# Patient Record
Sex: FEMALE | Race: WHITE
[De-identification: names, ages, dates, MRNs, and addresses within clinical notes are randomized per-mention and may not be internally consistent; named-entity substitution may affect disease eponyms.]

---

## 2017-09-01 ENCOUNTER — HOSPITAL ENCOUNTER (INPATIENT)
Dept: HOSPITAL 17 - NEPC | Age: 62
LOS: 22 days | Discharge: HOME | DRG: 545 | End: 2017-09-23
Attending: HOSPITALIST | Admitting: HOSPITALIST
Payer: SELF-PAY

## 2017-09-01 VITALS
RESPIRATION RATE: 16 BRPM | DIASTOLIC BLOOD PRESSURE: 80 MMHG | HEART RATE: 58 BPM | OXYGEN SATURATION: 97 % | SYSTOLIC BLOOD PRESSURE: 165 MMHG

## 2017-09-01 VITALS
SYSTOLIC BLOOD PRESSURE: 165 MMHG | OXYGEN SATURATION: 97 % | DIASTOLIC BLOOD PRESSURE: 80 MMHG | HEART RATE: 62 BPM | RESPIRATION RATE: 14 BRPM

## 2017-09-01 VITALS — HEART RATE: 61 BPM | OXYGEN SATURATION: 98 % | RESPIRATION RATE: 14 BRPM

## 2017-09-01 VITALS
RESPIRATION RATE: 16 BRPM | OXYGEN SATURATION: 95 % | SYSTOLIC BLOOD PRESSURE: 142 MMHG | DIASTOLIC BLOOD PRESSURE: 68 MMHG | HEART RATE: 58 BPM

## 2017-09-01 VITALS — BODY MASS INDEX: 42.58 KG/M2 | HEIGHT: 59 IN | WEIGHT: 211.2 LBS

## 2017-09-01 DIAGNOSIS — G47.00: ICD-10-CM

## 2017-09-01 DIAGNOSIS — R79.1: ICD-10-CM

## 2017-09-01 DIAGNOSIS — Z88.6: ICD-10-CM

## 2017-09-01 DIAGNOSIS — I50.9: ICD-10-CM

## 2017-09-01 DIAGNOSIS — J44.9: ICD-10-CM

## 2017-09-01 DIAGNOSIS — Z87.11: ICD-10-CM

## 2017-09-01 DIAGNOSIS — F32.9: ICD-10-CM

## 2017-09-01 DIAGNOSIS — M32.14: Primary | ICD-10-CM

## 2017-09-01 DIAGNOSIS — F43.23: ICD-10-CM

## 2017-09-01 DIAGNOSIS — I25.10: ICD-10-CM

## 2017-09-01 DIAGNOSIS — H91.93: ICD-10-CM

## 2017-09-01 DIAGNOSIS — K74.60: ICD-10-CM

## 2017-09-01 DIAGNOSIS — I13.0: ICD-10-CM

## 2017-09-01 DIAGNOSIS — Z91.011: ICD-10-CM

## 2017-09-01 DIAGNOSIS — K59.00: ICD-10-CM

## 2017-09-01 DIAGNOSIS — K29.50: ICD-10-CM

## 2017-09-01 DIAGNOSIS — N18.9: ICD-10-CM

## 2017-09-01 DIAGNOSIS — E03.9: ICD-10-CM

## 2017-09-01 DIAGNOSIS — M19.90: ICD-10-CM

## 2017-09-01 DIAGNOSIS — K85.90: ICD-10-CM

## 2017-09-01 DIAGNOSIS — K22.2: ICD-10-CM

## 2017-09-01 DIAGNOSIS — E11.22: ICD-10-CM

## 2017-09-01 DIAGNOSIS — I25.2: ICD-10-CM

## 2017-09-01 DIAGNOSIS — K75.4: ICD-10-CM

## 2017-09-01 DIAGNOSIS — K70.9: ICD-10-CM

## 2017-09-01 DIAGNOSIS — N20.0: ICD-10-CM

## 2017-09-01 DIAGNOSIS — Z86.73: ICD-10-CM

## 2017-09-01 DIAGNOSIS — N17.9: ICD-10-CM

## 2017-09-01 LAB
ANION GAP SERPL CALC-SCNC: 6 MEQ/L (ref 5–15)
APTT BLD: 34 SEC (ref 24.3–30.1)
BASOPHILS # BLD AUTO: 0.1 TH/MM3 (ref 0–0.2)
BASOPHILS NFR BLD: 1 % (ref 0–2)
BUN SERPL-MCNC: 31 MG/DL (ref 7–18)
CHLORIDE SERPL-SCNC: 102 MEQ/L (ref 98–107)
CK MB SERPL-MCNC: 3.6 NG/ML (ref 0.5–3.6)
CK SERPL-CCNC: 205 U/L (ref 26–192)
EOSINOPHIL # BLD: 0.2 TH/MM3 (ref 0–0.4)
EOSINOPHIL NFR BLD: 2.9 % (ref 0–4)
ERYTHROCYTE [DISTWIDTH] IN BLOOD BY AUTOMATED COUNT: 15.9 % (ref 11.6–17.2)
GFR SERPLBLD BASED ON 1.73 SQ M-ARVRAT: 14 ML/MIN (ref 89–?)
HCO3 BLD-SCNC: 26.4 MEQ/L (ref 21–32)
HCT VFR BLD CALC: 30.6 % (ref 35–46)
HEMO FLAGS: (no result)
INR PPP: 1 RATIO
LYMPHOCYTES # BLD AUTO: 2.8 TH/MM3 (ref 1–4.8)
LYMPHOCYTES NFR BLD AUTO: 46.9 % (ref 9–44)
MAGNESIUM SERPL-MCNC: 1.9 MG/DL (ref 1.5–2.5)
MCH RBC QN AUTO: 31.5 PG (ref 27–34)
MCHC RBC AUTO-ENTMCNC: 33.7 % (ref 32–36)
MCV RBC AUTO: 93.5 FL (ref 80–100)
MONOCYTES NFR BLD: 8.5 % (ref 0–8)
NEUTROPHILS # BLD AUTO: 2.5 TH/MM3 (ref 1.8–7.7)
NEUTROPHILS NFR BLD AUTO: 40.7 % (ref 16–70)
PLATELET # BLD: 229 TH/MM3 (ref 150–450)
POTASSIUM SERPL-SCNC: 4 MEQ/L (ref 3.5–5.1)
PROTHROMBIN TIME: 11.6 SEC (ref 9.8–11.6)
RBC # BLD AUTO: 3.27 MIL/MM3 (ref 4–5.3)
SODIUM SERPL-SCNC: 134 MEQ/L (ref 136–145)
WBC # BLD AUTO: 6 TH/MM3 (ref 4–11)

## 2017-09-01 PROCEDURE — 80053 COMPREHEN METABOLIC PANEL: CPT

## 2017-09-01 PROCEDURE — 82728 ASSAY OF FERRITIN: CPT

## 2017-09-01 PROCEDURE — 83036 HEMOGLOBIN GLYCOSYLATED A1C: CPT

## 2017-09-01 PROCEDURE — 82784 ASSAY IGA/IGD/IGG/IGM EACH: CPT

## 2017-09-01 PROCEDURE — 83516 IMMUNOASSAY NONANTIBODY: CPT

## 2017-09-01 PROCEDURE — 82787 IGG 1 2 3 OR 4 EACH: CPT

## 2017-09-01 PROCEDURE — 94664 DEMO&/EVAL PT USE INHALER: CPT

## 2017-09-01 PROCEDURE — 86376 MICROSOMAL ANTIBODY EACH: CPT

## 2017-09-01 PROCEDURE — 76937 US GUIDE VASCULAR ACCESS: CPT

## 2017-09-01 PROCEDURE — 96375 TX/PRO/DX INJ NEW DRUG ADDON: CPT

## 2017-09-01 PROCEDURE — 96374 THER/PROPH/DIAG INJ IV PUSH: CPT

## 2017-09-01 PROCEDURE — 83520 IMMUNOASSAY QUANT NOS NONAB: CPT

## 2017-09-01 PROCEDURE — 85027 COMPLETE CBC AUTOMATED: CPT

## 2017-09-01 PROCEDURE — 82948 REAGENT STRIP/BLOOD GLUCOSE: CPT

## 2017-09-01 PROCEDURE — 83690 ASSAY OF LIPASE: CPT

## 2017-09-01 PROCEDURE — 77012 CT SCAN FOR NEEDLE BIOPSY: CPT

## 2017-09-01 PROCEDURE — C1769 GUIDE WIRE: HCPCS

## 2017-09-01 PROCEDURE — 86255 FLUORESCENT ANTIBODY SCREEN: CPT

## 2017-09-01 PROCEDURE — 82390 ASSAY OF CERULOPLASMIN: CPT

## 2017-09-01 PROCEDURE — 86160 COMPLEMENT ANTIGEN: CPT

## 2017-09-01 PROCEDURE — 81001 URINALYSIS AUTO W/SCOPE: CPT

## 2017-09-01 PROCEDURE — 80307 DRUG TEST PRSMV CHEM ANLYZR: CPT

## 2017-09-01 PROCEDURE — 83540 ASSAY OF IRON: CPT

## 2017-09-01 PROCEDURE — A9540 TC99M MAA: HCPCS

## 2017-09-01 PROCEDURE — 85025 COMPLETE CBC W/AUTO DIFF WBC: CPT

## 2017-09-01 PROCEDURE — 86021 WBC ANTIBODY IDENTIFICATION: CPT

## 2017-09-01 PROCEDURE — 84443 ASSAY THYROID STIM HORMONE: CPT

## 2017-09-01 PROCEDURE — 85610 PROTHROMBIN TIME: CPT

## 2017-09-01 PROCEDURE — 82105 ALPHA-FETOPROTEIN SERUM: CPT

## 2017-09-01 PROCEDURE — 93005 ELECTROCARDIOGRAM TRACING: CPT

## 2017-09-01 PROCEDURE — 85730 THROMBOPLASTIN TIME PARTIAL: CPT

## 2017-09-01 PROCEDURE — 85379 FIBRIN DEGRADATION QUANT: CPT

## 2017-09-01 PROCEDURE — 85652 RBC SED RATE AUTOMATED: CPT

## 2017-09-01 PROCEDURE — 84439 ASSAY OF FREE THYROXINE: CPT

## 2017-09-01 PROCEDURE — 70450 CT HEAD/BRAIN W/O DYE: CPT

## 2017-09-01 PROCEDURE — A9567 TECHNETIUM TC-99M AEROSOL: HCPCS

## 2017-09-01 PROCEDURE — 83550 IRON BINDING TEST: CPT

## 2017-09-01 PROCEDURE — 82552 ASSAY OF CPK IN BLOOD: CPT

## 2017-09-01 PROCEDURE — 82550 ASSAY OF CK (CPK): CPT

## 2017-09-01 PROCEDURE — 88305 TISSUE EXAM BY PATHOLOGIST: CPT

## 2017-09-01 PROCEDURE — 86225 DNA ANTIBODY NATIVE: CPT

## 2017-09-01 PROCEDURE — 71010: CPT

## 2017-09-01 PROCEDURE — 88312 SPECIAL STAINS GROUP 1: CPT

## 2017-09-01 PROCEDURE — 74176 CT ABD & PELVIS W/O CONTRAST: CPT

## 2017-09-01 PROCEDURE — 80048 BASIC METABOLIC PNL TOTAL CA: CPT

## 2017-09-01 PROCEDURE — 86038 ANTINUCLEAR ANTIBODIES: CPT

## 2017-09-01 PROCEDURE — 82947 ASSAY GLUCOSE BLOOD QUANT: CPT

## 2017-09-01 PROCEDURE — 84484 ASSAY OF TROPONIN QUANT: CPT

## 2017-09-01 PROCEDURE — 86039 ANTINUCLEAR ANTIBODIES (ANA): CPT

## 2017-09-01 PROCEDURE — 87086 URINE CULTURE/COLONY COUNT: CPT

## 2017-09-01 PROCEDURE — 78582 LUNG VENTILAT&PERFUS IMAGING: CPT

## 2017-09-01 PROCEDURE — 76705 ECHO EXAM OF ABDOMEN: CPT

## 2017-09-01 PROCEDURE — 86140 C-REACTIVE PROTEIN: CPT

## 2017-09-01 PROCEDURE — 50200 RENAL BIOPSY PERQ: CPT

## 2017-09-01 PROCEDURE — 76775 US EXAM ABDO BACK WALL LIM: CPT

## 2017-09-01 PROCEDURE — 83735 ASSAY OF MAGNESIUM: CPT

## 2017-09-01 PROCEDURE — 80074 ACUTE HEPATITIS PANEL: CPT

## 2017-09-01 PROCEDURE — 94640 AIRWAY INHALATION TREATMENT: CPT

## 2017-09-01 PROCEDURE — 80061 LIPID PANEL: CPT

## 2017-09-01 NOTE — RADRPT
EXAM DATE/TIME:  09/01/2017 22:32 

 

[facility] COMPARISON:     

CHEST SINGLE AP, June 03, 2016, 16:28.

 

                     

INDICATIONS :     

Chest pain and shortness of breath.

                     

 

MEDICAL HISTORY :     

Chronic obstructive pulmonary disease.  Diabetes mellitus type II.        

 

SURGICAL HISTORY :     

None.   

 

ENCOUNTER:     

Initial                                        

 

ACUITY:     

2 days      

 

PAIN SCORE:     

10/10

 

LOCATION:     

Bilateral chest 

 

FINDINGS:     

A single view of the chest demonstrates minimal patchy areas of density at the lateral bases bilatera
lly being more prominent left. The upper lungs are relatively clear. No effusion is seen. The cardiom
ediastinal contours are unremarkable.  Osseous structures are intact.

 

CONCLUSION:     

Minimal patchy areas of consolidation or atelectasis at the lateral bases.

 

 

 

 Scooter Tatum MD on September 01, 2017 at 23:43           

Board Certified Radiologist.

 This report was verified electronically.

## 2017-09-01 NOTE — PD
HPI


Chief Complaint:  Chest Pain


Time Seen by Provider:  22:17


Travel History


International Travel<30 days:  No


Contact w/Intl Traveler<30days:  No


Traveled to known affect area:  No





History of Present Illness


HPI


62-year-old female that presents to the ED for evaluation of chest pain.  

Patient developed chest pain since yesterday.  Per patient is become more 

severe and more painful.  Per patient he comes and goes.  Per patient the pain 

is 7 out of 10.  She denies any recent travel.  No injury.  Gets short of 

breath when it happens.  Per patient he last for a couple seconds but is 

progressively getting more and more consistent.  She states that she has a 

history of MI in the past.  She denies having any stents or bypass.  She has no 

cartilage is in the area.  She has any drugs or alcohol.  She takes no 

medications.  She has any problems with diabetes or high blood pressure.  She 

has an allergy to milk.





PFSH


Past Medical History


Hx Anticoagulant Therapy:  No


Blood Disorders:  No


Depression:  Yes


Heart Rhythm Problems:  Yes


Cancer:  No


Cardiovascular Problems:  Yes (HTN)


Chemotherapy:  No


Chest Pain:  Yes (ENLARGED HEART)


Congestive Heart Failure:  Yes


COPD:  Yes


Cerebrovascular Accident:  Yes


Diabetes:  Yes (pt stated she used to have DM)


Patient Takes Glucophage:  No


Diminished Hearing:  Yes (ana luisa but right side is worse )


Endocrine:  No


Genitourinary:  Yes (UTI'S)


Headaches:  No


Hypertension:  Yes


Kidney Stones:  No


Musculoskeletal:  Yes (BONES ACHE)


Psychiatric:  No


Reproductive:  No


Respiratory:  Yes (COPD,SOB)


Pneumonia:  Yes


Radiation Therapy:  No


Seizures:  No


Thyroid Disease:  Yes (HYPOTHYROID)


Menopausal:  Yes


Tubal Ligation:  Yes





Past Surgical History


Appendectomy:  Yes


 Section:  Yes


Cholecystectomy:  Yes


Hysterectomy:  Yes


Other Surgery:  Yes





Social History


Alcohol Use:  No


Tobacco Use:  No


Substance Use:  No





Allergies-Medications


(Allergen,Severity, Reaction):  


Coded Allergies:  


     milk (Unverified  Allergy, Mild, Cramping, 8/15/17)


 lactose intolerant


Reported Meds & Prescriptions





Reported Meds & Active Scripts


Active


Losartan (Losartan Potassium) 50 Mg Tab 50 Mg PO DAILY


Advair Diskus Inh (Fluticasone-Salmeterol Inh) 250-50 Mcg/Blist Aer 1 Puff INH 

BID


     Rinse mouth after use.


Ventolin Hfa 18 GM Inh (Albuterol Sulfate) 90 Mcg/Act Aer 2 Puff INH Q4-6H PRN


Reported


Synthroid (Levothyroxine Sodium) 150 Mcg Tab 150 Mcg PO DAILY








Review of Systems


Except as stated in HPI:  all other systems reviewed are Neg





Physical Exam


Narrative


GENERAL: 


SKIN: Warm and dry.


HEAD: Atraumatic. Normocephalic. 


EYES: Pupils equal and round. No scleral icterus. No injection or drainage. 


ENT: No nasal bleeding or discharge.  Mucous membranes pink and moist.  Tongue 

is midline.  No uvula deviation.


NECK: Trachea midline. No JVD. 


CARDIOVASCULAR: Regular rate and rhythm.  No murmurs, S3, S4.


RESPIRATORY: No accessory muscle use. Clear to auscultation. Breath sounds 

equal bilaterally. 


GASTROINTESTINAL: Abdomen soft, non-tender, nondistended. Hepatic and splenic 

margins not palpable. 


MUSCULOSKELETAL: Extremities without clubbing, cyanosis, or edema. No obvious 

deformities.  Full range of motion of the upper and lower extremities 

bilaterally.  2+ pulses bilaterally.


NEUROLOGICAL: Awake and alert. No obvious cranial nerve deficits.  Motor 

grossly within normal limits. Five out of 5 muscle strength in the arms and 

legs.  Normal speech.


PSYCHIATRIC: Appropriate mood and affect; insight and judgment normal.





Data


Data


Last Documented VS





Vital Signs








  Date Time  Temp Pulse Resp B/P (MAP) Pulse Ox O2 Delivery O2 Flow Rate FiO2


 


17 22:15  57   95 Room Air  


 


17 22:15   14    2.00 


 


17 22:08    142/68 (92)    








Orders





 Orders


Electrocardiogram (17 22:17)


Basic Metabolic Panel (Bmp) (17 22:17)


Ckmb (Isoenzyme) Profile (17 22:17)


Complete Blood Count With Diff (17 22:17)


D-Dimer (17 22:17)


Magnesium (Mg) (17 22:17)


Prothrombin Time / Inr (Pt) (17 22:17)


Act Partial Throm Time (Ptt) (17 22:17)


Troponin I (17 22:17)


Lipase (17 22:17)


Chest, Single Ap (17 22:17)


Ecg Monitoring (17 22:17)


Bilateral Bp Monitoring (17 22:17)


Iv Access Insert/Monitor (17 22:17)


Oximetry (17 22:17)


Sodium Chloride 0.9% Flush (Ns Flush) (17 22:30)


Nitroglycerin Sl (Nitrostat Sl) (17 22:30)


Morphine Inj (Morphine Inj) (17 22:30)


Ondansetron Inj (Zofran Inj) (17 22:30)








MDM


Medical Decision Making


Medical Screen Exam Complete:  Yes


Emergency Medical Condition:  Yes


Medical Record Reviewed:  Yes


Differential Diagnosis


Chest pain versus a typical chest pain versus PE versus ACS versus gastritis


Narrative Course


62-year-old female that presents to the ED for evaluation of chest pain.  

Patient was properly examined and was found to have signs and symptoms 

consistent appears to be chest pain.  Unclear etiology.  Labs and imaging 

ordered.  Case signed out to my attending pending dispo and treatment plan.











Rodriguez Kate Sep 1, 2017 22:26

## 2017-09-02 VITALS
TEMPERATURE: 97.6 F | RESPIRATION RATE: 18 BRPM | DIASTOLIC BLOOD PRESSURE: 87 MMHG | SYSTOLIC BLOOD PRESSURE: 178 MMHG | HEART RATE: 67 BPM | OXYGEN SATURATION: 95 %

## 2017-09-02 VITALS
DIASTOLIC BLOOD PRESSURE: 84 MMHG | TEMPERATURE: 97.8 F | RESPIRATION RATE: 16 BRPM | OXYGEN SATURATION: 93 % | HEART RATE: 53 BPM | SYSTOLIC BLOOD PRESSURE: 179 MMHG

## 2017-09-02 VITALS
DIASTOLIC BLOOD PRESSURE: 86 MMHG | OXYGEN SATURATION: 94 % | SYSTOLIC BLOOD PRESSURE: 151 MMHG | HEART RATE: 63 BPM | TEMPERATURE: 96.7 F | RESPIRATION RATE: 18 BRPM

## 2017-09-02 VITALS
RESPIRATION RATE: 18 BRPM | HEART RATE: 62 BPM | DIASTOLIC BLOOD PRESSURE: 93 MMHG | SYSTOLIC BLOOD PRESSURE: 180 MMHG | TEMPERATURE: 96.4 F | OXYGEN SATURATION: 95 %

## 2017-09-02 VITALS — DIASTOLIC BLOOD PRESSURE: 79 MMHG | SYSTOLIC BLOOD PRESSURE: 156 MMHG

## 2017-09-02 VITALS — OXYGEN SATURATION: 96 %

## 2017-09-02 VITALS
HEART RATE: 50 BPM | DIASTOLIC BLOOD PRESSURE: 68 MMHG | SYSTOLIC BLOOD PRESSURE: 141 MMHG | TEMPERATURE: 96.7 F | OXYGEN SATURATION: 97 % | RESPIRATION RATE: 16 BRPM

## 2017-09-02 VITALS — OXYGEN SATURATION: 92 %

## 2017-09-02 VITALS — HEART RATE: 52 BPM

## 2017-09-02 VITALS — OXYGEN SATURATION: 93 %

## 2017-09-02 LAB
ALP SERPL-CCNC: 150 U/L (ref 45–117)
ALT SERPL-CCNC: 35 U/L (ref 10–53)
ANION GAP SERPL CALC-SCNC: 5 MEQ/L (ref 5–15)
AST SERPL-CCNC: 68 U/L (ref 15–37)
BASOPHILS # BLD AUTO: 0.1 TH/MM3 (ref 0–0.2)
BASOPHILS NFR BLD: 1.5 % (ref 0–2)
BILIRUB SERPL-MCNC: 0.3 MG/DL (ref 0.2–1)
BUN SERPL-MCNC: 30 MG/DL (ref 7–18)
CHLORIDE SERPL-SCNC: 104 MEQ/L (ref 98–107)
CK MB SERPL-MCNC: 6.5 NG/ML (ref 0.5–3.6)
CK SERPL-CCNC: 186 U/L (ref 26–192)
CK SERPL-CCNC: 244 U/L (ref 26–192)
COLOR UR: YELLOW
COMMENT (UR): (no result)
CULTURE IF INDICATED: (no result)
EOSINOPHIL # BLD: 0.2 TH/MM3 (ref 0–0.4)
EOSINOPHIL NFR BLD: 3.2 % (ref 0–4)
ERYTHROCYTE [DISTWIDTH] IN BLOOD BY AUTOMATED COUNT: 16.1 % (ref 11.6–17.2)
GFR SERPLBLD BASED ON 1.73 SQ M-ARVRAT: 15 ML/MIN (ref 89–?)
GLUCOSE UR STRIP-MCNC: (no result) MG/DL
HCO3 BLD-SCNC: 25.4 MEQ/L (ref 21–32)
HCT VFR BLD CALC: 30.4 % (ref 35–46)
HDLC SERPL-MCNC: 46.5 MG/DL (ref 40–60)
HEMO FLAGS: (no result)
HEMOGLOBIN A1A: 1.2 %
HEMOGLOBIN A1B: 1.5 %
HEMOGLOBIN AO: 86.1 %
HEMOGLOBIN LA1C: 1.8 %
HEMOGLOBIN P3: 3.5 %
HGB UR QL STRIP: (no result)
KETONES UR STRIP-MCNC: (no result) MG/DL
LDLC SERPL-MCNC: 95 MG/DL (ref 0–99)
LYMPHOCYTES # BLD AUTO: 2 TH/MM3 (ref 1–4.8)
LYMPHOCYTES NFR BLD AUTO: 40.6 % (ref 9–44)
MCH RBC QN AUTO: 31.2 PG (ref 27–34)
MCHC RBC AUTO-ENTMCNC: 32.7 % (ref 32–36)
MCV RBC AUTO: 95.5 FL (ref 80–100)
MONOCYTES NFR BLD: 8 % (ref 0–8)
NEUTROPHILS # BLD AUTO: 2.2 TH/MM3 (ref 1.8–7.7)
NEUTROPHILS NFR BLD AUTO: 46.7 % (ref 16–70)
NITRITE UR QL STRIP: (no result)
PLATELET # BLD: 219 TH/MM3 (ref 150–450)
POTASSIUM SERPL-SCNC: 4.1 MEQ/L (ref 3.5–5.1)
RBC # BLD AUTO: 3.19 MIL/MM3 (ref 4–5.3)
SODIUM SERPL-SCNC: 134 MEQ/L (ref 136–145)
SP GR UR STRIP: 1.01 (ref 1–1.03)
SQUAMOUS #/AREA URNS HPF: <1 /HPF (ref 0–5)
WBC # BLD AUTO: 4.8 TH/MM3 (ref 4–11)

## 2017-09-02 RX ADMIN — PHENYTOIN SODIUM SCH MLS/HR: 50 INJECTION INTRAMUSCULAR; INTRAVENOUS at 03:18

## 2017-09-02 RX ADMIN — PHENYTOIN SODIUM SCH MLS/HR: 50 INJECTION INTRAMUSCULAR; INTRAVENOUS at 11:59

## 2017-09-02 RX ADMIN — Medication SCH ML: at 19:23

## 2017-09-02 RX ADMIN — STANDARDIZED SENNA CONCENTRATE AND DOCUSATE SODIUM SCH TAB: 8.6; 5 TABLET, FILM COATED ORAL at 19:23

## 2017-09-02 RX ADMIN — FAMOTIDINE SCH MG: 10 INJECTION, SOLUTION INTRAVENOUS at 16:12

## 2017-09-02 RX ADMIN — HYDROCODONE BITARTRATE AND ACETAMINOPHEN PRN TAB: 5; 325 TABLET ORAL at 21:15

## 2017-09-02 RX ADMIN — Medication SCH ML: at 10:24

## 2017-09-02 RX ADMIN — IPRATROPIUM BROMIDE AND ALBUTEROL SULFATE SCH AMPULE: .5; 3 SOLUTION RESPIRATORY (INHALATION) at 19:50

## 2017-09-02 RX ADMIN — STANDARDIZED SENNA CONCENTRATE AND DOCUSATE SODIUM SCH TAB: 8.6; 5 TABLET, FILM COATED ORAL at 10:24

## 2017-09-02 RX ADMIN — MORPHINE SULFATE PRN MG: 2 INJECTION, SOLUTION INTRAMUSCULAR; INTRAVENOUS at 16:13

## 2017-09-02 RX ADMIN — MORPHINE SULFATE PRN MG: 2 INJECTION, SOLUTION INTRAMUSCULAR; INTRAVENOUS at 03:19

## 2017-09-02 RX ADMIN — PHENYTOIN SODIUM SCH MLS/HR: 50 INJECTION INTRAMUSCULAR; INTRAVENOUS at 19:24

## 2017-09-02 RX ADMIN — MAGNESIUM HYDROXIDE PRN ML: 400 SUSPENSION ORAL at 19:23

## 2017-09-02 RX ADMIN — FAMOTIDINE SCH MG: 10 INJECTION, SOLUTION INTRAVENOUS at 03:19

## 2017-09-02 RX ADMIN — IPRATROPIUM BROMIDE AND ALBUTEROL SULFATE SCH AMPULE: .5; 3 SOLUTION RESPIRATORY (INHALATION) at 11:42

## 2017-09-02 RX ADMIN — IPRATROPIUM BROMIDE AND ALBUTEROL SULFATE SCH AMPULE: .5; 3 SOLUTION RESPIRATORY (INHALATION) at 15:41

## 2017-09-02 NOTE — HHI.HP
__________________________________________________





[facility]


Service


[facility]


Primary Care Physician


Unknown


Admission Diagnosis





pancreatitis; h/o CAD; chest pain


Diagnoses:  


(1) Pancreatitis


Diagnosis:  Principal





(2) Chest pain


Diagnosis:  Principal





(3) VALENTIN (acute kidney injury)


Diagnosis:  Principal





(4) Elevated d-dimer


Diagnosis:  Principal





(5) Rhabdomyolysis


Diagnosis:  Principal





Travel History


International Travel<30 Days:  No


Contact w/Intl Traveler <30 Da:  No


Traveled to Known Affected Are:  No


History of Present Illness


This is a 62-year-old female with a PMH of HTN, CHF (Unknown EF), COPD, 

Depression and h/o DM who presented to the ER w/ complaints of chest pain x1 

day.  States pain is intermittent, pressure-like and non-radiating.  Denies 

fever, chills, cough or sick contacts.  On arrival, /68, HR 58, O2 sat 95

% on RA, Afebrile.  CBC is essentially unremarkable.  Creatinine 3.25, 

previously 0.99 on 16.  .  Troponin negative.  Lipase 732.  INR 

1.0.  D-dimer 1.77.  V/Q pending.  While in ER, pt w/ complaints of 

intermittent weakness RUE and RLE, now resolved.  CT Head w/ no acute findings.

  CXR w/ minimal patchy areas of consolidation/atelectasis.  CT Abd/Pelvis w/ 

no acute findings.





Review of Systems


Except as stated in HPI:  all other systems reviewed are Neg


ROS: 14 point review of systems otherwise negative.





Past Family Social History


Past Medical History


PMH:  HTN, CHF (Unknown EF), COPD, Depression and h/o DM


Past Surgical History


PAST SURGICAL HISTORY:  Appendectomy, Cholecystectomy, , Hysterectomy


Allergies:  


Coded Allergies:  


     milk (Unverified  Allergy, Mild, Cramping, 8/15/17)


 lactose intolerant


Family History


PAST FAMILY HISTORY:  Reviewed.  No h/o DM or CAD


Social History


PAST SOCIAL HISTORY:  History of alcohol abuse per records, however denies.  

Negative for tobacco or drugs.





Physical Exam


Vital Signs





Vital Signs








  Date Time  Temp Pulse Resp B/P (MAP) Pulse Ox O2 Delivery O2 Flow Rate FiO2


 


9/1/17 22:55  62 14 165/80 (108) 97 Nasal Cannula 2.00 


 


17 22:22  58 16 165/80 (108) 97 Nasal Cannula 2.00 


 


17 22:15  57   95 Room Air  


 


17 22:15  61 14  98 Nasal Cannula 2.00 


 


17 22:08  58 16 142/68 (92) 95   








Physical Exam


PE:


GENERAL: Middle-aged female in no acute distress.


HEENT: PERRLA, EOMI. No scleral icterus or conjunctival pallor. No lid lag or 

facial droop.  


CARDIOVASCULAR: Regular rate and rhythm.  No obvious murmurs to auscultation. 

No chest tenderness to palpation. 


RESPIRATORY: No obvious rhonchi or wheezing. Clear to auscultation. Breath 

sounds equal bilaterally. 


GASTROINTESTINAL: Abdomen soft, mild epigastric tenderness to palpation, 

nondistended. BS normal. 


MUSCULOSKELETAL: Extremities without clubbing, cyanosis, or edema. No obvious 

deformities. 


NEUROLOGICAL: Awake, alert and oriented x4. No focal neurologic deficits. 

Moving both upper and lower extremities spontaneously.


Laboratory





Laboratory Tests








Test


  17


22:25


 


White Blood Count 6.0 


 


Red Blood Count 3.27 


 


Hemoglobin 10.3 


 


Hematocrit 30.6 


 


Mean Corpuscular Volume 93.5 


 


Mean Corpuscular Hemoglobin 31.5 


 


Mean Corpuscular Hemoglobin


Concent 33.7 


 


 


Red Cell Distribution Width 15.9 


 


Platelet Count 229 


 


Mean Platelet Volume 7.5 


 


Neutrophils (%) (Auto) 40.7 


 


Lymphocytes (%) (Auto) 46.9 


 


Monocytes (%) (Auto) 8.5 


 


Eosinophils (%) (Auto) 2.9 


 


Basophils (%) (Auto) 1.0 


 


Neutrophils # (Auto) 2.5 


 


Lymphocytes # (Auto) 2.8 


 


Monocytes # (Auto) 0.5 


 


Eosinophils # (Auto) 0.2 


 


Basophils # (Auto) 0.1 


 


CBC Comment DIFF FINAL 


 


Differential Comment  


 


Prothrombin Time 11.6 


 


Prothromb Time International


Ratio 1.0 


 


 


Activated Partial


Thromboplast Time 34.0 


 


 


D-Dimer Quantitative (PE/DVT) 1.77 


 


Blood Urea Nitrogen 31 


 


Creatinine 3.25 


 


Random Glucose 93 


 


Calcium Level 9.4 


 


Magnesium Level 1.9 


 


Sodium Level 134 


 


Potassium Level 4.0 


 


Chloride Level 102 


 


Carbon Dioxide Level 26.4 


 


Anion Gap 6 


 


Estimat Glomerular Filtration


Rate 14 


 


 


Total Creatine Kinase 205 


 


Creatine Kinase MB 3.6 


 


Creatine Kinase MB % 1.8 


 


Troponin I LESS THAN 0.02 


 


Lipase 732 








Result Diagram:  


17








Caprini VTE Risk Assessment


Caprini VTE Risk Assessment:  Mod/High Risk (score >= 2)


Caprini Risk Assessment Model











 Point Value = 1          Point Value = 2  Point Value = 3  Point Value = 5


 


Age 41-60


Minor surgery


BMI > 25 kg/m2


Swollen legs


Varicose veins


Pregnancy or postpartum


History of unexplained or recurrent


   spontaneous 


Oral contraceptives or hormone


   replacement


Sepsis (< 1 month)


Serious lung disease, including


   pneumonia (< 1 month)


Abnormal pulmonary function


Acute myocardial infarction


Congestive heart failure (< 1 month)


History of inflammatory bowel disease


Medical patient at bed rest Age 61-74


Arthroscopic surgery


Major open surgery (> 45 min)


Laparoscopic surgery (> 45 min)


Malignancy


Confined to bed (> 72 hours)


Immobilizing plaster cast


Central venous access Age >= 75


History of VTE


Family history of VTE


Factor V Leiden


Prothrombin 99261I


Lupus anticoagulant


Anticardiolipin antibodies


Elevated serum homocysteine


Heparin-induced thrombocytopenia


Other congenital or acquired


   thrombophilia Stroke (< 1 month)


Elective arthroplasty


Hip, pelvis, or leg fracture


Acute spinal cord injury (< 1 month)








Prophylaxis Regimen











   Total Risk


Factor Score Risk Level Prophylaxis Regimen


 


0-1      Low Early ambulation


 


2 Moderate Order ONE of the following:


*Sequential Compression Device (SCD)


*Heparin 5000 units SQ BID


 


3-4 Higher Order ONE of the following medications:


*Heparin 5000 units SQ TID


*Enoxaparin/Lovenox 40 mg SQ daily (WT < 150 kg, CrCl > 30 mL/min)


*Enoxaparin/Lovenox 30 mg SQ daily (WT < 150 kg, CrCl > 10-29 mL/min)


*Enoxaparin/Lovenox 30 mg SQ BID (WT < 150 kg, CrCl > 30 mL/min)


AND/OR


*Sequential Compression Device (SCD)


 


5 or more Highest Order ONE of the following medications:


*Heparin 5000 units SQ TID (Preferred with Epidurals)


*Enoxaparin/Lovenox 40 mg SQ daily (WT < 150 kg, CrCl > 30 mL/min)


*Enoxaparin/Lovenox 30 mg SQ daily (WT < 150 kg, CrCl > 10-29 mL/min)


*Enoxaparin/Lovenox 30 mg SQ BID (WT < 150 kg, CrCl > 30 mL/min)


AND


*Sequential Compression Device (SCD)











Assessment and Plan


Problem List:  


(1) Pancreatitis


ICD Code:  K85.90 - Acute pancreatitis without necrosis or infection, 

unspecified


Status:  Acute


(2) Chest pain


ICD Code:  R07.9 - Chest pain, unspecified


Status:  Acute


(3) VALENTIN (acute kidney injury)


ICD Code:  N17.9 - Acute kidney failure, unspecified


(4) Rhabdomyolysis


ICD Code:  M62.82 - Rhabdomyolysis


(5) Elevated d-dimer


ICD Code:  R79.89 - Other specified abnormal findings of blood chemistry


Assessment and Plan


A/P:


1.  Pancreatitis:  Mild.  Lipase 732, CT Abd/Pelvis w/ no acute findings, 

images reviewed by me.  Protonix IV, Diet as tolerated, analgesics/antiemetics 

as needed, check repeat Lipase in am.  Check Lipid Profile, TSH and Hgb A1c. 


2.  Chest Pain:  Likely secondary to pancreatitis, initial trop negative, EKG w

/ no acute findings.  Reports ALLERGY to ASA, will hold.  Hold Statin in light 

of acute pancreatitis.  Hold B-blocker in light of bradycardia.  Check serial 

cardiac enzymes, telemetry, check Lipid Profile, TSH, Hgb A1c.  NTG/Morphine as 

needed. 


3.  VALENTIN:  Creatinine 3.25, previously 0.99 on 16.  Check U/a, Urine Drug 

Screen.  IVF for hydration, repeat labs in am.  Monitor I/O.  Check Renal US. 


4.  Rhabdomyolysis:  Mild.  .  Check serial CPK.  IVF, Check U/a and UDS 

as above. 


5.  Elevated D-Dimer:  Mild.  1.77.  V/Q Scan ordered in ER, currently pending, 

will follow. 


6.  DVT Prophylaxis:  SCD/Teds. 


7.  Social work for d/c planning as needed. 


8.  Case discussed w/ ER physician at length.





Physician Certification


2 Midnight Certification Type:  Admission for Inpatient Services


Order for Inpatient Services


The services are ordered in accordance with Medicare regulations or non-

Medicare payer requirements, as applicable.  In the case of services not 

specified as inpatient-only, they are appropriately provided as inpatient 

services in accordance with the 2-midnight benchmark.


Estimated LOS (days):  2


 days is the estimated time the patient will need to remain in the hospital, 

assuming treatment plan goals are met and no additional complications.


Post-Hospital Plan:  Not yet determined











Luh Campo MD Sep 2, 2017 02:24

## 2017-09-02 NOTE — RADRPT
EXAM DATE/TIME:  2017 08:50 

 

[facility] COMPARISON:     

CT ABDOMEN & PELVIS W/O CONTRAST, 2017, 0:32.

        

 

 

INDICATIONS :     

Increased BUN/Creatnine. 

                     

 

MEDICAL HISTORY :     

Hyperthyroidism. Stroke Congestive heart failure. Goiter. Hearing loss. Glasses. Numbness. Hypertensi
on. Hypercholestrolemia. Chest pain. Cardiomegaly. COPD. Pnemonia. Sleep apnea. Dyspnea. Ulcer. Urina
ry tract infection. Diabetes. Depression. Anxiety.  

 

SURGICAL HISTORY :     

Appendectomy. Cholecystectomy. Tubal ligation. Hysterectomy.  section. Left ankle surgery. 

 

ENCOUNTER:     

Subsequent

 

ACUITY:     

4-6 months

 

PAIN SCORE:     

5/10

 

LOCATION:     

Bilateral flank 

MEASUREMENTS:     

 

RIGHT KIDNEY:     

10.5 x 4.2 x 4.6  cm

 

LEFT KIDNEY:     

11.7 x 4.5 x 4.8  cm

 

FINDINGS:     

No there is no hydronephrosis. There are right renal staghorn calculi at the upper and lower poles un
changed. A 2.1 x 1.5 x 1.8 cm cyst is noted at the upper pole, simple in appearance. Right lower pole
 staghorn calculus measures 1.5 cm. 0.2 mm thin echogenic left mid to upper pole calculus seen, nonob
structing. No hydronephrosis. No mass. Bladder unremarkable.

 

CONCLUSION:     

Bilateral nonobstructing renal calculi. Right renal cyst.

 

 

 

 Cholo Gaytan MD on 2017 at 9:38           

Board Certified Radiologist.

 This report was verified electronically.

## 2017-09-02 NOTE — RADRPT
EXAM DATE/TIME:  09/02/2017 00:32 

 

[facility] COMPARISON:     

CT ABDOMEN & PELVIS W/O CONTRAST, September 13, 2015, 14:31.

 

 

INDICATIONS :     

Abdominal pain.

                  

 

ORAL CONTRAST:      

No oral contrast ingested.

                  

 

RADIATION DOSE:      

CTDIvol (mGy) 

 

 

MEDICAL HISTORY :     

Hypertension. Chronic obstructive pulmonary disease. Congestive heart failure.

 

SURGICAL HISTORY :      

Cholecystectomy. Appendectomy. Hysterectomy.

 

ENCOUNTER:      

Initial

 

ACUITY:      

1 day

 

PAIN SCALE:      

6/10

 

LOCATION:         

abdomen

 

TECHNIQUE:     

Volumetric scanning of the abdomen and pelvis was performed.  Using automated exposure control and ad
justment of the mA and/or kV according to patient size, radiation dose was kept as low as reasonably 
achievable to obtain optimal diagnostic quality images.  DICOM format image data is available electro
nically for review and comparison.  

 

FINDINGS:     

There is some patchy airspace disease at the lung bases which is nonspecific. Differential diagnosis 
includes bronchopneumonia and aspiration.

 

No acute findings within the liver. Liver has a slight nodular appearance most characteristic of cirr
hosis. Spleen within normal limits for size. Adrenals and pancreas unremarkable. Previous cholecystec
shelia. There are partial staghorn type calcifications in the upper and lower pole right kidney similar
 to prior exam at 2015. Tiny nonobstructing calculi present in upper pole left kidney. No hydronephro
sis.

 

No free fluid within the bowel obstruction. Adenopathy. No acute bony abnormality. Small fat-containi
ng hernia in the lower anterior abdominal wall on the right is stable.

 

CONCLUSION:     

1. No acute findings within the abdomen. Partial staghorn type calcifications upper and lower pole ri
ght kidney and tiny calculi upper pole left kidney. No bowel obstruction, free air, free fluid or obs
tructive uropathy.

2. Probable liver cirrhosis.

Previous cholecystectomy, appendectomy. 

 

 

 Michael Forrest MD on September 02, 2017 at 0:50           

Board Certified Radiologist.

 This report was verified electronically.

## 2017-09-02 NOTE — EKG
Date Performed: 09/01/2017       Time Performed: 22:13:02

 

PTAGE:      62 years

 

EKG:      SINUS BRADYCARDIA BORDERLINE ECG

 

PREVIOUS TRACING       : 12/27/2016 07.43

 

DOCTOR:   Cristian Dominguez  Interpretating Date/Time  09/02/2017 09:20:50

## 2017-09-02 NOTE — RADRPT
EXAM DATE/TIME:  09/02/2017 02:35 

 

[facility] COMPARISON:     

No previous studies available for comparison.

 

 

INDICATIONS :      

Dyspnea and chest pain with elevated d-dimer.

                       

 

DOSE:      

8.5 mCi Tc99m MAA IV 

                                           1.5 mCi Tc99m DTPA aerosol 

                       

                       

 

MEDICAL HISTORY :     

Hypothyroidism. Chronic obstructive pulmonary disease. Diabetes mellitus type 2. Myocardial infarctio
n.

 

SURGICAL HISTORY :      

Appendectomy.  Cholecystectomy. Hysterectomy. 

 

ENCOUNTER:     

Initial

 

ACUITY:     

3 days

 

PAIN SCALE:     

5/10

 

LOCATION:      

Left chest 

 

TECHNIQUE:     

Following five minutes of tidal breathing of DTPA aerosol, planar images of the lungs were performed 
in eight projections.  The patient was then injected with MAA, and eight-view perfusion scan was perf
ormed.  

 

FINDINGS:     

There is a homogeneous pattern of aerosol delivery to the periphery of both lungs except for some fidel
nting of the costophrenic angles.  No focal ventilatory defects are seen.

 

The perfusion lung scan demonstrates a homogenous pattern of uptake in both lungs.  No segmental or s
ubsegmental defects are seen. 

 

CONCLUSION:     

1. Low probability for pulmonary embolus.

 

 

 

 Michael Forrest MD on September 02, 2017 at 3:30           

Board Certified Radiologist.

 This report was verified electronically.

## 2017-09-02 NOTE — PD
Physical Exam


Date Seen by Provider:  Sep 1, 2017


Time Seen by Provider:  23:15


Narrative


Accepted in transfer of care





Data


Data


Last Documented VS





Vital Signs








  Date Time  Temp Pulse Resp B/P (MAP) Pulse Ox O2 Delivery O2 Flow Rate FiO2


 


9/1/17 22:55  62 14 165/80 (108) 97 Nasal Cannula 2.00 








Orders





 Orders


Electrocardiogram (9/1/17 22:17)


Basic Metabolic Panel (Bmp) (9/1/17 22:17)


Ckmb (Isoenzyme) Profile (9/1/17 22:17)


Complete Blood Count With Diff (9/1/17 22:17)


D-Dimer (9/1/17 22:17)


Magnesium (Mg) (9/1/17 22:17)


Prothrombin Time / Inr (Pt) (9/1/17 22:17)


Act Partial Throm Time (Ptt) (9/1/17 22:17)


Troponin I (9/1/17 22:17)


Lipase (9/1/17 22:17)


Chest, Single Ap (9/1/17 22:17)


Ecg Monitoring (9/1/17 22:17)


Bilateral Bp Monitoring (9/1/17 22:17)


Iv Access Insert/Monitor (9/1/17 22:17)


Oximetry (9/1/17 22:17)


Sodium Chloride 0.9% Flush (Ns Flush) (9/1/17 22:30)


Nitroglycerin Sl (Nitrostat Sl) (9/1/17 22:30)


Morphine Inj (Morphine Inj) (9/1/17 22:30)


Ondansetron Inj (Zofran Inj) (9/1/17 22:30)


CKMB (9/1/17 22:25)


CKMB% (9/1/17 22:25)


Ct Abd/Pel W/O Iv Contrast (9/1/17 )


Ct Brain W/O Iv Contrast(Rout) (9/2/17 )


Ventilation & Perfusion Scan (9/2/17 )


Admit Order (Ed Use Only) (9/2/17 )


^ Saline Lock (9/2/17 01:48)


Resp Oxygen Andrew C Titrat 1-4 L (9/2/17 )


Notify Dr: Other (9/2/17 01:48)


Sodium Chloride 0.9% Flush (Ns Flush) (9/2/17 09:00)


Sodium Chloride 0.9% Flush (Ns Flush) (9/2/17 02:00)





Labs





Laboratory Tests








Test


  9/1/17


22:25


 


White Blood Count 6.0 TH/MM3 


 


Red Blood Count 3.27 MIL/MM3 


 


Hemoglobin 10.3 GM/DL 


 


Hematocrit 30.6 % 


 


Mean Corpuscular Volume 93.5 FL 


 


Mean Corpuscular Hemoglobin 31.5 PG 


 


Mean Corpuscular Hemoglobin


Concent 33.7 % 


 


 


Red Cell Distribution Width 15.9 % 


 


Platelet Count 229 TH/MM3 


 


Mean Platelet Volume 7.5 FL 


 


Neutrophils (%) (Auto) 40.7 % 


 


Lymphocytes (%) (Auto) 46.9 % 


 


Monocytes (%) (Auto) 8.5 % 


 


Eosinophils (%) (Auto) 2.9 % 


 


Basophils (%) (Auto) 1.0 % 


 


Neutrophils # (Auto) 2.5 TH/MM3 


 


Lymphocytes # (Auto) 2.8 TH/MM3 


 


Monocytes # (Auto) 0.5 TH/MM3 


 


Eosinophils # (Auto) 0.2 TH/MM3 


 


Basophils # (Auto) 0.1 TH/MM3 


 


CBC Comment DIFF FINAL 


 


Differential Comment  


 


Prothrombin Time 11.6 SEC 


 


Prothromb Time International


Ratio 1.0 RATIO 


 


 


Activated Partial


Thromboplast Time 34.0 SEC 


 


 


D-Dimer Quantitative (PE/DVT) 1.77 MG/L FEU 


 


Blood Urea Nitrogen 31 MG/DL 


 


Creatinine 3.25 MG/DL 


 


Random Glucose 93 MG/DL 


 


Calcium Level 9.4 MG/DL 


 


Magnesium Level 1.9 MG/DL 


 


Sodium Level 134 MEQ/L 


 


Potassium Level 4.0 MEQ/L 


 


Chloride Level 102 MEQ/L 


 


Carbon Dioxide Level 26.4 MEQ/L 


 


Anion Gap 6 MEQ/L 


 


Estimat Glomerular Filtration


Rate 14 ML/MIN 


 


 


Total Creatine Kinase 205 U/L 


 


Creatine Kinase MB 3.6 NG/ML 


 


Creatine Kinase MB % 1.8 % 


 


Troponin I


  LESS THAN 0.02


NG/ML


 


Lipase 732 U/L 











[facility]


Medical Record Reviewed:  Yes


Supervised Visit with EDUARDO:  Yes


Interpretation(s)


D-dimer is elevated


Troponin I less than 0.02, not elevated


CBC & BMP Diagram


9/1/17 22:25








Calcium Level 9.4, Magnesium Level 1.9








Vital Signs








  Date Time  Temp Pulse Resp B/P (MAP) Pulse Ox O2 Delivery O2 Flow Rate FiO2


 


9/1/17 22:55  62 14 165/80 (108) 97 Nasal Cannula 2.00 


 


9/1/17 22:22  58 16 165/80 (108) 97 Nasal Cannula 2.00 


 


9/1/17 22:15  57   95 Room Air  


 


9/1/17 22:15  61 14  98 Nasal Cannula 2.00 


 


9/1/17 22:08  58 16 142/68 (92) 95   








Differential Diagnosis


Accepted in transfer of care


Narrative Course


Accepted in transfer of care





At 1:50 AM patient resting comfortably voicing no concerns or complaints CT 

brain noncontrast reveals no acute abnormalities; CT abdomen and pelvis without 

contrast reveals no acute abnormality; VQ scan pending; patient will be 

admitted to observation to [facility] service for bowel rest IV fluid hydration repeat 

lipase and serial cardiac enzymes


Physician Communication


Physician Communication


discussed with DR Campo --OBS





Diagnosis





 Primary Impression:  


 Pancreatitis


 Additional Impressions:  


 CAD (coronary artery disease)


 Chest pain


 Acute renal insufficiency





Admitting Information


Admitting Physician Requests:  Observation


Scripts


No Active Prescriptions or Reported Meds











Betty Wood MD Sep 2, 2017 00:02

## 2017-09-02 NOTE — RADRPT
EXAM DATE/TIME:  09/02/2017 00:29 

 

[facility] COMPARISON:     

No previous studies available for comparison.

 

 

INDICATIONS :     

Tinnitis.

                      

 

RADIATION DOSE:     

43.00 CTDIvol (mGy) 

 

 

 

MEDICAL HISTORY :     

Hypertension. Chronic obstructive pulmonary disease. Congestive heart failure.

 

SURGICAL HISTORY :      

Appendectomy. Cholecystectomy.Tubal ligation.

 

ENCOUNTER:      

Initial

 

ACUITY:      

1 day

 

PAIN SCALE:      

6/10

 

LOCATION:        

cranial 

 

TECHNIQUE:     

Multiple contiguous axial images were obtained of the head.  Using automated exposure control and adj
ustment of the mA and/or kV according to patient size, radiation dose was kept as low as reasonably a
chievable to obtain optimal diagnostic quality images.   DICOM format image data is available electro
nically for review and comparison.  

 

FINDINGS:     

 

CEREBRUM:     

The ventricles are normal for age.  No evidence of midline shift, mass lesion, hemorrhage or acute in
farction.  No extra-axial fluid collections are seen.

 

POSTERIOR FOSSA:     

The cerebellum and brainstem are intact.  The 4th ventricle is midline.  The cerebellopontine angle i
s unremarkable.

 

EXTRACRANIAL:     

The visualized portion of the orbits is intact.

 

SKULL:     

The calvaria is intact.  No evidence of skull fracture.

 

CONCLUSION:     

Normal examination.  

 

 

 

 Michael Forrest MD on September 02, 2017 at 0:47           

Board Certified Radiologist.

 This report was verified electronically.

## 2017-09-03 VITALS
OXYGEN SATURATION: 92 % | HEART RATE: 56 BPM | RESPIRATION RATE: 18 BRPM | TEMPERATURE: 97.8 F | DIASTOLIC BLOOD PRESSURE: 81 MMHG | SYSTOLIC BLOOD PRESSURE: 155 MMHG

## 2017-09-03 VITALS
SYSTOLIC BLOOD PRESSURE: 155 MMHG | TEMPERATURE: 96.2 F | HEART RATE: 54 BPM | DIASTOLIC BLOOD PRESSURE: 78 MMHG | RESPIRATION RATE: 18 BRPM | OXYGEN SATURATION: 94 %

## 2017-09-03 VITALS
OXYGEN SATURATION: 94 % | RESPIRATION RATE: 18 BRPM | DIASTOLIC BLOOD PRESSURE: 77 MMHG | SYSTOLIC BLOOD PRESSURE: 145 MMHG | HEART RATE: 58 BPM | TEMPERATURE: 95.5 F

## 2017-09-03 VITALS
OXYGEN SATURATION: 94 % | TEMPERATURE: 96.4 F | DIASTOLIC BLOOD PRESSURE: 87 MMHG | RESPIRATION RATE: 18 BRPM | HEART RATE: 59 BPM | SYSTOLIC BLOOD PRESSURE: 134 MMHG

## 2017-09-03 VITALS — OXYGEN SATURATION: 96 %

## 2017-09-03 VITALS — OXYGEN SATURATION: 94 %

## 2017-09-03 VITALS
TEMPERATURE: 96.4 F | SYSTOLIC BLOOD PRESSURE: 138 MMHG | RESPIRATION RATE: 19 BRPM | HEART RATE: 60 BPM | DIASTOLIC BLOOD PRESSURE: 78 MMHG | OXYGEN SATURATION: 94 %

## 2017-09-03 VITALS
RESPIRATION RATE: 16 BRPM | SYSTOLIC BLOOD PRESSURE: 177 MMHG | DIASTOLIC BLOOD PRESSURE: 84 MMHG | HEART RATE: 57 BPM | TEMPERATURE: 96.4 F | OXYGEN SATURATION: 94 %

## 2017-09-03 VITALS — HEART RATE: 65 BPM

## 2017-09-03 LAB
ALP SERPL-CCNC: 135 U/L (ref 45–117)
ALT SERPL-CCNC: 33 U/L (ref 10–53)
ANION GAP SERPL CALC-SCNC: 7 MEQ/L (ref 5–15)
AST SERPL-CCNC: 75 U/L (ref 15–37)
BILIRUB SERPL-MCNC: 0.3 MG/DL (ref 0.2–1)
BUN SERPL-MCNC: 27 MG/DL (ref 7–18)
CHLORIDE SERPL-SCNC: 101 MEQ/L (ref 98–107)
GFR SERPLBLD BASED ON 1.73 SQ M-ARVRAT: 16 ML/MIN (ref 89–?)
HCO3 BLD-SCNC: 24.5 MEQ/L (ref 21–32)
POTASSIUM SERPL-SCNC: 4.1 MEQ/L (ref 3.5–5.1)
SODIUM SERPL-SCNC: 132 MEQ/L (ref 136–145)

## 2017-09-03 RX ADMIN — IPRATROPIUM BROMIDE AND ALBUTEROL SULFATE SCH AMPULE: .5; 3 SOLUTION RESPIRATORY (INHALATION) at 19:30

## 2017-09-03 RX ADMIN — PHENYTOIN SODIUM SCH MLS/HR: 50 INJECTION INTRAMUSCULAR; INTRAVENOUS at 23:31

## 2017-09-03 RX ADMIN — STANDARDIZED SENNA CONCENTRATE AND DOCUSATE SODIUM SCH TAB: 8.6; 5 TABLET, FILM COATED ORAL at 08:54

## 2017-09-03 RX ADMIN — HYDROCODONE BITARTRATE AND ACETAMINOPHEN PRN TAB: 5; 325 TABLET ORAL at 04:50

## 2017-09-03 RX ADMIN — IPRATROPIUM BROMIDE AND ALBUTEROL SULFATE SCH AMPULE: .5; 3 SOLUTION RESPIRATORY (INHALATION) at 15:36

## 2017-09-03 RX ADMIN — POLYETHYLENE GLYCOL 3350 SCH GM: 17 POWDER, FOR SOLUTION ORAL at 16:10

## 2017-09-03 RX ADMIN — FAMOTIDINE SCH MG: 10 INJECTION, SOLUTION INTRAVENOUS at 00:18

## 2017-09-03 RX ADMIN — MORPHINE SULFATE PRN MG: 2 INJECTION, SOLUTION INTRAMUSCULAR; INTRAVENOUS at 06:21

## 2017-09-03 RX ADMIN — IPRATROPIUM BROMIDE AND ALBUTEROL SULFATE SCH AMPULE: .5; 3 SOLUTION RESPIRATORY (INHALATION) at 09:00

## 2017-09-03 RX ADMIN — HYDROCODONE BITARTRATE AND ACETAMINOPHEN PRN TAB: 5; 325 TABLET ORAL at 19:01

## 2017-09-03 RX ADMIN — IPRATROPIUM BROMIDE AND ALBUTEROL SULFATE SCH AMPULE: .5; 3 SOLUTION RESPIRATORY (INHALATION) at 12:44

## 2017-09-03 RX ADMIN — LEVOTHYROXINE SODIUM SCH MCG: 150 TABLET ORAL at 16:10

## 2017-09-03 RX ADMIN — STANDARDIZED SENNA CONCENTRATE AND DOCUSATE SODIUM SCH TAB: 8.6; 5 TABLET, FILM COATED ORAL at 20:59

## 2017-09-03 RX ADMIN — PHENYTOIN SODIUM SCH MLS/HR: 50 INJECTION INTRAMUSCULAR; INTRAVENOUS at 04:54

## 2017-09-03 RX ADMIN — IPRATROPIUM BROMIDE AND ALBUTEROL SULFATE SCH AMPULE: .5; 3 SOLUTION RESPIRATORY (INHALATION) at 00:00

## 2017-09-03 RX ADMIN — FAMOTIDINE SCH MG: 10 INJECTION, SOLUTION INTRAVENOUS at 17:47

## 2017-09-03 RX ADMIN — HYDROCODONE BITARTRATE AND ACETAMINOPHEN PRN TAB: 5; 325 TABLET ORAL at 09:00

## 2017-09-03 RX ADMIN — Medication SCH ML: at 20:59

## 2017-09-03 RX ADMIN — Medication SCH ML: at 08:55

## 2017-09-03 RX ADMIN — PHENYTOIN SODIUM SCH MLS/HR: 50 INJECTION INTRAMUSCULAR; INTRAVENOUS at 17:59

## 2017-09-03 NOTE — HHI.PR
Subjective


Remarks


Follow-up on pancreatitis.  No acute reports from nursing for any acute issues.

  Patient herself is very depressed upon my intake, is tearful, says she is 

sick and hurting all over.  Says that she feels numbness in her hands, still 

feels sick, nauseated.  Patient says she's not eating well, says she barely has 

an appetite.  Says she hasn't taken her thyroid medications for months, says 

financial strain was the problem.  TSH noted to be substantially elevated today 

to 100.+





Objective





Vital Signs








  Date Time  Temp Pulse Resp B/P (MAP) Pulse Ox O2 Delivery O2 Flow Rate FiO2


 


9/3/17 09:06     96   21


 


9/3/17 07:27 97.8 56 18 155/81 (105) 92   


 


9/3/17 04:00 95.5 58 18 145/77 (99) 94   


 


9/3/17 00:00 96.4 57 16 177/84 (115) 94   


 


9/2/17 20:00 96.4 62 18 180/93 (122) 95   


 


9/2/17 19:22  52      


 


9/2/17 16:00 97.8 53 16 179/84 (115) 93   


 


9/2/17 15:41     92   21














I/O      


 


 9/2/17 9/2/17 9/2/17 9/3/17 9/3/17 9/3/17





 07:00 15:00 23:00 07:00 15:00 23:00


 


Intake Total 240 ml 720 ml 480 ml 1580 ml  


 


Balance 240 ml 720 ml 480 ml 1580 ml  


 


      


 


Intake Oral 240 ml 720 ml 480 ml 480 ml  


 


IV Total    1100 ml  


 


# Voids 1 8 2 3  


 


# Bowel Movements  0 0 0  








Result Diagram:  


9/2/17 1008                                                                    

            9/2/17 1008





Other Results


Gen.: Tearful, awake, alert


Abdomen: Mild abdominal tenderness to palpation diffusely


Neuro: Intact sensation to forceful pinprick on fingertips bilaterally, no 

tremors noted


MSK: 4 out of 5 bilateral proximal upper extremity strength





A/P


Assessment and Plan


1.  Pancreatitis:  Mild.  Lipase 732, CT Abd/Pelvis w/ no acute findings.  

switch over to oral pain meds, IV pain meds. po diet as tolerated.


2. abd pain - likely from pancreatitis plus suspected constipation - starting 

miralax + suppository.  Received IV pain medication earlier today, we'll switch 

over to oral pain medication.


3. depression 2/2 severe hypothyroidism - Remeron for appetite and sleep 

improvement, if no substantial improvement and her depression hinders her 

medical recovery within next 24-48 hrs, consult for psych recommended


4. hypothyroidism - awaiting t4, severe elevation of TSH over 100+, starting 

Synthroid 150 g daily


3. gen weakness - PTOT evaluation, likely 2/2 hypothyroidism


4.  VALENTIN: elevated upon admission, unsure if this is chronic, ordering repeat 

stat bmp


5.  Elevated D-Dimer:  Mild.  1.77.  V/Q Scan  showing lo probability


6.  DVT Prophylaxis:  SCD/Teds.











Heath Hodges MD Sep 3, 2017 14:32

## 2017-09-04 VITALS
TEMPERATURE: 96.8 F | OXYGEN SATURATION: 97 % | DIASTOLIC BLOOD PRESSURE: 80 MMHG | RESPIRATION RATE: 17 BRPM | HEART RATE: 58 BPM | SYSTOLIC BLOOD PRESSURE: 149 MMHG

## 2017-09-04 VITALS
SYSTOLIC BLOOD PRESSURE: 174 MMHG | DIASTOLIC BLOOD PRESSURE: 86 MMHG | HEART RATE: 65 BPM | OXYGEN SATURATION: 93 % | RESPIRATION RATE: 18 BRPM | TEMPERATURE: 96.4 F

## 2017-09-04 VITALS
SYSTOLIC BLOOD PRESSURE: 153 MMHG | HEART RATE: 63 BPM | TEMPERATURE: 96.1 F | DIASTOLIC BLOOD PRESSURE: 85 MMHG | OXYGEN SATURATION: 94 % | RESPIRATION RATE: 19 BRPM

## 2017-09-04 VITALS
RESPIRATION RATE: 17 BRPM | DIASTOLIC BLOOD PRESSURE: 96 MMHG | TEMPERATURE: 95.6 F | OXYGEN SATURATION: 94 % | HEART RATE: 58 BPM | SYSTOLIC BLOOD PRESSURE: 172 MMHG

## 2017-09-04 VITALS
RESPIRATION RATE: 19 BRPM | TEMPERATURE: 96 F | SYSTOLIC BLOOD PRESSURE: 148 MMHG | DIASTOLIC BLOOD PRESSURE: 69 MMHG | OXYGEN SATURATION: 94 % | HEART RATE: 59 BPM

## 2017-09-04 VITALS — OXYGEN SATURATION: 96 %

## 2017-09-04 VITALS — OXYGEN SATURATION: 93 %

## 2017-09-04 VITALS — OXYGEN SATURATION: 92 %

## 2017-09-04 VITALS
SYSTOLIC BLOOD PRESSURE: 168 MMHG | DIASTOLIC BLOOD PRESSURE: 77 MMHG | RESPIRATION RATE: 17 BRPM | TEMPERATURE: 96.4 F | HEART RATE: 62 BPM | OXYGEN SATURATION: 97 %

## 2017-09-04 RX ADMIN — IPRATROPIUM BROMIDE AND ALBUTEROL SULFATE SCH AMPULE: .5; 3 SOLUTION RESPIRATORY (INHALATION) at 16:00

## 2017-09-04 RX ADMIN — IPRATROPIUM BROMIDE AND ALBUTEROL SULFATE SCH AMPULE: .5; 3 SOLUTION RESPIRATORY (INHALATION) at 01:23

## 2017-09-04 RX ADMIN — IPRATROPIUM BROMIDE AND ALBUTEROL SULFATE SCH AMPULE: .5; 3 SOLUTION RESPIRATORY (INHALATION) at 21:47

## 2017-09-04 RX ADMIN — LEVOTHYROXINE SODIUM SCH MCG: 150 TABLET ORAL at 05:43

## 2017-09-04 RX ADMIN — HYDROCODONE BITARTRATE AND ACETAMINOPHEN PRN TAB: 5; 325 TABLET ORAL at 18:10

## 2017-09-04 RX ADMIN — POLYETHYLENE GLYCOL 3350 SCH GM: 17 POWDER, FOR SOLUTION ORAL at 09:27

## 2017-09-04 RX ADMIN — IPRATROPIUM BROMIDE AND ALBUTEROL SULFATE SCH AMPULE: .5; 3 SOLUTION RESPIRATORY (INHALATION) at 09:51

## 2017-09-04 RX ADMIN — FAMOTIDINE SCH MG: 10 INJECTION, SOLUTION INTRAVENOUS at 01:49

## 2017-09-04 RX ADMIN — HYDROCODONE BITARTRATE AND ACETAMINOPHEN PRN TAB: 5; 325 TABLET ORAL at 09:29

## 2017-09-04 RX ADMIN — IPRATROPIUM BROMIDE AND ALBUTEROL SULFATE SCH AMPULE: .5; 3 SOLUTION RESPIRATORY (INHALATION) at 03:49

## 2017-09-04 RX ADMIN — Medication SCH ML: at 20:16

## 2017-09-04 RX ADMIN — MIRTAZAPINE SCH MG: 15 TABLET, FILM COATED ORAL at 20:15

## 2017-09-04 RX ADMIN — STANDARDIZED SENNA CONCENTRATE AND DOCUSATE SODIUM SCH TAB: 8.6; 5 TABLET, FILM COATED ORAL at 09:27

## 2017-09-04 RX ADMIN — HYDROCODONE BITARTRATE AND ACETAMINOPHEN PRN TAB: 5; 325 TABLET ORAL at 05:46

## 2017-09-04 RX ADMIN — STANDARDIZED SENNA CONCENTRATE AND DOCUSATE SODIUM SCH TAB: 8.6; 5 TABLET, FILM COATED ORAL at 20:15

## 2017-09-04 RX ADMIN — PHENYTOIN SODIUM SCH MLS/HR: 50 INJECTION INTRAMUSCULAR; INTRAVENOUS at 04:27

## 2017-09-04 RX ADMIN — IPRATROPIUM BROMIDE AND ALBUTEROL SULFATE SCH AMPULE: .5; 3 SOLUTION RESPIRATORY (INHALATION) at 12:40

## 2017-09-04 RX ADMIN — Medication SCH ML: at 09:00

## 2017-09-04 NOTE — HHI.PR
Subjective


Remarks


Follow-up on pancreatitis.  Patient tolerating by mouth intake somewhat better 

today, says that she moves moving around somewhat more today, said feels that 

she is more flexible.  Says she still has abdominal pain but it is better since 

admission.  Says she had bowel movements yesterday which gave her relief. Says 

therapy has not come by yet.  She agrees that her mood is improved since 

yesterday after starting thyroid medication





Objective





Vital Signs








  Date Time  Temp Pulse Resp B/P (MAP) Pulse Ox O2 Delivery O2 Flow Rate FiO2


 


9/4/17 09:52     93   


 


9/4/17 08:00     94 Room Air  


 


9/4/17 08:00 95.6 58 17 172/96 (121) 94   


 


9/4/17 03:51 96.1 63 19 153/85 (107) 94   


 


9/4/17 01:25     96 Nasal Cannula 2.00 


 


9/4/17 00:28 96.0 59 19 148/69 (95) 94   


 


9/3/17 20:58  65      


 


9/3/17 20:25 96.4 60 19 138/78 (98) 94   


 


9/3/17 15:50 96.4 59 18 134/87 (103) 94   


 


9/3/17 15:36     94   21


 


9/3/17 11:28 96.2 54 18 155/78 (103) 94   














I/O      


 


 9/3/17 9/3/17 9/3/17 9/4/17 9/4/17 9/4/17





 07:00 15:00 23:00 07:00 15:00 23:00


 


Intake Total 1580 ml 720 ml 480 ml 1410 ml  


 


Balance 1580 ml 720 ml 480 ml 1410 ml  


 


      


 


Intake Oral 480 ml 720 ml 480 ml 360 ml  


 


IV Total 1100 ml   1050 ml  


 


# Voids 3 4 3 4  


 


# Bowel Movements 0 0 0 3  








Result Diagram:  


9/2/17 1008                                                                    

            9/3/17 1439





Objective Remarks


Gen.: Awake alert, no acute distress


Abdomen: Mild to moderate right-sided abdominal tenderness to palpation, soft, 

nondistended, no rebound


Psych: Still somewhat depressed mood but improved compared to yesterday, making 

more eye contact, not tearful today





A/P


Assessment and Plan


1.  Pancreatitis:  Mild.  Lipase 732, CT Abd/Pelvis w/ no acute findings. oral 

pain meds, IV pain meds. po diet as tolerated.


2. abd pain - likely from pancreatitis plus suspected constipation. continue po 

pain meds as needed.


3.  Constipation - improving - continue MiraLAX


4. depression 2/2 severe hypothyroidism - Remeron for appetite and sleep 

improvement plus synthroid as above, if no substantial improvement and her 

depression hinders her medical recovery within next 24-48 hrs, will hold off on 

psych consult given some improvement


5. hypothyroidism - free T4 pending, severe hypothyroidism based on TSH being 

over 100 , continue synthroid. 


6. gen weakness - PTOT evaluations pending, likely 2/2 hypothyroidism


7.  Renal insufficiency - since getting fluids administered since admission, it 

appears that this is chronic and I suspect this is due to uncontrolled 

hypertension given her blood pressures, we'll step up antihypertensive treatment

, patient is nonoliguric


8.  Elevated D-Dimer:  Mild.  1.77.  V/Q Scan  showing lo probability. 


9.  DVT Prophylaxis:  SCD/Teds.











Heath Hodges MD Sep 4, 2017 10:52

## 2017-09-05 VITALS
OXYGEN SATURATION: 95 % | SYSTOLIC BLOOD PRESSURE: 125 MMHG | HEART RATE: 68 BPM | RESPIRATION RATE: 18 BRPM | TEMPERATURE: 97.2 F | DIASTOLIC BLOOD PRESSURE: 85 MMHG

## 2017-09-05 VITALS
RESPIRATION RATE: 19 BRPM | HEART RATE: 64 BPM | SYSTOLIC BLOOD PRESSURE: 178 MMHG | OXYGEN SATURATION: 92 % | TEMPERATURE: 96.6 F | DIASTOLIC BLOOD PRESSURE: 83 MMHG

## 2017-09-05 VITALS
SYSTOLIC BLOOD PRESSURE: 150 MMHG | RESPIRATION RATE: 18 BRPM | HEART RATE: 70 BPM | DIASTOLIC BLOOD PRESSURE: 83 MMHG | TEMPERATURE: 98.1 F | OXYGEN SATURATION: 92 %

## 2017-09-05 VITALS
DIASTOLIC BLOOD PRESSURE: 84 MMHG | HEART RATE: 71 BPM | TEMPERATURE: 96 F | RESPIRATION RATE: 19 BRPM | SYSTOLIC BLOOD PRESSURE: 173 MMHG | OXYGEN SATURATION: 94 %

## 2017-09-05 VITALS
DIASTOLIC BLOOD PRESSURE: 85 MMHG | TEMPERATURE: 97.1 F | OXYGEN SATURATION: 92 % | RESPIRATION RATE: 20 BRPM | SYSTOLIC BLOOD PRESSURE: 157 MMHG | HEART RATE: 79 BPM

## 2017-09-05 VITALS
RESPIRATION RATE: 18 BRPM | TEMPERATURE: 96.6 F | SYSTOLIC BLOOD PRESSURE: 157 MMHG | DIASTOLIC BLOOD PRESSURE: 85 MMHG | HEART RATE: 65 BPM | OXYGEN SATURATION: 93 %

## 2017-09-05 VITALS — HEART RATE: 59 BPM

## 2017-09-05 VITALS — OXYGEN SATURATION: 90 %

## 2017-09-05 VITALS — OXYGEN SATURATION: 92 %

## 2017-09-05 VITALS — OXYGEN SATURATION: 93 %

## 2017-09-05 LAB
ANION GAP SERPL CALC-SCNC: 5 MEQ/L (ref 5–15)
BUN SERPL-MCNC: 23 MG/DL (ref 7–18)
CHLORIDE SERPL-SCNC: 103 MEQ/L (ref 98–107)
CK MB SERPL-MCNC: 9.9 NG/ML (ref 0.5–3.6)
CK SERPL-CCNC: 533 U/L (ref 26–192)
GFR SERPLBLD BASED ON 1.73 SQ M-ARVRAT: 18 ML/MIN (ref 89–?)
HCO3 BLD-SCNC: 25.6 MEQ/L (ref 21–32)
POTASSIUM SERPL-SCNC: 4 MEQ/L (ref 3.5–5.1)
SODIUM SERPL-SCNC: 134 MEQ/L (ref 136–145)

## 2017-09-05 RX ADMIN — IPRATROPIUM BROMIDE AND ALBUTEROL SULFATE SCH AMPULE: .5; 3 SOLUTION RESPIRATORY (INHALATION) at 08:32

## 2017-09-05 RX ADMIN — STANDARDIZED SENNA CONCENTRATE AND DOCUSATE SODIUM SCH TAB: 8.6; 5 TABLET, FILM COATED ORAL at 20:22

## 2017-09-05 RX ADMIN — IPRATROPIUM BROMIDE AND ALBUTEROL SULFATE SCH AMPULE: .5; 3 SOLUTION RESPIRATORY (INHALATION) at 03:41

## 2017-09-05 RX ADMIN — LEVOTHYROXINE SODIUM SCH MCG: 150 TABLET ORAL at 06:01

## 2017-09-05 RX ADMIN — Medication SCH ML: at 20:23

## 2017-09-05 RX ADMIN — IPRATROPIUM BROMIDE AND ALBUTEROL SULFATE SCH AMPULE: .5; 3 SOLUTION RESPIRATORY (INHALATION) at 12:00

## 2017-09-05 RX ADMIN — IPRATROPIUM BROMIDE AND ALBUTEROL SULFATE SCH AMPULE: .5; 3 SOLUTION RESPIRATORY (INHALATION) at 00:00

## 2017-09-05 RX ADMIN — IPRATROPIUM BROMIDE AND ALBUTEROL SULFATE SCH AMPULE: .5; 3 SOLUTION RESPIRATORY (INHALATION) at 20:37

## 2017-09-05 RX ADMIN — HYDROCODONE BITARTRATE AND ACETAMINOPHEN PRN TAB: 5; 325 TABLET ORAL at 06:01

## 2017-09-05 RX ADMIN — IPRATROPIUM BROMIDE AND ALBUTEROL SULFATE SCH AMPULE: .5; 3 SOLUTION RESPIRATORY (INHALATION) at 15:29

## 2017-09-05 RX ADMIN — PANTOPRAZOLE SCH MG: 40 TABLET, DELAYED RELEASE ORAL at 18:19

## 2017-09-05 RX ADMIN — MIRTAZAPINE SCH MG: 15 TABLET, FILM COATED ORAL at 20:22

## 2017-09-05 RX ADMIN — POLYETHYLENE GLYCOL 3350 SCH GM: 17 POWDER, FOR SOLUTION ORAL at 10:44

## 2017-09-05 RX ADMIN — Medication SCH ML: at 10:45

## 2017-09-05 RX ADMIN — STANDARDIZED SENNA CONCENTRATE AND DOCUSATE SODIUM SCH TAB: 8.6; 5 TABLET, FILM COATED ORAL at 10:44

## 2017-09-05 NOTE — PD.CONS
HPI


Service


Nephrology


Consult Requested By


Dr. Perez


Reason for Consult


Renal failure


Primary Care Physician


Unknown


History of Present Illness


Patient is a 62-year-old female with history of hepatitis, chronic right 

quadrant pain old records revealed that  STEPHANIE was positive and high titers on  1:1280, anti-smooth muscle antibody positive, she states she was never 

offered any treatment and never took any steroid now present with mild 

pancreatitis right quadrant pain, ultrasound of the kidneys showed tiny 

bilateral stones creatinine was high 3.2 and declined to 2.7, her creatinine 

climbed to records in 2016 was 0.9, she continues to have right sided 

pain her appetite is not too good either.





Review of Systems


Constitutional:  COMPLAINS OF: Fatigue


Respiratory:  COMPLAINS OF: Shortness of breath


Gastrointestinal:  COMPLAINS OF: Abdominal pain


Musculoskeletal:  COMPLAINS OF: Joint pain, Muscle aches, Stiffness





Past Family Social History


Allergies:  


Coded Allergies:  


     milk (Unverified  Allergy, Mild, Cramping, 8/15/17)


 lactose intolerant


Past Medical History


Abdominal pain


Liver issues


STEPHANIE positive


Anti-smooth muscle antibody positive


Now with pancreatitis


Acute renal disease


History of diabetes


Obesity


Arthritis


COPD


CVA


Hypertension


Depression


Hypothyroid


Past Surgical History


Appendectomy


Cholecystectomy


Tubal ligation





Left ankle surgery


Reported Medications





Reported Meds & Active Scripts


Active


No Active Prescriptions or Reported Medications


Active Ordered Medications





Current Medications








 Medications


  (Trade)  Dose


 Ordered  Sig/Kandis


 Route  Start Time


 Stop Time Status Last Admin


 


  (NS Flush)  2 ml  UNSCH  PRN


 IV FLUSH  17 02:00


     


 


 


  (NS Flush)  2 ml  BID


 IV FLUSH  17 09:00


    17 10:45


 


 


  (Tylenol)  650 mg  Q6H  PRN


 PO  17 02:00


     


 


 


  (Norco  5-325 Mg)  1 tab  Q4H  PRN


 PO  17 02:00


    17 06:01


 


 


  (Palma-Colace)  1 tab  BID


 PO  17 09:00


    17 10:44


 


 


  (Milk Of


 Magnesia Liq)  30 ml  Q12H  PRN


 PO  17 02:00


    17 19:23


 


 


  (Dulcolax Supp)  10 mg  DAILY  PRN


 RECTAL  17 02:00


     


 


 


  (Lactulose Liq)  30 ml  DAILY  PRN


 PO  17 02:00


     


 


 


  (Duoneb Neb)  1 ampule  Q4HR  NEB


 NEB  17 12:00


    17 15:29


 


 


  (Albuterol Neb)  2.5 mg  Q2HR NEB  PRN


 NEB  17 10:45


     


 


 


  (Synthroid)  150 mcg  DAILY@0600


 PO  9/3/17 14:30


    17 06:01


 


 


  (Miralax)  17 gm  DAILY


 PO  9/3/17 14:45


    17 10:44


 


 


  (Remeron)  15 mg  HS


 PO  17 21:00


    17 20:15


 


 


  (Zofran  Odt)  4 mg  Q6H  PRN


 PO  17 11:00


     


 


 


  (Protonix)  40 mg  DAILY


 PO  17 11:00


    17 18:19


 








Family History


Noncontributory


Social History


Denies smoking or alcohol use





Physical Exam


Vital Signs





Vital Signs








  Date Time  Temp Pulse Resp B/P (MAP) Pulse Ox O2 Delivery O2 Flow Rate FiO2


 


17 12:00 97.2 68 18 125/85 (98) 95   


 


17 08:33     93   21


 


17 08:00 96.6 65 18 157/85 (109) 93   


 


17 07:43      Room Air  


 


17 06:47  59      


 


17 04:20 96.6 64 19 178/83 (114) 92   


 


17 00:00 96.0 71 19 173/84 (113) 94   


 


17 21:48     92   21


 


17 20:10 96.4 65 18 174/86 (115) 93   








Physical Exam


GENERAL: Well-nourished, well-developed patient.


SKIN: Warm and dry.


HEAD: Normocephalic.


EYES: No scleral icterus. No injection or drainage. 


NECK: Supple, trachea midline. No JVD or lymphadenopathy.


CARDIOVASCULAR: Regular rate and rhythm without murmurs, gallops, or rubs. 


RESPIRATORY: Breath sounds equal bilaterally. No accessory muscle use.


GASTROINTESTINAL: Abdomen tenderness and right upper quadrant pulse sounds are 

present


EXTREMITIES: No cyanosis, or edema. 


NEUROLOGICAL: Awake, alert, and oriented x 3. Non-focal.


Laboratory





Laboratory Tests








Test


  17


08:46


 


Blood Urea Nitrogen 23 


 


Creatinine 2.71 


 


Random Glucose 65 


 


Calcium Level 8.3 


 


Sodium Level 134 


 


Potassium Level 4.0 


 


Chloride Level 103 


 


Carbon Dioxide Level 25.6 


 


Anion Gap 5 


 


Estimat Glomerular Filtration


Rate 18 


 


 


Total Creatine Kinase 533 


 


Creatine Kinase MB 9.9 


 


Creatine Kinase MB % 1.9 














 Date/Time


Source Procedure


Growth Status


 


 


 17 02:58


Urine Clean Catch Urine Culture - Final


,000 CFU/ML MIXED GRAM POSITIVE... Complete








Result Diagram:  


17 1008                                                                    

            17 0846





Imaging





Last Impressions








Renal Ultrasound 17 0000 Signed





Impressions: 





 Service Date/Time:  2017 17:45 - CONCLUSION:  1. 





 Nonobstructing right renal calculi. Right renal cysts. No perinephric fluid. 





 Bladder unremarkable.     Michael Forrest MD 


 


Lung Scan-V Nuclear Medicine 17 0000 Signed





Impressions: 





 Service Date/Time:  2017 02:35 - CONCLUSION:  1. Low 





 probability for pulmonary embolus.     Michael Forrest MD 


 


Head CT 17 0000 Signed





Impressions: 





 Service Date/Time:  2017 00:29 - CONCLUSION:  Normal 





 examination.       Michael Forrest MD 


 


Chest X-Ray 17 Signed





Impressions: 





 Service Date/Time:  2017 22:32 - CONCLUSION:  Minimal 





 patchy areas of consolidation or atelectasis at the lateral bases.     Scooter Tatum MD 


 


Abdomen/Pelvis CT 17 0000 Signed





Impressions: 





 Service Date/Time:  2017 00:32 - CONCLUSION:  1. No 

acute 





 findings within the abdomen. Partial staghorn type calcifications upper and 





 lower pole right kidney and tiny calculi upper pole left kidney. No bowel 





 obstruction, free air, free fluid or obstructive uropathy. 2. Probable liver 





 cirrhosis. Previous cholecystectomy, appendectomy.     Michael Forrest MD 











Assessment and Plan


Problem List:  


(1) VALENTIN (acute kidney injury)


ICD Codes:  N17.9 - Acute kidney failure, unspecified


Plan:  This is likely secondary to her recent pancreatitis and liver issues and 

could be an Autoimmune process, such as SLE as well, I will obtain ultrasound 

of the liver she may have advanced liver disease as CT scan suggests cirrhosis


We had the old records that she has GI problems and autoimmune workup was 

positive as above with high titers of STEPHANIE


She states she never took any steroids or any other immunosuppressive medication


We will repeat STEPHANIE, serum complements, anti-Smooth muscle antibody


Kidney ultrasound showed non-obstructive tiny calculus


Continue to hydrate and follow BMP





(2) Pancreatitis


ICD Codes:  K85.90 - Acute pancreatitis without necrosis or infection, 

unspecified


Status:  Acute


Plan:  She has undiagnosed underlying autoimmune disease possibility of SLE 

versus autoimmune hepatitis exists


STEPHANIE was 1:1280 and Antismooth antibody positive in 





(3) Autoimmune disease


ICD Codes:  M35.9 - Systemic involvement of connective tissue, unspecified


Plan:  Continue to monitor she needs to be followed by Delia Nunez MD Sep 5, 2017 19:44

## 2017-09-05 NOTE — HHI.PR
Subjective


Remarks


Follow-up on pancreatitis.  Nurse denies any acute events since last night.  

Says that patient got tired when she was ambulating with physical therapy.  

When talking to the patient, her mood seems more down than yesterday, says she 

doesn't feel good when asked about details, she doesn't elucidate any further.  

It requires very frequent prompting to get any information from her finally she 

says she has some persistent right abdominal pain which she says is actually 

better than yesterday.  Says she doesn't feel like eating but denies being 

nauseated .  Says that she hasn't had another bowel movement since last 2 days 

and would like to be able to go again.  When asked about her therapy session, 

she says she got really tired but denies any chest pain during her session and 

denies any worsening of her abdominal pain during her session





Objective





Vital Signs








  Date Time  Temp Pulse Resp B/P (MAP) Pulse Ox O2 Delivery O2 Flow Rate FiO2


 


9/5/17 08:33     93   21


 


9/5/17 08:00 96.6 65 18 157/85 (109) 93   


 


9/5/17 07:43      Room Air  


 


9/5/17 06:47  59      


 


9/5/17 04:20 96.6 64 19 178/83 (114) 92   


 


9/5/17 00:00 96.0 71 19 173/84 (113) 94   


 


9/4/17 21:48     92   21


 


9/4/17 20:10 96.4 65 18 174/86 (115) 93   


 


9/4/17 16:00 96.8 58 17 149/80 (103) 97   














I/O      


 


 9/4/17 9/4/17 9/4/17 9/5/17 9/5/17 9/5/17





 07:00 15:00 23:00 07:00 15:00 23:00


 


Intake Total 1410 ml 1129 ml 909 ml 120 ml  


 


Balance 1410 ml 1129 ml 909 ml 120 ml  


 


      


 


Intake Oral 360 ml 460 ml 240 ml 120 ml  


 


IV Total 1050 ml 669 ml 669 ml   


 


# Voids 4 6 8 5  


 


# Bowel Movements 3 0 0 0  








Result Diagram:  


9/2/17 1008                                                                    

            9/5/17 0846





Objective Remarks


Gen.: Awake alert, no acute distress, 


Abdomen: Mild to moderate right-sided abdominal tenderness to palpation, soft, 

nondistended, no rebound


Psych: Still somewhat depressed mood, not tearful today





A/P


Assessment and Plan


1.  Pancreatitis:  Mild.  CT Abd/Pelvis w/ no acute findings. oral pain meds, 

by mouth diet as tolerated


2.  abd pain - likely from pancreatitis plus suspected constipation. continue 

po pain meds as needed.


3.  Constipation - improving - continue MiraLAX, will give another one-time 

suppository, add on by mouth Colace


4. depression likely 2/2 severe hypothyroidism - continue Remeron for appetite 

and sleep improvement plus Synthroid as above. 


5.  Severe hypothyroidism - free T4 pending, severe hypothyroidism based on TSH 

being over 100 , continue synthroid. 


6. gen weakness - likely secondary to hypothyroidism, continue therapy


7.  Renal insufficiency -after reviewing older records, it appears that the 

patient had a substantial decline in her renal function within 12 months and at 

this point it does not respond to IV fluids well when she got her initial fluid 

therapy since admission for pancreatitis, consult nephrology, ordering renal US


8.  DVT Prophylaxis:  SCD/Teds. 








Patient has no pairs source, will therefore not be able to practically obtain 

rehabilitation placement, we'll attempt at best sending patient home with some 

possible home nursing/therapy along with a walker.  She will likely regain her 

strength if this is largely due to hypothyroidism once her thyroid hormone 

levels all more within normal limits.  Anticipate discharge in the next 24-48 

hours.  Discussed case case management, pending financial assessment patient 

via Hospital staff











Heath Hodges MD Sep 5, 2017 13:21

## 2017-09-05 NOTE — RADRPT
EXAM DATE/TIME:  2017 17:45 

 

[facility] COMPARISON:     

No previous studies available for comparison.

        

 

 

INDICATIONS :     

Increased BUN and creatinine. 

                     

 

MEDICAL HISTORY :           

Hyperthyroidism. Stroke Congestive heart failure. Goiter. Hearing loss.Numbness. Hypertension. Hyperc
holesterolemia. Chest pain. Cardiomegaly. COPD. Pneumonia. Dyspnea. Ulcer. Urinary tract infection. D
iabetes. Depression. Anxiety.

 

SURGICAL HISTORY :          

Appendectomy. Cholecystectomy. Tubal ligation. Hysterectomy.  section. Left ankle surgery.

 

ENCOUNTER:     

[facility]

 

ACUITY:     

1 day

 

PAIN SCORE:     

7/10

 

LOCATION:     

Bilateral flank 

MEASUREMENTS:     

 

RIGHT KIDNEY:     

9.9 x  x 3.8  cm

 

LEFT KIDNEY:     

11.2 x 5.1 x 4.1 cm

 

FINDINGS:     

No hydronephrosis. There is a 1.0 cm cyst upper pole right kidney and at least 2 calculi in right kid
juanita measuring up to 1.5 cm midpole and 1.1 cm lower pole. Kidney unremarkable. Bladder unremarkable. 
No perinephric fluid.

 

CONCLUSION:     

1. Nonobstructing right renal calculi. Right renal cysts. No perinephric fluid. Bladder unremarkable.


 

 

 

 Michael Forrest MD on 2017 at 18:31           

Board Certified Radiologist.

 This report was verified electronically.

## 2017-09-06 VITALS
OXYGEN SATURATION: 95 % | SYSTOLIC BLOOD PRESSURE: 145 MMHG | TEMPERATURE: 96.6 F | HEART RATE: 72 BPM | DIASTOLIC BLOOD PRESSURE: 84 MMHG | RESPIRATION RATE: 18 BRPM

## 2017-09-06 VITALS
OXYGEN SATURATION: 95 % | HEART RATE: 75 BPM | SYSTOLIC BLOOD PRESSURE: 124 MMHG | TEMPERATURE: 96.2 F | DIASTOLIC BLOOD PRESSURE: 74 MMHG | RESPIRATION RATE: 20 BRPM

## 2017-09-06 VITALS — OXYGEN SATURATION: 93 %

## 2017-09-06 VITALS
TEMPERATURE: 97.2 F | OXYGEN SATURATION: 95 % | HEART RATE: 73 BPM | DIASTOLIC BLOOD PRESSURE: 97 MMHG | RESPIRATION RATE: 19 BRPM | SYSTOLIC BLOOD PRESSURE: 176 MMHG

## 2017-09-06 VITALS
OXYGEN SATURATION: 95 % | RESPIRATION RATE: 16 BRPM | HEART RATE: 62 BPM | SYSTOLIC BLOOD PRESSURE: 145 MMHG | DIASTOLIC BLOOD PRESSURE: 80 MMHG | TEMPERATURE: 96.6 F

## 2017-09-06 VITALS
RESPIRATION RATE: 18 BRPM | TEMPERATURE: 97.6 F | HEART RATE: 60 BPM | OXYGEN SATURATION: 92 % | SYSTOLIC BLOOD PRESSURE: 161 MMHG | DIASTOLIC BLOOD PRESSURE: 88 MMHG

## 2017-09-06 VITALS
HEART RATE: 72 BPM | TEMPERATURE: 96.8 F | SYSTOLIC BLOOD PRESSURE: 166 MMHG | OXYGEN SATURATION: 95 % | RESPIRATION RATE: 19 BRPM | DIASTOLIC BLOOD PRESSURE: 87 MMHG

## 2017-09-06 VITALS — OXYGEN SATURATION: 98 %

## 2017-09-06 LAB
ANA SER QL: (no result)
ANION GAP SERPL CALC-SCNC: 5 MEQ/L (ref 5–15)
BUN SERPL-MCNC: 24 MG/DL (ref 7–18)
CHLORIDE SERPL-SCNC: 103 MEQ/L (ref 98–107)
GFR SERPLBLD BASED ON 1.73 SQ M-ARVRAT: 16 ML/MIN (ref 89–?)
HCO3 BLD-SCNC: 28.1 MEQ/L (ref 21–32)
POTASSIUM SERPL-SCNC: 3.9 MEQ/L (ref 3.5–5.1)
SODIUM SERPL-SCNC: 136 MEQ/L (ref 136–145)

## 2017-09-06 RX ADMIN — Medication SCH ML: at 20:38

## 2017-09-06 RX ADMIN — HYDROCODONE BITARTRATE AND ACETAMINOPHEN PRN TAB: 5; 325 TABLET ORAL at 20:38

## 2017-09-06 RX ADMIN — IPRATROPIUM BROMIDE AND ALBUTEROL SULFATE SCH AMPULE: .5; 3 SOLUTION RESPIRATORY (INHALATION) at 01:08

## 2017-09-06 RX ADMIN — Medication SCH ML: at 08:10

## 2017-09-06 RX ADMIN — STANDARDIZED SENNA CONCENTRATE AND DOCUSATE SODIUM SCH TAB: 8.6; 5 TABLET, FILM COATED ORAL at 20:37

## 2017-09-06 RX ADMIN — PANTOPRAZOLE SCH MG: 40 TABLET, DELAYED RELEASE ORAL at 08:09

## 2017-09-06 RX ADMIN — POLYETHYLENE GLYCOL 3350 SCH GM: 17 POWDER, FOR SOLUTION ORAL at 08:10

## 2017-09-06 RX ADMIN — STANDARDIZED SENNA CONCENTRATE AND DOCUSATE SODIUM SCH TAB: 8.6; 5 TABLET, FILM COATED ORAL at 08:10

## 2017-09-06 RX ADMIN — HYDROCODONE BITARTRATE AND ACETAMINOPHEN PRN TAB: 5; 325 TABLET ORAL at 16:03

## 2017-09-06 RX ADMIN — LEVOTHYROXINE SODIUM SCH MCG: 150 TABLET ORAL at 03:20

## 2017-09-06 RX ADMIN — IPRATROPIUM BROMIDE AND ALBUTEROL SULFATE SCH AMPULE: .5; 3 SOLUTION RESPIRATORY (INHALATION) at 08:57

## 2017-09-06 RX ADMIN — HYDROCODONE BITARTRATE AND ACETAMINOPHEN PRN TAB: 5; 325 TABLET ORAL at 08:09

## 2017-09-06 RX ADMIN — LEVOTHYROXINE SODIUM SCH MCG: 150 TABLET ORAL at 08:10

## 2017-09-06 RX ADMIN — MIRTAZAPINE SCH MG: 15 TABLET, FILM COATED ORAL at 20:37

## 2017-09-06 NOTE — PD.CONS
HPI


History of Present Illness


This is a 62 year old female who presented to the emergency room for evaluation 

of chest pain.  She reports that she started having chest pain in her 

substernal area the day before yesterday.  She states this was a constant sharp 

pain with no radiation and associated shortness of breath.  This was not 

related to food or activity.  There were no aggravating or alleviating factors.

  Around the same time, she started having pain in her abdomen on the right 

side.  This was in both her right upper quadrant and right lower quadrant.  She 

is unable to describe this but said it was a significant discomfort.  She had 

associated nausea without vomiting.  She denies any fevers or chills.  She has 

occasional heartburn, she states that the pain has gradually subsided although 

she did have some chest pain earlier today.  She was noted to have a mildly 

elevated lipase of 732, which has since normalized.  Of note she was also noted 

to have a creatinine of 3.25.  CT scan abdomen and pelvis (17) revealed no 

acute findings within the abdomen.  Partial staghorn type calcifications upper 

and lower pole right kidney and tiny calculi upper pole left kidney.  No bowel 

obstruction, free air, free fluid, or obstructive uropathy.  Probable liver 

cirrhosis.  Previous cholecystectomy, appendectomy.  The patient reports that 

she has a history of peptic ulcer disease/bleeding ulcers about 4 years ago, 

but has not had any further issues.  She does not take any NSAIDs at home and 

does not drink any alcohol.  She also reports that she has a history of liver 

disease related to alcohol abuse, but states that she quit drinking about 3-4 

years ago.  She denies any prior history of pancreatitis and is status post 

cholecystectomy.  She has never had a colonoscopy.





PFSH


Past Medical History


Peptic ulcer disease/bleeding ulcers


Chronic kidney disease


Hypertension


CHF


COPD


Depression


History of diabetes


History of liver disease, secondary to alcohol


Past Surgical History


Appendectomy


Cholecystectomy





Hysterectomy


Coded Allergies:  


     milk (Unverified  Allergy, Mild, Cramping, 8/15/17)


 lactose intolerant


Medications





 Allergies








Coded Allergies Type Severity Reaction Last Updated Verified


 


  milk Allergy Mild Cramping 8/15/17 No








 Active Scripts








 Medications  Dose


 Route/Sig


 Max Daily Dose Days Date Category


 


 No Active


 Prescriptions or


 Reported


 Medications  


 


     Rx








Family History


Denies any family history of liver disease or esophageal, gastric, or 

colorectal cancer.


Social History


History of alcohol abuse per records, however denies.  Negative for tobacco or 

drugs.





Review of Systems


Constitutional:  COMPLAINS OF: Fatigue, DENIES: Fever, Weight loss, Chills


Respiratory:  DENIES: Cough


Cardiovascular:  DENIES: Chest pain


Gastrointestinal:  COMPLAINS OF: Abdominal pain, Nausea, Heartburn, DENIES: 

Black stools, Bloody stools, Constipation, Diarrhea, Vomiting, Difficulty 

Swallowing, Anorexia, Hematemesis


Musculoskeletal:  DENIES: Joint pain


Integumentary:  DENIES: Abnormal pigmentation


Hematologic/lymphatic:  DENIES: Bruising


Immunologic/allergic:  DENIES: Eczema


Neurologic:  DENIES: Headache


Psychiatric:  DENIES: Confusion





GI Exam


Vitals I&O





Vital Signs








  Date Time  Temp Pulse Resp B/P (MAP) Pulse Ox O2 Delivery O2 Flow Rate FiO2


 


17 08:57     93 Nasal Cannula 2.00 


 


17 08:00 96.6 72 18 145/84 (104) 95   


 


17 04:55 96.2 75 20 124/74 (91) 95   


 


17 00:45 96.8 72 19 166/87 (113) 95   


 


17 20:58     92 Nasal Cannula 2.00 


 


17 20:38     90   


 


17 20:35 98.1 70 18 150/83 (105) 92   


 


17 16:00 97.1 79 20 157/85 (109) 92   


 


17 12:00 97.2 68 18 125/85 (98) 95   














I/O      


 


 17





 06:59 14:59 22:59 06:59 14:59 22:59


 


Intake Total 120 ml 480 ml 240 ml 0 ml  


 


Balance 120 ml 480 ml 240 ml 0 ml  


 


      


 


Intake Oral 120 ml 480 ml 240 ml 0 ml  


 


# Voids 5 4 4 5  


 


# Bowel Movements 0  0 0  








Imaging





Last Impressions








Renal Ultrasound 17 0000 Signed





Impressions: 





 Service Date/Time:  2017 17:45 - CONCLUSION:  1. 





 Nonobstructing right renal calculi. Right renal cysts. No perinephric fluid. 





 Bladder unremarkable.     Michael Forrest MD 


 


Lung Scan- Nuclear Medicine 17 0000 Signed





Impressions: 





 Service Date/Time:  2017 02:35 - CONCLUSION:  1. Low 





 probability for pulmonary embolus.     Michael Forrest MD 


 


Head CT 17 0000 Signed





Impressions: 





 Service Date/Time:  2017 00:29 - CONCLUSION:  Normal 





 examination.       Michael Forrest MD 


 


Chest X-Ray 177 Signed





Impressions: 





 Service Date/Time:  2017 22:32 - CONCLUSION:  Minimal 





 patchy areas of consolidation or atelectasis at the lateral bases.     Scooter Tatum MD 


 


Abdomen/Pelvis CT 17 0000 Signed





Impressions: 





 Service Date/Time:  2017 00:32 - CONCLUSION:  1. No 

acute 





 findings within the abdomen. Partial staghorn type calcifications upper and 





 lower pole right kidney and tiny calculi upper pole left kidney. No bowel 





 obstruction, free air, free fluid or obstructive uropathy. 2. Probable liver 





 cirrhosis. Previous cholecystectomy, appendectomy.     Michael Forrest MD 








Laboratory











Test


  17


06:20














 Date/Time


Source Procedure


Growth Status


 


 


 17 02:58


Urine Clean Catch Urine Culture - Final


,000 CFU/ML MIXED GRAM POSITIVE... Complete








Physical Examination


HEENT: Normocephalic; atraumatic; no jaundice.  


CHEST:  Cta


CARDIAC:  Rrr


ABDOMEN:  Soft, nondistended, nontender; no hepatosplenomegaly; bowel sounds 

are present in all four quadrants.


EXTREMITIES: No clubbing, cyanosis, or edema.


SKIN:  Normal; no rash; no jaundice.


CNS:  No focal deficits; alert and oriented times three.





Assessment and Plan


Plan


ASSESSMENT:


- Abdominal pain, right sided. CT scan abdomen and pelvis (17) revealed no 

acute findings within the abdomen.  Partial staghorn type


   calcifications upper and lower pole right kidney and tiny calculi upper pole 

left kidney.  No bowel obstruction, free air, free fluid, or obstructive


   uropathy.  Probable liver cirrhosis.  Previous cholecystectomy, 

appendectomy. Elevated lipase- mildly although also with acute on chronic


   VALENTIN and elevated 2.71.  Does have hx of PUD 4 years ago.  Would benefit from 

EGD, timing to be determined.


- Anemia.  10.0/30.4.  


- Lipase, undetermined significance.  No signs of pancreatitis on CT scan.  Has 

elevated creatining, now 346.


- Chest pain, per attending.


- VALENTIN, Rhabdo.  Creat 2.71.  


- Elevated TSH per attending.





PLAN:


- Clear liquids


- PPI


- Monitor labs


- Would benefit from EGD, timing to be determined.


- Further recommendations to follow after seen by Zuleima Washington Sep 6, 2017 12:30

## 2017-09-06 NOTE — HHI.PR
Subjective


Remarks


Follow-up on pancreatitis.  Nurse reports the patient says that she fell before 

she came in for admission to the hospital and is complaining of hip pain.  When 

asked the patient how she fell at home, she says she fell forward and fell on 

her knee denies falling on her buttocks or her lateral hips.  Otherwise the 

patient says she feels a little better than yesterday in terms of her abdominal 

pain.





Objective





Vital Signs








  Date Time  Temp Pulse Resp B/P (MAP) Pulse Ox O2 Delivery O2 Flow Rate FiO2


 


9/6/17 08:57     93 Nasal Cannula 2.00 


 


9/6/17 08:00 96.6 72 18 145/84 (104) 95   


 


9/6/17 04:55 96.2 75 20 124/74 (91) 95   


 


9/6/17 00:45 96.8 72 19 166/87 (113) 95   


 


9/5/17 20:58     92 Nasal Cannula 2.00 


 


9/5/17 20:38     90   


 


9/5/17 20:35 98.1 70 18 150/83 (105) 92   


 


9/5/17 16:00 97.1 79 20 157/85 (109) 92   














I/O      


 


 9/5/17 9/5/17 9/5/17 9/6/17 9/6/17 9/6/17





 07:00 15:00 23:00 07:00 15:00 23:00


 


Intake Total 120 ml 480 ml 240 ml 0 ml  


 


Balance 120 ml 480 ml 240 ml 0 ml  


 


      


 


Intake Oral 120 ml 480 ml 240 ml 0 ml  


 


# Voids 5 4 4 5  


 


# Bowel Movements 0  0 0  








Result Diagram:  


9/2/17 1008                                                                    

            9/5/17 0846





Objective Remarks


Gen.: Awake alert, no acute distress, 


Abdomen: Mild to moderate right-sided abdominal tenderness to palpation, soft, 

nondistended, no rebound


Psych: neutral mood today, not tearful today





A/P


Assessment and Plan


1.  abd pain - likely from pancreatitis plus suspected constipation. but given 

persistence and improvement in those to underlying etiologies, gastric 

nephrology plans for EGD tomorrow , continue po pain meds as needed.


2.  Constipation - continue MiraLAX, will give another one-time suppository, 

add on by mouth Colace


3. depression likely 2/2 severe hypothyroidism - continue Remeron for appetite 

and sleep improvement plus Synthroid as above. 


4.  Severe hypothyroidism - free T4 is low, severe hypothyroidism based on TSH 

being over 100 , continue synthroid. 


5. gen weakness - likely secondary to hypothyroidism, continue therapy


6.  Renal insufficiency - renal US unremarkable, appreciate nephro recs, 

pursuing w/ autoimmune workup


78.  DVT Prophylaxis:  SCD/Teds. 








Patient has no pairs source, will therefore not be able to practically obtain 

rehabilitation placement, we'll attempt at best sending patient home with some 

possible home nursing/therapy along with a walker.  She will likely regain her 

strength if this is largely due to hypothyroidism once her thyroid hormone 

levels all more within normal limits.  Anticipate discharge in the next 24-48 

hours.  Discussed case case management, pending financial assessment patient 

via Hospital staff











Heath Hodges MD Sep 6, 2017 13:22

## 2017-09-06 NOTE — RADRPT
EXAM DATE/TIME:  2017 08:44 

 

[facility] COMPARISON:     

US ABDOMEN - LIVER, 2016, 9:30.

        

 

 

INDICATIONS :     

Cirrhosis.

                     

 

MEDICAL HISTORY :     

Hypercholesterolemia. Chronic obstructive pulmonary disease. Congestive heart failure. Hypothyroid. H
earing loss. Numbness. Cerebrovascular accident. Hypertension. Irregular heartbeat. Cardiomegaly. Pne
umonia. Sleep apnea. Gastric ulcer. Pregnancy. UTIs. Back pain. Diabetes. Depression. Anxiety. 

 

SURGICAL HISTORY :     

Cholecystectomy. Appendectomy. Hysterectomy. Tubal ligation. Left ankle surgery.  section.

 

ENCOUNTER:     

Initial

 

ACUITY:     

2 days

 

PAIN SCORE:     

2/10

 

LOCATION:     

Bilateral upper quadrant 

                     

MEASUREMENTS:     

 

LIVER:     

16.3 cm length 

 

COMMON DUCT:     

5 mm

 

RIGHT KIDNEY:     

10.5 x 4.7 x 5.7 cm

 

SPLEEN:     

8.1 cm length

 

FINDINGS:     

 

LIVER:     

Mildly heterogeneous echotexture without focal lesion or ductal dilatation.  Hepatopedal flow in the 
portal vein.  

 

COMMON DUCT:     

No intraluminal mass or stone visualized.  

 

GALLBLADDER:     

Cholecystectomy

 

PANCREAS:     

The visualized portions are within normal limits.  

 

RIGHT KIDNEY:     

Cortical cyst upper pole measuring 2.0 x 1.6 cm.  Multiple echogenic and shadowing foci in the upper 
and lower pole renal pelvis measures up to 1.7 cm, characteristic of stones.  No hydronephrosis.

 

SPLEEN:     

No focal lesion.  

 

CONCLUSION:     

1. Hepatomegaly with heterogeneous echotexture, slightly larger than on prior exam in 2016.

2. Shadowing right renal stones without evidence of hydronephrosis.

 

 

 

 Jesse Martínez MD on 2017 at 14:08           

Board Certified Radiologist.

 This report was verified electronically.

## 2017-09-06 NOTE — HHI.NPPN
Subjective


History of Present Illness


62 year old with ARF/Pancreatitis/Cirrhosis Autoimmune disease





Review of Systems


General


Constitutional:  Fatigue





Gastrointestinal


Gastrointestinal:  Abdominal Pain





Musculoskeletal


MS:  Pain/Stiffness





Objective Data


Data





Vital Signs








  Date Time  Temp Pulse Resp B/P (MAP) Pulse Ox O2 Delivery O2 Flow Rate FiO2


 


9/6/17 08:57     93 Nasal Cannula 2.00 


 


9/6/17 08:00 96.6 72 18 145/84 (104) 95   


 


9/6/17 04:55 96.2 75 20 124/74 (91) 95   


 


9/6/17 00:45 96.8 72 19 166/87 (113) 95   


 


9/5/17 20:58     92 Nasal Cannula 2.00 


 


9/5/17 20:38     90   


 


9/5/17 20:35 98.1 70 18 150/83 (105) 92   


 


9/5/17 16:00 97.1 79 20 157/85 (109) 92   








-:  


9/2/17 1008                                                                    

            9/5/17 0846








Physical Exam


General


Appearance:  Well Developed





Neck


Neck Exam:  Neck Supple





Pulmonary


Resp Exam:  Clear Bilaterally, Breath Sounds Equal





Cardiology


CV Exam:  Regular, Normal Sinus Rhythm





Gastrointestinal/Abdomen


GI Exam:  Soft, Bowel Sounds Present





Extremeties


Extremities Exam:  No Edema





Assessment/Plan


Problem List:  


(1) VALENTIN (acute kidney injury)


ICD Codes:  N17.9 - Acute kidney failure, unspecified


Plan:  This is likely secondary to her recent pancreatitis and liver issues and 

could be an Autoimmune process, such as SLE as well, 


STEPHANIE positive


C3, C4 low


likely SLE


follow renal functions


start Solumedrol


may need a kidney biopsy, may need to wait to do this due to state of emergency 

in [facility]/[place]





(2) Pancreatitis


ICD Codes:  K85.90 - Acute pancreatitis without necrosis or infection, 

unspecified


Status:  Acute


Plan:  She has undiagnosed underlying autoimmune disease possibility of SLE 

versus autoimmune hepatitis exists


STEPHANIE was 1:1280 and Antismooth antibody positive in 6/12





(3) Autoimmune disease


ICD Codes:  M35.9 - Systemic involvement of connective tissue, unspecified


Plan:  Continue to monitor she needs to be followed by Delia Nunez MD Sep 6, 2017 13:47

## 2017-09-07 VITALS
OXYGEN SATURATION: 93 % | HEART RATE: 84 BPM | TEMPERATURE: 97.3 F | RESPIRATION RATE: 18 BRPM | SYSTOLIC BLOOD PRESSURE: 183 MMHG | DIASTOLIC BLOOD PRESSURE: 96 MMHG

## 2017-09-07 VITALS
SYSTOLIC BLOOD PRESSURE: 160 MMHG | DIASTOLIC BLOOD PRESSURE: 89 MMHG | OXYGEN SATURATION: 93 % | RESPIRATION RATE: 18 BRPM | TEMPERATURE: 97 F | HEART RATE: 67 BPM

## 2017-09-07 VITALS
OXYGEN SATURATION: 93 % | TEMPERATURE: 96.8 F | RESPIRATION RATE: 19 BRPM | SYSTOLIC BLOOD PRESSURE: 159 MMHG | HEART RATE: 70 BPM | DIASTOLIC BLOOD PRESSURE: 98 MMHG

## 2017-09-07 VITALS
RESPIRATION RATE: 18 BRPM | HEART RATE: 72 BPM | DIASTOLIC BLOOD PRESSURE: 89 MMHG | SYSTOLIC BLOOD PRESSURE: 170 MMHG | OXYGEN SATURATION: 96 % | TEMPERATURE: 97.5 F

## 2017-09-07 VITALS
OXYGEN SATURATION: 92 % | SYSTOLIC BLOOD PRESSURE: 166 MMHG | DIASTOLIC BLOOD PRESSURE: 88 MMHG | HEART RATE: 57 BPM | TEMPERATURE: 96.8 F | RESPIRATION RATE: 16 BRPM

## 2017-09-07 VITALS
RESPIRATION RATE: 18 BRPM | OXYGEN SATURATION: 93 % | TEMPERATURE: 97.5 F | DIASTOLIC BLOOD PRESSURE: 82 MMHG | SYSTOLIC BLOOD PRESSURE: 157 MMHG | HEART RATE: 58 BPM

## 2017-09-07 VITALS — OXYGEN SATURATION: 94 %

## 2017-09-07 LAB
ANION GAP SERPL CALC-SCNC: 6 MEQ/L (ref 5–15)
BASOPHILS # BLD AUTO: 0 TH/MM3 (ref 0–0.2)
BASOPHILS NFR BLD: 0.2 % (ref 0–2)
BUN SERPL-MCNC: 28 MG/DL (ref 7–18)
CHLORIDE SERPL-SCNC: 101 MEQ/L (ref 98–107)
EOSINOPHIL # BLD: 0 TH/MM3 (ref 0–0.4)
EOSINOPHIL NFR BLD: 0 % (ref 0–4)
ERYTHROCYTE [DISTWIDTH] IN BLOOD BY AUTOMATED COUNT: 16.1 % (ref 11.6–17.2)
GFR SERPLBLD BASED ON 1.73 SQ M-ARVRAT: 16 ML/MIN (ref 89–?)
HCO3 BLD-SCNC: 24.7 MEQ/L (ref 21–32)
HCT VFR BLD CALC: 31.3 % (ref 35–46)
HEMO FLAGS: (no result)
LYMPHOCYTES # BLD AUTO: 1.3 TH/MM3 (ref 1–4.8)
LYMPHOCYTES NFR BLD AUTO: 31.5 % (ref 9–44)
MCH RBC QN AUTO: 32.2 PG (ref 27–34)
MCHC RBC AUTO-ENTMCNC: 34 % (ref 32–36)
MCV RBC AUTO: 94.8 FL (ref 80–100)
MONOCYTES NFR BLD: 1.9 % (ref 0–8)
NEUTROPHILS # BLD AUTO: 2.7 TH/MM3 (ref 1.8–7.7)
NEUTROPHILS NFR BLD AUTO: 66.4 % (ref 16–70)
PLATELET # BLD: 222 TH/MM3 (ref 150–450)
POTASSIUM SERPL-SCNC: 4.4 MEQ/L (ref 3.5–5.1)
RBC # BLD AUTO: 3.31 MIL/MM3 (ref 4–5.3)
SODIUM SERPL-SCNC: 132 MEQ/L (ref 136–145)
WBC # BLD AUTO: 4 TH/MM3 (ref 4–11)

## 2017-09-07 RX ADMIN — MEGESTROL ACETATE SCH MG: 40 SUSPENSION ORAL at 17:23

## 2017-09-07 RX ADMIN — MIRTAZAPINE SCH MG: 15 TABLET, FILM COATED ORAL at 20:29

## 2017-09-07 RX ADMIN — POLYETHYLENE GLYCOL 3350 SCH GM: 17 POWDER, FOR SOLUTION ORAL at 09:30

## 2017-09-07 RX ADMIN — PANTOPRAZOLE SCH MG: 40 TABLET, DELAYED RELEASE ORAL at 09:30

## 2017-09-07 RX ADMIN — PHENYTOIN SODIUM SCH MLS/HR: 50 INJECTION INTRAMUSCULAR; INTRAVENOUS at 17:24

## 2017-09-07 RX ADMIN — HYDROCODONE BITARTRATE AND ACETAMINOPHEN PRN TAB: 5; 325 TABLET ORAL at 09:29

## 2017-09-07 RX ADMIN — HYDROCODONE BITARTRATE AND ACETAMINOPHEN PRN TAB: 5; 325 TABLET ORAL at 17:24

## 2017-09-07 RX ADMIN — Medication SCH ML: at 09:30

## 2017-09-07 RX ADMIN — LEVOTHYROXINE SODIUM SCH MCG: 150 TABLET ORAL at 05:50

## 2017-09-07 RX ADMIN — HYDROCODONE BITARTRATE AND ACETAMINOPHEN PRN TAB: 5; 325 TABLET ORAL at 05:50

## 2017-09-07 RX ADMIN — STANDARDIZED SENNA CONCENTRATE AND DOCUSATE SODIUM SCH TAB: 8.6; 5 TABLET, FILM COATED ORAL at 09:29

## 2017-09-07 RX ADMIN — Medication SCH ML: at 20:29

## 2017-09-07 RX ADMIN — STANDARDIZED SENNA CONCENTRATE AND DOCUSATE SODIUM SCH TAB: 8.6; 5 TABLET, FILM COATED ORAL at 20:29

## 2017-09-07 NOTE — HHI.GIFU
Subjective


Remarks


Resting in bed.  Abdominal pain improved.  Mild nausea.  No vomiting.  


 (Zuleima West)





Objective


Vitals I&O





Vital Signs








  Date Time  Temp Pulse Resp B/P (MAP) Pulse Ox O2 Delivery O2 Flow Rate FiO2


 


9/7/17 12:12     94 Nasal Cannula 2.00 


 


9/7/17 04:00 96.8 57 16 166/88 (114) 92   


 


9/7/17 00:30 96.8 70 19 159/98 (118) 93   


 


9/6/17 21:11     93   21


 


9/6/17 20:30 97.2 73 19 176/97 (123) 95   


 


9/6/17 16:00 97.6 60 18 161/88 (112) 92   














I/O      


 


 9/6/17 9/6/17 9/6/17 9/7/17 9/7/17 9/7/17





 07:00 15:00 23:00 07:00 15:00 23:00


 


Intake Total 0 ml  360 ml 480 ml  


 


Balance 0 ml  360 ml 480 ml  


 


      


 


Intake Oral 0 ml  360 ml 480 ml  


 


# Voids 5  3 2  


 


# Bowel Movements 0  0 0  








Laboratory





Laboratory Tests








Test


  9/6/17


18:20 9/7/17


06:49


 


Erythrocyte Sedimentation Rate


  GREATER THAN


140 


 


 


Blood Urea Nitrogen 24  28 


 


Creatinine 2.96  3.00 


 


Random Glucose 83  126 


 


Calcium Level 8.7  8.9 


 


Sodium Level 136  132 


 


Potassium Level 3.9  4.4 


 


Chloride Level 103  101 


 


Carbon Dioxide Level 28.1  24.7 


 


Anion Gap 5  6 


 


Estimat Glomerular Filtration


Rate 16 


  16 


 


 


C-Reactive Protein 1.60  


 


White Blood Count  4.0 


 


Red Blood Count  3.31 


 


Hemoglobin  10.6 


 


Hematocrit  31.3 


 


Mean Corpuscular Volume  94.8 


 


Mean Corpuscular Hemoglobin  32.2 


 


Mean Corpuscular Hemoglobin


Concent 


  34.0 


 


 


Red Cell Distribution Width  16.1 


 


Platelet Count  222 


 


Mean Platelet Volume  7.5 


 


Neutrophils (%) (Auto)  66.4 


 


Lymphocytes (%) (Auto)  31.5 


 


Monocytes (%) (Auto)  1.9 


 


Eosinophils (%) (Auto)  0.0 


 


Basophils (%) (Auto)  0.2 


 


Neutrophils # (Auto)  2.7 


 


Lymphocytes # (Auto)  1.3 


 


Monocytes # (Auto)  0.1 


 


Eosinophils # (Auto)  0.0 


 


Basophils # (Auto)  0.0 


 


CBC Comment  DIFF FINAL 


 


Differential Comment   














 Date/Time


Source Procedure


Growth Status


 


 


 9/2/17 02:58


Urine Clean Catch Urine Culture - Final


,000 CFU/ML MIXED GRAM POSITIVE... Complete








Imaging





Last Impressions








Liver Ultrasound 9/6/17 0000 Signed





Impressions: 





 Service Date/Time:  Wednesday, September 6, 2017 08:44 - CONCLUSION:  1. 





 Hepatomegaly with heterogeneous echotexture, slightly larger than on prior 

exam 





 in 2016. 2. Shadowing right renal stones without evidence of hydronephrosis.  

   





 Jesse Martínez MD 


 


Renal Ultrasound 9/5/17 0000 Signed





Impressions: 





 Service Date/Time:  Tuesday, September 5, 2017 17:45 - CONCLUSION:  1. 





 Nonobstructing right renal calculi. Right renal cysts. No perinephric fluid. 





 Bladder unremarkable.     Michael Forrest MD 


 


Lung Scan- Nuclear Medicine 9/2/17 0000 Signed





Impressions: 





 Service Date/Time:  Saturday, September 2, 2017 02:35 - CONCLUSION:  1. Low 





 probability for pulmonary embolus.     Michael Forrest MD 


 


Head CT 9/2/17 0000 Signed





Impressions: 





 Service Date/Time:  Saturday, September 2, 2017 00:29 - CONCLUSION:  Normal 





 examination.       Michael Forrest MD 


 


Chest X-Ray 9/1/17 2217 Signed





Impressions: 





 Service Date/Time:  Friday, September 1, 2017 22:32 - CONCLUSION:  Minimal 





 patchy areas of consolidation or atelectasis at the lateral bases.     Scooter Tatum MD 


 


Abdomen/Pelvis CT 9/1/17 0000 Signed





Impressions: 





 Service Date/Time:  Saturday, September 2, 2017 00:32 - CONCLUSION:  1. No 

acute 





 findings within the abdomen. Partial staghorn type calcifications upper and 





 lower pole right kidney and tiny calculi upper pole left kidney. No bowel 





 obstruction, free air, free fluid or obstructive uropathy. 2. Probable liver 





 cirrhosis. Previous cholecystectomy, appendectomy.     Michael Forrest MD 








Physical Exam


HEENT: Normocephalic; atraumatic; no jaundice.  


CHEST:  CTA


CARDIAC:  RRR.


ABDOMEN:  Soft, nondistended, mild epigastric tenderness; no hepatosplenomegaly

; bowel sounds are present in all four quadrants.


EXTREMITIES: No clubbing, cyanosis, or edema.


SKIN:  Normal; no rash; no jaundice.


CNS:  No focal deficits; alert and oriented times three.


 (Zuleima West)





Assessment and Plan


Plan


ASSESSMENT:


- Abdominal pain, right sided. CT scan abdomen and pelvis (9/1/17) revealed no 

acute findings within the abdomen.  Partial staghorn type


   calcifications upper and lower pole right kidney and tiny calculi upper pole 

left kidney.  No bowel obstruction, free air, free fluid, or obstructive


   uropathy.  Probable liver cirrhosis.  Previous cholecystectomy, 

appendectomy. Elevated lipase- mildly although also with acute on chronic


   VALENTIN and elevated 2.71.  Does have hx of PUD 4 years ago.  Would benefit from 

EGD, timing to be determined.


- Anemia.  10.0/30.4.  


- Mild elevated AST.  Will recheck.  Liver workup in 2012- Pt had positive RICH 

with titer > 1:1280- speckled and diffuse, ASMA (+) 1A:160.


   Alpha 1 antitrypsin 159, Ceruloplasmin 40, . Rpt. rich/asma pending. ? need 

for liver biopsy.  Of note, is on steroids.  Probable liver cirrhosis


   on ct.  Will also add ferritin, iron saturation, afp.  Await repeat RICH, ASMA


- Lipase, undetermined significance.  No signs of pancreatitis on CT scan.  Has 

elevated creatinine, now 346.  Check IgG 4 level given her 


   underlying autoimmune disease. 


- Chest pain, per attending.


- VALENTIN, Rhabdo, worsening renal function with creat. 3.00.  Renal following, 

suspects SLE, possible renal biopsy. 


- (+) RICH.  In June 2012, patient had positive RICH with titer > 1:1280- 

speckled and diffuse, ASMA (+) 1A:160.  Also with suspected SLE, may need


   kidney biopsy.  ? liver biopsy


- Elevated TSH per attending.





PLAN:


- Full liquids


- PPI


- Solumedrol


- Monitor labs


- Await RICH, ASMA


- AFP level


- Iron Saturation, Ferritin


- IgG 4 level


- ? Need for liver biopsy- will d/w Dr. Brody


- ? timing of EGD, will d/w Dr. Brody


- Further recommendations to follow after results of above


- Pt seen and examined by Dr. Brody and myself and this note is written on 

his behalf


 (Zuleima West)


Plan


gianna


Physician Comments


Patient seen and examined


Agree with above


Continue with current supportive care


Monitor labs


Plan for an EGD tomorrow to rule out varices and portal hypertension


Most likely cause of her pain is probably renal stones the patient tells me of 

a pain that radiates down into her groin


Will await the rest of the liver workup and the results of the endoscopy prior 

to making a decision about liver biopsy


 (Marbin Lopez MD)


Physician Comments


Patient was seen by Dr. Lopez in in today, plan for EGD tomorrow


 (Fidel Brody MD)











Zuleima West Sep 7, 2017 14:02


Marbin Lopez MD Sep 7, 2017 18:52


iFdel Brody MD Sep 7, 2017 19:30

## 2017-09-07 NOTE — HHI.NPPN
Subjective


History of Present Illness


62 year old with ARF/Pancreatitis/Cirrhosis Autoimmune disease





Review of Systems


General


Constitutional:  Fatigue





Gastrointestinal


Gastrointestinal:  Abdominal Pain





Musculoskeletal


MS:  Pain/Stiffness





Objective Data


Data





Vital Signs








  Date Time  Temp Pulse Resp B/P (MAP) Pulse Ox O2 Delivery O2 Flow Rate FiO2


 


9/7/17 12:12     94 Nasal Cannula 2.00 


 


9/7/17 12:00 97.5 72 18 170/89 (116) 96   


 


9/7/17 08:00 97.3 84 18 183/96 (125) 93   


 


9/7/17 04:00 96.8 57 16 166/88 (114) 92   


 


9/7/17 00:30 96.8 70 19 159/98 (118) 93   


 


9/6/17 21:11     93   21


 


9/6/17 20:30 97.2 73 19 176/97 (123) 95   








-:  


9/7/17 0649                                                                    

            9/7/17 0649








Physical Exam


General


Appearance:  Well Developed





Neck


Neck Exam:  Neck Supple





Pulmonary


Resp Exam:  Clear Bilaterally, Breath Sounds Equal





Cardiology


CV Exam:  Regular, Normal Sinus Rhythm





Gastrointestinal/Abdomen


GI Exam:  Soft, Bowel Sounds Present





Extremeties


Extremities Exam:  No Edema





Assessment/Plan


Problem List:  


(1) VALENTIN (acute kidney injury)


ICD Codes:  N17.9 - Acute kidney failure, unspecified


Plan:  This is likely secondary to her recent pancreatitis and liver issues and 

could be an Autoimmune process, such as SLE as well, 


STEPHANIE positive


C3, C4 low


likely SLE


follow renal functions


on Solumedrol not eating Cr 3


may need a kidney biopsy, next week


Add Megace poor appetite


IVF NS at 84 ml/hr


follow CMP


 CRP high





(2) Pancreatitis


ICD Codes:  K85.90 - Acute pancreatitis without necrosis or infection, 

unspecified


Status:  Acute


Plan:  She has undiagnosed underlying autoimmune disease possibility of SLE 

versus autoimmune hepatitis exists


STEPHANIE was 1:1280 and Antismooth antibody positive in 6/12





(3) Autoimmune disease


ICD Codes:  M35.9 - Systemic involvement of connective tissue, unspecified


Plan:  Continue to monitor she needs to be followed by Delia Nunez MD Sep 7, 2017 16:29

## 2017-09-07 NOTE — HHI.PR
Subjective


Remarks


Patient resting in bed, complaining of right upper quadrant and nausea


No fever or chills





Objective


Vitals





Vital Signs








  Date Time  Temp Pulse Resp B/P (MAP) Pulse Ox O2 Delivery O2 Flow Rate FiO2


 


9/7/17 12:12     94 Nasal Cannula 2.00 


 


9/7/17 04:00 96.8 57 16 166/88 (114) 92   


 


9/7/17 00:30 96.8 70 19 159/98 (118) 93   


 


9/6/17 21:11     93   21


 


9/6/17 20:30 97.2 73 19 176/97 (123) 95   


 


9/6/17 16:00 97.6 60 18 161/88 (112) 92   














I/O      


 


 9/6/17 9/6/17 9/6/17 9/7/17 9/7/17 9/7/17





 07:00 15:00 23:00 07:00 15:00 23:00


 


Intake Total 0 ml  360 ml 480 ml  


 


Balance 0 ml  360 ml 480 ml  


 


      


 


Intake Oral 0 ml  360 ml 480 ml  


 


# Voids 5  3 2  


 


# Bowel Movements 0  0 0  








Result Diagram:  


9/7/17 0649                                                                    

            9/7/17 0649





Objective Remarks


-  GENERAL: This is a well-nourished, well-developed patient, in no apparent 

distress.


SKIN: No rashes, warm and dry 


HEAD: Atraumatic. Normocephalic. 


EYES: Pupils equal round and reactive. Extraocular motions intact. No scleral 

icterus. 


ENT: Nose without bleeding, or drainage, Airway patent.


NECK: Trachea midline.  Supple


CARDIOVASCULAR: Regular rate and rhythm without murmurs, gallops, or rubs. 


RESPIRATORY: Fair air entry bilaterally. No wheezes, rales, or rhonchi.  


GASTROINTESTINAL: Abdomen soft, mildly tender to palpation right upper quadrant,

 nondistended.  Positive bowel sounds


MUSCULOSKELETAL: Extremities without clubbing, cyanosis, or edema.  Pedal 

pulses appreciated


NEUROLOGICAL: Awake and alert.  Moves all extremity.  Normal speech.no focal 

neurological deficit





A/P


Problem List:  


(1) Pancreatitis


ICD Code:  K85.90 - Acute pancreatitis without necrosis or infection, 

unspecified


Status:  Acute


(2) Chest pain


ICD Code:  R07.9 - Chest pain, unspecified


Status:  Acute


(3) VALENTIN (acute kidney injury)


ICD Code:  N17.9 - Acute kidney failure, unspecified


(4) Rhabdomyolysis


ICD Code:  M62.82 - Rhabdomyolysis


(5) Elevated d-dimer


ICD Code:  R79.89 - Other specified abnormal findings of blood chemistry


Assessment and Plan


62 years old female presented with





-  Abdominal pain with increased lipase likely from pancreatitis plus suspected 

constipation. but given persistence and improvement in those to underlying 

etiologies, gastric nephrology plans for EGD tomorrow , continue po pain meds 

as needed.


-  Constipation - continue MiraLAX, will give another one-time suppository, add 

on by mouth Colace


-  depression likely 2/2 severe hypothyroidism - continue Remeron for appetite 

and sleep improvement plus Synthroid as above. 


-  Severe hypothyroidism - free T4 is low, severe hypothyroidism based on TSH 

being over 100 , continue synthroid. 


-  Generalized weakness - likely secondary to hypothyroidism, continue therapy


-  VALENTIN versus CKD- renal US unremarkable, appreciate nephro recs, pursuing w/ 

autoimmune workup


-   DVT Prophylaxis:  SCD/Teds. 





9/7: Discussed with GI, possible plan for EGD, patient had low C3 low C4 

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 

GI nephrology











Shai Vasquez MD Sep 7, 2017 14:25

## 2017-09-08 VITALS
HEART RATE: 56 BPM | SYSTOLIC BLOOD PRESSURE: 120 MMHG | DIASTOLIC BLOOD PRESSURE: 83 MMHG | TEMPERATURE: 96.7 F | OXYGEN SATURATION: 95 % | RESPIRATION RATE: 18 BRPM

## 2017-09-08 VITALS
RESPIRATION RATE: 19 BRPM | HEART RATE: 67 BPM | OXYGEN SATURATION: 93 % | SYSTOLIC BLOOD PRESSURE: 166 MMHG | DIASTOLIC BLOOD PRESSURE: 74 MMHG | TEMPERATURE: 96.1 F

## 2017-09-08 VITALS
SYSTOLIC BLOOD PRESSURE: 160 MMHG | DIASTOLIC BLOOD PRESSURE: 87 MMHG | RESPIRATION RATE: 17 BRPM | OXYGEN SATURATION: 94 % | TEMPERATURE: 97.7 F | HEART RATE: 53 BPM

## 2017-09-08 VITALS
DIASTOLIC BLOOD PRESSURE: 97 MMHG | TEMPERATURE: 97.2 F | OXYGEN SATURATION: 93 % | HEART RATE: 92 BPM | RESPIRATION RATE: 18 BRPM | SYSTOLIC BLOOD PRESSURE: 177 MMHG

## 2017-09-08 VITALS
OXYGEN SATURATION: 94 % | HEART RATE: 60 BPM | TEMPERATURE: 97 F | RESPIRATION RATE: 20 BRPM | DIASTOLIC BLOOD PRESSURE: 84 MMHG | SYSTOLIC BLOOD PRESSURE: 160 MMHG

## 2017-09-08 VITALS
SYSTOLIC BLOOD PRESSURE: 178 MMHG | DIASTOLIC BLOOD PRESSURE: 84 MMHG | RESPIRATION RATE: 16 BRPM | HEART RATE: 51 BPM | OXYGEN SATURATION: 95 % | TEMPERATURE: 98.2 F

## 2017-09-08 VITALS — OXYGEN SATURATION: 94 %

## 2017-09-08 LAB
ALP SERPL-CCNC: 133 U/L (ref 45–117)
ALT SERPL-CCNC: 29 U/L (ref 10–53)
ANA TITER QUANT: (no result)
ANION GAP SERPL CALC-SCNC: 7 MEQ/L (ref 5–15)
AST SERPL-CCNC: 53 U/L (ref 15–37)
BASOPHILS # BLD AUTO: 0 TH/MM3 (ref 0–0.2)
BASOPHILS NFR BLD: 0.1 % (ref 0–2)
BILIRUB SERPL-MCNC: 0.3 MG/DL (ref 0.2–1)
BUN SERPL-MCNC: 37 MG/DL (ref 7–18)
CHLORIDE SERPL-SCNC: 104 MEQ/L (ref 98–107)
EOSINOPHIL # BLD: 0 TH/MM3 (ref 0–0.4)
EOSINOPHIL NFR BLD: 0 % (ref 0–4)
ERYTHROCYTE [DISTWIDTH] IN BLOOD BY AUTOMATED COUNT: 16.3 % (ref 11.6–17.2)
FERRITIN SERPL-MCNC: 160 NG/ML (ref 8–252)
GFR SERPLBLD BASED ON 1.73 SQ M-ARVRAT: 16 ML/MIN (ref 89–?)
HCO3 BLD-SCNC: 23.1 MEQ/L (ref 21–32)
HCT VFR BLD CALC: 31.5 % (ref 35–46)
HEMO FLAGS: (no result)
LYMPHOCYTES # BLD AUTO: 1.8 TH/MM3 (ref 1–4.8)
LYMPHOCYTES NFR BLD AUTO: 17.2 % (ref 9–44)
MCH RBC QN AUTO: 32 PG (ref 27–34)
MCHC RBC AUTO-ENTMCNC: 33.4 % (ref 32–36)
MCV RBC AUTO: 95.6 FL (ref 80–100)
MONOCYTES NFR BLD: 5.1 % (ref 0–8)
NEUTROPHILS # BLD AUTO: 8.2 TH/MM3 (ref 1.8–7.7)
NEUTROPHILS NFR BLD AUTO: 77.6 % (ref 16–70)
PLATELET # BLD: 239 TH/MM3 (ref 150–450)
POTASSIUM SERPL-SCNC: 4 MEQ/L (ref 3.5–5.1)
RBC # BLD AUTO: 3.29 MIL/MM3 (ref 4–5.3)
SODIUM SERPL-SCNC: 134 MEQ/L (ref 136–145)
TRANSFERRIN IRON PROFILE: 172 MG/DL (ref 200–360)
WBC # BLD AUTO: 10.5 TH/MM3 (ref 4–11)

## 2017-09-08 PROCEDURE — 0D758ZZ DILATION OF ESOPHAGUS, VIA NATURAL OR ARTIFICIAL OPENING ENDOSCOPIC: ICD-10-PCS | Performed by: INTERNAL MEDICINE

## 2017-09-08 PROCEDURE — 0DB68ZX EXCISION OF STOMACH, VIA NATURAL OR ARTIFICIAL OPENING ENDOSCOPIC, DIAGNOSTIC: ICD-10-PCS | Performed by: INTERNAL MEDICINE

## 2017-09-08 RX ADMIN — HYDROCODONE BITARTRATE AND ACETAMINOPHEN PRN TAB: 5; 325 TABLET ORAL at 19:41

## 2017-09-08 RX ADMIN — PHENYTOIN SODIUM SCH MLS/HR: 50 INJECTION INTRAMUSCULAR; INTRAVENOUS at 15:29

## 2017-09-08 RX ADMIN — STANDARDIZED SENNA CONCENTRATE AND DOCUSATE SODIUM SCH TAB: 8.6; 5 TABLET, FILM COATED ORAL at 19:41

## 2017-09-08 RX ADMIN — STANDARDIZED SENNA CONCENTRATE AND DOCUSATE SODIUM SCH TAB: 8.6; 5 TABLET, FILM COATED ORAL at 08:28

## 2017-09-08 RX ADMIN — POLYETHYLENE GLYCOL 3350 SCH GM: 17 POWDER, FOR SOLUTION ORAL at 08:27

## 2017-09-08 RX ADMIN — MIRTAZAPINE SCH MG: 15 TABLET, FILM COATED ORAL at 19:41

## 2017-09-08 RX ADMIN — MAGNESIUM HYDROXIDE PRN ML: 400 SUSPENSION ORAL at 19:40

## 2017-09-08 RX ADMIN — PANTOPRAZOLE SCH MG: 40 TABLET, DELAYED RELEASE ORAL at 08:28

## 2017-09-08 RX ADMIN — MEGESTROL ACETATE SCH MG: 40 SUSPENSION ORAL at 05:30

## 2017-09-08 RX ADMIN — PHENYTOIN SODIUM SCH MLS/HR: 50 INJECTION INTRAMUSCULAR; INTRAVENOUS at 04:55

## 2017-09-08 RX ADMIN — Medication SCH ML: at 08:27

## 2017-09-08 RX ADMIN — LEVOTHYROXINE SODIUM SCH MCG: 150 TABLET ORAL at 05:31

## 2017-09-08 RX ADMIN — Medication SCH ML: at 19:41

## 2017-09-08 RX ADMIN — MEGESTROL ACETATE SCH MG: 40 SUSPENSION ORAL at 15:26

## 2017-09-08 NOTE — HHI.PR
Subjective


Remarks


She is resting in bed comfortably


Not much for nausea today


Serology more consistent with autoimmune possibly SLE and autoimmune hepatitis





Objective


Vitals





Vital Signs








  Date Time  Temp Pulse Resp B/P (MAP) Pulse Ox O2 Delivery O2 Flow Rate FiO2


 


9/8/17 16:00 96.7 56 18 120/83 (95) 95   


 


9/8/17 13:57 97.2 92 18 177/97 (123) 93   


 


9/8/17 13:00 97.5 51 12 158/70 (99) 94 Nasal Cannula 2 


 


9/8/17 12:45  53 12 163/74 (103) 94 Nasal Cannula 2 


 


9/8/17 12:30  50 12 142/67 (92) 94 Nasal Cannula 2 


 


9/8/17 12:15  51 12 146/66 (92) 94 Nasal Cannula 2 


 


9/8/17 12:00  49 12 140/67 (91) 94 Nasal Cannula 2 


 


9/8/17 11:45  53 12 159/65 (96) 93 Nasal Cannula 2 


 


9/8/17 11:30  56 12 143/76 (98) 95 Nasal Cannula 2 


 


9/8/17 11:15  51 12 158/71 (100) 98 Nasal Cannula 4 


 


9/8/17 11:00  52 12 153/76 (101) 95 Nasal Cannula 4 


 


9/8/17 10:55 97.4 55 12 158/76 (103) 94 Nasal Cannula 4 


 


9/8/17 07:38 96.1 67 19 166/74 (104) 93   


 


9/8/17 04:00 98.2 51 16 178/84 (115) 95   


 


9/8/17 00:00 97.7 53 17 160/87 (111) 94   


 


9/7/17 21:42      Nasal Cannula 2.00 


 


9/7/17 20:32      Nasal Cannula 2.00 


 


9/7/17 20:25 97.5 58 18 157/82 (107) 93   














I/O      


 


 9/7/17 9/7/17 9/7/17 9/8/17 9/8/17 9/8/17





 07:00 15:00 23:00 07:00 15:00 23:00


 


Intake Total 480 ml  240 ml 0 ml 730 ml 


 


Balance 480 ml  240 ml 0 ml 730 ml 


 


      


 


Intake Oral 480 ml  240 ml 0 ml 480 ml 


 


Other     250 ml 


 


# Voids 2  2 1 4 


 


# Bowel Movements 0  0  0 








Result Diagram:  


9/8/17 1642                                                                    

            9/8/17 1642





Objective Remarks


-  GENERAL: This is a well-nourished, well-developed patient, in no apparent 

distress.


SKIN: No rashes, warm and dry 


HEAD: Atraumatic. Normocephalic. 


EYES: Pupils equal round and reactive. Extraocular motions intact. No scleral 

icterus. 


ENT: Nose without bleeding, or drainage, Airway patent.


NECK: Trachea midline.  Supple


CARDIOVASCULAR: Regular rate and rhythm without murmurs, gallops, or rubs. 


RESPIRATORY: Fair air entry bilaterally. No wheezes, rales, or rhonchi.  


GASTROINTESTINAL: Abdomen soft, mildly tender to palpation right upper quadrant,

 nondistended.  Positive bowel sounds


MUSCULOSKELETAL: Extremities without clubbing, cyanosis, or edema.  Pedal 

pulses appreciated


NEUROLOGICAL: Awake and alert.  Moves all extremity.  Normal speech.no focal 

neurological deficit





A/P


Problem List:  


(1) Pancreatitis


ICD Code:  K85.90 - Acute pancreatitis without necrosis or infection, 

unspecified


Status:  Acute


(2) Chest pain


ICD Code:  R07.9 - Chest pain, unspecified


Status:  Acute


(3) VALENTIN (acute kidney injury)


ICD Code:  N17.9 - Acute kidney failure, unspecified


(4) Rhabdomyolysis


ICD Code:  M62.82 - Rhabdomyolysis


(5) Elevated d-dimer


ICD Code:  R79.89 - Other specified abnormal findings of blood chemistry


Assessment and Plan


62 years old female presented with





-  Abdominal pain with increased lipase likely from pancreatitis plus suspected 

constipation. but given persistence and improvement in those to underlying 

etiologies, gastric nephrology plans for EGD tomorrow , continue po pain meds 

as needed.


-  Constipation - continue MiraLAX, will give another one-time suppository, add 

on by mouth Colace


-  depression likely 2/2 severe hypothyroidism - continue Remeron for appetite 

and sleep improvement plus Synthroid as above. 


-  Severe hypothyroidism - free T4 is low, severe hypothyroidism based on TSH 

being over 100 , continue synthroid. 


-  Generalized weakness - likely secondary to hypothyroidism, continue therapy


-  VALENTIN versus CKD- renal US unremarkable, appreciate nephro recs, pursuing w/ 

autoimmune workup


-   DVT Prophylaxis:  SCD/Teds. 





9/7: Discussed with GI, possible plan for EGD, patient had low C3 low C4 

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 

GI nephrology


9/8: Positive DS DNA, positive ASMA, decreased C3-C4, positive AMA, all 

consistent with autoimmune disease mostly SLE and autoimmune hepatitis, may 

need kidney and liver biopsy, GI and nephrology to follow











Shai Vasquez MD Sep 8, 2017 19:54

## 2017-09-08 NOTE — HHI.GIFU
Subjective


Remarks


Patient laying in bed comfortably, no significant pain today.





Objective


Vitals I&O





Vital Signs








  Date Time  Temp Pulse Resp B/P (MAP) Pulse Ox O2 Delivery O2 Flow Rate FiO2


 


9/8/17 07:38 96.1 67 19 166/74 (104) 93   


 


9/8/17 04:00 98.2 51 16 178/84 (115) 95   


 


9/8/17 00:00 97.7 53 17 160/87 (111) 94   


 


9/7/17 21:42      Nasal Cannula 2.00 


 


9/7/17 20:32      Nasal Cannula 2.00 


 


9/7/17 20:25 97.5 58 18 157/82 (107) 93   


 


9/7/17 16:00 97.0 67 18 160/89 (112) 93   


 


9/7/17 12:12     94 Nasal Cannula 2.00 


 


9/7/17 12:00 97.5 72 18 170/89 (116) 96   














I/O      


 


 9/7/17 9/7/17 9/7/17 9/8/17 9/8/17 9/8/17





 07:00 15:00 23:00 07:00 15:00 23:00


 


Intake Total 480 ml  240 ml 0 ml 250 ml 


 


Balance 480 ml  240 ml 0 ml 250 ml 


 


      


 


Intake Oral 480 ml  240 ml 0 ml  


 


Other     250 ml 


 


# Voids 2  2 1  


 


# Bowel Movements 0  0   








Laboratory











 Date/Time


Source Procedure


Growth Status


 


 


 9/2/17 02:58


Urine Clean Catch Urine Culture - Final


,000 CFU/ML MIXED GRAM POSITIVE... Complete








Physical Exam


HEENT: Normocephalic; atraumatic; no jaundice.  


CHEST:  CTA


CARDIAC:  RRR.


ABDOMEN:  Soft, nondistended, minimal epigastric tenderness; no 

hepatosplenomegaly; bowel sounds are present in all four quadrants.


EXTREMITIES: No clubbing, cyanosis, or edema.


SKIN:  Normal; no rash; no jaundice.


CNS:  No focal deficits; alert and oriented times three.





Assessment and Plan


Plan


Patient seen and examined


Continue with current supportive care


Monitor labs


EGD  rule out varices and portal hypertension, she has esophageal stricture 

status post dilation with savory guidewire dilator, mild redness of the antrum 

biopsy to rule out H. pylori no ulcer is seen


Most likely cause of her pain is probably renal stones the patient tells me of 

a pain that radiates down into her groin


Will await the rest of the liver workup  prior to making a decision about liver 

biopsy


We will advance diet as tolerated











Fidel Brody MD Sep 8, 2017 10:58

## 2017-09-08 NOTE — GIPROC
[facility]

[address] [facility]. [facility], [address]

 

 

EGD PROCEDURE REPORT     EXAM DATE: 09/08/2017

 

PATIENT NAME:      Anay Negrete           MR #:      [id number]

YOB: 1955      VISIT #:     [id number]

ATTENDING:     Fidel Brody MD     ORDER #:     [id number]

ASSISTANT:      Tobin Jeffers     STATUS:     inpatient

 

INDICATIONS:  The patient is a 62 yr old female here for an EGD due to

epigastric abdominal pain and nausea

PROCEDURE PERFORMED:     EGD w/ biopsy

EGD w/ dilation of esophagus via guidewire

MEDICATIONS:     None and Per Anesthesia.

TOPICAL ANESTHETIC:     Lidocaine Spray

 

CONSENT: The patient understands the risks and benefits of the procedure and

understands that these risks include, but are not limited to: sedation,

allergic reaction, infection, perforation and/or bleeding. Alternative means of

evaluation and treatment include, among others: physical exam, x-rays, and/or

surgical intervention. The patient elects to proceed with this endoscopic

procedure.

 



medical equipment was checked for proper function. Hand hygiene and appropriate

measures for infection prevention was taken. After the risks, benefits and

alternatives of the procedure were thoroughly explained, Informed consent was

verified, confirmed and timeout was successfully executed by the treatment

team. The patient was anesthetized with topical anesthesia and the Pentax

[id number] and [id number] endoscope was introduced through the mouth and advanced to

the second portion of the duodenum.  Retroflexed views revealed no

abnormalities  The gastroscope was then slowly withdrawn and removed.

Distal esophageal stricture, Savory guidewire size 17 mm.   Mild redness of the

stomach possible gastritis biopsy was done.   The endoscopy was otherwise

normal.

 

 

 

ADVERSE EVENTS:     There were no complications.

IMPRESSIONS:     1.  Distal esophageal stricture, Savory guidewire size 17 mm

2.  Mild redness of the stomach possible gastritis biopsy was done

3.  Normal endoscopy otherwise

4.  Retroflexed views revealed no abnormalities

 

RECOMMENDATIONS:     1.  Await biopsy results.  Biopsy results will not be ready

for 7-10 days.  If you don't hear from us in two weeks, call our office for

biopsy results.

2.  Anti-reflux regimen

3.  Continue PPI

4.  Avoid NSAIDS

5.  No clear reason for her abdominal pain, no peptic ulcer disease, she is

doing better today, we will advance diet if she tolerates that she can be

discharged home

PATIENT CONDITION:     stable

DISPOSITION:     Inpatient

REPEAT EXAM:     Return as needed for EGD

 

 

___________________________________

Fidel Brody MD

eSigned:  Fidel Brody MD 09/08/2017 10:54 AM

 

 

cc:

 

 

 

 

PATIENT NAME:  Anay Negrete ASHLEY

MR#: [id number]

## 2017-09-08 NOTE — HHI.NPPN
Subjective


History of Present Illness


62 year old with ARF/Pancreatitis/Cirrhosis Autoimmune disease





Review of Systems


General


Constitutional:  Fatigue





Gastrointestinal


Gastrointestinal:  Abdominal Pain





Musculoskeletal


MS:  Pain/Stiffness





Objective Data


Data











 9/8/17 9/9/17





 19:00 07:00


 


Intake Total 250 ml 


 


Balance 250 ml 


 


  


 


Other 250 ml 











Vital Signs








  Date Time  Temp Pulse Resp B/P (MAP) Pulse Ox O2 Delivery O2 Flow Rate FiO2


 


9/8/17 11:30  56 12 143/76 (98) 95 Nasal Cannula 2 


 


9/8/17 11:15  51 12 158/71 (100) 98 Nasal Cannula 4 


 


9/8/17 11:00  52 12 153/76 (101) 95 Nasal Cannula 4 


 


9/8/17 10:55 97.4 55 12 158/76 (103) 94 Nasal Cannula 4 


 


9/8/17 07:38 96.1 67 19 166/74 (104) 93   


 


9/8/17 04:00 98.2 51 16 178/84 (115) 95   


 


9/8/17 00:00 97.7 53 17 160/87 (111) 94   


 


9/7/17 21:42      Nasal Cannula 2.00 


 


9/7/17 20:32      Nasal Cannula 2.00 


 


9/7/17 20:25 97.5 58 18 157/82 (107) 93   


 


9/7/17 16:00 97.0 67 18 160/89 (112) 93   








-:  


9/7/17 0649                                                                    

            9/7/17 0649








Physical Exam


General


Appearance:  Well Developed





Neck


Neck Exam:  Neck Supple





Pulmonary


Resp Exam:  Clear Bilaterally, Breath Sounds Equal





Cardiology


CV Exam:  Regular, Normal Sinus Rhythm





Gastrointestinal/Abdomen


GI Exam:  Soft, Bowel Sounds Present





Extremeties


Extremities Exam:  No Edema





Assessment/Plan


Problem List:  


(1) VALENTIN (acute kidney injury)


ICD Codes:  N17.9 - Acute kidney failure, unspecified


Plan:  This is likely secondary to her recent pancreatitis and liver issues and 

could be an Autoimmune process, such as SLE as well, 


STEPHANIE positive


C3, C4 low


 SLE by serology STEPHANIE 1:1280 Anti dsDNA 775 very high


follow renal functions


on Solumedrol not eating Cr 3


may need a kidney biopsy, next week


Added Megace poor appetite


IVF NS at 84 ml/hr


follow CMP


 CRP high





(2) Pancreatitis


ICD Codes:  K85.90 - Acute pancreatitis without necrosis or infection, 

unspecified


Status:  Acute


Plan:  She has undiagnosed underlying autoimmune disease possibility of SLE 

versus autoimmune hepatitis exists


STEPHANIE was 1:1280 and Antismooth antibody positive in 6/12





(3) Autoimmune disease


ICD Codes:  M35.9 - Systemic involvement of connective tissue, unspecified


Plan:  Continue to monitor she needs to be followed by Delia Nunez MD Sep 8, 2017 12:49

## 2017-09-09 VITALS
HEART RATE: 64 BPM | RESPIRATION RATE: 18 BRPM | TEMPERATURE: 97.1 F | DIASTOLIC BLOOD PRESSURE: 60 MMHG | SYSTOLIC BLOOD PRESSURE: 131 MMHG | OXYGEN SATURATION: 94 %

## 2017-09-09 VITALS
HEART RATE: 60 BPM | RESPIRATION RATE: 19 BRPM | TEMPERATURE: 97 F | SYSTOLIC BLOOD PRESSURE: 167 MMHG | DIASTOLIC BLOOD PRESSURE: 77 MMHG | OXYGEN SATURATION: 92 %

## 2017-09-09 VITALS
DIASTOLIC BLOOD PRESSURE: 72 MMHG | RESPIRATION RATE: 17 BRPM | HEART RATE: 66 BPM | OXYGEN SATURATION: 94 % | SYSTOLIC BLOOD PRESSURE: 149 MMHG | TEMPERATURE: 97.1 F

## 2017-09-09 VITALS
OXYGEN SATURATION: 95 % | SYSTOLIC BLOOD PRESSURE: 148 MMHG | TEMPERATURE: 97.2 F | DIASTOLIC BLOOD PRESSURE: 81 MMHG | HEART RATE: 70 BPM | RESPIRATION RATE: 16 BRPM

## 2017-09-09 VITALS — HEART RATE: 62 BPM | SYSTOLIC BLOOD PRESSURE: 145 MMHG | DIASTOLIC BLOOD PRESSURE: 78 MMHG

## 2017-09-09 VITALS
OXYGEN SATURATION: 96 % | DIASTOLIC BLOOD PRESSURE: 72 MMHG | RESPIRATION RATE: 18 BRPM | HEART RATE: 69 BPM | SYSTOLIC BLOOD PRESSURE: 132 MMHG | TEMPERATURE: 97 F

## 2017-09-09 VITALS
OXYGEN SATURATION: 95 % | TEMPERATURE: 97.5 F | HEART RATE: 58 BPM | SYSTOLIC BLOOD PRESSURE: 156 MMHG | RESPIRATION RATE: 18 BRPM | DIASTOLIC BLOOD PRESSURE: 85 MMHG

## 2017-09-09 VITALS
DIASTOLIC BLOOD PRESSURE: 94 MMHG | SYSTOLIC BLOOD PRESSURE: 186 MMHG | OXYGEN SATURATION: 95 % | TEMPERATURE: 97.3 F | HEART RATE: 63 BPM | RESPIRATION RATE: 18 BRPM

## 2017-09-09 VITALS
DIASTOLIC BLOOD PRESSURE: 84 MMHG | RESPIRATION RATE: 16 BRPM | HEART RATE: 66 BPM | SYSTOLIC BLOOD PRESSURE: 144 MMHG | TEMPERATURE: 97.5 F | OXYGEN SATURATION: 95 %

## 2017-09-09 VITALS — OXYGEN SATURATION: 95 %

## 2017-09-09 LAB
ANION GAP SERPL CALC-SCNC: 6 MEQ/L (ref 5–15)
BUN SERPL-MCNC: 42 MG/DL (ref 7–18)
CHLORIDE SERPL-SCNC: 103 MEQ/L (ref 98–107)
GFR SERPLBLD BASED ON 1.73 SQ M-ARVRAT: 16 ML/MIN (ref 89–?)
HCO3 BLD-SCNC: 25.4 MEQ/L (ref 21–32)
POTASSIUM SERPL-SCNC: 4.3 MEQ/L (ref 3.5–5.1)
SODIUM SERPL-SCNC: 134 MEQ/L (ref 136–145)

## 2017-09-09 RX ADMIN — HYDROCODONE BITARTRATE AND ACETAMINOPHEN PRN TAB: 5; 325 TABLET ORAL at 05:50

## 2017-09-09 RX ADMIN — STANDARDIZED SENNA CONCENTRATE AND DOCUSATE SODIUM SCH TAB: 8.6; 5 TABLET, FILM COATED ORAL at 09:00

## 2017-09-09 RX ADMIN — POLYETHYLENE GLYCOL 3350 SCH GM: 17 POWDER, FOR SOLUTION ORAL at 09:00

## 2017-09-09 RX ADMIN — MEGESTROL ACETATE SCH MG: 40 SUSPENSION ORAL at 05:51

## 2017-09-09 RX ADMIN — HYDROCODONE BITARTRATE AND ACETAMINOPHEN PRN TAB: 5; 325 TABLET ORAL at 16:22

## 2017-09-09 RX ADMIN — STANDARDIZED SENNA CONCENTRATE AND DOCUSATE SODIUM SCH TAB: 8.6; 5 TABLET, FILM COATED ORAL at 22:34

## 2017-09-09 RX ADMIN — MEGESTROL ACETATE SCH MG: 40 SUSPENSION ORAL at 16:11

## 2017-09-09 RX ADMIN — PANTOPRAZOLE SCH MG: 40 TABLET, DELAYED RELEASE ORAL at 09:52

## 2017-09-09 RX ADMIN — MIRTAZAPINE SCH MG: 15 TABLET, FILM COATED ORAL at 22:34

## 2017-09-09 RX ADMIN — PHENYTOIN SODIUM SCH MLS/HR: 50 INJECTION INTRAMUSCULAR; INTRAVENOUS at 16:40

## 2017-09-09 RX ADMIN — LEVOTHYROXINE SODIUM SCH MCG: 150 TABLET ORAL at 05:50

## 2017-09-09 RX ADMIN — Medication SCH ML: at 22:34

## 2017-09-09 RX ADMIN — Medication SCH ML: at 09:52

## 2017-09-09 RX ADMIN — PHENYTOIN SODIUM SCH MLS/HR: 50 INJECTION INTRAMUSCULAR; INTRAVENOUS at 03:44

## 2017-09-09 NOTE — HHI.NPPN
Subjective


History of Present Illness


62 year old with ARF/Pancreatitis/Cirrhosis Autoimmune disease





Review of Systems


General


Constitutional:  Fatigue





Gastrointestinal


Gastrointestinal:  Abdominal Pain





Musculoskeletal


MS:  Pain/Stiffness





Objective Data


Data





Vital Signs








  Date Time  Temp Pulse Resp B/P (MAP) Pulse Ox O2 Delivery O2 Flow Rate FiO2


 


9/9/17 13:50     95   


 


9/9/17 08:00 97.0 60 19 167/77 (107) 92   


 


9/9/17 04:00 97.2 70 16 148/81 (103) 95   


 


9/9/17 00:00 97.5 66 16 144/84 (104) 95   


 


9/8/17 21:59     94 Nasal Cannula 2.00 


 


9/8/17 20:00 97.0 60 20 160/84 (109) 94   


 


9/8/17 19:49      Nasal Cannula 2.00 


 


9/8/17 16:00 96.7 56 18 120/83 (95) 95   








-:  


9/8/17 1642                                                                    

            9/9/17 0841








Physical Exam


General


Appearance:  Well Developed





Neck


Neck Exam:  Neck Supple





Pulmonary


Resp Exam:  Clear Bilaterally, Breath Sounds Equal





Cardiology


CV Exam:  Regular, Normal Sinus Rhythm





Gastrointestinal/Abdomen


GI Exam:  Soft, Bowel Sounds Present





Extremeties


Extremities Exam:  No Edema





Assessment/Plan


Problem List:  


(1) VALENTIN (acute kidney injury)


ICD Codes:  N17.9 - Acute kidney failure, unspecified


Plan:  This is likely secondary to her recent pancreatitis and liver issues and 

could be an Autoimmune process, such as SLE as well, 


STEPHANIE positive


C3, C4 low


 SLE by serology STEPHANIE 1:1280 Anti dsDNA 775 very high


follow renal functions


on Solumedrol  Cr 2.94


may need a kidney biopsy, next week


Added Megace poor appetite


IVF NS at 84 ml/hr


follow BMP


 CRP high





(2) Pancreatitis


ICD Codes:  K85.90 - Acute pancreatitis without necrosis or infection, 

unspecified


Status:  Acute


Plan:  She has undiagnosed underlying autoimmune disease possibility of SLE 

versus autoimmune hepatitis exists


STEPHANIE was 1:1280 and Antismooth antibody positive in 6/12





(3) Autoimmune disease


ICD Codes:  M35.9 - Systemic involvement of connective tissue, unspecified


Plan:  Continue to monitor she needs to be followed by Delia Nunez MD Sep 9, 2017 14:25

## 2017-09-09 NOTE — HHI.PR
Subjective


Remarks


Sitting on the edge of the bed trying to order her dinner


Still feeling extremely fatigued, will need rheumatology consultation





Objective


Vitals





Vital Signs








  Date Time  Temp Pulse Resp B/P (MAP) Pulse Ox O2 Delivery O2 Flow Rate FiO2


 


9/9/17 18:02  62  145/78 (100)    


 


9/9/17 15:34 97.3 63 18 186/94 (124) 95   


 


9/9/17 13:50     95   


 


9/9/17 12:00 97.5 58 18 156/85 (108) 95   


 


9/9/17 08:00 97.0 60 19 167/77 (107) 92   


 


9/9/17 04:00 97.2 70 16 148/81 (103) 95   


 


9/9/17 00:00 97.5 66 16 144/84 (104) 95   


 


9/8/17 21:59     94 Nasal Cannula 2.00 


 


9/8/17 20:00 97.0 60 20 160/84 (109) 94   


 


9/8/17 19:49      Nasal Cannula 2.00 














I/O      


 


 9/8/17 9/8/17 9/8/17 9/9/17 9/9/17 9/9/17





 07:00 15:00 23:00 07:00 15:00 23:00


 


Intake Total 0 ml 730 ml 480 ml 480 ml 840 ml 


 


Balance 0 ml 730 ml 480 ml 480 ml 840 ml 


 


      


 


Intake Oral 0 ml 480 ml 480 ml 480 ml 840 ml 


 


Other  250 ml    


 


# Voids 1 4 2 2 5 


 


# Bowel Movements  0  1 5 








Result Diagram:  


9/8/17 1642                                                                    

            9/9/17 0841





Objective Remarks


-  GENERAL: This is a well-nourished, well-developed patient, in no apparent 

distress.


SKIN: No rashes, warm and dry 


HEAD: Atraumatic. Normocephalic. 


EYES: Pupils equal round and reactive. Extraocular motions intact. No scleral 

icterus. 


ENT: Nose without bleeding, or drainage, Airway patent.


NECK: Trachea midline.  Supple


CARDIOVASCULAR: Regular rate and rhythm without murmurs, gallops, or rubs. 


RESPIRATORY: Fair air entry bilaterally. No wheezes, rales, or rhonchi.  


GASTROINTESTINAL: Abdomen soft, mildly tender to palpation right upper quadrant,

 nondistended.  Positive bowel sounds


MUSCULOSKELETAL: Extremities without clubbing, cyanosis, or edema.  Pedal 

pulses appreciated


NEUROLOGICAL: Awake and alert.  Moves all extremity.  Normal speech.no focal 

neurological deficit





A/P


Problem List:  


(1) Pancreatitis


ICD Code:  K85.90 - Acute pancreatitis without necrosis or infection, 

unspecified


Status:  Acute


(2) Chest pain


ICD Code:  R07.9 - Chest pain, unspecified


Status:  Acute


(3) VALENTIN (acute kidney injury)


ICD Code:  N17.9 - Acute kidney failure, unspecified


(4) Rhabdomyolysis


ICD Code:  M62.82 - Rhabdomyolysis


(5) Elevated d-dimer


ICD Code:  R79.89 - Other specified abnormal findings of blood chemistry


Assessment and Plan


62 years old female presented with





-  Abdominal pain with increased lipase likely from pancreatitis plus suspected 

constipation. but given persistence and improvement in those to underlying 

etiologies, gastric nephrology plans for EGD tomorrow , continue po pain meds 

as needed.


-  Constipation - continue MiraLAX, will give another one-time suppository, add 

on by mouth Colace


-  depression likely 2/2 severe hypothyroidism - continue Remeron for appetite 

and sleep improvement plus Synthroid as above. 


-  Severe hypothyroidism - free T4 is low, severe hypothyroidism based on TSH 

being over 100 , continue synthroid. 


-  Generalized weakness - likely secondary to hypothyroidism, continue therapy


-  VALENTIN versus CKD- renal US unremarkable, appreciate nephro recs, pursuing w/ 

autoimmune workup


-   DVT Prophylaxis:  SCD/Teds. 





9/7: Discussed with GI, possible plan for EGD, patient had low C3 low C4 

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 

GI nephrology


9/8: Positive DS DNA, positive ASMA, decreased C3-C4, positive AMA, all 

consistent with autoimmune disease mostly SLE and autoimmune hepatitis, may 

need kidney and liver biopsy, GI and nephrology to follow


9/9: Continue current care, continue Solu-Medrol, will need a rheumatology 

consultation, further recommendation for biopsy liver/kidney per specialist, 

monitor BMP and CBC











Shai Vasquez MD Sep 9, 2017 18:29

## 2017-09-10 VITALS
RESPIRATION RATE: 17 BRPM | TEMPERATURE: 98 F | OXYGEN SATURATION: 94 % | SYSTOLIC BLOOD PRESSURE: 153 MMHG | HEART RATE: 58 BPM | DIASTOLIC BLOOD PRESSURE: 73 MMHG

## 2017-09-10 VITALS
DIASTOLIC BLOOD PRESSURE: 75 MMHG | SYSTOLIC BLOOD PRESSURE: 145 MMHG | TEMPERATURE: 98.1 F | OXYGEN SATURATION: 93 % | HEART RATE: 69 BPM | RESPIRATION RATE: 19 BRPM

## 2017-09-10 VITALS
SYSTOLIC BLOOD PRESSURE: 155 MMHG | TEMPERATURE: 99.2 F | HEART RATE: 83 BPM | OXYGEN SATURATION: 95 % | RESPIRATION RATE: 18 BRPM | DIASTOLIC BLOOD PRESSURE: 79 MMHG

## 2017-09-10 VITALS
TEMPERATURE: 98.6 F | SYSTOLIC BLOOD PRESSURE: 182 MMHG | RESPIRATION RATE: 19 BRPM | OXYGEN SATURATION: 93 % | HEART RATE: 68 BPM | DIASTOLIC BLOOD PRESSURE: 79 MMHG

## 2017-09-10 LAB
ANION GAP SERPL CALC-SCNC: 6 MEQ/L (ref 5–15)
BUN SERPL-MCNC: 47 MG/DL (ref 7–18)
CHLORIDE SERPL-SCNC: 104 MEQ/L (ref 98–107)
GFR SERPLBLD BASED ON 1.73 SQ M-ARVRAT: 17 ML/MIN (ref 89–?)
HCO3 BLD-SCNC: 24.7 MEQ/L (ref 21–32)
POTASSIUM SERPL-SCNC: 4.6 MEQ/L (ref 3.5–5.1)
SODIUM SERPL-SCNC: 135 MEQ/L (ref 136–145)

## 2017-09-10 RX ADMIN — PANTOPRAZOLE SCH MG: 40 TABLET, DELAYED RELEASE ORAL at 08:48

## 2017-09-10 RX ADMIN — STANDARDIZED SENNA CONCENTRATE AND DOCUSATE SODIUM SCH TAB: 8.6; 5 TABLET, FILM COATED ORAL at 08:48

## 2017-09-10 RX ADMIN — PHENYTOIN SODIUM SCH MLS/HR: 50 INJECTION INTRAMUSCULAR; INTRAVENOUS at 03:31

## 2017-09-10 RX ADMIN — HYDROCODONE BITARTRATE AND ACETAMINOPHEN PRN TAB: 5; 325 TABLET ORAL at 21:16

## 2017-09-10 RX ADMIN — STANDARDIZED SENNA CONCENTRATE AND DOCUSATE SODIUM SCH TAB: 8.6; 5 TABLET, FILM COATED ORAL at 21:12

## 2017-09-10 RX ADMIN — LEVOTHYROXINE SODIUM SCH MCG: 150 TABLET ORAL at 04:31

## 2017-09-10 RX ADMIN — Medication SCH ML: at 21:16

## 2017-09-10 RX ADMIN — MEGESTROL ACETATE SCH MG: 40 SUSPENSION ORAL at 04:31

## 2017-09-10 RX ADMIN — HYDROCODONE BITARTRATE AND ACETAMINOPHEN PRN TAB: 5; 325 TABLET ORAL at 16:40

## 2017-09-10 RX ADMIN — Medication SCH ML: at 08:48

## 2017-09-10 RX ADMIN — PHENYTOIN SODIUM SCH MLS/HR: 50 INJECTION INTRAMUSCULAR; INTRAVENOUS at 16:30

## 2017-09-10 RX ADMIN — MIRTAZAPINE SCH MG: 15 TABLET, FILM COATED ORAL at 21:12

## 2017-09-10 RX ADMIN — HYDROCODONE BITARTRATE AND ACETAMINOPHEN PRN TAB: 5; 325 TABLET ORAL at 12:55

## 2017-09-10 RX ADMIN — MEGESTROL ACETATE SCH MG: 40 SUSPENSION ORAL at 16:39

## 2017-09-10 RX ADMIN — POLYETHYLENE GLYCOL 3350 SCH GM: 17 POWDER, FOR SOLUTION ORAL at 08:48

## 2017-09-10 NOTE — HHI.NPPN
Subjective


History of Present Illness


62 year old with ARF/Pancreatitis/Cirrhosis Autoimmune disease





Review of Systems


General


Constitutional:  Fatigue





Gastrointestinal


Gastrointestinal:  Abdominal Pain





Musculoskeletal


MS:  Pain/Stiffness





Objective Data


Data





Vital Signs








  Date Time  Temp Pulse Resp B/P (MAP) Pulse Ox O2 Delivery O2 Flow Rate FiO2


 


9/10/17 07:49 98.0 58 17 153/73 (99) 94   


 


9/9/17 23:59 97.1 64 18 131/60 (83) 94   


 


9/9/17 23:35 97.0 69 18 132/72 (92) 96   


 


9/9/17 20:03 97.1 66 17 149/72 (97) 94   


 


9/9/17 18:02  62  145/78 (100)    


 


9/9/17 15:34 97.3 63 18 186/94 (124) 95   


 


9/9/17 13:50     95   


 


9/9/17 12:00 97.5 58 18 156/85 (108) 95   








-:  


9/8/17 1642                                                                    

            9/10/17 0727








Physical Exam


General


Appearance:  Well Developed





Neck


Neck Exam:  Neck Supple





Pulmonary


Resp Exam:  Clear Bilaterally, Breath Sounds Equal





Cardiology


CV Exam:  Regular, Normal Sinus Rhythm





Gastrointestinal/Abdomen


GI Exam:  Soft, Bowel Sounds Present





Extremeties


Extremities Exam:  No Edema





Assessment/Plan


Problem List:  


(1) VALENTIN (acute kidney injury)


ICD Codes:  N17.9 - Acute kidney failure, unspecified


Plan:  This is likely secondary to her recent pancreatitis and liver issues and 

could be an Autoimmune process, such as SLE as well, 


STEPHANIE positive


C3, C4 low


 SLE by serology STEPHANIE 1:1280 Anti dsDNA 775 very high


follow renal functions


on Solumedrol  Cr 2.85


may need a kidney biopsy, next week


Added Megace poor appetite


IVF NS at 84 ml/hr


follow BMP


 CRP high





(2) Pancreatitis


ICD Codes:  K85.90 - Acute pancreatitis without necrosis or infection, 

unspecified


Status:  Acute


Plan:  She has undiagnosed underlying autoimmune disease possibility of SLE 

versus autoimmune hepatitis exists


STEPHANIE was 1:1280 and Antismooth antibody positive in 6/12





(3) Autoimmune disease


ICD Codes:  M35.9 - Systemic involvement of connective tissue, unspecified


Plan:  Continue to monitor she needs to be followed by Delia Nunez MD Sep 10, 2017 10:54

## 2017-09-10 NOTE — HHI.GIFU
Subjective


Remarks


Resting in bed.  Swallowing okay.  Tolerating diet.  No abdominal pain.  


 ([facility] Meredithjared LADD)





Objective


Vitals I&O





Vital Signs








  Date Time  Temp Pulse Resp B/P (MAP) Pulse Ox O2 Delivery O2 Flow Rate FiO2


 


9/10/17 07:49 98.0 58 17 153/73 (99) 94   


 


9/9/17 23:59 97.1 64 18 131/60 (83) 94   


 


9/9/17 23:35 97.0 69 18 132/72 (92) 96   


 


9/9/17 20:03 97.1 66 17 149/72 (97) 94   


 


9/9/17 18:02  62  145/78 (100)    


 


9/9/17 15:34 97.3 63 18 186/94 (124) 95   


 


9/9/17 13:50     95   


 


9/9/17 12:00 97.5 58 18 156/85 (108) 95   














I/O      


 


 9/9/17 9/9/17 9/9/17 9/10/17 9/10/17 9/10/17





 07:00 15:00 23:00 07:00 15:00 23:00


 


Intake Total 480 ml 840 ml 360 ml 480 ml  


 


Balance 480 ml 840 ml 360 ml 480 ml  


 


      


 


Intake Oral 480 ml 840 ml 360 ml 480 ml  


 


# Voids 2 5 6 5  


 


# Bowel Movements 1 5 4 2  








Laboratory





Laboratory Tests








Test


  9/10/17


07:27


 


Blood Urea Nitrogen 47 


 


Creatinine 2.85 


 


Random Glucose 150 


 


Calcium Level 8.7 


 


Sodium Level 135 


 


Potassium Level 4.6 


 


Chloride Level 104 


 


Carbon Dioxide Level 24.7 


 


Anion Gap 6 


 


Estimat Glomerular Filtration


Rate 17 


 














 Date/Time


Source Procedure


Growth Status


 


 


 9/2/17 02:58


Urine Clean Catch Urine Culture - Final


,000 CFU/ML MIXED GRAM POSITIVE... Complete








Imaging





Last Impressions








Liver Ultrasound 9/6/17 0000 Signed





Impressions: 





 Service Date/Time:  Wednesday, September 6, 2017 08:44 - CONCLUSION:  1. 





 Hepatomegaly with heterogeneous echotexture, slightly larger than on prior 

exam 





 in 2016. 2. Shadowing right renal stones without evidence of hydronephrosis.  

   





 Jesse Martínez MD 


 


Renal Ultrasound 9/5/17 0000 Signed





Impressions: 





 Service Date/Time:  Tuesday, September 5, 2017 17:45 - CONCLUSION:  1. 





 Nonobstructing right renal calculi. Right renal cysts. No perinephric fluid. 





 Bladder unremarkable.     Michael Forrest MD 


 


Lung Scan-[facility] Nuclear Medicine 9/2/17 0000 Signed





Impressions: 





 Service Date/Time:  Saturday, September 2, 2017 02:35 - CONCLUSION:  1. Low 





 probability for pulmonary embolus.     Michael Forrest MD 


 


Head CT 9/2/17 0000 Signed





Impressions: 





 Service Date/Time:  Saturday, September 2, 2017 00:29 - CONCLUSION:  Normal 





 examination.       Michael Forrest MD 


 


Chest X-Ray 9/1/17 2217 Signed





Impressions: 





 Service Date/Time:  Friday, September 1, 2017 22:32 - CONCLUSION:  Minimal 





 patchy areas of consolidation or atelectasis at the lateral bases.     Scooter Tatum MD 


 


Abdomen/Pelvis CT 9/1/17 0000 Signed





Impressions: 





 Service Date/Time:  Saturday, September 2, 2017 00:32 - CONCLUSION:  1. No 

acute 





 findings within the abdomen. Partial staghorn type calcifications upper and 





 lower pole right kidney and tiny calculi upper pole left kidney. No bowel 





 obstruction, free air, free fluid or obstructive uropathy. 2. Probable liver 





 cirrhosis. Previous cholecystectomy, appendectomy.     Michael Forrest MD 








Physical Exam


HEENT: Normocephalic; atraumatic; no jaundice.  


CHEST:  CTA


CARDIAC:  RRR.


ABDOMEN:  Soft, nondistended, nontender; no hepatosplenomegaly; bowel sounds 

are present in all four quadrants.


EXTREMITIES: No clubbing, cyanosis, or edema.


SKIN:  Normal; no rash; no jaundice.


CNS:  No focal deficits; alert and oriented times three.


 (Zuleima West)





Assessment and Plan


Plan


ASSESSMENT:


- Abdominal pain, right sided. CT scan abdomen and pelvis (9/1/17) revealed no 

acute findings within the abdomen.  Partial staghorn type


   calcifications upper and lower pole right kidney and tiny calculi upper pole 

left kidney.  No bowel obstruction, free air, free fluid, or obstructive


   uropathy.  Probable liver cirrhosis.  Previous cholecystectomy, 

appendectomy. Elevated lipase- mildly although also with acute on chronic


   VALENTIN and elevated creat.  Does have hx of PUD 4 years ago.  EGD (9/8/17)---> 

1.  Distal esophageal stricture, Savory guidewire size 17 mm


   2.  Mild redness of the stomach possible gastritis biopsy was done, 3.  

Normal endoscopy otherwise, 4.  Retroflexed views revealed no abnormalities.


   Pathology pending.  Possibly pain related to kidney stones, as it radiates 

into groin.


- Anemia.  10.5/31.5 on 9/8.  


- Mild elevated AST.  Will recheck.  Liver workup in 2012- Pt had positive STEPHANIE 

with titer > 1:1280- speckled and diffuse, ASMA (+) 1A:160.


   Alpha 1 antitrypsin 159, Ceruloplasmin 40, . Rpt. labs STEPHANIE (+) titer > 1:1280

, diffuse pattern.  ASMA negative, AMA pending, ferritin 160, Ceruloplasmin 

pending


   AFP 4.0.  Probable liver cirrhosis on CT scan.  ? need for liver biopsy.  

Will await for liver workup and then decide about biopsy. Steroids


- Lipase, undetermined significance.  No signs of pancreatitis on CT scan.  Has 

elevated creatinine, now 346.  IgG 4 level pending.


- Chest pain, per attending.


- VALENTIN, Rhabdo, Creat 2.85. Renal following, suspects SLE, Double strand DNA 

775.  possible renal biopsy. 


- (+) STEPHANIE with high titer > 1:1280-diffuse, ASMA (+) 1A:160 in past, now 

negative.  Possible renal biopsy, ? liver biopsy.  Steroids.


- Elevated TSH per attending.





PLAN:


- HERIBERTO


- Await pathology


- PPI


- Solumedrol


- Monitor labs


- Await AMA


- Await IgG 4 level


- ? Need for liver biopsy


- Further recommendations to follow after results of above


- Pt seen and examined by Dr. Duque and myself and this note is written on her 

behalf


 (Zuleima West)


Physician Comments


seen, examined


agree with above 


we will order liver biopsy next week


celiac panel, antiliver  kidney ab 


 (Oriana Duque MD)











Zuleima West Sep 10, 2017 10:04


Oriana Duque MD Sep 10, 2017 18:35

## 2017-09-10 NOTE — HHI.PR
Subjective


Remarks


patient walking in the room with a walker


Denied fever, chest pain abdominal pain, very mild nausea





Objective


Vitals





Vital Signs








  Date Time  Temp Pulse Resp B/P (MAP) Pulse Ox O2 Delivery O2 Flow Rate FiO2


 


9/10/17 11:34 98.1 69 19 145/75 (98) 93   


 


9/10/17 07:49 98.0 58 17 153/73 (99) 94   


 


9/9/17 23:59 97.1 64 18 131/60 (83) 94   


 


9/9/17 23:35 97.0 69 18 132/72 (92) 96   


 


9/9/17 20:03 97.1 66 17 149/72 (97) 94   


 


9/9/17 18:02  62  145/78 (100)    


 


9/9/17 15:34 97.3 63 18 186/94 (124) 95   


 


9/9/17 13:50     95   














I/O      


 


 9/9/17 9/9/17 9/9/17 9/10/17 9/10/17 9/10/17





 07:00 15:00 23:00 07:00 15:00 23:00


 


Intake Total 480 ml 840 ml 360 ml 480 ml  


 


Balance 480 ml 840 ml 360 ml 480 ml  


 


      


 


Intake Oral 480 ml 840 ml 360 ml 480 ml  


 


# Voids 2 5 6 5  


 


# Bowel Movements 1 5 4 2  








Result Diagram:  


9/8/17 1642                                                                    

            9/10/17 0727





Objective Remarks


-  GENERAL: This is a well-nourished, well-developed patient, in no apparent 

distress.


SKIN: No rashes, warm and dry 


HEAD: Atraumatic. Normocephalic. 


EYES: Pupils equal round and reactive. Extraocular motions intact. No scleral 

icterus. 


ENT: Nose without bleeding, or drainage, Airway patent.


NECK: Trachea midline.  Supple


CARDIOVASCULAR: Regular rate and rhythm without murmurs, gallops, or rubs. 


RESPIRATORY: Fair air entry bilaterally. No wheezes, rales, or rhonchi.  


GASTROINTESTINAL: Abdomen soft, mildly tender to palpation right upper quadrant,

 nondistended.  Positive bowel sounds


MUSCULOSKELETAL: Extremities without clubbing, cyanosis, or edema.  Pedal 

pulses appreciated


NEUROLOGICAL: Awake and alert.  Moves all extremity.  Normal speech.no focal 

neurological deficit





A/P


Problem List:  


(1) Pancreatitis


ICD Code:  K85.90 - Acute pancreatitis without necrosis or infection, 

unspecified


Status:  Acute


(2) Chest pain


ICD Code:  R07.9 - Chest pain, unspecified


Status:  Acute


(3) VALENTIN (acute kidney injury)


ICD Code:  N17.9 - Acute kidney failure, unspecified


(4) Rhabdomyolysis


ICD Code:  M62.82 - Rhabdomyolysis


(5) Elevated d-dimer


ICD Code:  R79.89 - Other specified abnormal findings of blood chemistry


Assessment and Plan


62 years old female presented with





-  Abdominal pain with increased lipase likely from pancreatitis plus suspected 

constipation. but given persistence and improvement in those to underlying 

etiologies, gastric nephrology plans for EGD tomorrow , continue po pain meds 

as needed.


-  Constipation - continue MiraLAX, will give another one-time suppository, add 

on by mouth Colace


-  depression likely 2/2 severe hypothyroidism - continue Remeron for appetite 

and sleep improvement plus Synthroid as above. 


-  Severe hypothyroidism - free T4 is low, severe hypothyroidism based on TSH 

being over 100 , continue synthroid. 


-  Generalized weakness - likely secondary to hypothyroidism, continue therapy


-  VALENTIN versus CKD- renal US unremarkable, appreciate nephro recs, pursuing w/ 

autoimmune workup


-   DVT Prophylaxis:  SCD/Teds. 





9/7: Discussed with GI, possible plan for EGD, patient had low C3 low C4 

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 

GI nephrology


9/8: Positive DS DNA, positive ASMA, decreased C3-C4, positive AMA, all 

consistent with autoimmune disease mostly SLE and autoimmune hepatitis, may 

need kidney and liver biopsy, GI and nephrology to follow


9/9: Continue current care, continue Solu-Medrol, will need a rheumatology 

consultation, further recommendation for biopsy liver/kidney per specialist, 

monitor BMP and CBC 


9/10: ASMA negative, was positive in the past, continue Solu-Medrol, expected 

liver/kidney biopsy











Shai Vasquez MD Sep 10, 2017 13:20

## 2017-09-11 VITALS
OXYGEN SATURATION: 95 % | DIASTOLIC BLOOD PRESSURE: 86 MMHG | SYSTOLIC BLOOD PRESSURE: 189 MMHG | HEART RATE: 65 BPM | TEMPERATURE: 98.6 F | RESPIRATION RATE: 18 BRPM

## 2017-09-11 VITALS
HEART RATE: 61 BPM | SYSTOLIC BLOOD PRESSURE: 180 MMHG | OXYGEN SATURATION: 95 % | TEMPERATURE: 99.1 F | DIASTOLIC BLOOD PRESSURE: 77 MMHG | RESPIRATION RATE: 18 BRPM

## 2017-09-11 VITALS
OXYGEN SATURATION: 92 % | SYSTOLIC BLOOD PRESSURE: 154 MMHG | TEMPERATURE: 98.5 F | HEART RATE: 62 BPM | RESPIRATION RATE: 19 BRPM | DIASTOLIC BLOOD PRESSURE: 81 MMHG

## 2017-09-11 VITALS
RESPIRATION RATE: 19 BRPM | OXYGEN SATURATION: 95 % | DIASTOLIC BLOOD PRESSURE: 83 MMHG | SYSTOLIC BLOOD PRESSURE: 174 MMHG | HEART RATE: 65 BPM | TEMPERATURE: 97.9 F

## 2017-09-11 VITALS — OXYGEN SATURATION: 92 %

## 2017-09-11 VITALS
SYSTOLIC BLOOD PRESSURE: 141 MMHG | DIASTOLIC BLOOD PRESSURE: 78 MMHG | OXYGEN SATURATION: 92 % | HEART RATE: 62 BPM | RESPIRATION RATE: 19 BRPM | TEMPERATURE: 95.9 F

## 2017-09-11 VITALS
HEART RATE: 55 BPM | RESPIRATION RATE: 18 BRPM | OXYGEN SATURATION: 92 % | SYSTOLIC BLOOD PRESSURE: 149 MMHG | TEMPERATURE: 98.9 F | DIASTOLIC BLOOD PRESSURE: 70 MMHG

## 2017-09-11 VITALS
DIASTOLIC BLOOD PRESSURE: 81 MMHG | OXYGEN SATURATION: 95 % | HEART RATE: 62 BPM | SYSTOLIC BLOOD PRESSURE: 164 MMHG | TEMPERATURE: 97.9 F | RESPIRATION RATE: 18 BRPM

## 2017-09-11 VITALS — OXYGEN SATURATION: 95 %

## 2017-09-11 LAB
ALP SERPL-CCNC: 127 U/L (ref 45–117)
ALT SERPL-CCNC: 29 U/L (ref 10–53)
ANION GAP SERPL CALC-SCNC: 7 MEQ/L (ref 5–15)
APTT BLD: 28.1 SEC (ref 24.3–30.1)
AST SERPL-CCNC: 34 U/L (ref 15–37)
BASOPHILS # BLD AUTO: 0 TH/MM3 (ref 0–0.2)
BASOPHILS NFR BLD: 0.1 % (ref 0–2)
BILIRUB SERPL-MCNC: 0.3 MG/DL (ref 0.2–1)
BUN SERPL-MCNC: 53 MG/DL (ref 7–18)
CHLORIDE SERPL-SCNC: 103 MEQ/L (ref 98–107)
EOSINOPHIL # BLD: 0 TH/MM3 (ref 0–0.4)
EOSINOPHIL NFR BLD: 0 % (ref 0–4)
ERYTHROCYTE [DISTWIDTH] IN BLOOD BY AUTOMATED COUNT: 16.7 % (ref 11.6–17.2)
GFR SERPLBLD BASED ON 1.73 SQ M-ARVRAT: 17 ML/MIN (ref 89–?)
HCO3 BLD-SCNC: 24.3 MEQ/L (ref 21–32)
HCT VFR BLD CALC: 31.7 % (ref 35–46)
HEMO FLAGS: (no result)
INR PPP: 1 RATIO
LYMPHOCYTES # BLD AUTO: 1.7 TH/MM3 (ref 1–4.8)
LYMPHOCYTES NFR BLD AUTO: 16.6 % (ref 9–44)
MCH RBC QN AUTO: 32 PG (ref 27–34)
MCHC RBC AUTO-ENTMCNC: 33.4 % (ref 32–36)
MCV RBC AUTO: 95.8 FL (ref 80–100)
MONOCYTES NFR BLD: 4.4 % (ref 0–8)
NEUTROPHILS # BLD AUTO: 8 TH/MM3 (ref 1.8–7.7)
NEUTROPHILS NFR BLD AUTO: 78.9 % (ref 16–70)
PLATELET # BLD: 226 TH/MM3 (ref 150–450)
POTASSIUM SERPL-SCNC: 4.7 MEQ/L (ref 3.5–5.1)
PROTHROMBIN TIME: 11.5 SEC (ref 9.8–11.6)
RBC # BLD AUTO: 3.31 MIL/MM3 (ref 4–5.3)
SODIUM SERPL-SCNC: 134 MEQ/L (ref 136–145)
WBC # BLD AUTO: 10.2 TH/MM3 (ref 4–11)

## 2017-09-11 RX ADMIN — HYDROCODONE BITARTRATE AND ACETAMINOPHEN PRN TAB: 5; 325 TABLET ORAL at 02:43

## 2017-09-11 RX ADMIN — HYDROCODONE BITARTRATE AND ACETAMINOPHEN PRN TAB: 5; 325 TABLET ORAL at 16:40

## 2017-09-11 RX ADMIN — HYDROCODONE BITARTRATE AND ACETAMINOPHEN PRN TAB: 5; 325 TABLET ORAL at 08:14

## 2017-09-11 RX ADMIN — MIRTAZAPINE SCH MG: 15 TABLET, FILM COATED ORAL at 19:47

## 2017-09-11 RX ADMIN — MEGESTROL ACETATE SCH MG: 40 SUSPENSION ORAL at 06:25

## 2017-09-11 RX ADMIN — Medication SCH ML: at 19:47

## 2017-09-11 RX ADMIN — PANTOPRAZOLE SCH MG: 40 TABLET, DELAYED RELEASE ORAL at 08:14

## 2017-09-11 RX ADMIN — MEGESTROL ACETATE SCH MG: 40 SUSPENSION ORAL at 16:40

## 2017-09-11 RX ADMIN — STANDARDIZED SENNA CONCENTRATE AND DOCUSATE SODIUM SCH TAB: 8.6; 5 TABLET, FILM COATED ORAL at 19:47

## 2017-09-11 RX ADMIN — HYDROCODONE BITARTRATE AND ACETAMINOPHEN PRN TAB: 5; 325 TABLET ORAL at 23:12

## 2017-09-11 RX ADMIN — PHENYTOIN SODIUM SCH MLS/HR: 50 INJECTION INTRAMUSCULAR; INTRAVENOUS at 04:25

## 2017-09-11 RX ADMIN — PHENYTOIN SODIUM SCH MLS/HR: 50 INJECTION INTRAMUSCULAR; INTRAVENOUS at 21:30

## 2017-09-11 RX ADMIN — PHENYTOIN SODIUM SCH MLS/HR: 50 INJECTION INTRAMUSCULAR; INTRAVENOUS at 18:17

## 2017-09-11 RX ADMIN — LEVOTHYROXINE SODIUM SCH MCG: 150 TABLET ORAL at 06:25

## 2017-09-11 RX ADMIN — MAGNESIUM HYDROXIDE PRN ML: 400 SUSPENSION ORAL at 19:47

## 2017-09-11 RX ADMIN — POLYETHYLENE GLYCOL 3350 SCH GM: 17 POWDER, FOR SOLUTION ORAL at 08:14

## 2017-09-11 RX ADMIN — Medication SCH ML: at 08:14

## 2017-09-11 RX ADMIN — PHENYTOIN SODIUM SCH MLS/HR: 50 INJECTION INTRAMUSCULAR; INTRAVENOUS at 16:20

## 2017-09-11 RX ADMIN — STANDARDIZED SENNA CONCENTRATE AND DOCUSATE SODIUM SCH TAB: 8.6; 5 TABLET, FILM COATED ORAL at 08:13

## 2017-09-11 NOTE — HHI.PR
Subjective


Remarks


Patient had her breakfast today, reported no nausea, no fever or chills


Follow up on autoimmune disease liver and kidney failure, mostly SLE





Objective


Vitals





Vital Signs








  Date Time  Temp Pulse Resp B/P (MAP) Pulse Ox O2 Delivery O2 Flow Rate FiO2


 


9/11/17 08:07 97.9 65 19 174/83 (113) 95   


 


9/11/17 06:06     95 Nasal Cannula 2.00 


 


9/11/17 04:35 99.1 61 18 180/77 (111) 95   


 


9/11/17 00:40 98.9 55 18 149/70 (96) 92   


 


9/10/17 19:30      Nasal Cannula 2.00 


 


9/10/17 19:19 99.2 83 18 155/79 (104) 95   


 


9/10/17 15:35 98.6 68 19 182/79 (113) 93   














I/O      


 


 9/10/17 9/10/17 9/10/17 9/11/17 9/11/17 9/11/17





 07:00 15:00 23:00 07:00 15:00 23:00


 


Intake Total 480 ml 700 ml 240 ml 480 ml  


 


Balance 480 ml 700 ml 240 ml 480 ml  


 


      


 


Intake Oral 480 ml 700 ml 240 ml 480 ml  


 


# Voids 5 5 4 4  


 


# Bowel Movements 2 0 2 3  








Result Diagram:  


9/11/17 0510                                                                   

             9/11/17 0510





Objective Remarks


-  GENERAL: This is a well-nourished, well-developed patient, in no apparent 

distress.


SKIN: No rashes, warm and dry 


HEAD: Atraumatic. Normocephalic. 


EYES: Pupils equal round and reactive. Extraocular motions intact. No scleral 

icterus. 


ENT: Nose without bleeding, or drainage, Airway patent.


NECK: Trachea midline.  Supple


CARDIOVASCULAR: Regular rate and rhythm without murmurs, gallops, or rubs. 


RESPIRATORY: Fair air entry bilaterally. No wheezes, rales, or rhonchi.  


GASTROINTESTINAL: Abdomen soft, mildly tender to palpation right upper quadrant,

 nondistended.  Positive bowel sounds


MUSCULOSKELETAL: Extremities without clubbing, cyanosis, or edema.  Pedal 

pulses appreciated


NEUROLOGICAL: Awake and alert.  Moves all extremity.  Normal speech.no focal 

neurological deficit





A/P


Problem List:  


(1) Pancreatitis


ICD Code:  K85.90 - Acute pancreatitis without necrosis or infection, 

unspecified


Status:  Acute


(2) Chest pain


ICD Code:  R07.9 - Chest pain, unspecified


Status:  Acute


(3) VALENTIN (acute kidney injury)


ICD Code:  N17.9 - Acute kidney failure, unspecified


(4) Rhabdomyolysis


ICD Code:  M62.82 - Rhabdomyolysis


(5) Elevated d-dimer


ICD Code:  R79.89 - Other specified abnormal findings of blood chemistry


Assessment and Plan


62 years old female presented with





-  Abdominal pain with increased lipase likely from pancreatitis plus suspected 

constipation. but given persistence and improvement in those to underlying 

etiologies, gastric nephrology plans for EGD tomorrow , continue po pain meds 

as needed.


-  Constipation - continue MiraLAX, will give another one-time suppository, add 

on by mouth Colace


-  depression likely 2/2 severe hypothyroidism - continue Remeron for appetite 

and sleep improvement plus Synthroid as above. 


-  Severe hypothyroidism - free T4 is low, severe hypothyroidism based on TSH 

being over 100 , continue synthroid. 


-  Generalized weakness - likely secondary to hypothyroidism, continue therapy


-  VALENTIN versus CKD- renal US unremarkable, appreciate nephro recs, pursuing w/ 

autoimmune workup


-   DVT Prophylaxis:  SCD/Teds. 





9/7: Discussed with GI, possible plan for EGD, patient had low C3 low C4 

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 

GI nephrology


9/8: Positive DS DNA, positive ASMA, decreased C3-C4, positive AMA, all 

consistent with autoimmune disease mostly SLE and autoimmune hepatitis, may 

need kidney and liver biopsy, GI and nephrology to follow


9/9: Continue current care, continue Solu-Medrol, will need a rheumatology 

consultation, further recommendation for biopsy liver/kidney per specialist, 

monitor BMP and CBC 


9/10: ASMA negative, was positive in the past, continue Solu-Medrol, expected 

liver/kidney biopsy


9/11: Continue Solu-Medrol, further plan for biopsies by GI and nephrology











Shai Vasquez MD Sep 11, 2017 11:40

## 2017-09-11 NOTE — HHI.NPPN
Subjective


History of Present Illness


62 year old with ARF/Pancreatitis/Cirrhosis Autoimmune disease





Review of Systems


General


Constitutional:  Fatigue





Gastrointestinal


Gastrointestinal:  Abdominal Pain





Musculoskeletal


MS:  Pain/Stiffness





Objective Data


Data





Vital Signs








  Date Time  Temp Pulse Resp B/P (MAP) Pulse Ox O2 Delivery O2 Flow Rate FiO2


 


9/11/17 11:19 95.9 62 19 141/78 (99) 92   


 


9/11/17 08:07 97.9 65 19 174/83 (113) 95   


 


9/11/17 06:06     95 Nasal Cannula 2.00 


 


9/11/17 04:35 99.1 61 18 180/77 (111) 95   


 


9/11/17 00:40 98.9 55 18 149/70 (96) 92   


 


9/10/17 19:30      Nasal Cannula 2.00 


 


9/10/17 19:19 99.2 83 18 155/79 (104) 95   








-:  


9/11/17 0510                                                                   

             9/11/17 0510








Physical Exam


General


Appearance:  Well Developed





Neck


Neck Exam:  Neck Supple





Pulmonary


Resp Exam:  Clear Bilaterally, Breath Sounds Equal





Cardiology


CV Exam:  Regular, Normal Sinus Rhythm





Gastrointestinal/Abdomen


GI Exam:  Soft, Bowel Sounds Present





Extremeties


Extremities Exam:  No Edema





Assessment/Plan


Problem List:  


(1) VALENTIN (acute kidney injury)


ICD Codes:  N17.9 - Acute kidney failure, unspecified


Plan:  This is likely secondary to her recent pancreatitis and liver issues and 

could be an Autoimmune process, such as SLE as well, 


STEPHANIE positive


C3, C4 low


 SLE by serology STEPHANIE 1:1280 Anti dsDNA 775 very high


follow renal functions


on Solumedrol  Cr 2.7


need a kidney biopsy, schedule for am


Added Megace poor appetite





follow BMP


 CRP high





(2) Pancreatitis


ICD Codes:  K85.90 - Acute pancreatitis without necrosis or infection, 

unspecified


Status:  Acute


Plan:  She has undiagnosed underlying autoimmune disease possibility of SLE 

versus autoimmune hepatitis exists


STEPHANIE was 1:1280 and Antismooth antibody positive in 6/12





(3) Autoimmune disease


ICD Codes:  M35.9 - Systemic involvement of connective tissue, unspecified


Plan:  Continue to monitor she needs to be followed by Delia Nunez MD Sep 11, 2017 17:43

## 2017-09-12 VITALS
TEMPERATURE: 97.1 F | HEART RATE: 66 BPM | SYSTOLIC BLOOD PRESSURE: 152 MMHG | OXYGEN SATURATION: 95 % | RESPIRATION RATE: 19 BRPM | DIASTOLIC BLOOD PRESSURE: 74 MMHG

## 2017-09-12 VITALS
RESPIRATION RATE: 16 BRPM | HEART RATE: 51 BPM | SYSTOLIC BLOOD PRESSURE: 140 MMHG | DIASTOLIC BLOOD PRESSURE: 77 MMHG | OXYGEN SATURATION: 96 %

## 2017-09-12 VITALS
OXYGEN SATURATION: 98 % | RESPIRATION RATE: 16 BRPM | DIASTOLIC BLOOD PRESSURE: 68 MMHG | SYSTOLIC BLOOD PRESSURE: 153 MMHG | HEART RATE: 61 BPM

## 2017-09-12 VITALS
HEART RATE: 57 BPM | TEMPERATURE: 97 F | OXYGEN SATURATION: 93 % | SYSTOLIC BLOOD PRESSURE: 187 MMHG | DIASTOLIC BLOOD PRESSURE: 88 MMHG | RESPIRATION RATE: 18 BRPM

## 2017-09-12 VITALS — OXYGEN SATURATION: 92 %

## 2017-09-12 VITALS
RESPIRATION RATE: 16 BRPM | DIASTOLIC BLOOD PRESSURE: 86 MMHG | OXYGEN SATURATION: 97 % | HEART RATE: 53 BPM | SYSTOLIC BLOOD PRESSURE: 138 MMHG | TEMPERATURE: 97.9 F

## 2017-09-12 VITALS
RESPIRATION RATE: 18 BRPM | TEMPERATURE: 97.3 F | DIASTOLIC BLOOD PRESSURE: 77 MMHG | SYSTOLIC BLOOD PRESSURE: 152 MMHG | HEART RATE: 57 BPM | OXYGEN SATURATION: 96 %

## 2017-09-12 VITALS
RESPIRATION RATE: 16 BRPM | OXYGEN SATURATION: 92 % | DIASTOLIC BLOOD PRESSURE: 86 MMHG | HEART RATE: 58 BPM | SYSTOLIC BLOOD PRESSURE: 152 MMHG

## 2017-09-12 VITALS
TEMPERATURE: 96.2 F | HEART RATE: 58 BPM | DIASTOLIC BLOOD PRESSURE: 93 MMHG | RESPIRATION RATE: 19 BRPM | OXYGEN SATURATION: 92 % | SYSTOLIC BLOOD PRESSURE: 181 MMHG

## 2017-09-12 VITALS
SYSTOLIC BLOOD PRESSURE: 116 MMHG | HEART RATE: 60 BPM | OXYGEN SATURATION: 99 % | DIASTOLIC BLOOD PRESSURE: 71 MMHG | RESPIRATION RATE: 16 BRPM

## 2017-09-12 VITALS
HEART RATE: 65 BPM | OXYGEN SATURATION: 95 % | DIASTOLIC BLOOD PRESSURE: 78 MMHG | SYSTOLIC BLOOD PRESSURE: 132 MMHG | RESPIRATION RATE: 16 BRPM

## 2017-09-12 VITALS — SYSTOLIC BLOOD PRESSURE: 176 MMHG | DIASTOLIC BLOOD PRESSURE: 85 MMHG

## 2017-09-12 LAB
BASOPHILS # BLD AUTO: 0 TH/MM3 (ref 0–0.2)
BASOPHILS NFR BLD: 0.2 % (ref 0–2)
EOSINOPHIL # BLD: 0 TH/MM3 (ref 0–0.4)
EOSINOPHIL NFR BLD: 0 % (ref 0–4)
ERYTHROCYTE [DISTWIDTH] IN BLOOD BY AUTOMATED COUNT: 16.4 % (ref 11.6–17.2)
ERYTHROCYTE [DISTWIDTH] IN BLOOD BY AUTOMATED COUNT: 16.6 % (ref 11.6–17.2)
HCT VFR BLD CALC: 32 % (ref 35–46)
HCT VFR BLD CALC: 33.5 % (ref 35–46)
HEMO FLAGS: (no result)
INR PPP: 1 RATIO
LYMPHOCYTES # BLD AUTO: 1.3 TH/MM3 (ref 1–4.8)
LYMPHOCYTES NFR BLD AUTO: 13.2 % (ref 9–44)
MCH RBC QN AUTO: 31.7 PG (ref 27–34)
MCH RBC QN AUTO: 31.9 PG (ref 27–34)
MCHC RBC AUTO-ENTMCNC: 33 % (ref 32–36)
MCHC RBC AUTO-ENTMCNC: 33.1 % (ref 32–36)
MCV RBC AUTO: 95.8 FL (ref 80–100)
MCV RBC AUTO: 96.7 FL (ref 80–100)
MONOCYTES NFR BLD: 4.9 % (ref 0–8)
NEUTROPHILS # BLD AUTO: 7.9 TH/MM3 (ref 1.8–7.7)
NEUTROPHILS NFR BLD AUTO: 81.7 % (ref 16–70)
PLATELET # BLD: 224 TH/MM3 (ref 150–450)
PLATELET # BLD: 237 TH/MM3 (ref 150–450)
PROTHROMBIN TIME: 11.6 SEC (ref 9.8–11.6)
RBC # BLD AUTO: 3.3 MIL/MM3 (ref 4–5.3)
RBC # BLD AUTO: 3.5 MIL/MM3 (ref 4–5.3)
REVIEW FLAG: (no result)
WBC # BLD AUTO: 8.9 TH/MM3 (ref 4–11)
WBC # BLD AUTO: 9.7 TH/MM3 (ref 4–11)

## 2017-09-12 PROCEDURE — 0TB13ZX EXCISION OF LEFT KIDNEY, PERCUTANEOUS APPROACH, DIAGNOSTIC: ICD-10-PCS

## 2017-09-12 RX ADMIN — HYDROCODONE BITARTRATE AND ACETAMINOPHEN PRN TAB: 5; 325 TABLET ORAL at 05:54

## 2017-09-12 RX ADMIN — Medication SCH ML: at 20:30

## 2017-09-12 RX ADMIN — HYDROCODONE BITARTRATE AND ACETAMINOPHEN PRN TAB: 5; 325 TABLET ORAL at 16:42

## 2017-09-12 RX ADMIN — PHENYTOIN SODIUM SCH MLS/HR: 50 INJECTION INTRAMUSCULAR; INTRAVENOUS at 18:00

## 2017-09-12 RX ADMIN — STANDARDIZED SENNA CONCENTRATE AND DOCUSATE SODIUM SCH TAB: 8.6; 5 TABLET, FILM COATED ORAL at 20:29

## 2017-09-12 RX ADMIN — LEVOTHYROXINE SODIUM SCH MCG: 150 TABLET ORAL at 05:55

## 2017-09-12 RX ADMIN — MIRTAZAPINE SCH MG: 15 TABLET, FILM COATED ORAL at 20:29

## 2017-09-12 RX ADMIN — HYDROCODONE BITARTRATE AND ACETAMINOPHEN PRN TAB: 5; 325 TABLET ORAL at 11:15

## 2017-09-12 RX ADMIN — PHENYTOIN SODIUM SCH MLS/HR: 50 INJECTION INTRAMUSCULAR; INTRAVENOUS at 16:10

## 2017-09-12 RX ADMIN — PANTOPRAZOLE SCH MG: 40 TABLET, DELAYED RELEASE ORAL at 08:57

## 2017-09-12 RX ADMIN — PHENYTOIN SODIUM SCH MLS/HR: 50 INJECTION INTRAMUSCULAR; INTRAVENOUS at 20:30

## 2017-09-12 RX ADMIN — STANDARDIZED SENNA CONCENTRATE AND DOCUSATE SODIUM SCH TAB: 8.6; 5 TABLET, FILM COATED ORAL at 08:56

## 2017-09-12 RX ADMIN — MEGESTROL ACETATE SCH MG: 40 SUSPENSION ORAL at 05:54

## 2017-09-12 RX ADMIN — Medication SCH ML: at 08:56

## 2017-09-12 RX ADMIN — MEGESTROL ACETATE SCH MG: 40 SUSPENSION ORAL at 16:00

## 2017-09-12 RX ADMIN — POLYETHYLENE GLYCOL 3350 SCH GM: 17 POWDER, FOR SOLUTION ORAL at 08:56

## 2017-09-12 NOTE — HHI.PR
Subjective


Remarks


Patient resting in bed, feeling frustrated due to her condition, she had kidney 

biopsy this morning, tolerated procedure well no acute issue so far





Objective


Vitals





Vital Signs








  Date Time  Temp Pulse Resp B/P (MAP) Pulse Ox O2 Delivery O2 Flow Rate FiO2


 


9/12/17 13:00  65 16 132/78 (96) 95   


 


9/12/17 12:30  60 16 116/71 (86) 99   


 


9/12/17 12:00  58 16 152/86 (108) 92   


 


9/12/17 11:10  61 16 153/68 (96) 98   


 


9/12/17 10:40  51 16 140/77 (98) 96   


 


9/12/17 10:25 97.9 53 16 138/86 (103) 97   


 


9/12/17 07:37 97.1 66 19 152/74 (100) 95   


 


9/12/17 00:40    176/85 (115)    


 


9/11/17 23:25 98.6 65 18 189/86 (120) 95   


 


9/11/17 20:00 97.9 62 18 164/81 (108) 95   


 


9/11/17 19:46     95 Nasal Cannula 2.00 


 


9/11/17 17:45     92 Nasal Cannula 2.00 


 


9/11/17 16:00 98.5 62 19 154/81 (105) 92   














I/O      


 


 9/11/17 9/11/17 9/11/17 9/12/17 9/12/17 9/12/17





 07:00 15:00 23:00 07:00 15:00 23:00


 


Intake Total 480 ml 720 ml 480 ml 360 ml  


 


Balance 480 ml 720 ml 480 ml 360 ml  


 


      


 


Intake Oral 480 ml 720 ml 480 ml   


 


IV Total    360 ml  


 


# Voids 4 4 3   


 


# Bowel Movements 3 0 0   








Result Diagram:  


9/12/17 1200                                                                   

             9/11/17 0510





Objective Remarks


-  GENERAL: This is a well-nourished, well-developed patient, in no apparent 

distress.


SKIN: No rashes, warm and dry 


HEAD: Atraumatic. Normocephalic. 


EYES: Pupils equal round and reactive. Extraocular motions intact. No scleral 

icterus. 


ENT: Nose without bleeding, or drainage, Airway patent.


NECK: Trachea midline.  Supple


CARDIOVASCULAR: Regular rate and rhythm without murmurs, gallops, or rubs. 


RESPIRATORY: Fair air entry bilaterally. No wheezes, rales, or rhonchi.  


GASTROINTESTINAL: Abdomen soft, mildly tender to palpation right upper quadrant,

 nondistended.  Positive bowel sounds


MUSCULOSKELETAL: Extremities without clubbing, cyanosis, or edema.  Pedal 

pulses appreciated


NEUROLOGICAL: Awake and alert.  Moves all extremity.  Normal speech.no focal 

neurological deficit





A/P


Problem List:  


(1) Pancreatitis


ICD Code:  K85.90 - Acute pancreatitis without necrosis or infection, 

unspecified


Status:  Acute


(2) Chest pain


ICD Code:  R07.9 - Chest pain, unspecified


Status:  Acute


(3) VALENTIN (acute kidney injury)


ICD Code:  N17.9 - Acute kidney failure, unspecified


(4) Rhabdomyolysis


ICD Code:  M62.82 - Rhabdomyolysis


(5) Elevated d-dimer


ICD Code:  R79.89 - Other specified abnormal findings of blood chemistry


Assessment and Plan


62 years old female presented with





-  Abdominal pain with increased lipase likely from pancreatitis plus suspected 

constipation. but given persistence and improvement in those to underlying 

etiologies, gastric nephrology plans for EGD tomorrow , continue po pain meds 

as needed.


-  Constipation - continue MiraLAX, will give another one-time suppository, add 

on by mouth Colace


-  depression likely 2/2 severe hypothyroidism - continue Remeron for appetite 

and sleep improvement plus Synthroid as above. 


-  Severe hypothyroidism - free T4 is low, severe hypothyroidism based on TSH 

being over 100 , continue synthroid. 


-  Generalized weakness - likely secondary to hypothyroidism, continue therapy


-  VALENTIN versus CKD- renal US unremarkable, appreciate nephro recs, pursuing w/ 

autoimmune workup


-   DVT Prophylaxis:  SCD/Teds. 





9/7: Discussed with GI, possible plan for EGD, patient had low C3 low C4 

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 

GI nephrology


9/8: Positive DS DNA, positive ASMA, decreased C3-C4, positive AMA, all 

consistent with autoimmune disease mostly SLE and autoimmune hepatitis, may 

need kidney and liver biopsy, GI and nephrology to follow


9/9: Continue current care, continue Solu-Medrol, will need a rheumatology 

consultation, further recommendation for biopsy liver/kidney per specialist, 

monitor BMP and CBC 


9/10: ASMA negative, was positive in the past, continue Solu-Medrol, expected 

liver/kidney biopsy


9/11: Continue Solu-Medrol, further plan for biopsies by GI and nephrology


9/12: Patient had a biopsy today, continue systemic cortisone, creatinine 

slightly decreasing from 2.85-2.79, further recommendation per GI and nephrology











Shai Vasquez MD Sep 12, 2017 14:07

## 2017-09-12 NOTE — HHI.NPPN
Subjective


History of Present Illness


62 year old with ARF/Pancreatitis/Cirrhosis Autoimmune disease





Review of Systems


General


Constitutional:  Fatigue





Gastrointestinal


Gastrointestinal:  Abdominal Pain





Musculoskeletal


MS:  Pain/Stiffness





Objective Data


Data





Vital Signs








  Date Time  Temp Pulse Resp B/P (MAP) Pulse Ox O2 Delivery O2 Flow Rate FiO2


 


9/12/17 13:00  65 16 132/78 (96) 95   


 


9/12/17 12:30  60 16 116/71 (86) 99   


 


9/12/17 12:00  58 16 152/86 (108) 92   


 


9/12/17 11:10  61 16 153/68 (96) 98   


 


9/12/17 10:40  51 16 140/77 (98) 96   


 


9/12/17 10:25 97.9 53 16 138/86 (103) 97   


 


9/12/17 07:37 97.1 66 19 152/74 (100) 95   


 


9/12/17 00:40    176/85 (115)    


 


9/11/17 23:25 98.6 65 18 189/86 (120) 95   


 


9/11/17 20:00 97.9 62 18 164/81 (108) 95   


 


9/11/17 19:46     95 Nasal Cannula 2.00 


 


9/11/17 17:45     92 Nasal Cannula 2.00 


 


9/11/17 16:00 98.5 62 19 154/81 (105) 92   








-:  


9/12/17 1200                                                                   

             9/11/17 0510








Physical Exam


General


Appearance:  Well Developed





Neck


Neck Exam:  Neck Supple





Pulmonary


Resp Exam:  Clear Bilaterally, Breath Sounds Equal





Cardiology


CV Exam:  Regular, Normal Sinus Rhythm





Gastrointestinal/Abdomen


GI Exam:  Soft, Bowel Sounds Present





Extremeties


Extremities Exam:  No Edema





Assessment/Plan


Problem List:  


(1) VALENTIN (acute kidney injury)


ICD Codes:  N17.9 - Acute kidney failure, unspecified


Plan:  This is likely secondary to her recent pancreatitis and liver issues and 

could be an Autoimmune process, such as SLE as well, 


STEPHANIE positive


C3, C4 low


 SLE by serology STEPHANIE 1:1280 Anti dsDNA 775 very high


follow renal functions


on Solumedrol  Cr 2.7


 kidney biopsy, done follow results tolerated it well





follow BMP





(2) Pancreatitis


ICD Codes:  K85.90 - Acute pancreatitis without necrosis or infection, 

unspecified


Status:  Acute


Plan:  She has undiagnosed underlying autoimmune disease possibility of SLE 

versus autoimmune hepatitis exists


STEPHANIE was 1:1280 and Antismooth antibody positive in 6/12





(3) Autoimmune disease


ICD Codes:  M35.9 - Systemic involvement of connective tissue, unspecified


Plan:  Continue to monitor she needs to be followed by Delia Nunez MD Sep 12, 2017 14:10

## 2017-09-12 NOTE — RADRPT
EXAM DATE/TIME:  2017 08:59 

 

This report includes an Addendum and supersedes previous reports for this exam.

 

 

 

 

[facility] COMPARISON:     

No previous studies available for comparison.

 

 

 

INDICATIONS :      

Left renal biopsy.  Acute renal failure.

                     

 

 

 

SEDATION TIME:       

30 minutes

 

 

BIOPSY SITE:       

Left renal

                     

 

MEDICATION(S):

1.) 1.5 mg midazolam (Versed) IV  

2.) 75 mcg fentanyl (Sublimaze) IV  

 

 

DEVICE(S):

1.) 18 gauge Temno core biopsy needle 

                     

 

MEDICAL HISTORY :     

Chronic obstructive pulmonary disease. Congestive heart failure. Cerebrovascular disease.  Hypertensi
on. 

 

SURGICAL HISTORY :     

Appendectomy. Cholecystectomy.  section. Tubal ligation. 

 

ENCOUNTER:     

Initial

 

ACUITY:     

1 day

 

PAIN SCORE:     

0/10

 

LOCATION:     

Left renal

                     

A total of two core specimen(s) were obtained and sent to the laboratory for pathologic evaluation.

 

 

PROCEDURE:

1.   CT guided renal biopsy.

2.   Conscious sedation with continuous EKG and oximetry monitoring.

3.   EKG and oximetry remained stable throughout the procedure.

 

Prior to the procedure informed consent was obtained.  Any appropriate prior imaging studies were rev
iewed. 

Using automated exposure control and adjustment of the mA and/or kV according to patient size, radiat
ion dose was kept as low as reasonably achievable to obtain optimal diagnostic quality images.  DICOM
 format image data is available electronically for review and comparison.   

 

 

The site was prepped in a sterile fashion.  Full sterile technique was used, including cap, mask, alirio
rile gloves and gown and a large sterile sheet.  Hand hygiene and 2% chlorhexidine and/or betadine/al
cohol prep was utilized per protocol for cutaneous antisepsis.  The skin and subcutaneous tissues wer
e infiltrated with local anesthetic solution.

 

With CT guidance the previously identified target was localized. Biopsy was performed using the pres
ribed needle as above.  Adequate hemostasis was obtained with compression at the puncture site.

 

Follow-up CT scan reveals moderate perinephric hemorrhage. H&H ordered immediately and repeat in one 
hour. Follow-up CT scan will be performed in 90 minutes also to confirm stability of perinephric hemo
rrhage. The patient's vitals are stable at this time.

 

The patient tolerated the procedure well and there were no complications. The patient was returned to
 the Radiology Outpatient Unit in stable condition.

 

CONCLUSION:     Successful 18 gauge core biopsies of left renal cortex. 

 

 

 Silviano Aggarwal MD on 2017 at 10:41           

Board Certified Radiologist.

 This report was verified electronically. 

 

ADDENDUM: 

 

A two hour delayed scan through the kidneys demonstrates stability of the perinephric hematoma with n
o evidence of increase in the size of this hematoma. H&H are also stable. The patient is also clinica
lly stable without evidence of ongoing bleeding status post renal biopsy.

 

 Silviano Aggarwal MD on 2017 at 11:54           

Board Certified Radiologist.

 This report was verified electronically.

## 2017-09-13 VITALS
SYSTOLIC BLOOD PRESSURE: 138 MMHG | RESPIRATION RATE: 18 BRPM | DIASTOLIC BLOOD PRESSURE: 79 MMHG | OXYGEN SATURATION: 95 % | HEART RATE: 61 BPM | TEMPERATURE: 96.5 F

## 2017-09-13 VITALS
DIASTOLIC BLOOD PRESSURE: 97 MMHG | RESPIRATION RATE: 17 BRPM | HEART RATE: 57 BPM | OXYGEN SATURATION: 95 % | SYSTOLIC BLOOD PRESSURE: 205 MMHG | TEMPERATURE: 97.4 F

## 2017-09-13 VITALS
SYSTOLIC BLOOD PRESSURE: 136 MMHG | RESPIRATION RATE: 17 BRPM | OXYGEN SATURATION: 97 % | TEMPERATURE: 97.7 F | HEART RATE: 66 BPM | DIASTOLIC BLOOD PRESSURE: 92 MMHG

## 2017-09-13 VITALS
SYSTOLIC BLOOD PRESSURE: 156 MMHG | TEMPERATURE: 97.7 F | RESPIRATION RATE: 18 BRPM | HEART RATE: 66 BPM | OXYGEN SATURATION: 94 % | DIASTOLIC BLOOD PRESSURE: 76 MMHG

## 2017-09-13 VITALS
OXYGEN SATURATION: 97 % | SYSTOLIC BLOOD PRESSURE: 190 MMHG | DIASTOLIC BLOOD PRESSURE: 90 MMHG | RESPIRATION RATE: 17 BRPM | HEART RATE: 65 BPM | TEMPERATURE: 97.8 F

## 2017-09-13 LAB
ANION GAP SERPL CALC-SCNC: 6 MEQ/L (ref 5–15)
BUN SERPL-MCNC: 62 MG/DL (ref 7–18)
CHLORIDE SERPL-SCNC: 102 MEQ/L (ref 98–107)
GFR SERPLBLD BASED ON 1.73 SQ M-ARVRAT: 19 ML/MIN (ref 89–?)
HCO3 BLD-SCNC: 23.8 MEQ/L (ref 21–32)
POTASSIUM SERPL-SCNC: 4.8 MEQ/L (ref 3.5–5.1)
SODIUM SERPL-SCNC: 132 MEQ/L (ref 136–145)

## 2017-09-13 RX ADMIN — MIRTAZAPINE SCH MG: 15 TABLET, FILM COATED ORAL at 21:44

## 2017-09-13 RX ADMIN — HYDROCODONE BITARTRATE AND ACETAMINOPHEN PRN TAB: 5; 325 TABLET ORAL at 06:20

## 2017-09-13 RX ADMIN — MEGESTROL ACETATE SCH MG: 40 SUSPENSION ORAL at 06:22

## 2017-09-13 RX ADMIN — PHENYTOIN SODIUM SCH MLS/HR: 50 INJECTION INTRAMUSCULAR; INTRAVENOUS at 16:00

## 2017-09-13 RX ADMIN — STANDARDIZED SENNA CONCENTRATE AND DOCUSATE SODIUM SCH TAB: 8.6; 5 TABLET, FILM COATED ORAL at 07:43

## 2017-09-13 RX ADMIN — PHENYTOIN SODIUM SCH MLS/HR: 50 INJECTION INTRAMUSCULAR; INTRAVENOUS at 18:00

## 2017-09-13 RX ADMIN — Medication SCH ML: at 07:44

## 2017-09-13 RX ADMIN — PANTOPRAZOLE SCH MG: 40 TABLET, DELAYED RELEASE ORAL at 07:43

## 2017-09-13 RX ADMIN — LEVOTHYROXINE SODIUM SCH MCG: 150 TABLET ORAL at 06:20

## 2017-09-13 RX ADMIN — HYDROCODONE BITARTRATE AND ACETAMINOPHEN PRN TAB: 5; 325 TABLET ORAL at 02:17

## 2017-09-13 RX ADMIN — MEGESTROL ACETATE SCH MG: 40 SUSPENSION ORAL at 06:20

## 2017-09-13 RX ADMIN — MEGESTROL ACETATE SCH MG: 40 SUSPENSION ORAL at 16:00

## 2017-09-13 RX ADMIN — POLYETHYLENE GLYCOL 3350 SCH GM: 17 POWDER, FOR SOLUTION ORAL at 07:44

## 2017-09-13 RX ADMIN — STANDARDIZED SENNA CONCENTRATE AND DOCUSATE SODIUM SCH TAB: 8.6; 5 TABLET, FILM COATED ORAL at 21:44

## 2017-09-13 RX ADMIN — Medication SCH ML: at 21:44

## 2017-09-13 RX ADMIN — HYDROCODONE BITARTRATE AND ACETAMINOPHEN PRN TAB: 10; 325 TABLET ORAL at 16:03

## 2017-09-13 RX ADMIN — HYDROCODONE BITARTRATE AND ACETAMINOPHEN PRN TAB: 10; 325 TABLET ORAL at 21:44

## 2017-09-13 NOTE — HHI.NPPN
Subjective


History of Present Illness


62 year old with ARF/Pancreatitis/Cirrhosis Autoimmune disease





Review of Systems


General


Constitutional:  Fatigue





Gastrointestinal


Gastrointestinal:  Abdominal Pain





Musculoskeletal


MS:  Pain/Stiffness





Objective Data


Data





Vital Signs








  Date Time  Temp Pulse Resp B/P (MAP) Pulse Ox O2 Delivery O2 Flow Rate FiO2


 


9/13/17 12:00 97.8 65 17 190/90 (123) 97   


 


9/13/17 08:00 97.4 57 17 205/97 (133) 95   


 


9/13/17 07:39   16     


 


9/13/17 00:50 96.5 61 18 138/79 (98) 95   


 


9/12/17 20:50 97.3 57 18 152/77 (102) 96   


 


9/12/17 20:28     96 Nasal Cannula 2.00 


 


9/12/17 18:01     92 Nasal Cannula 2.00 


 


9/12/17 15:05 96.2 58 19 181/93 (122) 92   








-:  


9/12/17 1200                                                                   

             9/13/17 0929








Physical Exam


General


Appearance:  Well Developed





Neck


Neck Exam:  Neck Supple





Pulmonary


Resp Exam:  Clear Bilaterally, Breath Sounds Equal





Cardiology


CV Exam:  Regular, Normal Sinus Rhythm





Gastrointestinal/Abdomen


GI Exam:  Soft, Bowel Sounds Present





Extremeties


Extremities Exam:  No Edema





Assessment/Plan


Problem List:  


(1) VALENTIN (acute kidney injury)


ICD Codes:  N17.9 - Acute kidney failure, unspecified


Plan:  ARF Autoimmune process, such as SLE suspected


STEPHANIE positive


C3, C4 low


 SLE by serology STEPHANIE 1:1280 Anti dsDNA 775 very high


follow renal functions


on Solumedrol  Cr 2.53


 kidney biopsy, done follow results tolerated it well





follow BMP





(2) Pancreatitis


ICD Codes:  K85.90 - Acute pancreatitis without necrosis or infection, 

unspecified


Status:  Acute


Plan:  She has undiagnosed underlying autoimmune disease possibility of SLE 

versus autoimmune hepatitis exists


STEPHANIE was 1:1280 and Antismooth antibody positive in 6/12





(3) Autoimmune disease


ICD Codes:  M35.9 - Systemic involvement of connective tissue, unspecified


Plan:  Continue to monitor she needs to be followed by Delia Nunez MD Sep 13, 2017 14:14

## 2017-09-13 NOTE — HHI.PR
Subjective


Remarks


patient patient requested to increase her pain medication as well as her 

depression medication and when I asked her what does she feel she said on just 

way depressed


Direct question if she is thinking about hurting herself, her answer was"I don'

t feel worth living"





Objective


Vitals





Vital Signs








  Date Time  Temp Pulse Resp B/P (MAP) Pulse Ox O2 Delivery O2 Flow Rate FiO2


 


9/13/17 12:00 97.8 65 17 190/90 (123) 97   


 


9/13/17 08:00 97.4 57 17 205/97 (133) 95   


 


9/13/17 07:39   16     


 


9/13/17 00:50 96.5 61 18 138/79 (98) 95   


 


9/12/17 20:50 97.3 57 18 152/77 (102) 96   


 


9/12/17 20:28     96 Nasal Cannula 2.00 


 


9/12/17 18:01     92 Nasal Cannula 2.00 














I/O      


 


 9/12/17 9/12/17 9/12/17 9/13/17 9/13/17 9/13/17





 07:00 15:00 23:00 07:00 15:00 23:00


 


Intake Total 360 ml 360 ml 240 ml 600 ml  


 


Balance 360 ml 360 ml 240 ml 600 ml  


 


      


 


Intake Oral  360 ml 240 ml 240 ml  


 


IV Total 360 ml   360 ml  


 


# Voids  2 2 4  


 


# Bowel Movements  0 0 0  








Result Diagram:  


9/12/17 1200                                                                   

             9/13/17 0929





Objective Remarks


-  GENERAL: This is a well-nourished, well-developed patient, in no apparent 

distress.


SKIN: No rashes, warm and dry 


HEAD: Atraumatic. Normocephalic. 


EYES: Pupils equal round and reactive. Extraocular motions intact. No scleral 

icterus. 


ENT: Nose without bleeding, or drainage, Airway patent.


NECK: Trachea midline.  Supple


CARDIOVASCULAR: Regular rate and rhythm without murmurs, gallops, or rubs. 


RESPIRATORY: Fair air entry bilaterally. No wheezes, rales, or rhonchi.  


GASTROINTESTINAL: Abdomen soft, mildly tender to palpation right upper quadrant,

 nondistended.  Positive bowel sounds


MUSCULOSKELETAL: Extremities without clubbing, cyanosis, or edema.  Pedal 

pulses appreciated


NEUROLOGICAL: Awake and alert.  Moves all extremity.  Normal speech.no focal 

neurological deficit





A/P


Problem List:  


(1) Pancreatitis


ICD Code:  K85.90 - Acute pancreatitis without necrosis or infection, 

unspecified


Status:  Acute


(2) Chest pain


ICD Code:  R07.9 - Chest pain, unspecified


Status:  Acute


(3) VALENTIN (acute kidney injury)


ICD Code:  N17.9 - Acute kidney failure, unspecified


(4) Rhabdomyolysis


ICD Code:  M62.82 - Rhabdomyolysis


(5) Elevated d-dimer


ICD Code:  R79.89 - Other specified abnormal findings of blood chemistry


Assessment and Plan


62 years old female presented with





-  Abdominal pain with increased lipase likely from pancreatitis plus suspected 

constipation. but given persistence and improvement in those to underlying 

etiologies, gastric nephrology plans for EGD tomorrow , continue po pain meds 

as needed.


-  Constipation - continue MiraLAX, will give another one-time suppository, add 

on by mouth Colace


-  depression likely 2/2 severe hypothyroidism - continue Remeron for appetite 

and sleep improvement plus Synthroid as above. 


-  Severe hypothyroidism - free T4 is low, severe hypothyroidism based on TSH 

being over 100 , continue synthroid. 


-  Generalized weakness - likely secondary to hypothyroidism, continue therapy


-  VALETNIN versus CKD- renal US unremarkable, appreciate nephro recs, pursuing w/ 

autoimmune workup


-   DVT Prophylaxis:  SCD/Teds. 





9/7: Discussed with GI, possible plan for EGD, patient had low C3 low C4 

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 

GI nephrology


9/8: Positive DS DNA, positive ASMA, decreased C3-C4, positive AMA, all 

consistent with autoimmune disease mostly SLE and autoimmune hepatitis, may 

need kidney and liver biopsy, GI and nephrology to follow


9/9: Continue current care, continue Solu-Medrol, will need a rheumatology 

consultation, further recommendation for biopsy liver/kidney per specialist, 

monitor BMP and CBC 


9/10: ASMA negative, was positive in the past, continue Solu-Medrol, expected 

liver/kidney biopsy


9/11: Continue Solu-Medrol, further plan for biopsies by GI and nephrology


9/12: Patient had a biopsy today, continue systemic cortisone, creatinine 

slightly decreasing from 2.85-2.79, further recommendation per GI and nephrology


9/13: Increase pain medication, consult psychiatry due to feeling further down, 

continue Solu-Medrol, creatinine still going slowly down to 0.53 today, follow 

kidney biopsy result











Shai Vasquez MD Sep 13, 2017 15:24

## 2017-09-13 NOTE — HHI.GIFU
Subjective


Remarks


Resting in bed.  States she is having right sided abdominal pain since renal 

biopsy yesterday.  No n/v.    


 (Zuleima West)





Objective


Vitals I&O





Vital Signs








  Date Time  Temp Pulse Resp B/P (MAP) Pulse Ox O2 Delivery O2 Flow Rate FiO2


 


9/13/17 12:00 97.8 65 17 190/90 (123) 97   


 


9/13/17 08:00 97.4 57 17 205/97 (133) 95   


 


9/13/17 07:39   16     


 


9/13/17 00:50 96.5 61 18 138/79 (98) 95   


 


9/12/17 20:50 97.3 57 18 152/77 (102) 96   


 


9/12/17 20:28     96 Nasal Cannula 2.00 


 


9/12/17 18:01     92 Nasal Cannula 2.00 














I/O      


 


 9/12/17 9/12/17 9/12/17 9/13/17 9/13/17 9/13/17





 07:00 15:00 23:00 07:00 15:00 23:00


 


Intake Total 360 ml 360 ml 240 ml 600 ml  


 


Balance 360 ml 360 ml 240 ml 600 ml  


 


      


 


Intake Oral  360 ml 240 ml 240 ml  


 


IV Total 360 ml   360 ml  


 


# Voids  2 2 4  


 


# Bowel Movements  0 0 0  








Laboratory





Laboratory Tests








Test


  9/13/17


09:29


 


Blood Urea Nitrogen 62 


 


Creatinine 2.53 


 


Random Glucose 273 


 


Calcium Level 8.6 


 


Sodium Level 132 


 


Potassium Level 4.8 


 


Chloride Level 102 


 


Carbon Dioxide Level 23.8 


 


Anion Gap 6 


 


Estimat Glomerular Filtration


Rate 19 


 














 Date/Time


Source Procedure


Growth Status


 


 


 9/2/17 02:58


Urine Clean Catch Urine Culture - Final


,000 CFU/ML MIXED GRAM POSITIVE... Complete








Imaging





Last Impressions








Renal Biopsy CT 9/12/17 0000 Signed





Impressions: 





 Service Date/Time:  Tuesday, September 12, 2017 08:59 - CONCLUSION: Successful 





 18 gauge core biopsies of left renal cortex.     Silviano Aggarwal MD ADDENDUM: 

  





 A two hour delayed scan through the kidneys demonstrates stability of the 





 perinephric hematoma with no evidence of increase in the size of this 

hematoma. 





 H&H are also stable. The patient is also clinically stable without evidence of 





 ongoing bleeding status post renal biopsy.   Silviano Aggarwal MD 


 


Liver Ultrasound 9/6/17 0000 Signed





Impressions: 





 Service Date/Time:  Wednesday, September 6, 2017 08:44 - CONCLUSION:  1. 





 Hepatomegaly with heterogeneous echotexture, slightly larger than on prior 

exam 





 in 2016. 2. Shadowing right renal stones without evidence of hydronephrosis.  

   





 Jesse Martínez MD 


 


Renal Ultrasound 9/5/17 0000 Signed





Impressions: 





 Service Date/Time:  Tuesday, September 5, 2017 17:45 - CONCLUSION:  1. 





 Nonobstructing right renal calculi. Right renal cysts. No perinephric fluid. 





 Bladder unremarkable.     Michael Forrest MD 


 


Lung Scan-[facility] 9/2/17 0000 Signed





Impressions: 





 Service Date/Time:  Saturday, September 2, 2017 02:35 - CONCLUSION:  1. Low 





 probability for pulmonary embolus.     Michael Forrest MD 


 


Head CT 9/2/17 0000 Signed





Impressions: 





 Service Date/Time:  Saturday, September 2, 2017 00:29 - CONCLUSION:  Normal 





 examination.       Michael Forrest MD 


 


Chest X-Ray 9/1/17 2217 Signed





Impressions: 





 Service Date/Time:  Friday, September 1, 2017 22:32 - CONCLUSION:  Minimal 





 patchy areas of consolidation or atelectasis at the lateral bases.     Scooter Tatum MD 


 


Abdomen/Pelvis CT 9/1/17 0000 Signed





Impressions: 





 Service Date/Time:  Saturday, September 2, 2017 00:32 - CONCLUSION:  1. No 

acute 





 findings within the abdomen. Partial staghorn type calcifications upper and 





 lower pole right kidney and tiny calculi upper pole left kidney. No bowel 





 obstruction, free air, free fluid or obstructive uropathy. 2. Probable liver 





 cirrhosis. Previous cholecystectomy, appendectomy.     Michael Forrest MD 








Physical Exam


HEENT: Normocephalic; atraumatic; no jaundice.  


CHEST:  CTA


CARDIAC:  RRR.


ABDOMEN:  Soft, nondistended, mild right sided tenderness; no hepatosplenomegaly

; bowel sounds are present in all four quadrants.


EXTREMITIES: No clubbing, cyanosis, or edema.


SKIN:  Normal; no rash; no jaundice.


CNS:  No focal deficits; alert and oriented times three.


 (Zuleima West)





Assessment and Plan


Plan


ASSESSMENT:


- Abdominal pain, right sided. CT scan abdomen and pelvis (9/1/17) revealed no 

acute findings within the abdomen.  Partial staghorn type


   calcifications upper and lower pole right kidney and tiny calculi upper pole 

left kidney.  No bowel obstruction, free air, free fluid, or obstructive


   uropathy.  Probable liver cirrhosis.  Previous cholecystectomy, 

appendectomy. Elevated lipase- mildly although also with acute on chronic


   VALENTIN and elevated creat.  Does have hx of PUD 4 years ago.  EGD (9/8/17)---> 

1.  Distal esophageal stricture, Savory guidewire size 17 mm


   2.  Mild redness of the stomach possible gastritis biopsy was done, 3.  

Normal endoscopy otherwise, 4.  Retroflexed views revealed no abnormalities.


   Pathology with moderate chronic gastritis, negative for helicobacter.  This 

had improved, but states she has been having right sided abdominal pain


   since renal biopsy yesterday.  Of note, she did have a stable perinephric 

hematoma.  Possibly pain related to kidney stones, as it radiates into groin.


- Anemia.  11.1/33.5 


- Mild elevated AST.  Will recheck.  Liver workup in 2012- Pt had positive STEPHANIE 

with titer > 1:1280- speckled and diffuse, ASMA (+) 1A:160.


   Alpha 1 antitrypsin 159, Ceruloplasmin 40, . Rpt. labs STEPHANIE (+) titer > 1:1280

, diffuse pattern.  ASMA negative, AMA pending, ferritin 160, Ceruloplasmin 

pending


   AFP 4.0.  Probable liver cirrhosis on CT scan.  Steroids.  Liver bx on hold 

secondary to hematoma.


- Lipase, undetermined significance.  No signs of pancreatitis on CT scan.  Has 

elevated creatinine, now 2.53.  IgG 4 level pending.


- Chest pain, per attending.


- VALENTIN, Rhabdo, Creat 2.85. Renal following, suspects SLE, Double strand DNA 

775.  S/P renal biopsy, pathology pending.


- Stable perinephric hematoma.


- (+) STEPHANIE with high titer > 1:1280-diffuse, ASMA (+) 1A:160 in past, now 

negative.  Possible renal biopsy, ? liver biopsy.  Steroids.


- Elevated TSH per attending.





PLAN:


- HERIBERTO


- Await renal biopsy results


- PPI


- Solumedrol


- Monitor labs


- Await AMA


- Await IgG 4 level


- Liver biopsy on hold secondary to perinephric hematoma


- Further recommendations to follow after results of above


- Pt seen and examined by Dr. Duque and myself and this note is written on her 

behalf


 (Zuleima West)











Zuleima West Sep 13, 2017 17:00


Oriana Duque MD Sep 14, 2017 05:31

## 2017-09-14 VITALS
HEART RATE: 68 BPM | RESPIRATION RATE: 18 BRPM | SYSTOLIC BLOOD PRESSURE: 163 MMHG | OXYGEN SATURATION: 95 % | DIASTOLIC BLOOD PRESSURE: 73 MMHG | TEMPERATURE: 98.2 F

## 2017-09-14 VITALS
HEART RATE: 72 BPM | SYSTOLIC BLOOD PRESSURE: 164 MMHG | RESPIRATION RATE: 18 BRPM | OXYGEN SATURATION: 95 % | TEMPERATURE: 97.3 F | DIASTOLIC BLOOD PRESSURE: 81 MMHG

## 2017-09-14 VITALS
DIASTOLIC BLOOD PRESSURE: 70 MMHG | TEMPERATURE: 98.1 F | SYSTOLIC BLOOD PRESSURE: 144 MMHG | HEART RATE: 66 BPM | OXYGEN SATURATION: 94 % | RESPIRATION RATE: 17 BRPM

## 2017-09-14 VITALS
SYSTOLIC BLOOD PRESSURE: 127 MMHG | RESPIRATION RATE: 18 BRPM | OXYGEN SATURATION: 95 % | HEART RATE: 72 BPM | TEMPERATURE: 98.1 F | DIASTOLIC BLOOD PRESSURE: 69 MMHG

## 2017-09-14 VITALS
RESPIRATION RATE: 18 BRPM | OXYGEN SATURATION: 95 % | TEMPERATURE: 98.3 F | DIASTOLIC BLOOD PRESSURE: 71 MMHG | HEART RATE: 72 BPM | SYSTOLIC BLOOD PRESSURE: 145 MMHG

## 2017-09-14 VITALS
DIASTOLIC BLOOD PRESSURE: 72 MMHG | TEMPERATURE: 98.3 F | HEART RATE: 66 BPM | RESPIRATION RATE: 18 BRPM | SYSTOLIC BLOOD PRESSURE: 146 MMHG | OXYGEN SATURATION: 95 %

## 2017-09-14 VITALS
TEMPERATURE: 98.3 F | OXYGEN SATURATION: 93 % | SYSTOLIC BLOOD PRESSURE: 149 MMHG | DIASTOLIC BLOOD PRESSURE: 81 MMHG | RESPIRATION RATE: 18 BRPM | HEART RATE: 72 BPM

## 2017-09-14 VITALS — OXYGEN SATURATION: 96 %

## 2017-09-14 RX ADMIN — MIRTAZAPINE SCH MG: 15 TABLET, FILM COATED ORAL at 21:29

## 2017-09-14 RX ADMIN — LEVOTHYROXINE SODIUM SCH MCG: 150 TABLET ORAL at 04:37

## 2017-09-14 RX ADMIN — PHENYTOIN SODIUM SCH MLS/HR: 50 INJECTION INTRAMUSCULAR; INTRAVENOUS at 02:44

## 2017-09-14 RX ADMIN — PANTOPRAZOLE SCH MG: 40 TABLET, DELAYED RELEASE ORAL at 08:54

## 2017-09-14 RX ADMIN — HYDROCODONE BITARTRATE AND ACETAMINOPHEN PRN TAB: 10; 325 TABLET ORAL at 04:37

## 2017-09-14 RX ADMIN — POLYETHYLENE GLYCOL 3350 SCH GM: 17 POWDER, FOR SOLUTION ORAL at 08:56

## 2017-09-14 RX ADMIN — MEGESTROL ACETATE SCH MG: 40 SUSPENSION ORAL at 15:19

## 2017-09-14 RX ADMIN — Medication SCH ML: at 08:56

## 2017-09-14 RX ADMIN — HYDROCODONE BITARTRATE AND ACETAMINOPHEN PRN TAB: 10; 325 TABLET ORAL at 22:35

## 2017-09-14 RX ADMIN — PHENYTOIN SODIUM SCH MLS/HR: 50 INJECTION INTRAMUSCULAR; INTRAVENOUS at 18:00

## 2017-09-14 RX ADMIN — STANDARDIZED SENNA CONCENTRATE AND DOCUSATE SODIUM SCH TAB: 8.6; 5 TABLET, FILM COATED ORAL at 08:55

## 2017-09-14 RX ADMIN — HYDROCODONE BITARTRATE AND ACETAMINOPHEN PRN TAB: 10; 325 TABLET ORAL at 10:36

## 2017-09-14 RX ADMIN — MEGESTROL ACETATE SCH MG: 40 SUSPENSION ORAL at 05:07

## 2017-09-14 RX ADMIN — PHENYTOIN SODIUM SCH MLS/HR: 50 INJECTION INTRAMUSCULAR; INTRAVENOUS at 15:19

## 2017-09-14 RX ADMIN — Medication SCH ML: at 21:29

## 2017-09-14 RX ADMIN — STANDARDIZED SENNA CONCENTRATE AND DOCUSATE SODIUM SCH TAB: 8.6; 5 TABLET, FILM COATED ORAL at 21:29

## 2017-09-14 NOTE — HHI.NPPN
Subjective


History of Present Illness


62 year old with ARF/Pancreatitis/Cirrhosis Autoimmune disease





Review of Systems


General


Constitutional:  Fatigue





Gastrointestinal


Gastrointestinal:  Abdominal Pain





Musculoskeletal


MS:  Pain/Stiffness





Objective Data


Data











 9/14/17 9/15/17





 19:00 07:00


 


Intake Total 240 ml 


 


Balance 240 ml 


 


  


 


Intake Oral 240 ml 


 


# Voids 3 


 


# Bowel Movements 2 











Vital Signs








  Date Time  Temp Pulse Resp B/P (MAP) Pulse Ox O2 Delivery O2 Flow Rate FiO2


 


9/14/17 11:56 98.3 72 18 145/71 (95) 95   


 


9/14/17 11:40   16     


 


9/14/17 11:27     96   21


 


9/14/17 08:00 98.1 72 18 127/69 (88) 95   


 


9/14/17 04:30 98.1 66 17 144/70 (94) 94   


 


9/14/17 00:00 98.3 72 18 149/81 (103) 93   


 


9/13/17 20:00 97.7 66 18 156/76 (102) 94   


 


9/13/17 16:00 97.7 66 17 136/92 (107) 97   








-:  


9/12/17 1200                                                                   

             9/13/17 0929








Physical Exam


General


Appearance:  Well Developed





Neck


Neck Exam:  Neck Supple





Pulmonary


Resp Exam:  Clear Bilaterally, Breath Sounds Equal





Cardiology


CV Exam:  Regular, Normal Sinus Rhythm





Gastrointestinal/Abdomen


GI Exam:  Soft, Bowel Sounds Present





Extremeties


Extremities Exam:  No Edema





Assessment/Plan


Problem List:  


(1) VALENTIN (acute kidney injury)


ICD Codes:  N17.9 - Acute kidney failure, unspecified


Plan:  ARF Autoimmune process, such as SLE suspected


STEPHANIE positive


C3, C4 low


 SLE by serology STEPHANIE 1:1280 Anti dsDNA 775 very high


follow renal functions


on Solumedrol  Cr 2.53


 kidney biopsy, done follow results expect this afternoon


follow BMP





(2) Pancreatitis


ICD Codes:  K85.90 - Acute pancreatitis without necrosis or infection, 

unspecified


Status:  Acute


Plan:  She has undiagnosed underlying autoimmune disease possibility of SLE 

versus autoimmune hepatitis exists


STEPHANIE was 1:1280 and Antismooth antibody positive in 6/12





(3) Autoimmune disease


ICD Codes:  M35.9 - Systemic involvement of connective tissue, unspecified


Plan:  Continue to monitor she needs to be followed by Delia Nunez MD Sep 14, 2017 13:28
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
## 2017-09-14 NOTE — HHI.GIFU
Subjective


Remarks


Says her pain is better today.  + flatus, 3 x BM today.  "I'm trying to get 

better."


 (Ellyn Doe)





Objective


Vitals I&O





Vital Signs








  Date Time  Temp Pulse Resp B/P (MAP) Pulse Ox O2 Delivery O2 Flow Rate FiO2


 


9/14/17 11:56 98.3 72 18 145/71 (95) 95   


 


9/14/17 11:40   16     


 


9/14/17 11:27     96   21


 


9/14/17 08:00 98.1 72 18 127/69 (88) 95   


 


9/14/17 04:30 98.1 66 17 144/70 (94) 94   


 


9/14/17 00:00 98.3 72 18 149/81 (103) 93   


 


9/13/17 20:00 97.7 66 18 156/76 (102) 94   














I/O      


 


 9/13/17 9/13/17 9/13/17 9/14/17 9/14/17 9/14/17





 07:00 15:00 23:00 07:00 15:00 23:00


 


Intake Total 600 ml 800 ml 360 ml  240 ml 


 


Balance 600 ml 800 ml 360 ml  240 ml 


 


      


 


Intake Oral 240 ml 800 ml 360 ml  240 ml 


 


IV Total 360 ml     


 


# Voids 4 4 4  3 


 


# Bowel Movements 0 1 0  2 








Laboratory





Laboratory Tests








Test


  9/14/17


07:17














 Date/Time


Source Procedure


Growth Status


 


 


 9/2/17 02:58


Urine Clean Catch Urine Culture - Final


,000 CFU/ML MIXED GRAM POSITIVE... Complete








Physical Exam


HEENT: Normocephalic; atraumatic; no jaundice.  


CHEST:  CTA


CARDIAC:  RRR.


ABDOMEN:  Soft, nondistended,nontender; no hepatosplenomegaly; bowel sounds are 

present in all four quadrants.


EXTREMITIES: No clubbing, cyanosis, or edema.


SKIN:  Normal; no rash; no jaundice.


CNS:  No focal deficits; alert and oriented times three.


 (Ellyn Doe)





Assessment and Plan


Plan


ASSESSMENT:


- Abdominal pain, right sided. CT scan abdomen and pelvis (9/1/17) revealed no 

acute findings within the abdomen.  Partial staghorn type


   calcifications upper and lower pole right kidney and tiny calculi upper pole 

left kidney.  No bowel obstruction, free air, free fluid, or obstructive


   uropathy.  Probable liver cirrhosis.  Previous cholecystectomy, 

appendectomy. Elevated lipase- mildly although also with acute on chronic


   VALENTIN and elevated creat.  Does have hx of PUD 4 years ago.  EGD (9/8/17)---> 

1.  Distal esophageal stricture, Savory guidewire size 17 mm


   2.  Mild redness of the stomach possible gastritis biopsy was done, 3.  

Normal endoscopy otherwise, 4.  Retroflexed views revealed no abnormalities.


   Pathology with moderate chronic gastritis, negative for helicobacter.  This 

had improved, but states she has been having right sided abdominal pain


   since renal biopsy.  Of note, she did have a stable perinephric hematoma.  

Possibly pain related to kidney stones, as it radiates into groin.


- Anemia.  stable


- Mild elevated AST.  Will recheck.  Liver workup in 2012- Pt had positive STEPHANIE 

with titer > 1:1280- speckled and diffuse, ASMA (+) 1A:160.


   Alpha 1 antitrypsin 159, Ceruloplasmin 40, . Rpt. labs STEPHANIE (+) titer > 1:1280

, diffuse pattern.  ASMA negative, AMA pending, ferritin 160, Ceruloplasmin 

pending


   AFP 4.0.  Probable liver cirrhosis on CT scan.  Steroids.  Liver bx on hold 

secondary to hematoma.


- Lipase, undetermined significance.  No signs of pancreatitis on CT scan.  Has 

elevated creatinine,  IgG 4 level pending.


- Chest pain, per attending.


- VALENTIN, Rhabdo,  Renal following, suspects SLE, Double strand .  S/P 

renal biopsy, pathology pending.


- Stable perinephric hematoma.


- (+) STEPHANIE with high titer > 1:1280-diffuse, ASMA (+) 1A:160 in past, now 

negative.  Possible renal biopsy, ? liver biopsy.  Steroids.


- Elevated TSH per attending.





PLAN:


- HERIBERTO


- Await renal biopsy results


- PPI


- Solumedrol


- Monitor labs


- Await AMA


- Await IgG 4 level


- Liver biopsy on hold secondary to perinephric hematoma


- Further recommendations to follow after results of above


- Pt seen and examined by Dr. Duque and myself and this note is written on her 

behalf


 (Ellyn Doe)











Ellyn Doe Sep 14, 2017 16:10


Oriana Duque MD Sep 14, 2017 18:02

## 2017-09-14 NOTE — HHI.PR
Subjective


Remarks


Resting in bed, afebrile, no acute event overnight


She is happy that she has been seen by the psychiatrist and he added anxiety 

medication to her regimen





Objective


Vitals





Vital Signs








  Date Time  Temp Pulse Resp B/P (MAP) Pulse Ox O2 Delivery O2 Flow Rate FiO2


 


9/14/17 11:56 98.3 72 18 145/71 (95) 95   


 


9/14/17 11:40   16     


 


9/14/17 11:27     96   21


 


9/14/17 08:00 98.1 72 18 127/69 (88) 95   


 


9/14/17 04:30 98.1 66 17 144/70 (94) 94   


 


9/14/17 00:00 98.3 72 18 149/81 (103) 93   


 


9/13/17 20:00 97.7 66 18 156/76 (102) 94   


 


9/13/17 16:00 97.7 66 17 136/92 (107) 97   














I/O      


 


 9/13/17 9/13/17 9/13/17 9/14/17 9/14/17 9/14/17





 07:00 15:00 23:00 07:00 15:00 23:00


 


Intake Total 600 ml 800 ml 360 ml  240 ml 


 


Balance 600 ml 800 ml 360 ml  240 ml 


 


      


 


Intake Oral 240 ml 800 ml 360 ml  240 ml 


 


IV Total 360 ml     


 


# Voids 4 4 4  3 


 


# Bowel Movements 0 1 0  2 








Result Diagram:  


9/12/17 1200                                                                   

             9/13/17 0929





Objective Remarks


-  GENERAL: This is a well-nourished, well-developed patient, in no apparent 

distress.


SKIN: No rashes, warm and dry 


HEAD: Atraumatic. Normocephalic. 


EYES: Pupils equal round and reactive. Extraocular motions intact. No scleral 

icterus. 


ENT: Nose without bleeding, or drainage, Airway patent.


NECK: Trachea midline.  Supple


CARDIOVASCULAR: Regular rate and rhythm without murmurs, gallops, or rubs. 


RESPIRATORY: Fair air entry bilaterally. No wheezes, rales, or rhonchi.  


GASTROINTESTINAL: Abdomen soft, mildly tender to palpation right upper quadrant,

 nondistended.  Positive bowel sounds


MUSCULOSKELETAL: Extremities without clubbing, cyanosis, or edema.  Pedal 

pulses appreciated


NEUROLOGICAL: Awake and alert.  Moves all extremity.  Normal speech.no focal 

neurological deficit





A/P


Problem List:  


(1) Pancreatitis


ICD Code:  K85.90 - Acute pancreatitis without necrosis or infection, 

unspecified


Status:  Acute


(2) Chest pain


ICD Code:  R07.9 - Chest pain, unspecified


Status:  Acute


(3) VALENTIN (acute kidney injury)


ICD Code:  N17.9 - Acute kidney failure, unspecified


(4) Rhabdomyolysis


ICD Code:  M62.82 - Rhabdomyolysis


(5) Elevated d-dimer


ICD Code:  R79.89 - Other specified abnormal findings of blood chemistry


Assessment and Plan


62 years old female presented with


-Autoimmune disease: SLE by serology positive STEPHANIE, double stranded DNA, 

possible autoimmune hepatitis with positive ASMA, low C3-C4, status post kidney 

biopsy pending


-  Abdominal pain with increased lipase likely from pancreatitis 

gastroenterology followed status post EGD showed gastritis with negative H. 

pylori, continue Protonix, po pain meds as needed.


-  Constipation - continue MiraLAX, Palma-Colace


-  depression likely hypothyroidism, high dose steroid, chronic disease 

appreciate psychiatry consultation- continue Remeron for appetite and sleep 

improvement plus Synthroid as above.  Continue systemic cortisone on Solu-Medrol


-  Severe hypothyroidism - free T4 is low, severe hypothyroidism based on TSH 

being over 100 , continue synthroid. 


-  Generalized weakness - likely secondary to hypothyroidism, continue therapy


-  VALENTIN versus CKD- renal US unremarkable, SLE by serology as mentioned above, 

continue Solu-Medrol, creatinine trending


-   DVT Prophylaxis:  SCD/Teds. 





9/7: Discussed with GI, possible plan for EGD, patient had low C3 low C4 

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 

GI nephrology


9/8: Positive DS DNA, positive ASMA, decreased C3-C4, positive AMA, all 

consistent with autoimmune disease mostly SLE and autoimmune hepatitis, may 

need kidney and liver biopsy, GI and nephrology to follow


9/9: Continue current care, continue Solu-Medrol, will need a rheumatology 

consultation, further recommendation for biopsy liver/kidney per specialist, 

monitor BMP and CBC 


9/10: ASMA negative, was positive in the past, continue Solu-Medrol, expected 

liver/kidney biopsy


9/11: Continue Solu-Medrol, further plan for biopsies by GI and nephrology


9/12: Patient had a biopsy today, continue systemic cortisone, creatinine 

slightly decreasing from 2.85-2.79, further recommendation per GI and nephrology


9/13: Increase pain medication, consult psychiatry due to feeling further down, 

continue Solu-Medrol, creatinine still going slowly down to 0.53 today, follow 

kidney biopsy result


9/14: Appreciate psychiatry consultation, no need for psych hospitalization, 

increase Remeron and add clonazepam for anxiety, continue Solu-Medrol, kidney 

biopsy still pending, follow BMP in Shai Moreno MD Sep 14, 2017 15:11

## 2017-09-15 VITALS
OXYGEN SATURATION: 97 % | SYSTOLIC BLOOD PRESSURE: 146 MMHG | TEMPERATURE: 99.6 F | HEART RATE: 75 BPM | DIASTOLIC BLOOD PRESSURE: 81 MMHG | RESPIRATION RATE: 18 BRPM

## 2017-09-15 VITALS
SYSTOLIC BLOOD PRESSURE: 125 MMHG | HEART RATE: 70 BPM | OXYGEN SATURATION: 94 % | RESPIRATION RATE: 19 BRPM | TEMPERATURE: 98.9 F | DIASTOLIC BLOOD PRESSURE: 70 MMHG

## 2017-09-15 VITALS
RESPIRATION RATE: 18 BRPM | HEART RATE: 68 BPM | DIASTOLIC BLOOD PRESSURE: 79 MMHG | OXYGEN SATURATION: 96 % | SYSTOLIC BLOOD PRESSURE: 145 MMHG | TEMPERATURE: 97.7 F

## 2017-09-15 VITALS
OXYGEN SATURATION: 95 % | TEMPERATURE: 98.3 F | DIASTOLIC BLOOD PRESSURE: 72 MMHG | HEART RATE: 66 BPM | SYSTOLIC BLOOD PRESSURE: 146 MMHG | RESPIRATION RATE: 18 BRPM

## 2017-09-15 VITALS
SYSTOLIC BLOOD PRESSURE: 145 MMHG | OXYGEN SATURATION: 96 % | HEART RATE: 87 BPM | TEMPERATURE: 97.6 F | RESPIRATION RATE: 18 BRPM | DIASTOLIC BLOOD PRESSURE: 77 MMHG

## 2017-09-15 VITALS
DIASTOLIC BLOOD PRESSURE: 88 MMHG | RESPIRATION RATE: 18 BRPM | SYSTOLIC BLOOD PRESSURE: 169 MMHG | TEMPERATURE: 98.6 F | OXYGEN SATURATION: 95 % | HEART RATE: 73 BPM

## 2017-09-15 VITALS — OXYGEN SATURATION: 94 %

## 2017-09-15 LAB
ANION GAP SERPL CALC-SCNC: 5 MEQ/L (ref 5–15)
ANION GAP SERPL CALC-SCNC: 6 MEQ/L (ref 5–15)
BUN SERPL-MCNC: 71 MG/DL (ref 7–18)
BUN SERPL-MCNC: 76 MG/DL (ref 7–18)
CHLORIDE SERPL-SCNC: 105 MEQ/L (ref 98–107)
CHLORIDE SERPL-SCNC: 105 MEQ/L (ref 98–107)
GFR SERPLBLD BASED ON 1.73 SQ M-ARVRAT: 17 ML/MIN (ref 89–?)
GFR SERPLBLD BASED ON 1.73 SQ M-ARVRAT: 19 ML/MIN (ref 89–?)
HCO3 BLD-SCNC: 24.7 MEQ/L (ref 21–32)
HCO3 BLD-SCNC: 24.9 MEQ/L (ref 21–32)
IGA SERPL-MCNC: 1334 MG/DL (ref 81–463)
IGG SUBCLASSES 2: 560 MG/DL (ref 241–700)
IGG SUBCLASSES 3: 180 MG/DL (ref 22–178)
IGG4 SER-MCNC: 61 MG/DL (ref 4–86)
POTASSIUM SERPL-SCNC: 4.7 MEQ/L (ref 3.5–5.1)
POTASSIUM SERPL-SCNC: 4.9 MEQ/L (ref 3.5–5.1)
SODIUM SERPL-SCNC: 135 MEQ/L (ref 136–145)
SODIUM SERPL-SCNC: 136 MEQ/L (ref 136–145)
TISSUE TRANSGLUTAMINASE AB: 4 U/ML (ref 0–4)

## 2017-09-15 RX ADMIN — MIRTAZAPINE SCH MG: 15 TABLET, FILM COATED ORAL at 23:25

## 2017-09-15 RX ADMIN — MYCOPHENOLATE MOFETIL SCH MG: 500 TABLET, FILM COATED ORAL at 17:26

## 2017-09-15 RX ADMIN — PHENYTOIN SODIUM SCH MLS/HR: 50 INJECTION INTRAMUSCULAR; INTRAVENOUS at 03:42

## 2017-09-15 RX ADMIN — Medication SCH ML: at 09:50

## 2017-09-15 RX ADMIN — PANTOPRAZOLE SCH MG: 40 TABLET, DELAYED RELEASE ORAL at 09:49

## 2017-09-15 RX ADMIN — HYDROCODONE BITARTRATE AND ACETAMINOPHEN PRN TAB: 10; 325 TABLET ORAL at 17:27

## 2017-09-15 RX ADMIN — HYDROCODONE BITARTRATE AND ACETAMINOPHEN PRN TAB: 10; 325 TABLET ORAL at 05:11

## 2017-09-15 RX ADMIN — PHENYTOIN SODIUM SCH MLS/HR: 50 INJECTION INTRAMUSCULAR; INTRAVENOUS at 14:52

## 2017-09-15 RX ADMIN — Medication SCH ML: at 23:25

## 2017-09-15 RX ADMIN — MEGESTROL ACETATE SCH MG: 40 SUSPENSION ORAL at 05:10

## 2017-09-15 RX ADMIN — MEGESTROL ACETATE SCH MG: 40 SUSPENSION ORAL at 11:15

## 2017-09-15 RX ADMIN — STANDARDIZED SENNA CONCENTRATE AND DOCUSATE SODIUM SCH TAB: 8.6; 5 TABLET, FILM COATED ORAL at 09:00

## 2017-09-15 RX ADMIN — LEVOTHYROXINE SODIUM SCH MCG: 150 TABLET ORAL at 05:11

## 2017-09-15 RX ADMIN — POLYETHYLENE GLYCOL 3350 SCH GM: 17 POWDER, FOR SOLUTION ORAL at 09:00

## 2017-09-15 RX ADMIN — STANDARDIZED SENNA CONCENTRATE AND DOCUSATE SODIUM SCH TAB: 8.6; 5 TABLET, FILM COATED ORAL at 21:00

## 2017-09-15 RX ADMIN — HYDROCODONE BITARTRATE AND ACETAMINOPHEN PRN TAB: 10; 325 TABLET ORAL at 23:25

## 2017-09-15 NOTE — HHI.PR
Subjective


Remarks


Patient was sleeping, woke up to verbal stimuli


I delivered the news of the results of the kidney biopsy to her, she looks and 

disappointed





Objective


Vitals





Vital Signs








  Date Time  Temp Pulse Resp B/P (MAP) Pulse Ox O2 Delivery O2 Flow Rate FiO2


 


9/15/17 12:00 97.6 87 18 145/77 (99) 96   


 


9/15/17 08:55     94   21


 


9/15/17 08:00 99.6 75 18 146/81 (102) 97   


 


9/15/17 04:35 98.6 73 18 169/88 (115) 95   


 


9/15/17 00:32 98.3 66 18 146/72 (96) 95   


 


9/14/17 20:25 98.2 68 18 163/73 (103) 95   


 


9/14/17 16:00 97.3 72 18 164/81 (108) 95   














I/O      


 


 9/14/17 9/14/17 9/14/17 9/15/17 9/15/17 9/15/17





 07:00 15:00 23:00 07:00 15:00 23:00


 


Intake Total  960 ml 240 ml 240 ml  


 


Balance  960 ml 240 ml 240 ml  


 


      


 


Intake Oral  960 ml 240 ml 240 ml  


 


# Voids  9 8 7  


 


# Bowel Movements  8 4 0  








Result Diagram:  


9/12/17 1200                                                                   

             9/15/17 0701





Objective Remarks


-  GENERAL: This is a well-nourished, well-developed patient, in no apparent 

distress.


SKIN: No rashes, warm and dry 


HEAD: Atraumatic. Normocephalic. 


EYES: Pupils equal round and reactive. Extraocular motions intact. No scleral 

icterus. 


ENT: Nose without bleeding, or drainage, Airway patent.


NECK: Trachea midline.  Supple


CARDIOVASCULAR: Regular rate and rhythm without murmurs, gallops, or rubs. 


RESPIRATORY: Fair air entry bilaterally. No wheezes, rales, or rhonchi.  


GASTROINTESTINAL: Abdomen soft, mildly tender to palpation right upper quadrant,

 nondistended.  Positive bowel sounds


MUSCULOSKELETAL: Extremities without clubbing, cyanosis, or edema.  Pedal 

pulses appreciated


NEUROLOGICAL: Awake and alert.  Moves all extremity.  Normal speech.no focal 

neurological deficit





A/P


Problem List:  


(1) Pancreatitis


ICD Code:  K85.90 - Acute pancreatitis without necrosis or infection, 

unspecified


Status:  Acute


(2) Chest pain


ICD Code:  R07.9 - Chest pain, unspecified


Status:  Acute


(3) VALENTIN (acute kidney injury)


ICD Code:  N17.9 - Acute kidney failure, unspecified


(4) Rhabdomyolysis


ICD Code:  M62.82 - Rhabdomyolysis


(5) Elevated d-dimer


ICD Code:  R79.89 - Other specified abnormal findings of blood chemistry


Assessment and Plan


62 years old female presented with


-Autoimmune disease: SLE by serology positive STEPHANIE, double stranded DNA, 

possible autoimmune hepatitis with positive ASMA, low C3-C4, status post kidney 

biopsy showing:


   FOCAL PROLIFERATIVE AND NECROTIZING GLOMERULONEPHRITIS.  


   NODULAR DIABETIC GLOMERULOSCLEROSIS.  


   GLOBAL& SEGMENTAL GLOMERULOSCLEROSIS.  


   INTERSTITIAL FIBROSIS AND TUBULAR ATROPHY, SEVERE.  


Will need rheumatology consultation, unfortunately no rheumatology consultant 

is available at this point, consider transfer to another facility Center versus 

follow up as an outpatient after discharge





-  Abdominal pain with increased lipase likely from pancreatitis 

gastroenterology followed status post EGD showed gastritis with negative H. 

pylori, continue Protonix, po pain meds as needed.


   GI following, awaiting IgG4 level as well as AMA, liver biopsy on hold due 

to perinephritic hematoma





-  Constipation - continue MiraLAX, Palma-Colace


-  depression likely hypothyroidism, high dose steroid, chronic disease 

appreciate psychiatry consultation- continue Remeron for appetite and sleep 

improvement plus Synthroid as above.  Continue systemic cortisone on Solu-Medrol


-  Severe hypothyroidism - free T4 is low, severe hypothyroidism based on TSH 

being over 100 , continue synthroid. 


-  Generalized weakness - likely secondary to hypothyroidism, continue therapy


-  VALENTIN on CKD- renal US unremarkable, SLE by serology as mentioned above, 

continue Solu-Medrol, creatinine trending, kidney biopsy as above


- Worsening depression:Appreciate psychiatry consultation, no need for psych 

hospitalization, increase Remeron and add clonazepam for anxiety


-   DVT Prophylaxis:  SCD/Teds. 














Shai Vasquez MD Sep 15, 2017 14:02

## 2017-09-16 VITALS
TEMPERATURE: 98.7 F | SYSTOLIC BLOOD PRESSURE: 123 MMHG | DIASTOLIC BLOOD PRESSURE: 73 MMHG | HEART RATE: 76 BPM | RESPIRATION RATE: 19 BRPM | OXYGEN SATURATION: 94 %

## 2017-09-16 VITALS
DIASTOLIC BLOOD PRESSURE: 84 MMHG | RESPIRATION RATE: 18 BRPM | TEMPERATURE: 98.7 F | SYSTOLIC BLOOD PRESSURE: 135 MMHG | OXYGEN SATURATION: 96 % | HEART RATE: 82 BPM

## 2017-09-16 VITALS
TEMPERATURE: 97.5 F | RESPIRATION RATE: 19 BRPM | DIASTOLIC BLOOD PRESSURE: 85 MMHG | HEART RATE: 75 BPM | SYSTOLIC BLOOD PRESSURE: 142 MMHG | OXYGEN SATURATION: 95 %

## 2017-09-16 VITALS
TEMPERATURE: 98.4 F | OXYGEN SATURATION: 95 % | SYSTOLIC BLOOD PRESSURE: 138 MMHG | RESPIRATION RATE: 19 BRPM | DIASTOLIC BLOOD PRESSURE: 76 MMHG | HEART RATE: 74 BPM

## 2017-09-16 VITALS
DIASTOLIC BLOOD PRESSURE: 82 MMHG | OXYGEN SATURATION: 95 % | RESPIRATION RATE: 17 BRPM | HEART RATE: 70 BPM | TEMPERATURE: 97 F | SYSTOLIC BLOOD PRESSURE: 152 MMHG

## 2017-09-16 VITALS
HEART RATE: 68 BPM | RESPIRATION RATE: 18 BRPM | DIASTOLIC BLOOD PRESSURE: 69 MMHG | SYSTOLIC BLOOD PRESSURE: 129 MMHG | OXYGEN SATURATION: 93 % | TEMPERATURE: 97.6 F

## 2017-09-16 VITALS — OXYGEN SATURATION: 93 %

## 2017-09-16 LAB
ANION GAP SERPL CALC-SCNC: 4 MEQ/L (ref 5–15)
BASOPHILS # BLD AUTO: 0 TH/MM3 (ref 0–0.2)
BASOPHILS NFR BLD: 0.3 % (ref 0–2)
BUN SERPL-MCNC: 77 MG/DL (ref 7–18)
CHLORIDE SERPL-SCNC: 105 MEQ/L (ref 98–107)
ENDOMYSIUM AB TITR SER: (no result) {TITER}
EOSINOPHIL # BLD: 0 TH/MM3 (ref 0–0.4)
EOSINOPHIL NFR BLD: 0 % (ref 0–4)
ERYTHROCYTE [DISTWIDTH] IN BLOOD BY AUTOMATED COUNT: 16.6 % (ref 11.6–17.2)
GFR SERPLBLD BASED ON 1.73 SQ M-ARVRAT: 19 ML/MIN (ref 89–?)
HCO3 BLD-SCNC: 27.1 MEQ/L (ref 21–32)
HCT VFR BLD CALC: 32.3 % (ref 35–46)
HEMO FLAGS: (no result)
LYMPHOCYTES # BLD AUTO: 1.3 TH/MM3 (ref 1–4.8)
LYMPHOCYTES NFR BLD AUTO: 12.3 % (ref 9–44)
MCH RBC QN AUTO: 31.5 PG (ref 27–34)
MCHC RBC AUTO-ENTMCNC: 32.7 % (ref 32–36)
MCV RBC AUTO: 96.3 FL (ref 80–100)
MONOCYTES NFR BLD: 8.9 % (ref 0–8)
NEUTROPHILS # BLD AUTO: 8.1 TH/MM3 (ref 1.8–7.7)
NEUTROPHILS NFR BLD AUTO: 78.5 % (ref 16–70)
PLATELET # BLD: 246 TH/MM3 (ref 150–450)
POTASSIUM SERPL-SCNC: 5 MEQ/L (ref 3.5–5.1)
RBC # BLD AUTO: 3.35 MIL/MM3 (ref 4–5.3)
SODIUM SERPL-SCNC: 136 MEQ/L (ref 136–145)
WBC # BLD AUTO: 10.4 TH/MM3 (ref 4–11)

## 2017-09-16 RX ADMIN — PHENYTOIN SODIUM SCH MLS/HR: 50 INJECTION INTRAMUSCULAR; INTRAVENOUS at 18:00

## 2017-09-16 RX ADMIN — PANTOPRAZOLE SCH MG: 40 TABLET, DELAYED RELEASE ORAL at 10:43

## 2017-09-16 RX ADMIN — PHENYTOIN SODIUM SCH MLS/HR: 50 INJECTION INTRAMUSCULAR; INTRAVENOUS at 23:26

## 2017-09-16 RX ADMIN — PHENYTOIN SODIUM SCH MLS/HR: 50 INJECTION INTRAMUSCULAR; INTRAVENOUS at 15:30

## 2017-09-16 RX ADMIN — PHENYTOIN SODIUM SCH MLS/HR: 50 INJECTION INTRAMUSCULAR; INTRAVENOUS at 03:35

## 2017-09-16 RX ADMIN — MYCOPHENOLATE MOFETIL SCH MG: 500 TABLET, FILM COATED ORAL at 18:20

## 2017-09-16 RX ADMIN — MIRTAZAPINE SCH MG: 15 TABLET, FILM COATED ORAL at 20:25

## 2017-09-16 RX ADMIN — POLYETHYLENE GLYCOL 3350 SCH GM: 17 POWDER, FOR SOLUTION ORAL at 09:00

## 2017-09-16 RX ADMIN — STANDARDIZED SENNA CONCENTRATE AND DOCUSATE SODIUM SCH TAB: 8.6; 5 TABLET, FILM COATED ORAL at 09:00

## 2017-09-16 RX ADMIN — Medication SCH ML: at 10:45

## 2017-09-16 RX ADMIN — Medication SCH ML: at 20:25

## 2017-09-16 RX ADMIN — STANDARDIZED SENNA CONCENTRATE AND DOCUSATE SODIUM SCH TAB: 8.6; 5 TABLET, FILM COATED ORAL at 20:25

## 2017-09-16 RX ADMIN — HYDROCODONE BITARTRATE AND ACETAMINOPHEN PRN TAB: 10; 325 TABLET ORAL at 06:11

## 2017-09-16 RX ADMIN — LEVOTHYROXINE SODIUM SCH MCG: 150 TABLET ORAL at 06:11

## 2017-09-16 RX ADMIN — MYCOPHENOLATE MOFETIL SCH MG: 500 TABLET, FILM COATED ORAL at 06:11

## 2017-09-16 RX ADMIN — HYDROCODONE BITARTRATE AND ACETAMINOPHEN PRN TAB: 10; 325 TABLET ORAL at 18:24

## 2017-09-16 RX ADMIN — MEGESTROL ACETATE SCH MG: 40 SUSPENSION ORAL at 06:12

## 2017-09-16 RX ADMIN — MEGESTROL ACETATE SCH MG: 40 SUSPENSION ORAL at 16:00

## 2017-09-16 NOTE — HHI.GIFU
Subjective


Remarks


Up in chair eating lunch.  Tolerating diet.  No n/v.  Abdominal pain improved.  


 ([facility])





Objective


Vitals I&O





Vital Signs








  Date Time  Temp Pulse Resp B/P (MAP) Pulse Ox O2 Delivery O2 Flow Rate FiO2


 


9/16/17 11:56 98.4 74 19 138/76 (96) 95   


 


9/16/17 07:06 97.5 75 19 142/85 (104) 95   


 


9/16/17 03:30 97.0 70 17 152/82 (105) 95   


 


9/16/17 00:25 97.6 68 18 129/69 (89) 93   


 


9/15/17 19:30 98.9 70 19 125/70 (88) 94   


 


9/15/17 16:00 97.7 68 18 145/79 (101) 96   














I/O      


 


 9/15/17 9/15/17 9/15/17 9/16/17 9/16/17 9/16/17





 07:00 15:00 23:00 07:00 15:00 23:00


 


Intake Total 240 ml 720 ml 480 ml 720 ml  


 


Balance 240 ml 720 ml 480 ml 720 ml  


 


      


 


Intake Oral 240 ml 720 ml 480 ml 720 ml  


 


# Voids 7 4 6 7  


 


# Bowel Movements 0 4 0 0  








Laboratory





Laboratory Tests








Test


  9/16/17


08:06


 


White Blood Count 10.4 


 


Red Blood Count 3.35 


 


Hemoglobin 10.6 


 


Hematocrit 32.3 


 


Mean Corpuscular Volume 96.3 


 


Mean Corpuscular Hemoglobin 31.5 


 


Mean Corpuscular Hemoglobin


Concent 32.7 


 


 


Red Cell Distribution Width 16.6 


 


Platelet Count 246 


 


Mean Platelet Volume 7.2 


 


Neutrophils (%) (Auto) 78.5 


 


Lymphocytes (%) (Auto) 12.3 


 


Monocytes (%) (Auto) 8.9 


 


Eosinophils (%) (Auto) 0.0 


 


Basophils (%) (Auto) 0.3 


 


Neutrophils # (Auto) 8.1 


 


Lymphocytes # (Auto) 1.3 


 


Monocytes # (Auto) 0.9 


 


Eosinophils # (Auto) 0.0 


 


Basophils # (Auto) 0.0 


 


CBC Comment DIFF FINAL 


 


Differential Comment  


 


Blood Urea Nitrogen 77 


 


Creatinine 2.56 


 


Random Glucose 206 


 


Calcium Level 9.5 


 


Sodium Level 136 


 


Potassium Level 5.0 


 


Chloride Level 105 


 


Carbon Dioxide Level 27.1 


 


Anion Gap 4 


 


Estimat Glomerular Filtration


Rate 19 


 














 Date/Time


Source Procedure


Growth Status


 


 


 9/2/17 02:58


Urine Clean Catch Urine Culture - Final


,000 CFU/ML MIXED GRAM POSITIVE... Complete








Imaging





Last Impressions








Renal Biopsy CT 9/12/17 0000 Signed





Impressions: 





 Service Date/Time:  Tuesday, September 12, 2017 08:59 - CONCLUSION: Successful 





 18 gauge core biopsies of left renal cortex.     Silviano Aggarwal MD ADDENDUM: 

  





 A two hour delayed scan through the kidneys demonstrates stability of the 





 perinephric hematoma with no evidence of increase in the size of this 

hematoma. 





 H&H are also stable. The patient is also clinically stable without evidence of 





 ongoing bleeding status post renal biopsy.   Silviano Aggarwal MD 


 


Liver Ultrasound 9/6/17 0000 Signed





Impressions: 





 Service Date/Time:  Wednesday, September 6, 2017 08:44 - CONCLUSION:  1. 





 Hepatomegaly with heterogeneous echotexture, slightly larger than on prior 

exam 





 in 2016. 2. Shadowing right renal stones without evidence of hydronephrosis.  

   





 Jesse Martínez MD 


 


Renal Ultrasound 9/5/17 0000 Signed





Impressions: 





 Service Date/Time:  Tuesday, September 5, 2017 17:45 - CONCLUSION:  1. 





 Nonobstructing right renal calculi. Right renal cysts. No perinephric fluid. 





 Bladder unremarkable.     Michael Forrest MD 


 


Lung Scan- Nuclear Medicine 9/2/17 0000 Signed





Impressions: 





 Service Date/Time:  Saturday, September 2, 2017 02:35 - CONCLUSION:  1. Low 





 probability for pulmonary embolus.     Michael Forrest MD 


 


Head CT 9/2/17 0000 Signed





Impressions: 





 Service Date/Time:  Saturday, September 2, 2017 00:29 - CONCLUSION:  Normal 





 examination.       Michael Forrest MD 


 


Chest X-Ray 9/1/17 2217 Signed





Impressions: 





 Service Date/Time:  Friday, September 1, 2017 22:32 - CONCLUSION:  Minimal 





 patchy areas of consolidation or atelectasis at the lateral bases.     Scooter Tatum MD 


 


Abdomen/Pelvis CT 9/1/17 0000 Signed





Impressions: 





 Service Date/Time:  Saturday, September 2, 2017 00:32 - CONCLUSION:  1. No 

acute 





 findings within the abdomen. Partial staghorn type calcifications upper and 





 lower pole right kidney and tiny calculi upper pole left kidney. No bowel 





 obstruction, free air, free fluid or obstructive uropathy. 2. Probable liver 





 cirrhosis. Previous cholecystectomy, appendectomy.     Michael Forrest MD 








Physical Exam


HEENT: Normocephalic; atraumatic; no jaundice.  


CHEST:  CTA


CARDIAC:  RRR.


ABDOMEN:  Soft, nondistended,nontender; no hepatosplenomegaly; bowel sounds are 

present in all four quadrants.


EXTREMITIES: No clubbing, cyanosis, or edema.


SKIN:  Normal; no rash; no jaundice.


CNS:  No focal deficits; alert and oriented times three.


 ([facility])





Assessment and Plan


Plan


ASSESSMENT:


- Abdominal pain, right sided. CT scan abdomen and pelvis (9/1/17) revealed no 

acute findings within the abdomen.  Partial staghorn type


   calcifications upper and lower pole right kidney and tiny calculi upper pole 

left kidney.  No bowel obstruction, free air, free fluid, or obstructive


   uropathy.  Probable liver cirrhosis.  Previous cholecystectomy, 

appendectomy. Elevated lipase- mildly although also with acute on chronic


   VALENTIN and elevated creat.  Does have hx of PUD 4 years ago.  EGD (9/8/17)---> 

1.  Distal esophageal stricture, Savory guidewire size 17 mm


   2.  Mild redness of the stomach possible gastritis biopsy was done, 3.  

Normal endoscopy otherwise, 4.  Retroflexed views revealed no abnormalities.


   Pathology with moderate chronic gastritis, negative for helicobacter.  This 

had improved, but states she has been having right sided abdominal pain


   since renal biopsy.  Of note, she did have a stable perinephric hematoma.  

Abdominal pain improved.


- Anemia. 10.6/32.3.


- Mild elevated AST.  Will recheck.  Liver workup in 2012- Pt had positive STEPHANIE 

with titer > 1:1280- speckled and diffuse, ASMA (+) 1A:160.


   Alpha 1 antitrypsin 159, Ceruloplasmin 40, . Rpt. labs STEPHANIE (+) titer > 1:1280

, diffuse pattern.  ASMA negative, AMA pending, ferritin 160, 


   Ceruloplasmin 34, AFP 4.0.  Probable liver cirrhosis on CT scan.  

Prednisone.  Possible liver biopsy next week.


- Lipase, undetermined significance.  No signs of pancreatitis on CT scan.  Has 

elevated creatinine,  IgG 4 level 61.0.


- Chest pain, per attending.


- VALENTIN, Rhabdo,  Renal following, suspects SLE, Double strand .  S/P 

renal biopsy, pathology focal proliferative and necrotizing


   glomerulonephritis, nodular diabetic glomerulosclerosis, global and 

segmental glomerulosclerosis, interstitial fibrosis and tubular


   atrophy, severe. Cellcept/Prednisone. 


- Stable perinephric hematoma.


- (+) STEPHANIE with high titer > 1:1280-diffuse, ASMA (+) 1A:160 in past, now 

negative.  Possible renal biopsy, ? liver biopsy.  Steroids.


- Elevated TSH per attending.





PLAN:


- HERIBERTO


- PPI


- Prednisone


- Monitor labs


- Possible liver biopsy next week


- Further recommendations to follow after results of above


- Pt seen and examined by Dr. Peraza and myself and this note is written on 

his behalf


 (Zuleima West)


Physician Comments


Seen and examined with WILBERTO, doing better. LFTs normal. Liver biopsy next week.


 (Allie Peraza MD)











Zuleima West Sep 16, 2017 14:01


Allie Peraza MD Sep 16, 2017 14:23

## 2017-09-16 NOTE — HHI.PR
Subjective


Remarks


resting comfortably with no distress.


denies pain.


no fever.





Objective


Vitals





Vital Signs








  Date Time  Temp Pulse Resp B/P (MAP) Pulse Ox O2 Delivery O2 Flow Rate FiO2


 


9/16/17 11:56 98.4 74 19 138/76 (96) 95   


 


9/16/17 07:06 97.5 75 19 142/85 (104) 95   


 


9/16/17 03:30 97.0 70 17 152/82 (105) 95   


 


9/16/17 00:25 97.6 68 18 129/69 (89) 93   


 


9/15/17 19:30 98.9 70 19 125/70 (88) 94   


 


9/15/17 16:00 97.7 68 18 145/79 (101) 96   














I/O      


 


 9/15/17 9/15/17 9/15/17 9/16/17 9/16/17 9/16/17





 07:00 15:00 23:00 07:00 15:00 23:00


 


Intake Total 240 ml 720 ml 480 ml 720 ml  


 


Balance 240 ml 720 ml 480 ml 720 ml  


 


      


 


Intake Oral 240 ml 720 ml 480 ml 720 ml  


 


# Voids 7 4 6 7  


 


# Bowel Movements 0 4 0 0  








Result Diagram:  


9/16/17 0806                                                                   

             9/16/17 0806





Imaging





Last Impressions








Renal Biopsy CT 9/12/17 0000 Signed





Impressions: 





 Service Date/Time:  Tuesday, September 12, 2017 08:59 - CONCLUSION: Successful 





 18 gauge core biopsies of left renal cortex.     Silviano Aggarwal MD ADDENDUM: 

  





 A two hour delayed scan through the kidneys demonstrates stability of the 





 perinephric hematoma with no evidence of increase in the size of this 

hematoma. 





 H&H are also stable. The patient is also clinically stable without evidence of 





 ongoing bleeding status post renal biopsy.   Silviano Aggarwal MD 


 


Liver Ultrasound 9/6/17 0000 Signed





Impressions: 





 Service Date/Time:  Wednesday, September 6, 2017 08:44 - CONCLUSION:  1. 





 Hepatomegaly with heterogeneous echotexture, slightly larger than on prior 

exam 





 in 2016. 2. Shadowing right renal stones without evidence of hydronephrosis.  

   





 Jesse Martínez MD 


 


Renal Ultrasound 9/5/17 0000 Signed





Impressions: 





 Service Date/Time:  Tuesday, September 5, 2017 17:45 - CONCLUSION:  1. 





 Nonobstructing right renal calculi. Right renal cysts. No perinephric fluid. 





 Bladder unremarkable.     Michael Forrest MD 


 


Lung Scan-VQ Nuclear Medicine 9/2/17 0000 Signed





Impressions: 





 Service Date/Time:  Saturday, September 2, 2017 02:35 - CONCLUSION:  1. Low 





 probability for pulmonary embolus.     Michael Forrest MD 


 


Head CT 9/2/17 0000 Signed





Impressions: 





 Service Date/Time:  Saturday, September 2, 2017 00:29 - CONCLUSION:  Normal 





 examination.       Michael Forrest MD 


 


Chest X-Ray 9/1/17 2217 Signed





Impressions: 





 Service Date/Time:  Friday, September 1, 2017 22:32 - CONCLUSION:  Minimal 





 patchy areas of consolidation or atelectasis at the lateral bases.     Scooter Tatum MD 


 


Abdomen/Pelvis CT 9/1/17 0000 Signed





Impressions: 





 Service Date/Time:  Saturday, September 2, 2017 00:32 - CONCLUSION:  1. No 

acute 





 findings within the abdomen. Partial staghorn type calcifications upper and 





 lower pole right kidney and tiny calculi upper pole left kidney. No bowel 





 obstruction, free air, free fluid or obstructive uropathy. 2. Probable liver 





 cirrhosis. Previous cholecystectomy, appendectomy.     Michael Forrest MD 








Objective Remarks


GENERAL: This is a well-nourished, well-developed patient, in no apparent 

distress.


CARDIOVASCULAR: Regular rate and regular rhythm without murmurs, gallops, or 

rubs. 


RESPIRATORY: Clear to auscultation. Breath sounds equal bilaterally. No wheezes

, rales, or rhonchi.  


GASTROINTESTINAL: Abdomen soft, non-tender, nondistended. 


Normal, active bowel sounds


MUSCULOSKELETAL: Extremities without clubbing, cyanosis, or edema.


NEURO:  Alert & Oriented x4 to person, place, time, situation.  Moves all ext x4


Procedures


kidney biopsy


Medications and IVs





Current Medications


Sodium Chloride (NS Flush) 2 ml UNSCH  PRN IVF FLUSH AFTER USING IV ACCESS Last 

administered on 9/1/17at 22:54;  Start 9/1/17 at 22:30;  Stop 9/2/17 at 02:16;  

Status DC


Nitroglycerin (Nitrostat Sl) 0.3 mg ONCE  ONCE SL ;  Start 9/1/17 at 22:30;  

Stop 9/1/17 at 22:31;  Status DC


Morphine Sulfate (Morphine Inj) 4 mg ONCE  ONCE IV PUSH  Last administered on 9/ 1/17at 22:53;  Start 9/1/17 at 22:30;  Stop 9/1/17 at 22:31;  Status DC


Ondansetron HCl (Zofran Inj) 4 mg ONCE  ONCE IV PUSH  Last administered on 9/1/ 17at 22:54;  Start 9/1/17 at 22:30;  Stop 9/1/17 at 22:31;  Status DC


Sodium Chloride (NS Flush) 2 ml BID IV FLUSH ;  Start 9/2/17 at 09:00;  Stop 9/2 /17 at 09:00;  Status DC


Sodium Chloride (NS Flush) 2 ml UNSCH  PRN IVF FLUSH AFTER USING IV ACCESS;  

Start 9/2/17 at 02:00;  Stop 9/2/17 at 02:16;  Status DC


Famotidine (Pepcid Inj) 20 mg Q12H IV PUSH  Last administered on 9/3/17at 00:18

;  Start 9/2/17 at 02:00;  Stop 9/3/17 at 08:43;  Status DC


Sodium Chloride 1,000 ml @  100 mls/hr Q10H IV  Last administered on 9/4/17at 04

:27;  Start 9/2/17 at 01:59;  Stop 9/4/17 at 10:54;  Status DC


Sodium Chloride (NS Flush) 2 ml UNSCH  PRN IV FLUSH FLUSH AFTER USING IV ACCESS

;  Start 9/2/17 at 02:00


Sodium Chloride (NS Flush) 2 ml BID IV FLUSH  Last administered on 9/16/17at 10:

45;  Start 9/2/17 at 09:00


Ondansetron HCl (Zofran Inj) 4 mg Q6H  PRN IVP NAUSEA OR VOMITING;  Start 9/2/ 17 at 02:00;  Stop 9/4/17 at 10:54;  Status DC


Acetaminophen (Tylenol) 650 mg Q6H  PRN PO FEVER/PAIN SCALE 1 TO 2;  Start 9/2/ 17 at 02:00


Acetaminophen/ Hydrocodone Bitart (Norco  5-325 Mg) 1 tab Q4H  PRN PO PAIN 

SCALE 3 TO 5 Last administered on 9/13/17at 06:20;  Start 9/2/17 at 02:00


Morphine Sulfate (Morphine Inj) 2 mg Q3H  PRN IV Pain 6-10 Last administered on 

9/3/17at 06:21;  Start 9/2/17 at 02:00;  Stop 9/3/17 at 13:16;  Status DC


Senna/Docusate Sodium (Palma-Colace) 1 tab BID PO  Last administered on 9/13/ 17at 21:44;  Start 9/2/17 at 09:00


Magnesium Hydroxide (Milk Of Magnesia Liq) 30 ml Q12H  PRN PO MILD - MODERATE 

CONSTIPATION Last administered on 9/11/17at 19:47;  Start 9/2/17 at 02:00


Sennosides (Senokot) 17.2 mg Q12H  PRN PO MODERATE - SEVERE CONSTIPATION;  

Start 9/2/17 at 02:00;  Stop 9/5/17 at 11:01;  Status DC


Bisacodyl (Dulcolax Supp) 10 mg DAILY  PRN RECTAL SEVERE CONSITIPATION;  Start 9 /2/17 at 02:00


Lactulose (Lactulose Liq) 30 ml DAILY  PRN PO SEVERE CONSITIPATION;  Start 9/2/ 17 at 02:00


Albuterol/ Ipratropium (Duoneb Neb) 1 ampule Q4HR  NEB NEB  Last administered 

on 9/6/17at 08:57;  Start 9/2/17 at 12:00;  Stop 9/6/17 at 11:59;  Status DC


Albuterol Sulfate (Albuterol Neb) 2.5 mg Q2HR NEB  PRN NEB wheezing Last 

administered on 9/6/17at 06:28;  Start 9/2/17 at 10:45


Clonidine (Catapres) 0.1 mg ONCE  ONCE PO  Last administered on 9/3/17at 00:17;

  Start 9/2/17 at 23:45;  Stop 9/2/17 at 23:46;  Status DC


Famotidine (Pepcid Inj) 10 mg Q12H IV PUSH  Last administered on 9/4/17at 01:49

;  Start 9/3/17 at 14:00;  Stop 9/4/17 at 10:54;  Status DC


Levothyroxine Sodium (Synthroid) 150 mcg DAILY@0600 PO  Last administered on 9/ 16/17at 06:11;  Start 9/3/17 at 14:30


Polyethylene Glycol (Miralax) 17 gm DAILY PO  Last administered on 9/13/17at 07:

44;  Start 9/3/17 at 14:45


Glycerin (Glycerin Adult Supp) 2 gm ONCE  ONCE RECTAL  Last administered on 9/3/

17at 17:47;  Start 9/3/17 at 15:00;  Stop 9/3/17 at 15:01;  Status DC


Mirtazapine (Remeron) 15 mg HS PO  Last administered on 9/13/17at 21:44;  Start 

9/4/17 at 21:00;  Stop 9/14/17 at 13:04;  Status DC


Ondansetron HCl (Zofran  Odt) 4 mg Q6H  PRN PO nausea;  Start 9/4/17 at 11:00


Pantoprazole Sodium (Protonix) 40 mg DAILY PO  Last administered on 9/16/17at 10

:43;  Start 9/5/17 at 11:00


Methylprednisolone Sodium Succinate (SoluMEDROL INJ) 60 mg Q12H IV PUSH  Last 

administered on 9/15/17at 14:52;  Start 9/6/17 at 14:00;  Stop 9/15/17 at 20:05

;  Status DC


Sodium Chloride 1,000 ml @  84 mls/hr [id number] IV  Last administered on 9/8/17at 

04:55;  Start 9/7/17 at 17:00


Megestrol Acetate (Megace Liq) 400 mg BIDAC PO  Last administered on 9/12/17at 

05:54;  Start 9/7/17 at 16:30


Lactated Ringer's 1,000 ml @  30 mls/hr Q24H PRN IV SEE LABEL COMMENTS;  Start 9 /8/17 at 03:15;  Stop 9/11/17 at 03:14;  Status DC


Sodium Chloride 500 ml @  30 mls/hr [id number] PRN IV SEE LABEL COMMENTS;  Start 9/ 8/17 at 03:15;  Stop 9/11/17 at 03:14;  Status DC


Povidone Iodine (Betadine 5% Antisepsis Kit) 1 applic ON CALL  PRN EACH NARE 

SEE LABEL COMMENTS;  Start 9/8/17 at 03:15;  Stop 9/11/17 at 03:14;  Status DC


Chlorhexidine Gluconate (Chlorhexidine 2% Cloth) 3 pack ON CALL  PRN TOPICAL 

SEE LABEL COMMENTS;  Start 9/8/17 at 03:15;  Stop 9/11/17 at 03:14;  Status DC


Ketamine HCl (Ketalar Inj) 500 mg STK-MED ONCE .ROUTE ;  Start 9/8/17 at 08:55;

  Stop 9/8/17 at 08:56;  Status DC


Miscellaneous Information ALL NURSING DEPARTME... UNSCH  PRN .XX SEE LABEL 

COMMENTS;  Start 9/8/17 at 10:53;  Stop 9/9/17 at 10:52;  Status DC


Fluoxetine HCl (PROzac) 10 mg ONCE  ONCE PO  Last administered on 9/8/17at 17:45

;  Start 9/8/17 at 17:30;  Stop 9/8/17 at 17:36;  Status DC


Fluoxetine HCl (PROzac) 10 mg DAILY PO  Last administered on 9/13/17at 07:43;  

Start 9/9/17 at 09:00;  Stop 9/13/17 at 15:01;  Status DC


Sodium Chloride 1,000 ml @  30 mls/hr Q24H IV  Last administered on 9/11/17at 18

:17;  Start 9/11/17 at 18:00


Clonidine (Catapres) 0.1 mg ONCE  ONCE PO  Last administered on 9/12/17at 02:28

;  Start 9/12/17 at 02:15;  Stop 9/12/17 at 02:16;  Status DC


Fentanyl Citrate (fentaNYL INJ) 100 mcg STK-MED ONCE .ROUTE  Last administered 

on 9/12/17at 08:15;  Start 9/12/17 at 08:15;  Stop 9/12/17 at 08:16;  Status DC


Midazolam HCl (Versed Inj) 5 mg STK-MED ONCE .ROUTE  Last administered on 9/12/ 17at 08:15;  Start 9/12/17 at 08:15;  Stop 9/12/17 at 08:16;  Status DC


Lidocaine HCl (Xylocaine 1% Inj) 20 ml STK-MED ONCE .ROUTE  Last administered 

on 9/12/17at 08:19;  Start 9/12/17 at 08:19;  Stop 9/12/17 at 08:20;  Status DC


Midazolam HCl (Versed Inj) 2 mg STK-MED ONCE IV ;  Start 9/8/17 at 12:00;  Stop 

9/12/17 at 10:29;  Status DC


Propofol (Diprivan  200 Mg/20 ml Inj) 30 mg STK-MED ONCE IV ;  Start 9/8/17 at 

12:00;  Stop 9/12/17 at 10:29;  Status DC


Acetaminophen/ Hydrocodone Bitart (Norco  Mg) 1 tab Q6H  PRN PO pain 6-10 

Last administered on 9/16/17at 06:11;  Start 9/13/17 at 15:30


Mirtazapine (Remeron) 30 mg HS PO  Last administered on 9/15/17at 23:25;  Start 

9/14/17 at 21:00


Clonazepam (KlonoPIN) 0.5 mg Q12HR PO  Last administered on 9/16/17at 10:43;  

Start 9/14/17 at 13:30


Mycophenolate Mofetil (Cellcept) 500 mg BID@06,18 PO  Last administered on 9/16/ 17at 06:11;  Start 9/15/17 at 18:00


Prednisone (Deltasone) 60 mg DAILY PO ;  Start 9/16/17 at 09:00;  Stop 9/16/17 

at 10:33;  Status DC


Prednisone (Deltasone) 60 mg DAILY PO  Last administered on 9/16/17at 10:43;  

Start 9/16/17 at 11:00





A/P


Assessment and Plan


-Autoimmune disease: SLE by serology positive STEPHANIE, double stranded DNA, 

possible autoimmune hepatitis with positive ASMA, low C3-C4, status post kidney 

biopsy showing:


   FOCAL PROLIFERATIVE AND NECROTIZING GLOMERULONEPHRITIS.  


   NODULAR DIABETIC GLOMERULOSCLEROSIS.  


   GLOBAL& SEGMENTAL GLOMERULOSCLEROSIS.  


   INTERSTITIAL FIBROSIS AND TUBULAR ATROPHY, SEVERE.  


Will need rheumatology consultation, unfortunately no rheumatology consultant 

is available at this point, consider transfer to another facility Center versus 

follow up as an outpatient after discharge.





-  Abdominal pain with increased lipase likely from pancreatitis 

gastroenterology followed status post EGD showed gastritis with negative H. 

pylori, continue Protonix, po pain meds as needed.


   GI following, awaiting IgG4 level as well as AMA, liver biopsy on hold due 

to perinephritic hematoma





-  Constipation - continue MiraLAX, Palma-Colace


-  depression likely hypothyroidism, high dose steroid, chronic disease 

appreciate psychiatry consultation- continue Remeron for appetite and sleep 

improvement plus Synthroid as above.  Continue systemic cortisone on Solu-Medrol


-  Severe hypothyroidism - free T4 is low, severe hypothyroidism based on TSH 

being over 100 , continue synthroid. 


-  Generalized weakness - likely secondary to hypothyroidism, continue therapy


-  VALENTIN on CKD- renal US unremarkable, SLE by serology as mentioned above, 

continue prednisone and Cellcept. nephrology following.


- Worsening depression:Appreciate psychiatry consultation, no need for psych 

hospitalization, increased Remeron and add clonazepam for anxiety


-   DVT Prophylaxis:  SCD/Teds.











Dorothy Lozoya MD Sep 16, 2017 14:31

## 2017-09-17 VITALS
RESPIRATION RATE: 18 BRPM | HEART RATE: 74 BPM | OXYGEN SATURATION: 94 % | SYSTOLIC BLOOD PRESSURE: 154 MMHG | DIASTOLIC BLOOD PRESSURE: 78 MMHG | TEMPERATURE: 97.9 F

## 2017-09-17 VITALS
SYSTOLIC BLOOD PRESSURE: 159 MMHG | OXYGEN SATURATION: 95 % | TEMPERATURE: 98.7 F | RESPIRATION RATE: 19 BRPM | DIASTOLIC BLOOD PRESSURE: 87 MMHG | HEART RATE: 80 BPM

## 2017-09-17 VITALS
RESPIRATION RATE: 18 BRPM | HEART RATE: 77 BPM | DIASTOLIC BLOOD PRESSURE: 93 MMHG | TEMPERATURE: 97.2 F | OXYGEN SATURATION: 93 % | SYSTOLIC BLOOD PRESSURE: 155 MMHG

## 2017-09-17 VITALS
RESPIRATION RATE: 18 BRPM | OXYGEN SATURATION: 94 % | SYSTOLIC BLOOD PRESSURE: 170 MMHG | DIASTOLIC BLOOD PRESSURE: 86 MMHG | HEART RATE: 72 BPM | TEMPERATURE: 98.6 F

## 2017-09-17 VITALS
SYSTOLIC BLOOD PRESSURE: 152 MMHG | TEMPERATURE: 98.3 F | HEART RATE: 72 BPM | OXYGEN SATURATION: 93 % | DIASTOLIC BLOOD PRESSURE: 85 MMHG | RESPIRATION RATE: 18 BRPM

## 2017-09-17 RX ADMIN — Medication SCH ML: at 19:51

## 2017-09-17 RX ADMIN — LEVOTHYROXINE SODIUM SCH MCG: 150 TABLET ORAL at 05:37

## 2017-09-17 RX ADMIN — POLYETHYLENE GLYCOL 3350 SCH GM: 17 POWDER, FOR SOLUTION ORAL at 08:56

## 2017-09-17 RX ADMIN — Medication SCH ML: at 08:55

## 2017-09-17 RX ADMIN — MIRTAZAPINE SCH MG: 15 TABLET, FILM COATED ORAL at 19:50

## 2017-09-17 RX ADMIN — PHENYTOIN SODIUM SCH MLS/HR: 50 INJECTION INTRAMUSCULAR; INTRAVENOUS at 15:20

## 2017-09-17 RX ADMIN — MYCOPHENOLATE MOFETIL SCH MG: 500 TABLET, FILM COATED ORAL at 05:37

## 2017-09-17 RX ADMIN — PHENYTOIN SODIUM SCH MLS/HR: 50 INJECTION INTRAMUSCULAR; INTRAVENOUS at 17:45

## 2017-09-17 RX ADMIN — STANDARDIZED SENNA CONCENTRATE AND DOCUSATE SODIUM SCH TAB: 8.6; 5 TABLET, FILM COATED ORAL at 08:56

## 2017-09-17 RX ADMIN — PHENYTOIN SODIUM SCH MLS/HR: 50 INJECTION INTRAMUSCULAR; INTRAVENOUS at 23:55

## 2017-09-17 RX ADMIN — MEGESTROL ACETATE SCH MG: 40 SUSPENSION ORAL at 04:09

## 2017-09-17 RX ADMIN — PANTOPRAZOLE SCH MG: 40 TABLET, DELAYED RELEASE ORAL at 08:56

## 2017-09-17 RX ADMIN — HYDROCODONE BITARTRATE AND ACETAMINOPHEN PRN TAB: 10; 325 TABLET ORAL at 05:38

## 2017-09-17 RX ADMIN — STANDARDIZED SENNA CONCENTRATE AND DOCUSATE SODIUM SCH TAB: 8.6; 5 TABLET, FILM COATED ORAL at 19:51

## 2017-09-17 RX ADMIN — HYDROCODONE BITARTRATE AND ACETAMINOPHEN PRN TAB: 10; 325 TABLET ORAL at 12:13

## 2017-09-17 RX ADMIN — MYCOPHENOLATE MOFETIL SCH MG: 500 TABLET, FILM COATED ORAL at 17:47

## 2017-09-17 RX ADMIN — MEGESTROL ACETATE SCH MG: 40 SUSPENSION ORAL at 17:44

## 2017-09-17 RX ADMIN — HYDROCODONE BITARTRATE AND ACETAMINOPHEN PRN TAB: 10; 325 TABLET ORAL at 18:45

## 2017-09-17 NOTE — HHI.PR
Subjective


Remarks


in no acute distress.


denies pain.


no new complaints.





Objective


Vitals





Vital Signs








  Date Time  Temp Pulse Resp B/P (MAP) Pulse Ox O2 Delivery O2 Flow Rate FiO2


 


9/17/17 11:46 97.9 74 18 154/78 (103) 94   


 


9/17/17 08:03 98.6 72 18 170/86 (114) 94   


 


9/17/17 00:55 98.7 80 19 159/87 (111) 95   


 


9/16/17 20:50 98.7 82 18 135/84 (101) 96   


 


9/16/17 18:02     93   21


 


9/16/17 15:10 98.7 76 19 123/73 (90) 94   














I/O      


 


 9/16/17 9/16/17 9/16/17 9/17/17 9/17/17 9/17/17





 07:00 15:00 23:00 07:00 15:00 23:00


 


Intake Total 720 ml 720 ml 480 ml 480 ml  


 


Balance 720 ml 720 ml 480 ml 480 ml  


 


      


 


Intake Oral 720 ml 720 ml 480 ml 480 ml  


 


# Voids 7 4 3 3  


 


# Bowel Movements 0 1 0 0  








Result Diagram:  


9/16/17 0806                                                                   

             9/16/17 0806





Imaging





Last Impressions








Renal Biopsy CT 9/12/17 0000 Signed





Impressions: 





 Service Date/Time:  Tuesday, September 12, 2017 08:59 - CONCLUSION: Successful 





 18 gauge core biopsies of left renal cortex.     Silviano Aggarwal MD ADDENDUM: 

  





 A two hour delayed scan through the kidneys demonstrates stability of the 





 perinephric hematoma with no evidence of increase in the size of this 

hematoma. 





 H&H are also stable. The patient is also clinically stable without evidence of 





 ongoing bleeding status post renal biopsy.   Silviano Aggarwal MD 


 


Liver Ultrasound 9/6/17 0000 Signed





Impressions: 





 Service Date/Time:  Wednesday, September 6, 2017 08:44 - CONCLUSION:  1. 





 Hepatomegaly with heterogeneous echotexture, slightly larger than on prior 

exam 





 in 2016. 2. Shadowing right renal stones without evidence of hydronephrosis.  

   





 Jesse Martínez MD 


 


Renal Ultrasound 9/5/17 0000 Signed





Impressions: 





 Service Date/Time:  Tuesday, September 5, 2017 17:45 - CONCLUSION:  1. 





 Nonobstructing right renal calculi. Right renal cysts. No perinephric fluid. 





 Bladder unremarkable.     Michael Forrest MD 


 


Lung Scan-VQ Nuclear Medicine 9/2/17 0000 Signed





Impressions: 





 Service Date/Time:  Saturday, September 2, 2017 02:35 - CONCLUSION:  1. Low 





 probability for pulmonary embolus.     Michael Forrest MD 


 


Head CT 9/2/17 0000 Signed





Impressions: 





 Service Date/Time:  Saturday, September 2, 2017 00:29 - CONCLUSION:  Normal 





 examination.       Michael Forrest MD 


 


Chest X-Ray 9/1/17 2217 Signed





Impressions: 





 Service Date/Time:  Friday, September 1, 2017 22:32 - CONCLUSION:  Minimal 





 patchy areas of consolidation or atelectasis at the lateral bases.     Scooter Tatum MD 


 


Abdomen/Pelvis CT 9/1/17 0000 Signed





Impressions: 





 Service Date/Time:  Saturday, September 2, 2017 00:32 - CONCLUSION:  1. No 

acute 





 findings within the abdomen. Partial staghorn type calcifications upper and 





 lower pole right kidney and tiny calculi upper pole left kidney. No bowel 





 obstruction, free air, free fluid or obstructive uropathy. 2. Probable liver 





 cirrhosis. Previous cholecystectomy, appendectomy.     Michael Forrest MD 








Objective Remarks


GENERAL: This is a well-nourished, well-developed patient, in no apparent 

distress.


CARDIOVASCULAR: Regular rate and regular rhythm without murmurs, gallops, or 

rubs. 


RESPIRATORY: Clear to auscultation. Breath sounds equal bilaterally. No wheezes

, rales, or rhonchi.  


GASTROINTESTINAL: Abdomen soft, non-tender, nondistended. 


Normal, active bowel sounds


MUSCULOSKELETAL: Extremities without clubbing, cyanosis, or edema.


NEURO:  Alert & Oriented x4 to person, place, time, situation.  Moves all ext x4


Procedures


kidney biopsy


Medications and IVs





Current Medications


Sodium Chloride (NS Flush) 2 ml UNSCH  PRN IVF FLUSH AFTER USING IV ACCESS Last 

administered on 9/1/17at 22:54;  Start 9/1/17 at 22:30;  Stop 9/2/17 at 02:16;  

Status DC


Nitroglycerin (Nitrostat Sl) 0.3 mg ONCE  ONCE SL ;  Start 9/1/17 at 22:30;  

Stop 9/1/17 at 22:31;  Status DC


Morphine Sulfate (Morphine Inj) 4 mg ONCE  ONCE IV PUSH  Last administered on 9/ 1/17at 22:53;  Start 9/1/17 at 22:30;  Stop 9/1/17 at 22:31;  Status DC


Ondansetron HCl (Zofran Inj) 4 mg ONCE  ONCE IV PUSH  Last administered on 9/1/ 17at 22:54;  Start 9/1/17 at 22:30;  Stop 9/1/17 at 22:31;  Status DC


Sodium Chloride (NS Flush) 2 ml BID IV FLUSH ;  Start 9/2/17 at 09:00;  Stop 9/2 /17 at 09:00;  Status DC


Sodium Chloride (NS Flush) 2 ml UNSCH  PRN IVF FLUSH AFTER USING IV ACCESS;  

Start 9/2/17 at 02:00;  Stop 9/2/17 at 02:16;  Status DC


Famotidine (Pepcid Inj) 20 mg Q12H IV PUSH  Last administered on 9/3/17at 00:18

;  Start 9/2/17 at 02:00;  Stop 9/3/17 at 08:43;  Status DC


Sodium Chloride 1,000 ml @  100 mls/hr Q10H IV  Last administered on 9/4/17at 04

:27;  Start 9/2/17 at 01:59;  Stop 9/4/17 at 10:54;  Status DC


Sodium Chloride (NS Flush) 2 ml UNSCH  PRN IV FLUSH FLUSH AFTER USING IV ACCESS

;  Start 9/2/17 at 02:00


Sodium Chloride (NS Flush) 2 ml BID IV FLUSH  Last administered on 9/17/17at 08:

55;  Start 9/2/17 at 09:00


Ondansetron HCl (Zofran Inj) 4 mg Q6H  PRN IVP NAUSEA OR VOMITING;  Start 9/2/ 17 at 02:00;  Stop 9/4/17 at 10:54;  Status DC


Acetaminophen (Tylenol) 650 mg Q6H  PRN PO FEVER/PAIN SCALE 1 TO 2;  Start 9/2/ 17 at 02:00


Acetaminophen/ Hydrocodone Bitart (Norco  5-325 Mg) 1 tab Q4H  PRN PO PAIN 

SCALE 3 TO 5 Last administered on 9/13/17at 06:20;  Start 9/2/17 at 02:00


Morphine Sulfate (Morphine Inj) 2 mg Q3H  PRN IV Pain 6-10 Last administered on 

9/3/17at 06:21;  Start 9/2/17 at 02:00;  Stop 9/3/17 at 13:16;  Status DC


Senna/Docusate Sodium (Palma-Colace) 1 tab BID PO  Last administered on 9/13/ 17at 21:44;  Start 9/2/17 at 09:00


Magnesium Hydroxide (Milk Of Magnesia Liq) 30 ml Q12H  PRN PO MILD - MODERATE 

CONSTIPATION Last administered on 9/11/17at 19:47;  Start 9/2/17 at 02:00


Sennosides (Senokot) 17.2 mg Q12H  PRN PO MODERATE - SEVERE CONSTIPATION;  

Start 9/2/17 at 02:00;  Stop 9/5/17 at 11:01;  Status DC


Bisacodyl (Dulcolax Supp) 10 mg DAILY  PRN RECTAL SEVERE CONSITIPATION;  Start 9 /2/17 at 02:00


Lactulose (Lactulose Liq) 30 ml DAILY  PRN PO SEVERE CONSITIPATION;  Start 9/2/ 17 at 02:00


Albuterol/ Ipratropium (Duoneb Neb) 1 ampule Q4HR  NEB NEB  Last administered 

on 9/6/17at 08:57;  Start 9/2/17 at 12:00;  Stop 9/6/17 at 11:59;  Status DC


Albuterol Sulfate (Albuterol Neb) 2.5 mg Q2HR NEB  PRN NEB wheezing Last 

administered on 9/6/17at 06:28;  Start 9/2/17 at 10:45


Clonidine (Catapres) 0.1 mg ONCE  ONCE PO  Last administered on 9/3/17at 00:17;

  Start 9/2/17 at 23:45;  Stop 9/2/17 at 23:46;  Status DC


Famotidine (Pepcid Inj) 10 mg Q12H IV PUSH  Last administered on 9/4/17at 01:49

;  Start 9/3/17 at 14:00;  Stop 9/4/17 at 10:54;  Status DC


Levothyroxine Sodium (Synthroid) 150 mcg DAILY@0600 PO  Last administered on 9/ 17/17at 05:37;  Start 9/3/17 at 14:30


Polyethylene Glycol (Miralax) 17 gm DAILY PO  Last administered on 9/13/17at 07:

44;  Start 9/3/17 at 14:45


Glycerin (Glycerin Adult Supp) 2 gm ONCE  ONCE RECTAL  Last administered on 9/3/

17at 17:47;  Start 9/3/17 at 15:00;  Stop 9/3/17 at 15:01;  Status DC


Mirtazapine (Remeron) 15 mg HS PO  Last administered on 9/13/17at 21:44;  Start 

9/4/17 at 21:00;  Stop 9/14/17 at 13:04;  Status DC


Ondansetron HCl (Zofran  Odt) 4 mg Q6H  PRN PO nausea;  Start 9/4/17 at 11:00


Pantoprazole Sodium (Protonix) 40 mg DAILY PO  Last administered on 9/17/17at 08

:56;  Start 9/5/17 at 11:00


Methylprednisolone Sodium Succinate (SoluMEDROL INJ) 60 mg Q12H IV PUSH  Last 

administered on 9/15/17at 14:52;  Start 9/6/17 at 14:00;  Stop 9/15/17 at 20:05

;  Status DC


Sodium Chloride 1,000 ml @  84 mls/hr [id number] IV  Last administered on 9/8/17at 

04:55;  Start 9/7/17 at 17:00


Megestrol Acetate (Megace Liq) 400 mg BIDAC PO  Last administered on 9/12/17at 

05:54;  Start 9/7/17 at 16:30


Lactated Ringer's 1,000 ml @  30 mls/hr Q24H PRN IV SEE LABEL COMMENTS;  Start 9 /8/17 at 03:15;  Stop 9/11/17 at 03:14;  Status DC


Sodium Chloride 500 ml @  30 mls/hr [id number] PRN IV SEE LABEL COMMENTS;  Start 9/ 8/17 at 03:15;  Stop 9/11/17 at 03:14;  Status DC


Povidone Iodine (Betadine 5% Antisepsis Kit) 1 applic ON CALL  PRN EACH NARE 

SEE LABEL COMMENTS;  Start 9/8/17 at 03:15;  Stop 9/11/17 at 03:14;  Status DC


Chlorhexidine Gluconate (Chlorhexidine 2% Cloth) 3 pack ON CALL  PRN TOPICAL 

SEE LABEL COMMENTS;  Start 9/8/17 at 03:15;  Stop 9/11/17 at 03:14;  Status DC


Ketamine HCl (Ketalar Inj) 500 mg STK-MED ONCE .ROUTE ;  Start 9/8/17 at 08:55;

  Stop 9/8/17 at 08:56;  Status DC


Miscellaneous Information ALL NURSING DEPARTME... UNSCH  PRN .XX SEE LABEL 

COMMENTS;  Start 9/8/17 at 10:53;  Stop 9/9/17 at 10:52;  Status DC


Fluoxetine HCl (PROzac) 10 mg ONCE  ONCE PO  Last administered on 9/8/17at 17:45

;  Start 9/8/17 at 17:30;  Stop 9/8/17 at 17:36;  Status DC


Fluoxetine HCl (PROzac) 10 mg DAILY PO  Last administered on 9/13/17at 07:43;  

Start 9/9/17 at 09:00;  Stop 9/13/17 at 15:01;  Status DC


Sodium Chloride 1,000 ml @  30 mls/hr Q24H IV  Last administered on 9/11/17at 18

:17;  Start 9/11/17 at 18:00


Clonidine (Catapres) 0.1 mg ONCE  ONCE PO  Last administered on 9/12/17at 02:28

;  Start 9/12/17 at 02:15;  Stop 9/12/17 at 02:16;  Status DC


Fentanyl Citrate (fentaNYL INJ) 100 mcg STK-MED ONCE .ROUTE  Last administered 

on 9/12/17at 08:15;  Start 9/12/17 at 08:15;  Stop 9/12/17 at 08:16;  Status DC


Midazolam HCl (Versed Inj) 5 mg STK-MED ONCE .ROUTE  Last administered on 9/12/ 17at 08:15;  Start 9/12/17 at 08:15;  Stop 9/12/17 at 08:16;  Status DC


Lidocaine HCl (Xylocaine 1% Inj) 20 ml STK-MED ONCE .ROUTE  Last administered 

on 9/12/17at 08:19;  Start 9/12/17 at 08:19;  Stop 9/12/17 at 08:20;  Status DC


Midazolam HCl (Versed Inj) 2 mg STK-MED ONCE IV ;  Start 9/8/17 at 12:00;  Stop 

9/12/17 at 10:29;  Status DC


Propofol (Diprivan  200 Mg/20 ml Inj) 30 mg STK-MED ONCE IV ;  Start 9/8/17 at 

12:00;  Stop 9/12/17 at 10:29;  Status DC


Acetaminophen/ Hydrocodone Bitart (Norco  Mg) 1 tab Q6H  PRN PO pain 6-10 

Last administered on 9/17/17at 12:13;  Start 9/13/17 at 15:30


Mirtazapine (Remeron) 30 mg HS PO  Last administered on 9/16/17at 20:25;  Start 

9/14/17 at 21:00


Clonazepam (KlonoPIN) 0.5 mg Q12HR PO  Last administered on 9/17/17at 08:56;  

Start 9/14/17 at 13:30


Mycophenolate Mofetil (Cellcept) 500 mg BID@06,18 PO  Last administered on 9/17/ 17at 05:37;  Start 9/15/17 at 18:00


Prednisone (Deltasone) 60 mg DAILY PO ;  Start 9/16/17 at 09:00;  Stop 9/16/17 

at 10:33;  Status DC


Prednisone (Deltasone) 60 mg DAILY PO  Last administered on 9/17/17at 08:56;  

Start 9/16/17 at 11:00





A/P


Assessment and Plan


A/P





- SLE by serology positive STEPHANIE, double stranded DNA, possible autoimmune 

hepatitis with positive ASMA, low C3-C4, status post kidney biopsy showing:


   FOCAL PROLIFERATIVE AND NECROTIZING GLOMERULONEPHRITIS.  


   NODULAR DIABETIC GLOMERULOSCLEROSIS.  


   GLOBAL& SEGMENTAL GLOMERULOSCLEROSIS.  


   INTERSTITIAL FIBROSIS AND TUBULAR ATROPHY, SEVERE.  


Will need rheumatology evaluation- as outpatient.





-  Abdominal pain with increased lipase likely from pancreatitis 

gastroenterology followed status post EGD showed gastritis with negative H. 

pylori, continue Protonix, po pain meds as needed.


   GI following, awaiting IgG4 level as well as AMA, liver biopsy this week.





-  Constipation - continue MiraLAX, Palma-Colace


-  depression likely hypothyroidism, high dose steroid, chronic disease 

appreciate psychiatry consultation- continue Remeron for appetite and sleep 

improvement plus Synthroid as above.  Continue systemic cortisone on Solu-Medrol


-  Severe hypothyroidism - free T4 is low, severe hypothyroidism based on TSH 

being over 100 , continue synthroid. 


-  Generalized weakness - likely secondary to hypothyroidism, continue therapy


-  VALENTIN on CKD- renal US unremarkable, SLE by serology as mentioned above, 

continue prednisone and Cellcept. nephrology following.


- Worsening depression:Appreciate psychiatry consultation, no need for psych 

hospitalization, increased Remeron and add clonazepam for anxiety


-   DVT Prophylaxis:  SCD/Teds.











Dorothy Lozoya MD Sep 17, 2017 13:27

## 2017-09-18 VITALS
OXYGEN SATURATION: 93 % | RESPIRATION RATE: 18 BRPM | HEART RATE: 98 BPM | SYSTOLIC BLOOD PRESSURE: 152 MMHG | DIASTOLIC BLOOD PRESSURE: 87 MMHG | TEMPERATURE: 97.9 F

## 2017-09-18 VITALS
HEART RATE: 87 BPM | TEMPERATURE: 97.5 F | DIASTOLIC BLOOD PRESSURE: 78 MMHG | RESPIRATION RATE: 18 BRPM | SYSTOLIC BLOOD PRESSURE: 127 MMHG | OXYGEN SATURATION: 95 %

## 2017-09-18 VITALS
OXYGEN SATURATION: 93 % | SYSTOLIC BLOOD PRESSURE: 127 MMHG | TEMPERATURE: 96.7 F | RESPIRATION RATE: 18 BRPM | HEART RATE: 93 BPM | DIASTOLIC BLOOD PRESSURE: 80 MMHG

## 2017-09-18 VITALS
SYSTOLIC BLOOD PRESSURE: 174 MMHG | RESPIRATION RATE: 18 BRPM | HEART RATE: 104 BPM | DIASTOLIC BLOOD PRESSURE: 91 MMHG | OXYGEN SATURATION: 95 % | TEMPERATURE: 96.5 F

## 2017-09-18 VITALS
SYSTOLIC BLOOD PRESSURE: 111 MMHG | DIASTOLIC BLOOD PRESSURE: 93 MMHG | OXYGEN SATURATION: 97 % | TEMPERATURE: 96 F | RESPIRATION RATE: 18 BRPM | HEART RATE: 92 BPM

## 2017-09-18 VITALS
HEART RATE: 77 BPM | RESPIRATION RATE: 18 BRPM | DIASTOLIC BLOOD PRESSURE: 84 MMHG | TEMPERATURE: 98.7 F | OXYGEN SATURATION: 95 % | SYSTOLIC BLOOD PRESSURE: 162 MMHG

## 2017-09-18 LAB
ANION GAP SERPL CALC-SCNC: 6 MEQ/L (ref 5–15)
BUN SERPL-MCNC: 80 MG/DL (ref 7–18)
CHLORIDE SERPL-SCNC: 105 MEQ/L (ref 98–107)
GFR SERPLBLD BASED ON 1.73 SQ M-ARVRAT: 18 ML/MIN (ref 89–?)
HCO3 BLD-SCNC: 24.3 MEQ/L (ref 21–32)
POTASSIUM SERPL-SCNC: 4.7 MEQ/L (ref 3.5–5.1)
SODIUM SERPL-SCNC: 135 MEQ/L (ref 136–145)

## 2017-09-18 RX ADMIN — HYDROCODONE BITARTRATE AND ACETAMINOPHEN PRN TAB: 10; 325 TABLET ORAL at 04:13

## 2017-09-18 RX ADMIN — HYDROCODONE BITARTRATE AND ACETAMINOPHEN PRN TAB: 10; 325 TABLET ORAL at 17:12

## 2017-09-18 RX ADMIN — MEGESTROL ACETATE SCH MG: 40 SUSPENSION ORAL at 04:15

## 2017-09-18 RX ADMIN — STANDARDIZED SENNA CONCENTRATE AND DOCUSATE SODIUM SCH TAB: 8.6; 5 TABLET, FILM COATED ORAL at 21:00

## 2017-09-18 RX ADMIN — Medication SCH ML: at 22:58

## 2017-09-18 RX ADMIN — MYCOPHENOLATE MOFETIL SCH MG: 500 TABLET, FILM COATED ORAL at 17:12

## 2017-09-18 RX ADMIN — PHENYTOIN SODIUM SCH MLS/HR: 50 INJECTION INTRAMUSCULAR; INTRAVENOUS at 15:10

## 2017-09-18 RX ADMIN — Medication SCH ML: at 09:50

## 2017-09-18 RX ADMIN — LEVOTHYROXINE SODIUM SCH MCG: 150 TABLET ORAL at 04:13

## 2017-09-18 RX ADMIN — POLYETHYLENE GLYCOL 3350 SCH GM: 17 POWDER, FOR SOLUTION ORAL at 09:00

## 2017-09-18 RX ADMIN — PANTOPRAZOLE SCH MG: 40 TABLET, DELAYED RELEASE ORAL at 09:41

## 2017-09-18 RX ADMIN — MEGESTROL ACETATE SCH MG: 40 SUSPENSION ORAL at 17:13

## 2017-09-18 RX ADMIN — HYDROCODONE BITARTRATE AND ACETAMINOPHEN PRN TAB: 10; 325 TABLET ORAL at 09:43

## 2017-09-18 RX ADMIN — MYCOPHENOLATE MOFETIL SCH MG: 500 TABLET, FILM COATED ORAL at 22:57

## 2017-09-18 RX ADMIN — PHENYTOIN SODIUM SCH MLS/HR: 50 INJECTION INTRAMUSCULAR; INTRAVENOUS at 17:16

## 2017-09-18 RX ADMIN — STANDARDIZED SENNA CONCENTRATE AND DOCUSATE SODIUM SCH TAB: 8.6; 5 TABLET, FILM COATED ORAL at 09:51

## 2017-09-18 RX ADMIN — MIRTAZAPINE SCH MG: 15 TABLET, FILM COATED ORAL at 22:58

## 2017-09-18 RX ADMIN — MYCOPHENOLATE MOFETIL SCH MG: 500 TABLET, FILM COATED ORAL at 04:13

## 2017-09-18 RX ADMIN — HYDROCODONE BITARTRATE AND ACETAMINOPHEN PRN TAB: 10; 325 TABLET ORAL at 22:58

## 2017-09-18 NOTE — HHI.GIFU
Subjective


Remarks


Sitting on edge of bed washing up.  No distress.  States her abdominal pain has 

improved.  Tolerating diet. 


 (Zuleima West)





Objective


Vitals I&O





Vital Signs








  Date Time  Temp Pulse Resp B/P (MAP) Pulse Ox O2 Delivery O2 Flow Rate FiO2


 


9/18/17 07:53 96.5 104 18 174/91 (118) 95   


 


9/18/17 04:15 97.9 98 18 152/87 (108) 93   


 


9/18/17 00:02 96.7 93 18 127/80 (96) 93   


 


9/17/17 21:00 98.3 72 18 152/85 (107) 93   


 


9/17/17 16:00 97.2 77 18 155/93 (113) 93   


 


9/17/17 11:46 97.9 74 18 154/78 (103) 94   














I/O      


 


 9/17/17 9/17/17 9/17/17 9/18/17 9/18/17 9/18/17





 07:00 15:00 23:00 07:00 15:00 23:00


 


Intake Total 480 ml 960 ml 480 ml 0 ml  


 


Balance 480 ml 960 ml 480 ml 0 ml  


 


      


 


Intake Oral 480 ml 960 ml 480 ml 0 ml  


 


# Voids 3 4 3 3  


 


# Bowel Movements 0 4 0 0  








Laboratory





Laboratory Tests








Test


  9/18/17


06:49


 


Blood Urea Nitrogen 80 


 


Creatinine 2.75 


 


Random Glucose 377 


 


Calcium Level 9.4 


 


Sodium Level 135 


 


Potassium Level 4.7 


 


Chloride Level 105 


 


Carbon Dioxide Level 24.3 


 


Anion Gap 6 


 


Estimat Glomerular Filtration


Rate 18 


 














 Date/Time


Source Procedure


Growth Status


 


 


 9/2/17 02:58


Urine Clean Catch Urine Culture - Final


,000 CFU/ML MIXED GRAM POSITIVE... Complete








Imaging





Last Impressions








Renal Biopsy CT 9/12/17 0000 Signed





Impressions: 





 Service Date/Time:  Tuesday, September 12, 2017 08:59 - CONCLUSION: Successful 





 18 gauge core biopsies of left renal cortex.     Silviano Aggarwal MD ADDENDUM: 

  





 A two hour delayed scan through the kidneys demonstrates stability of the 





 perinephric hematoma with no evidence of increase in the size of this 

hematoma. 





 H&H are also stable. The patient is also clinically stable without evidence of 





 ongoing bleeding status post renal biopsy.   Silviano Aggarwal MD 


 


Liver Ultrasound 9/6/17 0000 Signed





Impressions: 





 Service Date/Time:  Wednesday, September 6, 2017 08:44 - CONCLUSION:  1. 





 Hepatomegaly with heterogeneous echotexture, slightly larger than on prior 

exam 





 in 2016. 2. Shadowing right renal stones without evidence of hydronephrosis.  

   





 Jesse Martínez MD 


 


Renal Ultrasound 9/5/17 0000 Signed





Impressions: 





 Service Date/Time:  Tuesday, September 5, 2017 17:45 - CONCLUSION:  1. 





 Nonobstructing right renal calculi. Right renal cysts. No perinephric fluid. 





 Bladder unremarkable.     Michael Forrest MD 


 


Lung Scan-VQ Nuclear Medicine 9/2/17 0000 Signed





Impressions: 





 Service Date/Time:  Saturday, September 2, 2017 02:35 - CONCLUSION:  1. Low 





 probability for pulmonary embolus.     Michael Forrest MD 


 


Head CT 9/2/17 0000 Signed





Impressions: 





 Service Date/Time:  Saturday, September 2, 2017 00:29 - CONCLUSION:  Normal 





 examination.       Michael Forrest MD 


 


Chest X-Ray 9/1/17 2217 Signed





Impressions: 





 Service Date/Time:  Friday, September 1, 2017 22:32 - CONCLUSION:  Minimal 





 patchy areas of consolidation or atelectasis at the lateral bases.     Scooter Tatum MD 


 


Abdomen/Pelvis CT 9/1/17 0000 Signed





Impressions: 





 Service Date/Time:  Saturday, September 2, 2017 00:32 - CONCLUSION:  1. No 

acute 





 findings within the abdomen. Partial staghorn type calcifications upper and 





 lower pole right kidney and tiny calculi upper pole left kidney. No bowel 





 obstruction, free air, free fluid or obstructive uropathy. 2. Probable liver 





 cirrhosis. Previous cholecystectomy, appendectomy.     Michael Forrest MD 








Physical Exam


HEENT: Normocephalic; atraumatic; no jaundice.  


CHEST:  CTA


CARDIAC:  RRR.


ABDOMEN:  Soft, nondistended,nontender; no hepatosplenomegaly; bowel sounds are 

present in all four quadrants.


EXTREMITIES: No clubbing, cyanosis, or edema.


SKIN:  Normal; no rash; no jaundice.


CNS:  No focal deficits; alert and oriented times three.


 (Zuleima West)





Assessment and Plan


Plan


ASSESSMENT:


- Abdominal pain, right sided. CT scan abdomen and pelvis (9/1/17) revealed no 

acute findings within the abdomen.  Partial staghorn type


   calcifications upper and lower pole right kidney and tiny calculi upper pole 

left kidney.  No bowel obstruction, free air, free fluid, or obstructive


   uropathy.  Probable liver cirrhosis.  Previous cholecystectomy, 

appendectomy. Elevated lipase- mildly although also with acute on chronic


   VALENTIN and elevated creat.  Does have hx of PUD 4 years ago.  EGD (9/8/17)---> 

1.  Distal esophageal stricture, Savory guidewire size 17 mm


   2.  Mild redness of the stomach possible gastritis biopsy was done, 3.  

Normal endoscopy otherwise, 4.  Retroflexed views revealed no abnormalities.


   Pathology with moderate chronic gastritis, negative for helicobacter.  She 

id have some right sided abdominal pain after renal biopsy and was noted


   to have a stable perinephric hematoma, but states her abdominal pain has 

improved.  


- Anemia. 10.6/32.3.


- Mild elevated AST.  Will recheck.  Liver workup in 2012- Pt had positive STEPHANIE 

with titer > 1:1280- speckled and diffuse, ASMA (+) 1A:160.


   Alpha 1 antitrypsin 159, Ceruloplasmin 40, . Rpt. labs STEPHANIE (+) titer > 1:1280

, diffuse pattern.  ASMA negative, AMA pending, ferritin 160, 


   Ceruloplasmin 34, AFP 4.0.  Probable liver cirrhosis on CT scan.  

Prednisone.  LFTs are improving, no need for liver biopsy at this time.


- Lipase, undetermined significance.  No signs of pancreatitis on CT scan.  Has 

elevated creatinine,  IgG 4 level 61.0.


- Chest pain, per attending.


- VALENTIN, Rhabdo,  Renal following, suspects SLE, Double strand .  S/P 

renal biopsy, pathology focal proliferative and necrotizing


   glomerulonephritis, nodular diabetic glomerulosclerosis, global and 

segmental glomerulosclerosis, interstitial fibrosis and tubular


   atrophy, severe. Cellcept/Prednisone. 


- Stable perinephric hematoma.


- (+) STEPHANIE with high titer > 1:1280-diffuse, ASMA (+) 1A:160 in past, now 

negative.  Possible renal biopsy, ? liver biopsy.  Steroids.


- Elevated TSH per attending.





PLAN:


- HERIBERTO


- PPI


- Prednisone


- Monitor labs


- LFTs improving, no need for liver biopsy at this time


- Further recommendations to follow after results of above


- Pt seen and examined by Dr. Lopez and myself and this note is written on 

his behalf


 (Zuleima West)


Physician Comments


Patient seen and examined


Agree with above


Monitor labs


Continue with current supportive care


Autoimmune markers are positive and it is very likely that the patient has a 

component of autoimmune hepatitis


Liver improving with prednisone use


Will defer further treatment to rheumatology who will address her autoimmune 

status


Not much to add from a GI perspective otherwise


We will sign off


 (Marbin Lopez MD)











Zuleima West Sep 18, 2017 11:46


Marbin Lopez MD Sep 18, 2017 22:16

## 2017-09-18 NOTE — HHI.NPPN
Subjective


History of Present Illness


62 year old with ARF/Pancreatitis/Cirrhosis Autoimmune disease





Review of Systems


General


Constitutional:  Fatigue





Gastrointestinal


Gastrointestinal:  Abdominal Pain





Musculoskeletal


MS:  Pain/Stiffness





Objective Data


Data











 9/18/17 9/19/17





 19:00 07:00


 


Intake Total 660 ml 


 


Balance 660 ml 


 


  


 


Intake Oral 660 ml 


 


# Voids 3 


 


# Bowel Movements 1 











Vital Signs








  Date Time  Temp Pulse Resp B/P (MAP) Pulse Ox O2 Delivery O2 Flow Rate FiO2


 


9/18/17 18:45      Room Air  


 


9/18/17 16:00 97.5 87 18 127/78 (94) 95   


 


9/18/17 12:00 96.0 92 18 111/93 (99) 97   


 


9/18/17 07:53 96.5 104 18 174/91 (118) 95   


 


9/18/17 04:15 97.9 98 18 152/87 (108) 93   


 


9/18/17 00:02 96.7 93 18 127/80 (96) 93   


 


9/17/17 21:00 98.3 72 18 152/85 (107) 93   








-:  


9/16/17 0806                                                                   

             9/18/17 0649








Physical Exam


General


Appearance:  Well Developed





Neck


Neck Exam:  Neck Supple





Pulmonary


Resp Exam:  Clear Bilaterally, Breath Sounds Equal





Cardiology


CV Exam:  Regular, Normal Sinus Rhythm





Gastrointestinal/Abdomen


GI Exam:  Soft, Bowel Sounds Present





Extremeties


Extremities Exam:  No Edema





Assessment/Plan


Problem List:  


(1) VALENTIN (acute kidney injury)


ICD Codes:  N17.9 - Acute kidney failure, unspecified


Plan:  ARF Autoimmune process, has advanced kidney disease due to lupus 

nephritis and diabetic kidney disease


STEPHANIE positive


C3, C4 low


 SLE by serology STEPHANIE 1:1280 Anti dsDNA 775 very high


follow renal functions


on Prednisone  Cr 2.75


 kidney biopsy, showed Focal Proliferative GN, with chronicity started on 

Cellcept goal is to keep her on Prednisone and Cellcept and adjust dose as 

tolerated


She also has diabetic kidney disease


Prognosis for kidney is worse possibility of dialysis is high


Need to arrange her medication as outpatient increase CellCept to 500 mg 3 

times a day





(2) Pancreatitis


ICD Codes:  K85.90 - Acute pancreatitis without necrosis or infection, 

unspecified


Status:  Acute


Plan:  She has undiagnosed underlying autoimmune disease possibility of SLE 

versus autoimmune hepatitis exists


STEPHANIE was 1:1280 and Antismooth antibody positive in 6/12





(3) Autoimmune disease


ICD Codes:  M35.9 - Systemic involvement of connective tissue, unspecified


Plan:  Continue to monitor she needs to be followed by Delia Nunez MD Sep 18, 2017 19:05

## 2017-09-18 NOTE — HHI.PR
Subjective


Remarks


resting comfortably with no distress.


denies pain.


d/w the RN and no acute issues over night.





Objective


Vitals





Vital Signs








  Date Time  Temp Pulse Resp B/P (MAP) Pulse Ox O2 Delivery O2 Flow Rate FiO2


 


9/18/17 12:00 96.0 92 18 111/93 (99) 97   


 


9/18/17 07:53 96.5 104 18 174/91 (118) 95   


 


9/18/17 04:15 97.9 98 18 152/87 (108) 93   


 


9/18/17 00:02 96.7 93 18 127/80 (96) 93   


 


9/17/17 21:00 98.3 72 18 152/85 (107) 93   


 


9/17/17 16:00 97.2 77 18 155/93 (113) 93   














I/O      


 


 9/17/17 9/17/17 9/17/17 9/18/17 9/18/17 9/18/17





 07:00 15:00 23:00 07:00 15:00 23:00


 


Intake Total 480 ml 960 ml 480 ml 0 ml  


 


Balance 480 ml 960 ml 480 ml 0 ml  


 


      


 


Intake Oral 480 ml 960 ml 480 ml 0 ml  


 


# Voids 3 4 3 3  


 


# Bowel Movements 0 4 0 0  








Result Diagram:  


9/16/17 0806                                                                   

             9/18/17 0649





Imaging





Last Impressions








Renal Biopsy CT 9/12/17 0000 Signed





Impressions: 





 Service Date/Time:  Tuesday, September 12, 2017 08:59 - CONCLUSION: Successful 





 18 gauge core biopsies of left renal cortex.     Silviano Aggarwal MD ADDENDUM: 

  





 A two hour delayed scan through the kidneys demonstrates stability of the 





 perinephric hematoma with no evidence of increase in the size of this 

hematoma. 





 H&H are also stable. The patient is also clinically stable without evidence of 





 ongoing bleeding status post renal biopsy.   Silviano Aggarwal MD 


 


Liver Ultrasound 9/6/17 0000 Signed





Impressions: 





 Service Date/Time:  Wednesday, September 6, 2017 08:44 - CONCLUSION:  1. 





 Hepatomegaly with heterogeneous echotexture, slightly larger than on prior 

exam 





 in 2016. 2. Shadowing right renal stones without evidence of hydronephrosis.  

   





 Jesse Martínez MD 


 


Renal Ultrasound 9/5/17 0000 Signed





Impressions: 





 Service Date/Time:  Tuesday, September 5, 2017 17:45 - CONCLUSION:  1. 





 Nonobstructing right renal calculi. Right renal cysts. No perinephric fluid. 





 Bladder unremarkable.     Michael Forrest MD 


 


Lung Scan-VQ Nuclear Medicine 9/2/17 0000 Signed





Impressions: 





 Service Date/Time:  Saturday, September 2, 2017 02:35 - CONCLUSION:  1. Low 





 probability for pulmonary embolus.     Michael Forrest MD 


 


Head CT 9/2/17 0000 Signed





Impressions: 





 Service Date/Time:  Saturday, September 2, 2017 00:29 - CONCLUSION:  Normal 





 examination.       Michael Forrest MD 


 


Chest X-Ray 9/1/17 2217 Signed





Impressions: 





 Service Date/Time:  Friday, September 1, 2017 22:32 - CONCLUSION:  Minimal 





 patchy areas of consolidation or atelectasis at the lateral bases.     Scooter Tatum MD 


 


Abdomen/Pelvis CT 9/1/17 0000 Signed





Impressions: 





 Service Date/Time:  Saturday, September 2, 2017 00:32 - CONCLUSION:  1. No 

acute 





 findings within the abdomen. Partial staghorn type calcifications upper and 





 lower pole right kidney and tiny calculi upper pole left kidney. No bowel 





 obstruction, free air, free fluid or obstructive uropathy. 2. Probable liver 





 cirrhosis. Previous cholecystectomy, appendectomy.     Michael Forrest MD 








Objective Remarks


GENERAL: This is a well-nourished, well-developed patient, in no apparent 

distress.


CARDIOVASCULAR: Regular rate and regular rhythm without murmurs, gallops, or 

rubs. 


RESPIRATORY: Clear to auscultation. Breath sounds equal bilaterally. No wheezes

, rales, or rhonchi.  


GASTROINTESTINAL: Abdomen soft, non-tender, nondistended. 


Normal, active bowel sounds


MUSCULOSKELETAL: Extremities without clubbing, cyanosis, or edema.


NEURO:  Alert & Oriented x4 to person, place, time, situation.  Moves all ext x4


Procedures


kidney biopsy


Medications and IVs





Current Medications


Sodium Chloride (NS Flush) 2 ml UNSCH  PRN IVF FLUSH AFTER USING IV ACCESS Last 

administered on 9/1/17at 22:54;  Start 9/1/17 at 22:30;  Stop 9/2/17 at 02:16;  

Status DC


Nitroglycerin (Nitrostat Sl) 0.3 mg ONCE  ONCE SL ;  Start 9/1/17 at 22:30;  

Stop 9/1/17 at 22:31;  Status DC


Morphine Sulfate (Morphine Inj) 4 mg ONCE  ONCE IV PUSH  Last administered on 9/ 1/17at 22:53;  Start 9/1/17 at 22:30;  Stop 9/1/17 at 22:31;  Status DC


Ondansetron HCl (Zofran Inj) 4 mg ONCE  ONCE IV PUSH  Last administered on 9/1/ 17at 22:54;  Start 9/1/17 at 22:30;  Stop 9/1/17 at 22:31;  Status DC


Sodium Chloride (NS Flush) 2 ml BID IV FLUSH ;  Start 9/2/17 at 09:00;  Stop 9/2 /17 at 09:00;  Status DC


Sodium Chloride (NS Flush) 2 ml UNSCH  PRN IVF FLUSH AFTER USING IV ACCESS;  

Start 9/2/17 at 02:00;  Stop 9/2/17 at 02:16;  Status DC


Famotidine (Pepcid Inj) 20 mg Q12H IV PUSH  Last administered on 9/3/17at 00:18

;  Start 9/2/17 at 02:00;  Stop 9/3/17 at 08:43;  Status DC


Sodium Chloride 1,000 ml @  100 mls/hr Q10H IV  Last administered on 9/4/17at 04

:27;  Start 9/2/17 at 01:59;  Stop 9/4/17 at 10:54;  Status DC


Sodium Chloride (NS Flush) 2 ml UNSCH  PRN IV FLUSH FLUSH AFTER USING IV ACCESS

;  Start 9/2/17 at 02:00


Sodium Chloride (NS Flush) 2 ml BID IV FLUSH  Last administered on 9/18/17at 09:

50;  Start 9/2/17 at 09:00


Ondansetron HCl (Zofran Inj) 4 mg Q6H  PRN IVP NAUSEA OR VOMITING;  Start 9/2/ 17 at 02:00;  Stop 9/4/17 at 10:54;  Status DC


Acetaminophen (Tylenol) 650 mg Q6H  PRN PO FEVER/PAIN SCALE 1 TO 2;  Start 9/2/ 17 at 02:00


Acetaminophen/ Hydrocodone Bitart (Norco  5-325 Mg) 1 tab Q4H  PRN PO PAIN 

SCALE 3 TO 5 Last administered on 9/13/17at 06:20;  Start 9/2/17 at 02:00


Morphine Sulfate (Morphine Inj) 2 mg Q3H  PRN IV Pain 6-10 Last administered on 

9/3/17at 06:21;  Start 9/2/17 at 02:00;  Stop 9/3/17 at 13:16;  Status DC


Senna/Docusate Sodium (Palma-Colace) 1 tab BID PO  Last administered on 9/13/ 17at 21:44;  Start 9/2/17 at 09:00


Magnesium Hydroxide (Milk Of Magnesia Liq) 30 ml Q12H  PRN PO MILD - MODERATE 

CONSTIPATION Last administered on 9/11/17at 19:47;  Start 9/2/17 at 02:00


Sennosides (Senokot) 17.2 mg Q12H  PRN PO MODERATE - SEVERE CONSTIPATION;  

Start 9/2/17 at 02:00;  Stop 9/5/17 at 11:01;  Status DC


Bisacodyl (Dulcolax Supp) 10 mg DAILY  PRN RECTAL SEVERE CONSITIPATION;  Start 9 /2/17 at 02:00


Lactulose (Lactulose Liq) 30 ml DAILY  PRN PO SEVERE CONSITIPATION;  Start 9/2/ 17 at 02:00


Albuterol/ Ipratropium (Duoneb Neb) 1 ampule Q4HR  NEB NEB  Last administered 

on 9/6/17at 08:57;  Start 9/2/17 at 12:00;  Stop 9/6/17 at 11:59;  Status DC


Albuterol Sulfate (Albuterol Neb) 2.5 mg Q2HR NEB  PRN NEB wheezing Last 

administered on 9/6/17at 06:28;  Start 9/2/17 at 10:45


Clonidine (Catapres) 0.1 mg ONCE  ONCE PO  Last administered on 9/3/17at 00:17;

  Start 9/2/17 at 23:45;  Stop 9/2/17 at 23:46;  Status DC


Famotidine (Pepcid Inj) 10 mg Q12H IV PUSH  Last administered on 9/4/17at 01:49

;  Start 9/3/17 at 14:00;  Stop 9/4/17 at 10:54;  Status DC


Levothyroxine Sodium (Synthroid) 150 mcg DAILY@0600 PO  Last administered on 9/ 18/17at 04:13;  Start 9/3/17 at 14:30


Polyethylene Glycol (Miralax) 17 gm DAILY PO  Last administered on 9/13/17at 07:

44;  Start 9/3/17 at 14:45


Glycerin (Glycerin Adult Supp) 2 gm ONCE  ONCE RECTAL  Last administered on 9/3/

17at 17:47;  Start 9/3/17 at 15:00;  Stop 9/3/17 at 15:01;  Status DC


Mirtazapine (Remeron) 15 mg HS PO  Last administered on 9/13/17at 21:44;  Start 

9/4/17 at 21:00;  Stop 9/14/17 at 13:04;  Status DC


Ondansetron HCl (Zofran  Odt) 4 mg Q6H  PRN PO nausea;  Start 9/4/17 at 11:00


Pantoprazole Sodium (Protonix) 40 mg DAILY PO  Last administered on 9/18/17at 09

:41;  Start 9/5/17 at 11:00


Methylprednisolone Sodium Succinate (SoluMEDROL INJ) 60 mg Q12H IV PUSH  Last 

administered on 9/15/17at 14:52;  Start 9/6/17 at 14:00;  Stop 9/15/17 at 20:05

;  Status DC


Sodium Chloride 1,000 ml @  84 mls/hr [id number] IV  Last administered on 9/8/17at 

04:55;  Start 9/7/17 at 17:00


Megestrol Acetate (Megace Liq) 400 mg BIDAC PO  Last administered on 9/17/17at 

17:44;  Start 9/7/17 at 16:30


Lactated Ringer's 1,000 ml @  30 mls/hr Q24H PRN IV SEE LABEL COMMENTS;  Start 9 /8/17 at 03:15;  Stop 9/11/17 at 03:14;  Status DC


Sodium Chloride 500 ml @  30 mls/hr [id number] PRN IV SEE LABEL COMMENTS;  Start 9/ 8/17 at 03:15;  Stop 9/11/17 at 03:14;  Status DC


Povidone Iodine (Betadine 5% Antisepsis Kit) 1 applic ON CALL  PRN EACH NARE 

SEE LABEL COMMENTS;  Start 9/8/17 at 03:15;  Stop 9/11/17 at 03:14;  Status DC


Chlorhexidine Gluconate (Chlorhexidine 2% Cloth) 3 pack ON CALL  PRN TOPICAL 

SEE LABEL COMMENTS;  Start 9/8/17 at 03:15;  Stop 9/11/17 at 03:14;  Status DC


Ketamine HCl (Ketalar Inj) 500 mg STK-MED ONCE .ROUTE ;  Start 9/8/17 at 08:55;

  Stop 9/8/17 at 08:56;  Status DC


Miscellaneous Information ALL NURSING DEPARTME... UNSCH  PRN .XX SEE LABEL 

COMMENTS;  Start 9/8/17 at 10:53;  Stop 9/9/17 at 10:52;  Status DC


Fluoxetine HCl (PROzac) 10 mg ONCE  ONCE PO  Last administered on 9/8/17at 17:45

;  Start 9/8/17 at 17:30;  Stop 9/8/17 at 17:36;  Status DC


Fluoxetine HCl (PROzac) 10 mg DAILY PO  Last administered on 9/13/17at 07:43;  

Start 9/9/17 at 09:00;  Stop 9/13/17 at 15:01;  Status DC


Sodium Chloride 1,000 ml @  30 mls/hr Q24H IV  Last administered on 9/11/17at 18

:17;  Start 9/11/17 at 18:00


Clonidine (Catapres) 0.1 mg ONCE  ONCE PO  Last administered on 9/12/17at 02:28

;  Start 9/12/17 at 02:15;  Stop 9/12/17 at 02:16;  Status DC


Fentanyl Citrate (fentaNYL INJ) 100 mcg STK-MED ONCE .ROUTE  Last administered 

on 9/12/17at 08:15;  Start 9/12/17 at 08:15;  Stop 9/12/17 at 08:16;  Status DC


Midazolam HCl (Versed Inj) 5 mg STK-MED ONCE .ROUTE  Last administered on 9/12/ 17at 08:15;  Start 9/12/17 at 08:15;  Stop 9/12/17 at 08:16;  Status DC


Lidocaine HCl (Xylocaine 1% Inj) 20 ml STK-MED ONCE .ROUTE  Last administered 

on 9/12/17at 08:19;  Start 9/12/17 at 08:19;  Stop 9/12/17 at 08:20;  Status DC


Midazolam HCl (Versed Inj) 2 mg STK-MED ONCE IV ;  Start 9/8/17 at 12:00;  Stop 

9/12/17 at 10:29;  Status DC


Propofol (Diprivan  200 Mg/20 ml Inj) 30 mg STK-MED ONCE IV ;  Start 9/8/17 at 

12:00;  Stop 9/12/17 at 10:29;  Status DC


Acetaminophen/ Hydrocodone Bitart (Norco  Mg) 1 tab Q6H  PRN PO pain 6-10 

Last administered on 9/18/17at 09:43;  Start 9/13/17 at 15:30


Mirtazapine (Remeron) 30 mg HS PO  Last administered on 9/17/17at 19:50;  Start 

9/14/17 at 21:00


Clonazepam (KlonoPIN) 0.5 mg Q12HR PO  Last administered on 9/18/17at 09:41;  

Start 9/14/17 at 13:30


Mycophenolate Mofetil (Cellcept) 500 mg BID@06,18 PO  Last administered on 9/18/ 17at 04:13;  Start 9/15/17 at 18:00


Prednisone (Deltasone) 60 mg DAILY PO ;  Start 9/16/17 at 09:00;  Stop 9/16/17 

at 10:33;  Status DC


Prednisone (Deltasone) 60 mg DAILY PO  Last administered on 9/18/17at 09:40;  

Start 9/16/17 at 11:00





A/P


Assessment and Plan


A/P





- SLE by serology positive STEPHANIE, double stranded DNA, possible autoimmune 

hepatitis with positive ASMA, low C3-C4, status post kidney biopsy showing:


   FOCAL PROLIFERATIVE AND NECROTIZING GLOMERULONEPHRITIS.  


   NODULAR DIABETIC GLOMERULOSCLEROSIS.  


   GLOBAL& SEGMENTAL GLOMERULOSCLEROSIS.  


   INTERSTITIAL FIBROSIS AND TUBULAR ATROPHY, SEVERE.  


Will need rheumatology evaluation- as outpatient.





-  Abdominal pain with increased lipase likely from pancreatitis 

gastroenterology followed status post EGD showed gastritis with negative H. 

pylori, continue Protonix, po pain meds as needed.


   GI following, awaiting IgG4 level as well as AMA, possible liver biopsy 

today.





-  Constipation - continue MiraLAX, Palma-Colace


-  depression likely hypothyroidism, high dose steroid, chronic disease 

appreciate psychiatry consultation- continue Remeron for appetite and sleep 

improvement plus Synthroid as above.  Continue systemic cortisone on Solu-Medrol


-  Severe hypothyroidism - free T4 is low, severe hypothyroidism based on TSH 

being over 100 , continue synthroid. 


-  Generalized weakness - likely secondary to hypothyroidism, continue therapy


-  VALENTIN on CKD- renal US unremarkable, SLE by serology as mentioned above, 

continue prednisone and Cellcept. nephrology following.


- Worsening depression:Appreciate psychiatry consultation, no need for psych 

hospitalization, increased Remeron and add clonazepam for anxiety


-   DVT Prophylaxis:  SCD/Teds.


Discharge Planning


when cleared by GI and nephrology.











Dorothy Lozoya MD Sep 18, 2017 12:40

## 2017-09-19 VITALS
SYSTOLIC BLOOD PRESSURE: 141 MMHG | HEART RATE: 77 BPM | RESPIRATION RATE: 18 BRPM | DIASTOLIC BLOOD PRESSURE: 75 MMHG | TEMPERATURE: 97.7 F | OXYGEN SATURATION: 95 %

## 2017-09-19 VITALS
DIASTOLIC BLOOD PRESSURE: 82 MMHG | TEMPERATURE: 97.4 F | HEART RATE: 60 BPM | OXYGEN SATURATION: 92 % | SYSTOLIC BLOOD PRESSURE: 142 MMHG | RESPIRATION RATE: 18 BRPM

## 2017-09-19 VITALS
TEMPERATURE: 97.9 F | OXYGEN SATURATION: 95 % | HEART RATE: 79 BPM | RESPIRATION RATE: 18 BRPM | SYSTOLIC BLOOD PRESSURE: 130 MMHG | DIASTOLIC BLOOD PRESSURE: 76 MMHG

## 2017-09-19 VITALS
TEMPERATURE: 98.9 F | DIASTOLIC BLOOD PRESSURE: 78 MMHG | SYSTOLIC BLOOD PRESSURE: 153 MMHG | RESPIRATION RATE: 18 BRPM | HEART RATE: 71 BPM | OXYGEN SATURATION: 92 %

## 2017-09-19 VITALS
HEART RATE: 94 BPM | OXYGEN SATURATION: 94 % | DIASTOLIC BLOOD PRESSURE: 89 MMHG | RESPIRATION RATE: 18 BRPM | TEMPERATURE: 96.2 F | SYSTOLIC BLOOD PRESSURE: 158 MMHG

## 2017-09-19 VITALS
SYSTOLIC BLOOD PRESSURE: 182 MMHG | DIASTOLIC BLOOD PRESSURE: 81 MMHG | OXYGEN SATURATION: 97 % | TEMPERATURE: 96.4 F | RESPIRATION RATE: 18 BRPM | HEART RATE: 77 BPM

## 2017-09-19 VITALS
TEMPERATURE: 97.5 F | HEART RATE: 76 BPM | RESPIRATION RATE: 17 BRPM | SYSTOLIC BLOOD PRESSURE: 151 MMHG | DIASTOLIC BLOOD PRESSURE: 90 MMHG | OXYGEN SATURATION: 97 %

## 2017-09-19 LAB
ANION GAP SERPL CALC-SCNC: 5 MEQ/L (ref 5–15)
BASOPHILS # BLD AUTO: 0 TH/MM3 (ref 0–0.2)
BASOPHILS NFR BLD: 0.1 % (ref 0–2)
BUN SERPL-MCNC: 85 MG/DL (ref 7–18)
CHLORIDE SERPL-SCNC: 106 MEQ/L (ref 98–107)
EOSINOPHIL # BLD: 0 TH/MM3 (ref 0–0.4)
EOSINOPHIL NFR BLD: 0 % (ref 0–4)
ERYTHROCYTE [DISTWIDTH] IN BLOOD BY AUTOMATED COUNT: 16.3 % (ref 11.6–17.2)
GFR SERPLBLD BASED ON 1.73 SQ M-ARVRAT: 19 ML/MIN (ref 89–?)
HCO3 BLD-SCNC: 24.6 MEQ/L (ref 21–32)
HCT VFR BLD CALC: 31.9 % (ref 35–46)
HEMO FLAGS: (no result)
LYMPHOCYTES # BLD AUTO: 1 TH/MM3 (ref 1–4.8)
LYMPHOCYTES NFR BLD AUTO: 9.6 % (ref 9–44)
MCH RBC QN AUTO: 32.5 PG (ref 27–34)
MCHC RBC AUTO-ENTMCNC: 33.5 % (ref 32–36)
MCV RBC AUTO: 96.9 FL (ref 80–100)
MONOCYTES NFR BLD: 6.8 % (ref 0–8)
NEUTROPHILS # BLD AUTO: 8.9 TH/MM3 (ref 1.8–7.7)
NEUTROPHILS NFR BLD AUTO: 83.5 % (ref 16–70)
PLATELET # BLD: 222 TH/MM3 (ref 150–450)
POTASSIUM SERPL-SCNC: 4.9 MEQ/L (ref 3.5–5.1)
RBC # BLD AUTO: 3.29 MIL/MM3 (ref 4–5.3)
SODIUM SERPL-SCNC: 136 MEQ/L (ref 136–145)
WBC # BLD AUTO: 10.6 TH/MM3 (ref 4–11)

## 2017-09-19 RX ADMIN — MEGESTROL ACETATE SCH MG: 40 SUSPENSION ORAL at 14:38

## 2017-09-19 RX ADMIN — MIRTAZAPINE SCH MG: 15 TABLET, FILM COATED ORAL at 22:22

## 2017-09-19 RX ADMIN — PANTOPRAZOLE SCH MG: 40 TABLET, DELAYED RELEASE ORAL at 09:31

## 2017-09-19 RX ADMIN — LEVOTHYROXINE SODIUM SCH MCG: 150 TABLET ORAL at 06:27

## 2017-09-19 RX ADMIN — Medication SCH ML: at 22:27

## 2017-09-19 RX ADMIN — HYDROCODONE BITARTRATE AND ACETAMINOPHEN PRN TAB: 5; 325 TABLET ORAL at 22:22

## 2017-09-19 RX ADMIN — HUMAN INSULIN SCH: 100 INJECTION, SOLUTION SUBCUTANEOUS at 21:00

## 2017-09-19 RX ADMIN — STANDARDIZED SENNA CONCENTRATE AND DOCUSATE SODIUM SCH TAB: 8.6; 5 TABLET, FILM COATED ORAL at 09:31

## 2017-09-19 RX ADMIN — POLYETHYLENE GLYCOL 3350 SCH GM: 17 POWDER, FOR SOLUTION ORAL at 09:32

## 2017-09-19 RX ADMIN — MEGESTROL ACETATE SCH MG: 40 SUSPENSION ORAL at 06:27

## 2017-09-19 RX ADMIN — MYCOPHENOLATE MOFETIL SCH MG: 500 TABLET, FILM COATED ORAL at 06:27

## 2017-09-19 RX ADMIN — HYDROCODONE BITARTRATE AND ACETAMINOPHEN PRN TAB: 10; 325 TABLET ORAL at 06:27

## 2017-09-19 RX ADMIN — PHENYTOIN SODIUM SCH MLS/HR: 50 INJECTION INTRAMUSCULAR; INTRAVENOUS at 03:05

## 2017-09-19 RX ADMIN — MYCOPHENOLATE MOFETIL SCH MG: 500 TABLET, FILM COATED ORAL at 14:39

## 2017-09-19 RX ADMIN — HUMAN INSULIN SCH: 100 INJECTION, SOLUTION SUBCUTANEOUS at 17:00

## 2017-09-19 RX ADMIN — MYCOPHENOLATE MOFETIL SCH MG: 500 TABLET, FILM COATED ORAL at 22:23

## 2017-09-19 RX ADMIN — PHENYTOIN SODIUM SCH MLS/HR: 50 INJECTION INTRAMUSCULAR; INTRAVENOUS at 18:00

## 2017-09-19 RX ADMIN — HYDROCODONE BITARTRATE AND ACETAMINOPHEN PRN TAB: 5; 325 TABLET ORAL at 15:58

## 2017-09-19 RX ADMIN — Medication SCH ML: at 09:32

## 2017-09-19 RX ADMIN — STANDARDIZED SENNA CONCENTRATE AND DOCUSATE SODIUM SCH TAB: 8.6; 5 TABLET, FILM COATED ORAL at 22:22

## 2017-09-19 NOTE — HHI.PR
Subjective


Remarks


in no acute distress.


denies pain.


no new complaints.





Objective


Vitals





Vital Signs








  Date Time  Temp Pulse Resp B/P (MAP) Pulse Ox O2 Delivery O2 Flow Rate FiO2


 


9/19/17 11:18 97.4 60 18 142/82 (102) 92   


 


9/19/17 07:51 96.2 94 18 158/89 (112) 94   


 


9/19/17 03:10 98.9 71 18 153/78 (103) 92   


 


9/19/17 01:05 97.9 79 18 130/76 (94) 95   


 


9/18/17 19:25 98.7 77 18 162/84 (110) 95   


 


9/18/17 18:45      Room Air  


 


9/18/17 16:00 97.5 87 18 127/78 (94) 95   














I/O      


 


 9/18/17 9/18/17 9/18/17 9/19/17 9/19/17 9/19/17





 07:00 15:00 23:00 07:00 15:00 23:00


 


Intake Total 0 ml 660 ml  501 ml  


 


Balance 0 ml 660 ml  501 ml  


 


      


 


Intake Oral 0 ml 660 ml  0 ml  


 


IV Total    501 ml  


 


# Voids 3 3  5  


 


# Bowel Movements 0 1  3  








Result Diagram:  


9/19/17 0630                                                                   

             9/19/17 0630





Imaging





Last Impressions








Renal Biopsy CT 9/12/17 0000 Signed





Impressions: 





 Service Date/Time:  Tuesday, September 12, 2017 08:59 - CONCLUSION: Successful 





 18 gauge core biopsies of left renal cortex.     Silviano Aggarwal MD ADDENDUM: 

  





 A two hour delayed scan through the kidneys demonstrates stability of the 





 perinephric hematoma with no evidence of increase in the size of this 

hematoma. 





 H&H are also stable. The patient is also clinically stable without evidence of 





 ongoing bleeding status post renal biopsy.   Silviano Aggarwal MD 


 


Liver Ultrasound 9/6/17 0000 Signed





Impressions: 





 Service Date/Time:  Wednesday, September 6, 2017 08:44 - CONCLUSION:  1. 





 Hepatomegaly with heterogeneous echotexture, slightly larger than on prior 

exam 





 in 2016. 2. Shadowing right renal stones without evidence of hydronephrosis.  

   





 Jesse Martínez MD 


 


Renal Ultrasound 9/5/17 0000 Signed





Impressions: 





 Service Date/Time:  Tuesday, September 5, 2017 17:45 - CONCLUSION:  1. 





 Nonobstructing right renal calculi. Right renal cysts. No perinephric fluid. 





 Bladder unremarkable.     Michael Forerst MD 


 


Lung Scan-VQ Nuclear Medicine 9/2/17 0000 Signed





Impressions: 





 Service Date/Time:  Saturday, September 2, 2017 02:35 - CONCLUSION:  1. Low 





 probability for pulmonary embolus.     Michael Forrest MD 


 


Head CT 9/2/17 0000 Signed





Impressions: 





 Service Date/Time:  Saturday, September 2, 2017 00:29 - CONCLUSION:  Normal 





 examination.       Michael Forrest MD 


 


Chest X-Ray 9/1/17 2217 Signed





Impressions: 





 Service Date/Time:  Friday, September 1, 2017 22:32 - CONCLUSION:  Minimal 





 patchy areas of consolidation or atelectasis at the lateral bases.     Scooter Tatum MD 


 


Abdomen/Pelvis CT 9/1/17 0000 Signed





Impressions: 





 Service Date/Time:  Saturday, September 2, 2017 00:32 - CONCLUSION:  1. No 

acute 





 findings within the abdomen. Partial staghorn type calcifications upper and 





 lower pole right kidney and tiny calculi upper pole left kidney. No bowel 





 obstruction, free air, free fluid or obstructive uropathy. 2. Probable liver 





 cirrhosis. Previous cholecystectomy, appendectomy.     Michael Forrest MD 








Objective Remarks


GENERAL: This is a well-nourished, well-developed patient, in no apparent 

distress.


CARDIOVASCULAR: Regular rate and regular rhythm without murmurs, gallops, or 

rubs. 


RESPIRATORY: Clear to auscultation. Breath sounds equal bilaterally. No wheezes

, rales, or rhonchi.  


GASTROINTESTINAL: Abdomen soft, non-tender, nondistended. 


Normal, active bowel sounds


MUSCULOSKELETAL: Extremities without clubbing, cyanosis, or edema.


NEURO:  Alert & Oriented x4 to person, place, time, situation.  Moves all ext x4


Procedures


kidney biopsy


Medications and IVs





Current Medications


Sodium Chloride (NS Flush) 2 ml UNSCH  PRN IVF FLUSH AFTER USING IV ACCESS Last 

administered on 9/1/17at 22:54;  Start 9/1/17 at 22:30;  Stop 9/2/17 at 02:16;  

Status DC


Nitroglycerin (Nitrostat Sl) 0.3 mg ONCE  ONCE SL ;  Start 9/1/17 at 22:30;  

Stop 9/1/17 at 22:31;  Status DC


Morphine Sulfate (Morphine Inj) 4 mg ONCE  ONCE IV PUSH  Last administered on 9/ 1/17at 22:53;  Start 9/1/17 at 22:30;  Stop 9/1/17 at 22:31;  Status DC


Ondansetron HCl (Zofran Inj) 4 mg ONCE  ONCE IV PUSH  Last administered on 9/1/ 17at 22:54;  Start 9/1/17 at 22:30;  Stop 9/1/17 at 22:31;  Status DC


Sodium Chloride (NS Flush) 2 ml BID IV FLUSH ;  Start 9/2/17 at 09:00;  Stop 9/2 /17 at 09:00;  Status DC


Sodium Chloride (NS Flush) 2 ml UNSCH  PRN IVF FLUSH AFTER USING IV ACCESS;  

Start 9/2/17 at 02:00;  Stop 9/2/17 at 02:16;  Status DC


Famotidine (Pepcid Inj) 20 mg Q12H IV PUSH  Last administered on 9/3/17at 00:18

;  Start 9/2/17 at 02:00;  Stop 9/3/17 at 08:43;  Status DC


Sodium Chloride 1,000 ml @  100 mls/hr Q10H IV  Last administered on 9/4/17at 04

:27;  Start 9/2/17 at 01:59;  Stop 9/4/17 at 10:54;  Status DC


Sodium Chloride (NS Flush) 2 ml UNSCH  PRN IV FLUSH FLUSH AFTER USING IV ACCESS

;  Start 9/2/17 at 02:00


Sodium Chloride (NS Flush) 2 ml BID IV FLUSH  Last administered on 9/19/17at 09:

32;  Start 9/2/17 at 09:00


Ondansetron HCl (Zofran Inj) 4 mg Q6H  PRN IVP NAUSEA OR VOMITING;  Start 9/2/ 17 at 02:00;  Stop 9/4/17 at 10:54;  Status DC


Acetaminophen (Tylenol) 650 mg Q6H  PRN PO FEVER/PAIN SCALE 1 TO 2;  Start 9/2/ 17 at 02:00


Acetaminophen/ Hydrocodone Bitart (Norco  5-325 Mg) 1 tab Q4H  PRN PO PAIN 

SCALE 3 TO 5 Last administered on 9/13/17at 06:20;  Start 9/2/17 at 02:00


Morphine Sulfate (Morphine Inj) 2 mg Q3H  PRN IV Pain 6-10 Last administered on 

9/3/17at 06:21;  Start 9/2/17 at 02:00;  Stop 9/3/17 at 13:16;  Status DC


Senna/Docusate Sodium (Palma-Colace) 1 tab BID PO  Last administered on 9/19/ 17at 09:31;  Start 9/2/17 at 09:00


Magnesium Hydroxide (Milk Of Magnesia Liq) 30 ml Q12H  PRN PO MILD - MODERATE 

CONSTIPATION Last administered on 9/11/17at 19:47;  Start 9/2/17 at 02:00


Sennosides (Senokot) 17.2 mg Q12H  PRN PO MODERATE - SEVERE CONSTIPATION;  

Start 9/2/17 at 02:00;  Stop 9/5/17 at 11:01;  Status DC


Bisacodyl (Dulcolax Supp) 10 mg DAILY  PRN RECTAL SEVERE CONSITIPATION;  Start 9 /2/17 at 02:00


Lactulose (Lactulose Liq) 30 ml DAILY  PRN PO SEVERE CONSITIPATION;  Start 9/2/ 17 at 02:00


Albuterol/ Ipratropium (Duoneb Neb) 1 ampule Q4HR  NEB NEB  Last administered 

on 9/6/17at 08:57;  Start 9/2/17 at 12:00;  Stop 9/6/17 at 11:59;  Status DC


Albuterol Sulfate (Albuterol Neb) 2.5 mg Q2HR NEB  PRN NEB wheezing Last 

administered on 9/6/17at 06:28;  Start 9/2/17 at 10:45


Clonidine (Catapres) 0.1 mg ONCE  ONCE PO  Last administered on 9/3/17at 00:17;

  Start 9/2/17 at 23:45;  Stop 9/2/17 at 23:46;  Status DC


Famotidine (Pepcid Inj) 10 mg Q12H IV PUSH  Last administered on 9/4/17at 01:49

;  Start 9/3/17 at 14:00;  Stop 9/4/17 at 10:54;  Status DC


Levothyroxine Sodium (Synthroid) 150 mcg DAILY@0600 PO  Last administered on 9/ 19/17at 06:27;  Start 9/3/17 at 14:30


Polyethylene Glycol (Miralax) 17 gm DAILY PO  Last administered on 9/13/17at 07:

44;  Start 9/3/17 at 14:45


Glycerin (Glycerin Adult Supp) 2 gm ONCE  ONCE RECTAL  Last administered on 9/3/

17at 17:47;  Start 9/3/17 at 15:00;  Stop 9/3/17 at 15:01;  Status DC


Mirtazapine (Remeron) 15 mg HS PO  Last administered on 9/13/17at 21:44;  Start 

9/4/17 at 21:00;  Stop 9/14/17 at 13:04;  Status DC


Ondansetron HCl (Zofran  Odt) 4 mg Q6H  PRN PO nausea;  Start 9/4/17 at 11:00


Pantoprazole Sodium (Protonix) 40 mg DAILY PO  Last administered on 9/19/17at 09

:31;  Start 9/5/17 at 11:00


Methylprednisolone Sodium Succinate (SoluMEDROL INJ) 60 mg Q12H IV PUSH  Last 

administered on 9/15/17at 14:52;  Start 9/6/17 at 14:00;  Stop 9/15/17 at 20:05

;  Status DC


Sodium Chloride 1,000 ml @  84 mls/hr [id number] IV  Last administered on 9/19/ 17at 03:05;  Start 9/7/17 at 17:00


Megestrol Acetate (Megace Liq) 400 mg BIDAC PO  Last administered on 9/19/17at 

06:27;  Start 9/7/17 at 16:30


Lactated Ringer's 1,000 ml @  30 mls/hr Q24H PRN IV SEE LABEL COMMENTS;  Start 9 /8/17 at 03:15;  Stop 9/11/17 at 03:14;  Status DC


Sodium Chloride 500 ml @  30 mls/hr [id number] PRN IV SEE LABEL COMMENTS;  Start 9/ 8/17 at 03:15;  Stop 9/11/17 at 03:14;  Status DC


Povidone Iodine (Betadine 5% Antisepsis Kit) 1 applic ON CALL  PRN EACH NARE 

SEE LABEL COMMENTS;  Start 9/8/17 at 03:15;  Stop 9/11/17 at 03:14;  Status DC


Chlorhexidine Gluconate (Chlorhexidine 2% Cloth) 3 pack ON CALL  PRN TOPICAL 

SEE LABEL COMMENTS;  Start 9/8/17 at 03:15;  Stop 9/11/17 at 03:14;  Status DC


Ketamine HCl (Ketalar Inj) 500 mg STK-MED ONCE .ROUTE ;  Start 9/8/17 at 08:55;

  Stop 9/8/17 at 08:56;  Status DC


Miscellaneous Information ALL NURSING DEPARTME... UNSCH  PRN .XX SEE LABEL 

COMMENTS;  Start 9/8/17 at 10:53;  Stop 9/9/17 at 10:52;  Status DC


Fluoxetine HCl (PROzac) 10 mg ONCE  ONCE PO  Last administered on 9/8/17at 17:45

;  Start 9/8/17 at 17:30;  Stop 9/8/17 at 17:36;  Status DC


Fluoxetine HCl (PROzac) 10 mg DAILY PO  Last administered on 9/13/17at 07:43;  

Start 9/9/17 at 09:00;  Stop 9/13/17 at 15:01;  Status DC


Sodium Chloride 1,000 ml @  30 mls/hr Q24H IV  Last administered on 9/11/17at 18

:17;  Start 9/11/17 at 18:00


Clonidine (Catapres) 0.1 mg ONCE  ONCE PO  Last administered on 9/12/17at 02:28

;  Start 9/12/17 at 02:15;  Stop 9/12/17 at 02:16;  Status DC


Fentanyl Citrate (fentaNYL INJ) 100 mcg STK-MED ONCE .ROUTE  Last administered 

on 9/12/17at 08:15;  Start 9/12/17 at 08:15;  Stop 9/12/17 at 08:16;  Status DC


Midazolam HCl (Versed Inj) 5 mg STK-MED ONCE .ROUTE  Last administered on 9/12/ 17at 08:15;  Start 9/12/17 at 08:15;  Stop 9/12/17 at 08:16;  Status DC


Lidocaine HCl (Xylocaine 1% Inj) 20 ml STK-MED ONCE .ROUTE  Last administered 

on 9/12/17at 08:19;  Start 9/12/17 at 08:19;  Stop 9/12/17 at 08:20;  Status DC


Midazolam HCl (Versed Inj) 2 mg STK-MED ONCE IV ;  Start 9/8/17 at 12:00;  Stop 

9/12/17 at 10:29;  Status DC


Propofol (Diprivan  200 Mg/20 ml Inj) 30 mg STK-MED ONCE IV ;  Start 9/8/17 at 

12:00;  Stop 9/12/17 at 10:29;  Status DC


Acetaminophen/ Hydrocodone Bitart (Norco  Mg) 1 tab Q6H  PRN PO pain 6-10 

Last administered on 9/19/17at 06:27;  Start 9/13/17 at 15:30


Mirtazapine (Remeron) 30 mg HS PO  Last administered on 9/18/17at 22:58;  Start 

9/14/17 at 21:00


Clonazepam (KlonoPIN) 0.5 mg Q12HR PO  Last administered on 9/19/17at 09:31;  

Start 9/14/17 at 13:30


Mycophenolate Mofetil (Cellcept) 500 mg BID@06,18 PO  Last administered on 9/18/ 17at 17:12;  Start 9/15/17 at 18:00;  Stop 9/18/17 at 19:07;  Status DC


Prednisone (Deltasone) 60 mg DAILY PO ;  Start 9/16/17 at 09:00;  Stop 9/16/17 

at 10:33;  Status DC


Prednisone (Deltasone) 60 mg DAILY PO  Last administered on 9/19/17at 09:31;  

Start 9/16/17 at 11:00;  Stop 9/19/17 at 11:01;  Status DC


Mycophenolate Mofetil (Cellcept) 500 mg Q8HR PO  Last administered on 9/19/17at 

06:27;  Start 9/18/17 at 22:00


Prednisone (Deltasone) 40 mg DAILY PO ;  Start 9/20/17 at 09:00





A/P


Assessment and Plan


A/P





- SLE by serology positive STEPHANIE, double stranded DNA, possible autoimmune 

hepatitis with positive ASMA, low C3-C4, status post kidney biopsy showing:


   FOCAL PROLIFERATIVE AND NECROTIZING GLOMERULONEPHRITIS.  


   NODULAR DIABETIC GLOMERULOSCLEROSIS.  


   GLOBAL& SEGMENTAL GLOMERULOSCLEROSIS.  


   INTERSTITIAL FIBROSIS AND TUBULAR ATROPHY, SEVERE.  


continue prednisone.


Will need rheumatology evaluation- as outpatient.





-  possible autoimmune hepatitis- GI follow-up appreciated- GI has signed off- f

/u as outpatient.   


-hyperglycemia- steroid induced- A1c 5.5- start on accu-check with sliding 

scale coverage.


-  Constipation - continue MiraLAX, Palma-Colace


-  depression likelyappreciate psychiatry consultation- continue Remeron for 

appetite and sleep improvement plus Synthroid as above.  


-  Severe hypothyroidism - free T4 is low, severe hypothyroidism based on TSH 

being over 100 , continue synthroid. 


-  Generalized weakness - likely secondary to hypothyroidism, continue therapy


-  VALENTIN on CKD- renal US unremarkable, SLE by serology as mentioned above, 

continue prednisone and Cellcept. nephrology following.


- Worsening depression:Appreciate psychiatry consultation, no need for psych 

hospitalization, increased Remeron and add clonazepam for anxiety


-   DVT Prophylaxis:  SCD/Teds.


Discharge Planning


case management consulted to assist with dc planning and outpatient meds/ follow

-ups.


dc home within the next one-two days if stable.











Dorothy Lozoya MD Sep 19, 2017 13:26

## 2017-09-19 NOTE — HHI.NPPN
Subjective


History of Present Illness


62 year old with ARF/Pancreatitis/Cirrhosis Autoimmune disease





Review of Systems


General


Constitutional:  Fatigue





Gastrointestinal


Gastrointestinal:  Abdominal Pain





Musculoskeletal


MS:  Pain/Stiffness





Objective Data


Data





Vital Signs








  Date Time  Temp Pulse Resp B/P (MAP) Pulse Ox O2 Delivery O2 Flow Rate FiO2


 


9/19/17 11:18 97.4 60 18 142/82 (102) 92   


 


9/19/17 07:51 96.2 94 18 158/89 (112) 94   


 


9/19/17 03:10 98.9 71 18 153/78 (103) 92   


 


9/19/17 01:05 97.9 79 18 130/76 (94) 95   


 


9/18/17 19:25 98.7 77 18 162/84 (110) 95   


 


9/18/17 18:45      Room Air  


 


9/18/17 16:00 97.5 87 18 127/78 (94) 95   








-:  


9/19/17 0630                                                                   

             9/19/17 0630








Physical Exam


General


Appearance:  Well Developed





Neck


Neck Exam:  Neck Supple





Pulmonary


Resp Exam:  Clear Bilaterally, Breath Sounds Equal





Cardiology


CV Exam:  Regular, Normal Sinus Rhythm





Gastrointestinal/Abdomen


GI Exam:  Soft, Bowel Sounds Present





Extremeties


Extremities Exam:  No Edema





Assessment/Plan


Problem List:  


(1) VALENTIN (acute kidney injury)


ICD Codes:  N17.9 - Acute kidney failure, unspecified


Plan:  ARF Autoimmune process, has advanced kidney disease due to lupus 

nephritis and diabetic kidney disease


STEPHANIE positive


C3, C4 low


 SLE by serology STEPHANIE 1:1280 Anti dsDNA 775 very high


follow renal functions


on Prednisone Cr 2.57


 kidney biopsy, showed Focal Proliferative GN, with chronicity started on 

Cellcept goal is to keep her on Prednisone and Cellcept and adjust dose as 

tolerated


She also has diabetic kidney disease


Prognosis for kidney is worse possibility of dialysis is high


Need to arrange her medication as outpatient increase CellCept to 500 mg 3 

times a day


i again discussed above


Cr 2.5


GFR 19


FU as out patient


she needs long term fu with Nephrology


case management to help with assistance


BG are high I cut back on Prednisone to 40 mg daily





(2) Pancreatitis


ICD Codes:  K85.90 - Acute pancreatitis without necrosis or infection, 

unspecified


Status:  Acute


Plan:  She has undiagnosed underlying autoimmune disease possibility of SLE 

versus autoimmune hepatitis exists


STEPHANIE was 1:1280 and Antismooth antibody positive in 6/12





(3) Autoimmune disease


ICD Codes:  M35.9 - Systemic involvement of connective tissue, unspecified


Plan:  Continue to monitor she needs to be followed by Delia Nunez MD Sep 19, 2017 13:19

## 2017-09-20 VITALS
TEMPERATURE: 97.3 F | HEART RATE: 92 BPM | OXYGEN SATURATION: 96 % | SYSTOLIC BLOOD PRESSURE: 152 MMHG | RESPIRATION RATE: 17 BRPM | DIASTOLIC BLOOD PRESSURE: 85 MMHG

## 2017-09-20 VITALS
RESPIRATION RATE: 18 BRPM | TEMPERATURE: 99.2 F | OXYGEN SATURATION: 95 % | SYSTOLIC BLOOD PRESSURE: 143 MMHG | DIASTOLIC BLOOD PRESSURE: 82 MMHG | HEART RATE: 102 BPM

## 2017-09-20 VITALS
TEMPERATURE: 98.6 F | DIASTOLIC BLOOD PRESSURE: 73 MMHG | HEART RATE: 95 BPM | SYSTOLIC BLOOD PRESSURE: 124 MMHG | RESPIRATION RATE: 17 BRPM | OXYGEN SATURATION: 96 %

## 2017-09-20 VITALS
DIASTOLIC BLOOD PRESSURE: 66 MMHG | SYSTOLIC BLOOD PRESSURE: 118 MMHG | TEMPERATURE: 96.9 F | HEART RATE: 80 BPM | RESPIRATION RATE: 18 BRPM | OXYGEN SATURATION: 94 %

## 2017-09-20 VITALS
HEART RATE: 82 BPM | DIASTOLIC BLOOD PRESSURE: 68 MMHG | SYSTOLIC BLOOD PRESSURE: 131 MMHG | TEMPERATURE: 97.4 F | OXYGEN SATURATION: 96 % | RESPIRATION RATE: 18 BRPM

## 2017-09-20 RX ADMIN — Medication SCH ML: at 08:05

## 2017-09-20 RX ADMIN — MYCOPHENOLATE MOFETIL SCH MG: 500 TABLET, FILM COATED ORAL at 05:52

## 2017-09-20 RX ADMIN — HUMAN INSULIN SCH: 100 INJECTION, SOLUTION SUBCUTANEOUS at 17:00

## 2017-09-20 RX ADMIN — HYDROCODONE BITARTRATE AND ACETAMINOPHEN PRN TAB: 5; 325 TABLET ORAL at 22:46

## 2017-09-20 RX ADMIN — MEGESTROL ACETATE SCH MG: 40 SUSPENSION ORAL at 16:23

## 2017-09-20 RX ADMIN — HUMAN INSULIN SCH: 100 INJECTION, SOLUTION SUBCUTANEOUS at 12:00

## 2017-09-20 RX ADMIN — HUMAN INSULIN SCH: 100 INJECTION, SOLUTION SUBCUTANEOUS at 21:00

## 2017-09-20 RX ADMIN — STANDARDIZED SENNA CONCENTRATE AND DOCUSATE SODIUM SCH TAB: 8.6; 5 TABLET, FILM COATED ORAL at 21:00

## 2017-09-20 RX ADMIN — LEVOTHYROXINE SODIUM SCH MCG: 150 TABLET ORAL at 05:52

## 2017-09-20 RX ADMIN — PANTOPRAZOLE SCH MG: 40 TABLET, DELAYED RELEASE ORAL at 08:05

## 2017-09-20 RX ADMIN — POLYETHYLENE GLYCOL 3350 SCH GM: 17 POWDER, FOR SOLUTION ORAL at 08:05

## 2017-09-20 RX ADMIN — Medication SCH ML: at 22:48

## 2017-09-20 RX ADMIN — HYDROCODONE BITARTRATE AND ACETAMINOPHEN PRN TAB: 5; 325 TABLET ORAL at 05:52

## 2017-09-20 RX ADMIN — STANDARDIZED SENNA CONCENTRATE AND DOCUSATE SODIUM SCH TAB: 8.6; 5 TABLET, FILM COATED ORAL at 08:05

## 2017-09-20 RX ADMIN — MIRTAZAPINE SCH MG: 15 TABLET, FILM COATED ORAL at 22:46

## 2017-09-20 RX ADMIN — MYCOPHENOLATE MOFETIL SCH MG: 500 TABLET, FILM COATED ORAL at 14:17

## 2017-09-20 RX ADMIN — MYCOPHENOLATE MOFETIL SCH MG: 500 TABLET, FILM COATED ORAL at 22:47

## 2017-09-20 RX ADMIN — MEGESTROL ACETATE SCH MG: 40 SUSPENSION ORAL at 05:53

## 2017-09-20 RX ADMIN — PHENYTOIN SODIUM SCH MLS/HR: 50 INJECTION INTRAMUSCULAR; INTRAVENOUS at 18:00

## 2017-09-20 RX ADMIN — HUMAN INSULIN SCH: 100 INJECTION, SOLUTION SUBCUTANEOUS at 08:00

## 2017-09-20 NOTE — HHI.PR
Subjective


Remarks


in no distress.


denies pain.


no fever.


no new complaints.





Objective


Vitals





Vital Signs








  Date Time  Temp Pulse Resp B/P (MAP) Pulse Ox O2 Delivery O2 Flow Rate FiO2


 


9/20/17 08:00 99.2 102 18 143/82 (102) 95   


 


9/19/17 23:20 97.5 76 17 151/90 (110) 97   


 


9/19/17 20:30 97.7 77 18 141/75 (97) 95   


 


9/19/17 19:08      Room Air  


 


9/19/17 15:40 96.4 77 18 182/81 (114) 97   


 


9/19/17 11:18 97.4 60 18 142/82 (102) 92   














I/O      


 


 9/19/17 9/19/17 9/19/17 9/20/17 9/20/17 9/20/17





 07:00 15:00 23:00 07:00 15:00 23:00


 


Intake Total 501 ml 650 ml 960 ml 600 ml  


 


Balance 501 ml 650 ml 960 ml 600 ml  


 


      


 


Intake Oral 0 ml 650 ml 960 ml 600 ml  


 


IV Total 501 ml     


 


# Voids 5 6 5 3  


 


# Bowel Movements 3 0 2 0  








Result Diagram:  


9/19/17 0630                                                                   

             9/19/17 2316





Imaging





Last Impressions








Renal Biopsy CT 9/12/17 0000 Signed





Impressions: 





 Service Date/Time:  Tuesday, September 12, 2017 08:59 - CONCLUSION: Successful 





 18 gauge core biopsies of left renal cortex.     Silviano Aggarwal MD ADDENDUM: 

  





 A two hour delayed scan through the kidneys demonstrates stability of the 





 perinephric hematoma with no evidence of increase in the size of this 

hematoma. 





 H&H are also stable. The patient is also clinically stable without evidence of 





 ongoing bleeding status post renal biopsy.   Silviano Aggarwal MD 


 


Liver Ultrasound 9/6/17 0000 Signed





Impressions: 





 Service Date/Time:  Wednesday, September 6, 2017 08:44 - CONCLUSION:  1. 





 Hepatomegaly with heterogeneous echotexture, slightly larger than on prior 

exam 





 in 2016. 2. Shadowing right renal stones without evidence of hydronephrosis.  

   





 Jesse Martínez MD 


 


Renal Ultrasound 9/5/17 0000 Signed





Impressions: 





 Service Date/Time:  Tuesday, September 5, 2017 17:45 - CONCLUSION:  1. 





 Nonobstructing right renal calculi. Right renal cysts. No perinephric fluid. 





 Bladder unremarkable.     Michael Forrest MD 


 


Lung Scan-[facility] Nuclear Medicine 9/2/17 0000 Signed





Impressions: 





 Service Date/Time:  Saturday, September 2, 2017 02:35 - CONCLUSION:  1. Low 





 probability for pulmonary embolus.     Michael Forrest MD 


 


Head CT 9/2/17 0000 Signed





Impressions: 





 Service Date/Time:  Saturday, September 2, 2017 00:29 - CONCLUSION:  Normal 





 examination.       Michael Forrest MD 


 


Chest X-Ray 9/1/17 2217 Signed





Impressions: 





 Service Date/Time:  Friday, September 1, 2017 22:32 - CONCLUSION:  Minimal 





 patchy areas of consolidation or atelectasis at the lateral bases.     Scooter Tatum MD 


 


Abdomen/Pelvis CT 9/1/17 0000 Signed





Impressions: 





 Service Date/Time:  Saturday, September 2, 2017 00:32 - CONCLUSION:  1. No 

acute 





 findings within the abdomen. Partial staghorn type calcifications upper and 





 lower pole right kidney and tiny calculi upper pole left kidney. No bowel 





 obstruction, free air, free fluid or obstructive uropathy. 2. Probable liver 





 cirrhosis. Previous cholecystectomy, appendectomy.     Michael Forrest MD 








Objective Remarks


GENERAL: This is a well-nourished, well-developed patient, in no apparent 

distress.


CARDIOVASCULAR: Regular rate and regular rhythm without murmurs, gallops, or 

rubs. 


RESPIRATORY: Clear to auscultation. Breath sounds equal bilaterally. No wheezes

, rales, or rhonchi.  


GASTROINTESTINAL: Abdomen soft, non-tender, nondistended. 


Normal, active bowel sounds


MUSCULOSKELETAL: Extremities without clubbing, cyanosis, or edema.


NEURO:  Alert & Oriented x4 to person, place, time, situation.  Moves all ext x4


Procedures


kidney biopsy


Medications and IVs





Current Medications


Sodium Chloride (NS Flush) 2 ml UNSCH  PRN IVF FLUSH AFTER USING IV ACCESS Last 

administered on 9/1/17at 22:54;  Start 9/1/17 at 22:30;  Stop 9/2/17 at 02:16;  

Status DC


Nitroglycerin (Nitrostat Sl) 0.3 mg ONCE  ONCE SL ;  Start 9/1/17 at 22:30;  

Stop 9/1/17 at 22:31;  Status DC


Morphine Sulfate (Morphine Inj) 4 mg ONCE  ONCE IV PUSH  Last administered on 9/ 1/17at 22:53;  Start 9/1/17 at 22:30;  Stop 9/1/17 at 22:31;  Status DC


Ondansetron HCl (Zofran Inj) 4 mg ONCE  ONCE IV PUSH  Last administered on 9/1/ 17at 22:54;  Start 9/1/17 at 22:30;  Stop 9/1/17 at 22:31;  Status DC


Sodium Chloride (NS Flush) 2 ml BID IV FLUSH ;  Start 9/2/17 at 09:00;  Stop 9/2 /17 at 09:00;  Status DC


Sodium Chloride (NS Flush) 2 ml UNSCH  PRN IVF FLUSH AFTER USING IV ACCESS;  

Start 9/2/17 at 02:00;  Stop 9/2/17 at 02:16;  Status DC


Famotidine (Pepcid Inj) 20 mg Q12H IV PUSH  Last administered on 9/3/17at 00:18

;  Start 9/2/17 at 02:00;  Stop 9/3/17 at 08:43;  Status DC


Sodium Chloride 1,000 ml @  100 mls/hr Q10H IV  Last administered on 9/4/17at 04

:27;  Start 9/2/17 at 01:59;  Stop 9/4/17 at 10:54;  Status DC


Sodium Chloride (NS Flush) 2 ml UNSCH  PRN IV FLUSH FLUSH AFTER USING IV ACCESS

;  Start 9/2/17 at 02:00


Sodium Chloride (NS Flush) 2 ml BID IV FLUSH  Last administered on 9/20/17at 08:

05;  Start 9/2/17 at 09:00


Ondansetron HCl (Zofran Inj) 4 mg Q6H  PRN IVP NAUSEA OR VOMITING;  Start 9/2/ 17 at 02:00;  Stop 9/4/17 at 10:54;  Status DC


Acetaminophen (Tylenol) 650 mg Q6H  PRN PO FEVER/PAIN SCALE 1 TO 2;  Start 9/2/ 17 at 02:00


Acetaminophen/ Hydrocodone Bitart (Norco  5-325 Mg) 1 tab Q4H  PRN PO PAIN 

SCALE 3 TO 5 Last administered on 9/20/17at 05:52;  Start 9/2/17 at 02:00


Morphine Sulfate (Morphine Inj) 2 mg Q3H  PRN IV Pain 6-10 Last administered on 

9/3/17at 06:21;  Start 9/2/17 at 02:00;  Stop 9/3/17 at 13:16;  Status DC


Senna/Docusate Sodium (Palma-Colace) 1 tab BID PO  Last administered on 9/20/ 17at 08:05;  Start 9/2/17 at 09:00


Magnesium Hydroxide (Milk Of Magnesia Liq) 30 ml Q12H  PRN PO MILD - MODERATE 

CONSTIPATION Last administered on 9/11/17at 19:47;  Start 9/2/17 at 02:00


Sennosides (Senokot) 17.2 mg Q12H  PRN PO MODERATE - SEVERE CONSTIPATION;  

Start 9/2/17 at 02:00;  Stop 9/5/17 at 11:01;  Status DC


Bisacodyl (Dulcolax Supp) 10 mg DAILY  PRN RECTAL SEVERE CONSITIPATION;  Start 9 /2/17 at 02:00


Lactulose (Lactulose Liq) 30 ml DAILY  PRN PO SEVERE CONSITIPATION;  Start 9/2/ 17 at 02:00


Albuterol/ Ipratropium (Duoneb Neb) 1 ampule Q4HR  NEB NEB  Last administered 

on 9/6/17at 08:57;  Start 9/2/17 at 12:00;  Stop 9/6/17 at 11:59;  Status DC


Albuterol Sulfate (Albuterol Neb) 2.5 mg Q2HR NEB  PRN NEB wheezing Last 

administered on 9/6/17at 06:28;  Start 9/2/17 at 10:45


Clonidine (Catapres) 0.1 mg ONCE  ONCE PO  Last administered on 9/3/17at 00:17;

  Start 9/2/17 at 23:45;  Stop 9/2/17 at 23:46;  Status DC


Famotidine (Pepcid Inj) 10 mg Q12H IV PUSH  Last administered on 9/4/17at 01:49

;  Start 9/3/17 at 14:00;  Stop 9/4/17 at 10:54;  Status DC


Levothyroxine Sodium (Synthroid) 150 mcg DAILY@0600 PO  Last administered on 9/ 20/17at 05:52;  Start 9/3/17 at 14:30


Polyethylene Glycol (Miralax) 17 gm DAILY PO  Last administered on 9/20/17at 08:

05;  Start 9/3/17 at 14:45


Glycerin (Glycerin Adult Supp) 2 gm ONCE  ONCE RECTAL  Last administered on 9/3/

17at 17:47;  Start 9/3/17 at 15:00;  Stop 9/3/17 at 15:01;  Status DC


Mirtazapine (Remeron) 15 mg HS PO  Last administered on 9/13/17at 21:44;  Start 

9/4/17 at 21:00;  Stop 9/14/17 at 13:04;  Status DC


Ondansetron HCl (Zofran  Odt) 4 mg Q6H  PRN PO nausea;  Start 9/4/17 at 11:00


Pantoprazole Sodium (Protonix) 40 mg DAILY PO  Last administered on 9/20/17at 08

:05;  Start 9/5/17 at 11:00


Methylprednisolone Sodium Succinate (SoluMEDROL INJ) 60 mg Q12H IV PUSH  Last 

administered on 9/15/17at 14:52;  Start 9/6/17 at 14:00;  Stop 9/15/17 at 20:05

;  Status DC


Sodium Chloride 1,000 ml @  84 mls/hr [id number] IV  Last administered on 9/19/ 17at 03:05;  Start 9/7/17 at 17:00;  Stop 9/19/17 at 13:39;  Status DC


Megestrol Acetate (Megace Liq) 400 mg BIDAC PO  Last administered on 9/20/17at 

05:53;  Start 9/7/17 at 16:30


Lactated Ringer's 1,000 ml @  30 mls/hr Q24H PRN IV SEE LABEL COMMENTS;  Start 9 /8/17 at 03:15;  Stop 9/11/17 at 03:14;  Status DC


Sodium Chloride 500 ml @  30 mls/hr [id number] PRN IV SEE LABEL COMMENTS;  Start 9/ 8/17 at 03:15;  Stop 9/11/17 at 03:14;  Status DC


Povidone Iodine (Betadine 5% Antisepsis Kit) 1 applic ON CALL  PRN EACH NARE 

SEE LABEL COMMENTS;  Start 9/8/17 at 03:15;  Stop 9/11/17 at 03:14;  Status DC


Chlorhexidine Gluconate (Chlorhexidine 2% Cloth) 3 pack ON CALL  PRN TOPICAL 

SEE LABEL COMMENTS;  Start 9/8/17 at 03:15;  Stop 9/11/17 at 03:14;  Status DC


Ketamine HCl (Ketalar Inj) 500 mg STK-MED ONCE .ROUTE ;  Start 9/8/17 at 08:55;

  Stop 9/8/17 at 08:56;  Status DC


Miscellaneous Information ALL NURSING DEPARTME... UNSCH  PRN .XX SEE LABEL 

COMMENTS;  Start 9/8/17 at 10:53;  Stop 9/9/17 at 10:52;  Status DC


Fluoxetine HCl (PROzac) 10 mg ONCE  ONCE PO  Last administered on 9/8/17at 17:45

;  Start 9/8/17 at 17:30;  Stop 9/8/17 at 17:36;  Status DC


Fluoxetine HCl (PROzac) 10 mg DAILY PO  Last administered on 9/13/17at 07:43;  

Start 9/9/17 at 09:00;  Stop 9/13/17 at 15:01;  Status DC


Sodium Chloride 1,000 ml @  30 mls/hr Q24H IV  Last administered on 9/11/17at 18

:17;  Start 9/11/17 at 18:00


Clonidine (Catapres) 0.1 mg ONCE  ONCE PO  Last administered on 9/12/17at 02:28

;  Start 9/12/17 at 02:15;  Stop 9/12/17 at 02:16;  Status DC


Fentanyl Citrate (fentaNYL INJ) 100 mcg STK-MED ONCE .ROUTE  Last administered 

on 9/12/17at 08:15;  Start 9/12/17 at 08:15;  Stop 9/12/17 at 08:16;  Status DC


Midazolam HCl (Versed Inj) 5 mg STK-MED ONCE .ROUTE  Last administered on 9/12/ 17at 08:15;  Start 9/12/17 at 08:15;  Stop 9/12/17 at 08:16;  Status DC


Lidocaine HCl (Xylocaine 1% Inj) 20 ml STK-MED ONCE .ROUTE  Last administered 

on 9/12/17at 08:19;  Start 9/12/17 at 08:19;  Stop 9/12/17 at 08:20;  Status DC


Midazolam HCl (Versed Inj) 2 mg STK-MED ONCE IV ;  Start 9/8/17 at 12:00;  Stop 

9/12/17 at 10:29;  Status DC


Propofol (Diprivan  200 Mg/20 ml Inj) 30 mg STK-MED ONCE IV ;  Start 9/8/17 at 

12:00;  Stop 9/12/17 at 10:29;  Status DC


Acetaminophen/ Hydrocodone Bitart (Norco  Mg) 1 tab Q6H  PRN PO pain 6-10 

Last administered on 9/19/17at 06:27;  Start 9/13/17 at 15:30


Mirtazapine (Remeron) 30 mg HS PO  Last administered on 9/19/17at 22:22;  Start 

9/14/17 at 21:00


Clonazepam (KlonoPIN) 0.5 mg Q12HR PO  Last administered on 9/20/17at 08:05;  

Start 9/14/17 at 13:30


Mycophenolate Mofetil (Cellcept) 500 mg BID@06,18 PO  Last administered on 9/18/ 17at 17:12;  Start 9/15/17 at 18:00;  Stop 9/18/17 at 19:07;  Status DC


Prednisone (Deltasone) 60 mg DAILY PO ;  Start 9/16/17 at 09:00;  Stop 9/16/17 

at 10:33;  Status DC


Prednisone (Deltasone) 60 mg DAILY PO  Last administered on 9/19/17at 09:31;  

Start 9/16/17 at 11:00;  Stop 9/19/17 at 11:01;  Status DC


Mycophenolate Mofetil (Cellcept) 500 mg Q8HR PO  Last administered on 9/20/17at 

05:52;  Start 9/18/17 at 22:00


Prednisone (Deltasone) 40 mg DAILY PO  Last administered on 9/20/17at 08:05;  

Start 9/20/17 at 09:00


Dextrose (D50w (Vial) Inj) 50 ml UNSCH  PRN IV PUSH HYPOGLYCEMIA-SEE COMMENTS;  

Start 9/19/17 at 13:30;  Stop 9/19/17 at 18:39;  Status DC


Glucagon (Glucagon Inj) 1 mg UNSCH  PRN OTHER HYPOGLYCEMIA-SEE COMMENTS;  Start 

9/19/17 at 13:30;  Stop 9/19/17 at 18:39;  Status DC


Insulin Human Regular (NovoLIN R SUPPLEMENTAL SCALE) 1 ACHS SLIDING  SCALE SQ ;

  Start 9/19/17 at 17:00;  Stop 9/19/17 at 18:38;  Status DC


Dextrose (D50w (Vial) Inj) 50 ml UNSCH  PRN IV PUSH HYPOGLYCEMIA - SEE COMMENTS

;  Start 9/19/17 at 19:00;  Stop 9/19/17 at 19:00;  Status DC


Glucagon (Glucagon Inj) 1 mg UNSCH  PRN OTHER HYPOGLYCEMIA-SEE COMMENTS;  Start 

9/19/17 at 19:00;  Stop 9/19/17 at 19:00;  Status DC


Insulin Human Regular (NovoLIN R SUPPLEMENTAL SCALE) 1 ACHS SLIDING  SCALE SQ  

Last administered on 9/19/17at 21:00;  Start 9/19/17 at 17:00


Dextrose (D50w (Vial) Inj) 50 ml UNSCH  PRN IV PUSH HYPOGLYCEMIA - SEE COMMENTS

;  Start 9/19/17 at 17:00


Glucagon (Glucagon Inj) 1 mg UNSCH  PRN OTHER HYPOGLYCEMIA-SEE COMMENTS;  Start 

9/19/17 at 17:00


Insulin Human Regular (NovoLIN R INJ) 10 units UNSCH X1  PRN IV PUSH if BS>350 

on recheck Last administered on 9/20/17at 01:10;  Start 9/20/17 at 00:45;  Stop 

9/20/17 at 01:30;  Status DC





A/P


Assessment and Plan


A/P





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





   FOCAL PROLIFERATIVE AND NECROTIZING GLOMERULONEPHRITIS.  


   NODULAR DIABETIC GLOMERULOSCLEROSIS.  


   GLOBAL& SEGMENTAL GLOMERULOSCLEROSIS.  


   INTERSTITIAL FIBROSIS AND TUBULAR ATROPHY, SEVERE.  


continue prednisone and Cellcept.


Will need rheumatology evaluation- as outpatient and this was d/w the patient.





-  possible autoimmune hepatitis- GI follow-up appreciated- GI has signed off- f

/u as outpatient.   


-hyperglycemia- steroid induced- A1c 5.5- started on accu-check with sliding 

scale coverage.


  received a dose of IV regular insulin last night- blood sugar levels expected 

to improve as the dose of prednisone  being reduced- will consider adding 

levemir if accu-checks remain elevated.


   continue to monitor with sliding scale coverage today.


-  Severe hypothyroidism - free T4 is low, severe hypothyroidism based on TSH 

being over 100 , continue synthroid. 


-  Generalized weakness - likely secondary to hypothyroidism, continue therapy


-  VALENTIN on CKD- renal US unremarkable, SLE by serology as mentioned above, 

continue prednisone and Cellcept. nephrology following.


- Worsening depression:Appreciate psychiatry consultation, no need for psych 

hospitalization, increased Remeron and add clonazepam for anxiety


-   DVT Prophylaxis:  SCD/Teds.


Discharge Planning


case management consulted to assist with dc planning and outpatient meds/ follow

-ups.


dc home tomorrow if stable- with improved accu-checks.


d/w the case management today.











Dorothy Lozoya MD Sep 20, 2017 10:52

## 2017-09-21 VITALS
SYSTOLIC BLOOD PRESSURE: 139 MMHG | RESPIRATION RATE: 18 BRPM | OXYGEN SATURATION: 94 % | TEMPERATURE: 96.7 F | HEART RATE: 96 BPM | DIASTOLIC BLOOD PRESSURE: 70 MMHG

## 2017-09-21 VITALS
RESPIRATION RATE: 18 BRPM | SYSTOLIC BLOOD PRESSURE: 151 MMHG | OXYGEN SATURATION: 94 % | DIASTOLIC BLOOD PRESSURE: 86 MMHG | TEMPERATURE: 96.6 F | HEART RATE: 98 BPM

## 2017-09-21 VITALS
RESPIRATION RATE: 18 BRPM | HEART RATE: 91 BPM | SYSTOLIC BLOOD PRESSURE: 124 MMHG | TEMPERATURE: 97.7 F | DIASTOLIC BLOOD PRESSURE: 70 MMHG | OXYGEN SATURATION: 93 %

## 2017-09-21 VITALS
OXYGEN SATURATION: 99 % | RESPIRATION RATE: 16 BRPM | DIASTOLIC BLOOD PRESSURE: 71 MMHG | SYSTOLIC BLOOD PRESSURE: 99 MMHG | TEMPERATURE: 96.7 F | HEART RATE: 74 BPM

## 2017-09-21 VITALS
SYSTOLIC BLOOD PRESSURE: 142 MMHG | OXYGEN SATURATION: 96 % | TEMPERATURE: 96.3 F | RESPIRATION RATE: 17 BRPM | HEART RATE: 86 BPM | DIASTOLIC BLOOD PRESSURE: 73 MMHG

## 2017-09-21 LAB
ANION GAP SERPL CALC-SCNC: 5 MEQ/L (ref 5–15)
BUN SERPL-MCNC: 78 MG/DL (ref 7–18)
CHLORIDE SERPL-SCNC: 107 MEQ/L (ref 98–107)
GFR SERPLBLD BASED ON 1.73 SQ M-ARVRAT: 20 ML/MIN (ref 89–?)
HCO3 BLD-SCNC: 25.9 MEQ/L (ref 21–32)
MYELOPEROXIDASE: 7.2 AI (ref ?–1)
POTASSIUM SERPL-SCNC: 4.8 MEQ/L (ref 3.5–5.1)
PROTEINASE3 AB SER IA-ACNC: (no result) AI (ref ?–1)
SODIUM SERPL-SCNC: 138 MEQ/L (ref 136–145)

## 2017-09-21 RX ADMIN — HYDROCODONE BITARTRATE AND ACETAMINOPHEN PRN TAB: 5; 325 TABLET ORAL at 22:52

## 2017-09-21 RX ADMIN — MYCOPHENOLATE MOFETIL SCH MG: 500 TABLET, FILM COATED ORAL at 15:01

## 2017-09-21 RX ADMIN — PHENYTOIN SODIUM SCH MLS/HR: 50 INJECTION INTRAMUSCULAR; INTRAVENOUS at 18:00

## 2017-09-21 RX ADMIN — POLYETHYLENE GLYCOL 3350 SCH GM: 17 POWDER, FOR SOLUTION ORAL at 08:28

## 2017-09-21 RX ADMIN — MEGESTROL ACETATE SCH MG: 40 SUSPENSION ORAL at 06:49

## 2017-09-21 RX ADMIN — HUMAN INSULIN SCH: 100 INJECTION, SOLUTION SUBCUTANEOUS at 08:38

## 2017-09-21 RX ADMIN — HUMAN INSULIN SCH: 100 INJECTION, SOLUTION SUBCUTANEOUS at 18:25

## 2017-09-21 RX ADMIN — MIRTAZAPINE SCH MG: 15 TABLET, FILM COATED ORAL at 22:53

## 2017-09-21 RX ADMIN — MYCOPHENOLATE MOFETIL SCH MG: 500 TABLET, FILM COATED ORAL at 22:55

## 2017-09-21 RX ADMIN — MYCOPHENOLATE MOFETIL SCH MG: 500 TABLET, FILM COATED ORAL at 06:50

## 2017-09-21 RX ADMIN — Medication SCH ML: at 22:52

## 2017-09-21 RX ADMIN — HUMAN INSULIN SCH: 100 INJECTION, SOLUTION SUBCUTANEOUS at 21:00

## 2017-09-21 RX ADMIN — HUMAN INSULIN SCH: 100 INJECTION, SOLUTION SUBCUTANEOUS at 12:53

## 2017-09-21 RX ADMIN — STANDARDIZED SENNA CONCENTRATE AND DOCUSATE SODIUM SCH TAB: 8.6; 5 TABLET, FILM COATED ORAL at 08:28

## 2017-09-21 RX ADMIN — Medication SCH ML: at 08:29

## 2017-09-21 RX ADMIN — HYDROCODONE BITARTRATE AND ACETAMINOPHEN PRN TAB: 5; 325 TABLET ORAL at 07:45

## 2017-09-21 RX ADMIN — STANDARDIZED SENNA CONCENTRATE AND DOCUSATE SODIUM SCH TAB: 8.6; 5 TABLET, FILM COATED ORAL at 22:55

## 2017-09-21 RX ADMIN — LEVOTHYROXINE SODIUM SCH MCG: 150 TABLET ORAL at 06:51

## 2017-09-21 RX ADMIN — PANTOPRAZOLE SCH MG: 40 TABLET, DELAYED RELEASE ORAL at 08:28

## 2017-09-21 NOTE — HHI.NPPN
Subjective


History of Present Illness


62 year old with ARF/Pancreatitis/Cirrhosis Autoimmune disease





Review of Systems


General


Constitutional:  Fatigue





Gastrointestinal


Gastrointestinal:  Abdominal Pain





Musculoskeletal


MS:  Pain/Stiffness





Objective Data


Data











 9/21/17 9/22/17





 19:00 07:00


 


Intake Total 360 ml 


 


Balance 360 ml 


 


  


 


Intake Oral 360 ml 


 


# Voids 4 


 


# Bowel Movements 0 











Vital Signs








  Date Time  Temp Pulse Resp B/P (MAP) Pulse Ox O2 Delivery O2 Flow Rate FiO2


 


9/21/17 12:00 96.7 96 18 139/70 (93) 94   


 


9/21/17 07:52 96.6 98 18 151/86 (107) 94   


 


9/21/17 04:20 96.7 74 16 99/71 (80) 99   


 


9/20/17 23:55 96.9 80 18 118/66 (83) 94   


 


9/20/17 20:00 98.6 95 17 124/73 (90) 96   


 


9/20/17 16:00 97.3 92 17 152/85 (107) 96   








-:  


9/19/17 0630                                                                   

             9/21/17 0643








Physical Exam


General


Appearance:  Well Developed





Neck


Neck Exam:  Neck Supple





Pulmonary


Resp Exam:  Clear Bilaterally, Breath Sounds Equal





Cardiology


CV Exam:  Regular, Normal Sinus Rhythm





Gastrointestinal/Abdomen


GI Exam:  Soft, Bowel Sounds Present





Extremeties


Extremities Exam:  No Edema





Assessment/Plan


Problem List:  


(1) VALENTIN (acute kidney injury)


ICD Codes:  N17.9 - Acute kidney failure, unspecified


Plan:  ARF Autoimmune process, has advanced kidney disease due to lupus 

nephritis and diabetic kidney disease


STEPHANIE positive


C3, C4 low


 SLE by serology STEPHANIE 1:1280 Anti dsDNA 775 very high


follow renal functions


on prednisone Cr 2.45


 kidney biopsy, showed Focal Proliferative GN, with chronicity started on 

Cellcept goal is to keep her on Prednisone and Cellcept and adjust dose as 

tolerated


She also has diabetic kidney disease


Prognosis for kidney is worse possibility of dialysis is high


Need to arrange her medication as outpatient increase CellCept to 500 mg 3 

times a day


i again discussed above


Cr 2.45


GFR 20


FU as out patient


she needs long term fu with Nephrology


case management to help with assistance


BG are better after cutting  back on Prednisone to 20 mg daily





(2) Pancreatitis


ICD Codes:  K85.90 - Acute pancreatitis without necrosis or infection, 

unspecified


Status:  Acute


Plan:  She has undiagnosed underlying autoimmune disease possibility of SLE 

versus autoimmune hepatitis exists


STEPHANIE was 1:1280 and Antismooth antibody positive in 6/12





(3) Autoimmune disease


ICD Codes:  M35.9 - Systemic involvement of connective tissue, unspecified


Plan:  Continue to monitor she needs to be followed by Delia Nunez MD Sep 21, 2017 13:12

## 2017-09-21 NOTE — HHI.PR
Subjective


Remarks


in no acute distress.


denies pain- no fever.


blood sugar trend noted; had a blood sugar of 600 last evening.





Objective


Vitals





Vital Signs








  Date Time  Temp Pulse Resp B/P (MAP) Pulse Ox O2 Delivery O2 Flow Rate FiO2


 


9/21/17 12:00 96.7 96 18 139/70 (93) 94   


 


9/21/17 07:52 96.6 98 18 151/86 (107) 94   


 


9/21/17 04:20 96.7 74 16 99/71 (80) 99   


 


9/20/17 23:55 96.9 80 18 118/66 (83) 94   


 


9/20/17 20:00 98.6 95 17 124/73 (90) 96   


 


9/20/17 16:00 97.3 92 17 152/85 (107) 96   














I/O      


 


 9/20/17 9/20/17 9/20/17 9/21/17 9/21/17 9/21/17





 07:00 15:00 23:00 07:00 15:00 23:00


 


Intake Total 600 ml 600 ml 360 ml  360 ml 


 


Balance 600 ml 600 ml 360 ml  360 ml 


 


      


 


Intake Oral 600 ml 600 ml 360 ml  360 ml 


 


# Voids 3 3 4  4 


 


# Bowel Movements 0 1 1  0 








Result Diagram:  


9/19/17 0630                                                                   

             9/21/17 0643





Imaging





Last Impressions








Renal Biopsy CT 9/12/17 0000 Signed





Impressions: 





 Service Date/Time:  Tuesday, September 12, 2017 08:59 - CONCLUSION: Successful 





 18 gauge core biopsies of left renal cortex.     Silviano Aggarwal MD ADDENDUM: 

  





 A two hour delayed scan through the kidneys demonstrates stability of the 





 perinephric hematoma with no evidence of increase in the size of this 

hematoma. 





 H&H are also stable. The patient is also clinically stable without evidence of 





 ongoing bleeding status post renal biopsy.   Silviano Aggarwal MD 


 


Liver Ultrasound 9/6/17 0000 Signed





Impressions: 





 Service Date/Time:  Wednesday, September 6, 2017 08:44 - CONCLUSION:  1. 





 Hepatomegaly with heterogeneous echotexture, slightly larger than on prior 

exam 





 in 2016. 2. Shadowing right renal stones without evidence of hydronephrosis.  

   





 Jesse Martínez MD 


 


Renal Ultrasound 9/5/17 0000 Signed





Impressions: 





 Service Date/Time:  Tuesday, September 5, 2017 17:45 - CONCLUSION:  1. 





 Nonobstructing right renal calculi. Right renal cysts. No perinephric fluid. 





 Bladder unremarkable.     Michael Forrest MD 


 


Lung Scan-[facility] Nuclear Medicine 9/2/17 0000 Signed





Impressions: 





 Service Date/Time:  Saturday, September 2, 2017 02:35 - CONCLUSION:  1. Low 





 probability for pulmonary embolus.     Michael Forrest MD 


 


Head CT 9/2/17 0000 Signed





Impressions: 





 Service Date/Time:  Saturday, September 2, 2017 00:29 - CONCLUSION:  Normal 





 examination.       Michael Forrest MD 


 


Chest X-Ray 9/1/17 2217 Signed





Impressions: 





 Service Date/Time:  Friday, September 1, 2017 22:32 - CONCLUSION:  Minimal 





 patchy areas of consolidation or atelectasis at the lateral bases.     Scooter Tatum MD 


 


Abdomen/Pelvis CT 9/1/17 0000 Signed





Impressions: 





 Service Date/Time:  Saturday, September 2, 2017 00:32 - CONCLUSION:  1. No 

acute 





 findings within the abdomen. Partial staghorn type calcifications upper and 





 lower pole right kidney and tiny calculi upper pole left kidney. No bowel 





 obstruction, free air, free fluid or obstructive uropathy. 2. Probable liver 





 cirrhosis. Previous cholecystectomy, appendectomy.     Michael Forrest MD 








Objective Remarks


GENERAL: This is a well-nourished, well-developed patient, in no apparent 

distress.


CARDIOVASCULAR: Regular rate and regular rhythm without murmurs, gallops, or 

rubs. 


RESPIRATORY: Clear to auscultation. Breath sounds equal bilaterally. No wheezes

, rales, or rhonchi.  


GASTROINTESTINAL: Abdomen soft, non-tender, nondistended. 


Normal, active bowel sounds


MUSCULOSKELETAL: Extremities without clubbing, cyanosis, or edema.


NEURO:  Alert & Oriented x4 to person, place, time, situation.  Moves all ext x4


Procedures


kidney biopsy


Medications and IVs





Current Medications


Sodium Chloride (NS Flush) 2 ml UNSCH  PRN IVF FLUSH AFTER USING IV ACCESS Last 

administered on 9/1/17at 22:54;  Start 9/1/17 at 22:30;  Stop 9/2/17 at 02:16;  

Status DC


Nitroglycerin (Nitrostat Sl) 0.3 mg ONCE  ONCE SL ;  Start 9/1/17 at 22:30;  

Stop 9/1/17 at 22:31;  Status DC


Morphine Sulfate (Morphine Inj) 4 mg ONCE  ONCE IV PUSH  Last administered on 9/ 1/17at 22:53;  Start 9/1/17 at 22:30;  Stop 9/1/17 at 22:31;  Status DC


Ondansetron HCl (Zofran Inj) 4 mg ONCE  ONCE IV PUSH  Last administered on 9/1/ 17at 22:54;  Start 9/1/17 at 22:30;  Stop 9/1/17 at 22:31;  Status DC


Sodium Chloride (NS Flush) 2 ml BID IV FLUSH ;  Start 9/2/17 at 09:00;  Stop 9/2 /17 at 09:00;  Status DC


Sodium Chloride (NS Flush) 2 ml UNSCH  PRN IVF FLUSH AFTER USING IV ACCESS;  

Start 9/2/17 at 02:00;  Stop 9/2/17 at 02:16;  Status DC


Famotidine (Pepcid Inj) 20 mg Q12H IV PUSH  Last administered on 9/3/17at 00:18

;  Start 9/2/17 at 02:00;  Stop 9/3/17 at 08:43;  Status DC


Sodium Chloride 1,000 ml @  100 mls/hr Q10H IV  Last administered on 9/4/17at 04

:27;  Start 9/2/17 at 01:59;  Stop 9/4/17 at 10:54;  Status DC


Sodium Chloride (NS Flush) 2 ml UNSCH  PRN IV FLUSH FLUSH AFTER USING IV ACCESS

;  Start 9/2/17 at 02:00


Sodium Chloride (NS Flush) 2 ml BID IV FLUSH  Last administered on 9/21/17at 08:

29;  Start 9/2/17 at 09:00


Ondansetron HCl (Zofran Inj) 4 mg Q6H  PRN IVP NAUSEA OR VOMITING;  Start 9/2/ 17 at 02:00;  Stop 9/4/17 at 10:54;  Status DC


Acetaminophen (Tylenol) 650 mg Q6H  PRN PO FEVER/PAIN SCALE 1 TO 2;  Start 9/2/ 17 at 02:00


Acetaminophen/ Hydrocodone Bitart (Norco  5-325 Mg) 1 tab Q4H  PRN PO PAIN 

SCALE 3 TO 5 Last administered on 9/21/17at 07:45;  Start 9/2/17 at 02:00


Morphine Sulfate (Morphine Inj) 2 mg Q3H  PRN IV Pain 6-10 Last administered on 

9/3/17at 06:21;  Start 9/2/17 at 02:00;  Stop 9/3/17 at 13:16;  Status DC


Senna/Docusate Sodium (Palma-Colace) 1 tab BID PO  Last administered on 9/21/ 17at 08:28;  Start 9/2/17 at 09:00


Magnesium Hydroxide (Milk Of Magnesia Liq) 30 ml Q12H  PRN PO MILD - MODERATE 

CONSTIPATION Last administered on 9/11/17at 19:47;  Start 9/2/17 at 02:00


Sennosides (Senokot) 17.2 mg Q12H  PRN PO MODERATE - SEVERE CONSTIPATION;  

Start 9/2/17 at 02:00;  Stop 9/5/17 at 11:01;  Status DC


Bisacodyl (Dulcolax Supp) 10 mg DAILY  PRN RECTAL SEVERE CONSITIPATION;  Start 9 /2/17 at 02:00


Lactulose (Lactulose Liq) 30 ml DAILY  PRN PO SEVERE CONSITIPATION;  Start 9/2/ 17 at 02:00


Albuterol/ Ipratropium (Duoneb Neb) 1 ampule Q4HR  NEB NEB  Last administered 

on 9/6/17at 08:57;  Start 9/2/17 at 12:00;  Stop 9/6/17 at 11:59;  Status DC


Albuterol Sulfate (Albuterol Neb) 2.5 mg Q2HR NEB  PRN NEB wheezing Last 

administered on 9/6/17at 06:28;  Start 9/2/17 at 10:45


Clonidine (Catapres) 0.1 mg ONCE  ONCE PO  Last administered on 9/3/17at 00:17;

  Start 9/2/17 at 23:45;  Stop 9/2/17 at 23:46;  Status DC


Famotidine (Pepcid Inj) 10 mg Q12H IV PUSH  Last administered on 9/4/17at 01:49

;  Start 9/3/17 at 14:00;  Stop 9/4/17 at 10:54;  Status DC


Levothyroxine Sodium (Synthroid) 150 mcg DAILY@0600 PO  Last administered on 9/ 21/17at 06:51;  Start 9/3/17 at 14:30


Polyethylene Glycol (Miralax) 17 gm DAILY PO  Last administered on 9/21/17at 08:

28;  Start 9/3/17 at 14:45


Glycerin (Glycerin Adult Supp) 2 gm ONCE  ONCE RECTAL  Last administered on 9/3/

17at 17:47;  Start 9/3/17 at 15:00;  Stop 9/3/17 at 15:01;  Status DC


Mirtazapine (Remeron) 15 mg HS PO  Last administered on 9/13/17at 21:44;  Start 

9/4/17 at 21:00;  Stop 9/14/17 at 13:04;  Status DC


Ondansetron HCl (Zofran  Odt) 4 mg Q6H  PRN PO nausea;  Start 9/4/17 at 11:00


Pantoprazole Sodium (Protonix) 40 mg DAILY PO  Last administered on 9/21/17at 08

:28;  Start 9/5/17 at 11:00


Methylprednisolone Sodium Succinate (SoluMEDROL INJ) 60 mg Q12H IV PUSH  Last 

administered on 9/15/17at 14:52;  Start 9/6/17 at 14:00;  Stop 9/15/17 at 20:05

;  Status DC


Sodium Chloride 1,000 ml @  84 mls/hr [id number] IV  Last administered on 9/19/ 17at 03:05;  Start 9/7/17 at 17:00;  Stop 9/19/17 at 13:39;  Status DC


Megestrol Acetate (Megace Liq) 400 mg BIDAC PO  Last administered on 9/21/17at 

06:49;  Start 9/7/17 at 16:30


Lactated Ringer's 1,000 ml @  30 mls/hr Q24H PRN IV SEE LABEL COMMENTS;  Start 9 /8/17 at 03:15;  Stop 9/11/17 at 03:14;  Status DC


Sodium Chloride 500 ml @  30 mls/hr [id number] PRN IV SEE LABEL COMMENTS;  Start 9/ 8/17 at 03:15;  Stop 9/11/17 at 03:14;  Status DC


Povidone Iodine (Betadine 5% Antisepsis Kit) 1 applic ON CALL  PRN EACH NARE 

SEE LABEL COMMENTS;  Start 9/8/17 at 03:15;  Stop 9/11/17 at 03:14;  Status DC


Chlorhexidine Gluconate (Chlorhexidine 2% Cloth) 3 pack ON CALL  PRN TOPICAL 

SEE LABEL COMMENTS;  Start 9/8/17 at 03:15;  Stop 9/11/17 at 03:14;  Status DC


Ketamine HCl (Ketalar Inj) 500 mg STK-MED ONCE .ROUTE ;  Start 9/8/17 at 08:55;

  Stop 9/8/17 at 08:56;  Status DC


Miscellaneous Information ALL NURSING DEPARTME... UNSCH  PRN .XX SEE LABEL 

COMMENTS;  Start 9/8/17 at 10:53;  Stop 9/9/17 at 10:52;  Status DC


Fluoxetine HCl (PROzac) 10 mg ONCE  ONCE PO  Last administered on 9/8/17at 17:45

;  Start 9/8/17 at 17:30;  Stop 9/8/17 at 17:36;  Status DC


Fluoxetine HCl (PROzac) 10 mg DAILY PO  Last administered on 9/13/17at 07:43;  

Start 9/9/17 at 09:00;  Stop 9/13/17 at 15:01;  Status DC


Sodium Chloride 1,000 ml @  30 mls/hr Q24H IV  Last administered on 9/11/17at 18

:17;  Start 9/11/17 at 18:00


Clonidine (Catapres) 0.1 mg ONCE  ONCE PO  Last administered on 9/12/17at 02:28

;  Start 9/12/17 at 02:15;  Stop 9/12/17 at 02:16;  Status DC


Fentanyl Citrate (fentaNYL INJ) 100 mcg STK-MED ONCE .ROUTE  Last administered 

on 9/12/17at 08:15;  Start 9/12/17 at 08:15;  Stop 9/12/17 at 08:16;  Status DC


Midazolam HCl (Versed Inj) 5 mg STK-MED ONCE .ROUTE  Last administered on 9/12/ 17at 08:15;  Start 9/12/17 at 08:15;  Stop 9/12/17 at 08:16;  Status DC


Lidocaine HCl (Xylocaine 1% Inj) 20 ml STK-MED ONCE .ROUTE  Last administered 

on 9/12/17at 08:19;  Start 9/12/17 at 08:19;  Stop 9/12/17 at 08:20;  Status DC


Midazolam HCl (Versed Inj) 2 mg STK-MED ONCE IV ;  Start 9/8/17 at 12:00;  Stop 

9/12/17 at 10:29;  Status DC


Propofol (Diprivan  200 Mg/20 ml Inj) 30 mg STK-MED ONCE IV ;  Start 9/8/17 at 

12:00;  Stop 9/12/17 at 10:29;  Status DC


Acetaminophen/ Hydrocodone Bitart (Norco  Mg) 1 tab Q6H  PRN PO pain 6-10 

Last administered on 9/19/17at 06:27;  Start 9/13/17 at 15:30


Mirtazapine (Remeron) 30 mg HS PO  Last administered on 9/20/17at 22:46;  Start 

9/14/17 at 21:00


Clonazepam (KlonoPIN) 0.5 mg Q12HR PO  Last administered on 9/21/17at 08:28;  

Start 9/14/17 at 13:30


Mycophenolate Mofetil (Cellcept) 500 mg BID@06,18 PO  Last administered on 9/18/ 17at 17:12;  Start 9/15/17 at 18:00;  Stop 9/18/17 at 19:07;  Status DC


Prednisone (Deltasone) 60 mg DAILY PO ;  Start 9/16/17 at 09:00;  Stop 9/16/17 

at 10:33;  Status DC


Prednisone (Deltasone) 60 mg DAILY PO  Last administered on 9/19/17at 09:31;  

Start 9/16/17 at 11:00;  Stop 9/19/17 at 11:01;  Status DC


Mycophenolate Mofetil (Cellcept) 500 mg Q8HR PO  Last administered on 9/21/17at 

06:50;  Start 9/18/17 at 22:00


Prednisone (Deltasone) 40 mg DAILY PO  Last administered on 9/20/17at 08:05;  

Start 9/20/17 at 09:00;  Stop 9/20/17 at 12:12;  Status DC


Dextrose (D50w (Vial) Inj) 50 ml UNSCH  PRN IV PUSH HYPOGLYCEMIA-SEE COMMENTS;  

Start 9/19/17 at 13:30;  Stop 9/19/17 at 18:39;  Status DC


Glucagon (Glucagon Inj) 1 mg UNSCH  PRN OTHER HYPOGLYCEMIA-SEE COMMENTS;  Start 

9/19/17 at 13:30;  Stop 9/19/17 at 18:39;  Status DC


Insulin Human Regular (NovoLIN R SUPPLEMENTAL SCALE) 1 ACHS SLIDING  SCALE SQ ;

  Start 9/19/17 at 17:00;  Stop 9/19/17 at 18:38;  Status DC


Dextrose (D50w (Vial) Inj) 50 ml UNSCH  PRN IV PUSH HYPOGLYCEMIA - SEE COMMENTS

;  Start 9/19/17 at 19:00;  Stop 9/19/17 at 19:00;  Status DC


Glucagon (Glucagon Inj) 1 mg UNSCH  PRN OTHER HYPOGLYCEMIA-SEE COMMENTS;  Start 

9/19/17 at 19:00;  Stop 9/19/17 at 19:00;  Status DC


Insulin Human Regular (NovoLIN R SUPPLEMENTAL SCALE) 1 ACHS SLIDING  SCALE SQ  

Last administered on 9/21/17at 08:38;  Start 9/19/17 at 17:00


Dextrose (D50w (Vial) Inj) 50 ml UNSCH  PRN IV PUSH HYPOGLYCEMIA - SEE COMMENTS

;  Start 9/19/17 at 17:00


Glucagon (Glucagon Inj) 1 mg UNSCH  PRN OTHER HYPOGLYCEMIA-SEE COMMENTS;  Start 

9/19/17 at 17:00


Insulin Human Regular (NovoLIN R INJ) 10 units UNSCH X1  PRN IV PUSH if BS>350 

on recheck Last administered on 9/20/17at 01:10;  Start 9/20/17 at 00:45;  Stop 

9/20/17 at 01:30;  Status DC


Prednisone (Deltasone) 20 mg DAILY PO  Last administered on 9/21/17at 08:28;  

Start 9/21/17 at 09:00


Insulin Human Regular (NovoLIN R INJ) 10 units ONCE  ONCE IV PUSH  Last 

administered on 9/20/17at 18:02;  Start 9/20/17 at 17:00;  Stop 9/20/17 at 17:37

;  Status DC





A/P


Assessment and Plan


A/P





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





   FOCAL PROLIFERATIVE AND NECROTIZING GLOMERULONEPHRITIS.  


   NODULAR DIABETIC GLOMERULOSCLEROSIS.  


   GLOBAL& SEGMENTAL GLOMERULOSCLEROSIS.  


   INTERSTITIAL FIBROSIS AND TUBULAR ATROPHY, SEVERE.  


continue prednisone and Cellcept.


Will need rheumatology evaluation- as outpatient and this was d/w the patient.





-  possible autoimmune hepatitis- GI follow-up appreciated- GI has signed off- f

/u as outpatient.   


-hyperglycemia- steroid induced- A1c 5.5- started on accu-check with sliding 

scale coverage.


  received a dose of IV regular insulin last night- blood sugar levels expected 

to improve as the dose of prednisone  being reduced- will consider adding 

levemir if accu-checks remain elevated.


   continue to monitor with sliding scale coverage today.


-  Severe hypothyroidism - free T4 is low, severe hypothyroidism based on TSH 

being over 100 , continue synthroid. 


-  Generalized weakness - likely secondary to hypothyroidism, continue therapy


-  VALENTIN on CKD- renal US unremarkable, SLE by serology as mentioned above, 

continue prednisone and Cellcept. nephrology following.


- Worsening depression:Appreciate psychiatry consultation, no need for psych 

hospitalization, increased Remeron and add clonazepam for anxiety


-   DVT Prophylaxis:  SCD/Teds.


Discharge Planning


will watch and monitor the blood sugar levels till tomorrow; will dc home 

tomorrow if stable.


d/w the case management.











Dorothy Lozoya MD Sep 21, 2017 12:22

## 2017-09-22 VITALS
RESPIRATION RATE: 16 BRPM | HEART RATE: 81 BPM | OXYGEN SATURATION: 97 % | SYSTOLIC BLOOD PRESSURE: 137 MMHG | TEMPERATURE: 97.3 F | DIASTOLIC BLOOD PRESSURE: 84 MMHG

## 2017-09-22 VITALS
HEART RATE: 90 BPM | SYSTOLIC BLOOD PRESSURE: 133 MMHG | TEMPERATURE: 97.8 F | DIASTOLIC BLOOD PRESSURE: 75 MMHG | OXYGEN SATURATION: 93 % | RESPIRATION RATE: 18 BRPM

## 2017-09-22 VITALS
TEMPERATURE: 98.3 F | OXYGEN SATURATION: 95 % | RESPIRATION RATE: 18 BRPM | SYSTOLIC BLOOD PRESSURE: 137 MMHG | DIASTOLIC BLOOD PRESSURE: 71 MMHG | HEART RATE: 85 BPM

## 2017-09-22 VITALS
TEMPERATURE: 97.5 F | SYSTOLIC BLOOD PRESSURE: 156 MMHG | DIASTOLIC BLOOD PRESSURE: 89 MMHG | HEART RATE: 93 BPM | RESPIRATION RATE: 18 BRPM | OXYGEN SATURATION: 96 %

## 2017-09-22 VITALS
DIASTOLIC BLOOD PRESSURE: 69 MMHG | SYSTOLIC BLOOD PRESSURE: 120 MMHG | OXYGEN SATURATION: 95 % | RESPIRATION RATE: 16 BRPM | TEMPERATURE: 97.9 F | HEART RATE: 86 BPM

## 2017-09-22 VITALS
TEMPERATURE: 97.1 F | DIASTOLIC BLOOD PRESSURE: 68 MMHG | RESPIRATION RATE: 16 BRPM | HEART RATE: 97 BPM | SYSTOLIC BLOOD PRESSURE: 120 MMHG | OXYGEN SATURATION: 97 %

## 2017-09-22 RX ADMIN — Medication SCH ML: at 20:55

## 2017-09-22 RX ADMIN — Medication SCH ML: at 09:00

## 2017-09-22 RX ADMIN — LEVOTHYROXINE SODIUM SCH MCG: 150 TABLET ORAL at 08:00

## 2017-09-22 RX ADMIN — MIRTAZAPINE SCH MG: 15 TABLET, FILM COATED ORAL at 20:55

## 2017-09-22 RX ADMIN — HYDROCODONE BITARTRATE AND ACETAMINOPHEN PRN TAB: 5; 325 TABLET ORAL at 11:45

## 2017-09-22 RX ADMIN — POLYETHYLENE GLYCOL 3350 SCH GM: 17 POWDER, FOR SOLUTION ORAL at 07:59

## 2017-09-22 RX ADMIN — MYCOPHENOLATE MOFETIL SCH MG: 500 TABLET, FILM COATED ORAL at 07:57

## 2017-09-22 RX ADMIN — HYDROCODONE BITARTRATE AND ACETAMINOPHEN PRN TAB: 5; 325 TABLET ORAL at 16:34

## 2017-09-22 RX ADMIN — HUMAN INSULIN SCH: 100 INJECTION, SOLUTION SUBCUTANEOUS at 08:00

## 2017-09-22 RX ADMIN — STANDARDIZED SENNA CONCENTRATE AND DOCUSATE SODIUM SCH TAB: 8.6; 5 TABLET, FILM COATED ORAL at 20:55

## 2017-09-22 RX ADMIN — HYDROCODONE BITARTRATE AND ACETAMINOPHEN PRN TAB: 5; 325 TABLET ORAL at 20:55

## 2017-09-22 RX ADMIN — MYCOPHENOLATE MOFETIL SCH MG: 500 TABLET, FILM COATED ORAL at 20:55

## 2017-09-22 RX ADMIN — MYCOPHENOLATE MOFETIL SCH MG: 500 TABLET, FILM COATED ORAL at 15:03

## 2017-09-22 RX ADMIN — STANDARDIZED SENNA CONCENTRATE AND DOCUSATE SODIUM SCH TAB: 8.6; 5 TABLET, FILM COATED ORAL at 07:59

## 2017-09-22 RX ADMIN — HYDROCODONE BITARTRATE AND ACETAMINOPHEN PRN TAB: 5; 325 TABLET ORAL at 07:57

## 2017-09-22 RX ADMIN — HUMAN INSULIN SCH: 100 INJECTION, SOLUTION SUBCUTANEOUS at 16:40

## 2017-09-22 RX ADMIN — HUMAN INSULIN SCH: 100 INJECTION, SOLUTION SUBCUTANEOUS at 12:00

## 2017-09-22 RX ADMIN — PANTOPRAZOLE SCH MG: 40 TABLET, DELAYED RELEASE ORAL at 07:59

## 2017-09-22 RX ADMIN — PHENYTOIN SODIUM SCH MLS/HR: 50 INJECTION INTRAMUSCULAR; INTRAVENOUS at 18:00

## 2017-09-22 RX ADMIN — HUMAN INSULIN SCH: 100 INJECTION, SOLUTION SUBCUTANEOUS at 21:04

## 2017-09-22 NOTE — HHI.PR
Subjective


Remarks


in no acute distress.


denies pain.


but has some on and off blurred vision.


blood sugar trend noted.





Objective


Vitals





Vital Signs








  Date Time  Temp Pulse Resp B/P (MAP) Pulse Ox O2 Delivery O2 Flow Rate FiO2


 


9/22/17 08:00 97.5 93 18 156/89 (111) 96   


 


9/22/17 04:05 97.1 97 16 120/68 (85) 97   


 


9/22/17 00:10 97.3 81 16 137/84 (101) 97   


 


9/21/17 20:00 96.3 86 17 142/73 (96) 96   


 


9/21/17 15:59 97.7 91 18 124/70 (88) 93   


 


9/21/17 12:00 96.7 96 18 139/70 (93) 94   














I/O      


 


 9/21/17 9/21/17 9/21/17 9/22/17 9/22/17 9/22/17





 07:00 15:00 23:00 07:00 15:00 23:00


 


Intake Total  1210 ml 960 ml 360 ml  


 


Balance  1210 ml 960 ml 360 ml  


 


      


 


Intake Oral  1210 ml 960 ml 360 ml  


 


# Voids  8 3 2  


 


# Bowel Movements  0    








Result Diagram:  


9/19/17 0630                                                                   

             9/21/17 0643





Imaging





Last Impressions








Renal Biopsy CT 9/12/17 0000 Signed





Impressions: 





 Service Date/Time:  Tuesday, September 12, 2017 08:59 - CONCLUSION: Successful 





 18 gauge core biopsies of left renal cortex.     Silviano Aggarwal MD ADDENDUM: 

  





 A two hour delayed scan through the kidneys demonstrates stability of the 





 perinephric hematoma with no evidence of increase in the size of this 

hematoma. 





 H&H are also stable. The patient is also clinically stable without evidence of 





 ongoing bleeding status post renal biopsy.   Silviano Aggarwal MD 


 


Liver Ultrasound 9/6/17 0000 Signed





Impressions: 





 Service Date/Time:  Wednesday, September 6, 2017 08:44 - CONCLUSION:  1. 





 Hepatomegaly with heterogeneous echotexture, slightly larger than on prior 

exam 





 in 2016. 2. Shadowing right renal stones without evidence of hydronephrosis.  

   





 Jesse Martínez MD 


 


Renal Ultrasound 9/5/17 0000 Signed





Impressions: 





 Service Date/Time:  Tuesday, September 5, 2017 17:45 - CONCLUSION:  1. 





 Nonobstructing right renal calculi. Right renal cysts. No perinephric fluid. 





 Bladder unremarkable.     Michael Forrest MD 


 


Lung Scan-[facility] Nuclear Medicine 9/2/17 0000 Signed





Impressions: 





 Service Date/Time:  Saturday, September 2, 2017 02:35 - CONCLUSION:  1. Low 





 probability for pulmonary embolus.     Michael Forrest MD 


 


Head CT 9/2/17 0000 Signed





Impressions: 





 Service Date/Time:  Saturday, September 2, 2017 00:29 - CONCLUSION:  Normal 





 examination.       Michael Forrest MD 


 


Chest X-Ray 9/1/17 2217 Signed





Impressions: 





 Service Date/Time:  Friday, September 1, 2017 22:32 - CONCLUSION:  Minimal 





 patchy areas of consolidation or atelectasis at the lateral bases.     Scooter Tatum MD 


 


Abdomen/Pelvis CT 9/1/17 0000 Signed





Impressions: 





 Service Date/Time:  Saturday, September 2, 2017 00:32 - CONCLUSION:  1. No 

acute 





 findings within the abdomen. Partial staghorn type calcifications upper and 





 lower pole right kidney and tiny calculi upper pole left kidney. No bowel 





 obstruction, free air, free fluid or obstructive uropathy. 2. Probable liver 





 cirrhosis. Previous cholecystectomy, appendectomy.     Michael Forrest MD 








Objective Remarks


GENERAL: This is a well-nourished, well-developed patient, in no apparent 

distress.


CARDIOVASCULAR: Regular rate and regular rhythm without murmurs, gallops, or 

rubs. 


RESPIRATORY: Clear to auscultation. Breath sounds equal bilaterally. No wheezes

, rales, or rhonchi.  


GASTROINTESTINAL: Abdomen soft, non-tender, nondistended. 


Normal, active bowel sounds


MUSCULOSKELETAL: Extremities without clubbing, cyanosis, or edema.


NEURO:  Alert & Oriented x4 to person, place, time, situation.  Moves all ext x4


Procedures


kidney biopsy


Medications and IVs





Current Medications


Sodium Chloride (NS Flush) 2 ml UNSCH  PRN IVF FLUSH AFTER USING IV ACCESS Last 

administered on 9/1/17at 22:54;  Start 9/1/17 at 22:30;  Stop 9/2/17 at 02:16;  

Status DC


Nitroglycerin (Nitrostat Sl) 0.3 mg ONCE  ONCE SL ;  Start 9/1/17 at 22:30;  

Stop 9/1/17 at 22:31;  Status DC


Morphine Sulfate (Morphine Inj) 4 mg ONCE  ONCE IV PUSH  Last administered on 9/ 1/17at 22:53;  Start 9/1/17 at 22:30;  Stop 9/1/17 at 22:31;  Status DC


Ondansetron HCl (Zofran Inj) 4 mg ONCE  ONCE IV PUSH  Last administered on 9/1/ 17at 22:54;  Start 9/1/17 at 22:30;  Stop 9/1/17 at 22:31;  Status DC


Sodium Chloride (NS Flush) 2 ml BID IV FLUSH ;  Start 9/2/17 at 09:00;  Stop 9/2 /17 at 09:00;  Status DC


Sodium Chloride (NS Flush) 2 ml UNSCH  PRN IVF FLUSH AFTER USING IV ACCESS;  

Start 9/2/17 at 02:00;  Stop 9/2/17 at 02:16;  Status DC


Famotidine (Pepcid Inj) 20 mg Q12H IV PUSH  Last administered on 9/3/17at 00:18

;  Start 9/2/17 at 02:00;  Stop 9/3/17 at 08:43;  Status DC


Sodium Chloride 1,000 ml @  100 mls/hr Q10H IV  Last administered on 9/4/17at 04

:27;  Start 9/2/17 at 01:59;  Stop 9/4/17 at 10:54;  Status DC


Sodium Chloride (NS Flush) 2 ml UNSCH  PRN IV FLUSH FLUSH AFTER USING IV ACCESS

;  Start 9/2/17 at 02:00


Sodium Chloride (NS Flush) 2 ml BID IV FLUSH  Last administered on 9/21/17at 22:

52;  Start 9/2/17 at 09:00


Ondansetron HCl (Zofran Inj) 4 mg Q6H  PRN IVP NAUSEA OR VOMITING;  Start 9/2/ 17 at 02:00;  Stop 9/4/17 at 10:54;  Status DC


Acetaminophen (Tylenol) 650 mg Q6H  PRN PO FEVER/PAIN SCALE 1 TO 2;  Start 9/2/ 17 at 02:00


Acetaminophen/ Hydrocodone Bitart (Norco  5-325 Mg) 1 tab Q4H  PRN PO PAIN 

SCALE 3 TO 5 Last administered on 9/22/17at 07:57;  Start 9/2/17 at 02:00


Morphine Sulfate (Morphine Inj) 2 mg Q3H  PRN IV Pain 6-10 Last administered on 

9/3/17at 06:21;  Start 9/2/17 at 02:00;  Stop 9/3/17 at 13:16;  Status DC


Senna/Docusate Sodium (Palma-Colace) 1 tab BID PO  Last administered on 9/22/ 17at 07:59;  Start 9/2/17 at 09:00


Magnesium Hydroxide (Milk Of Magnesia Liq) 30 ml Q12H  PRN PO MILD - MODERATE 

CONSTIPATION Last administered on 9/11/17at 19:47;  Start 9/2/17 at 02:00


Sennosides (Senokot) 17.2 mg Q12H  PRN PO MODERATE - SEVERE CONSTIPATION;  

Start 9/2/17 at 02:00;  Stop 9/5/17 at 11:01;  Status DC


Bisacodyl (Dulcolax Supp) 10 mg DAILY  PRN RECTAL SEVERE CONSITIPATION;  Start 9 /2/17 at 02:00


Lactulose (Lactulose Liq) 30 ml DAILY  PRN PO SEVERE CONSITIPATION;  Start 9/2/ 17 at 02:00


Albuterol/ Ipratropium (Duoneb Neb) 1 ampule Q4HR  NEB NEB  Last administered 

on 9/6/17at 08:57;  Start 9/2/17 at 12:00;  Stop 9/6/17 at 11:59;  Status DC


Albuterol Sulfate (Albuterol Neb) 2.5 mg Q2HR NEB  PRN NEB wheezing Last 

administered on 9/6/17at 06:28;  Start 9/2/17 at 10:45


Clonidine (Catapres) 0.1 mg ONCE  ONCE PO  Last administered on 9/3/17at 00:17;

  Start 9/2/17 at 23:45;  Stop 9/2/17 at 23:46;  Status DC


Famotidine (Pepcid Inj) 10 mg Q12H IV PUSH  Last administered on 9/4/17at 01:49

;  Start 9/3/17 at 14:00;  Stop 9/4/17 at 10:54;  Status DC


Levothyroxine Sodium (Synthroid) 150 mcg DAILY@0600 PO  Last administered on 9/ 22/17at 08:00;  Start 9/3/17 at 14:30


Polyethylene Glycol (Miralax) 17 gm DAILY PO  Last administered on 9/21/17at 08:

28;  Start 9/3/17 at 14:45


Glycerin (Glycerin Adult Supp) 2 gm ONCE  ONCE RECTAL  Last administered on 9/3/

17at 17:47;  Start 9/3/17 at 15:00;  Stop 9/3/17 at 15:01;  Status DC


Mirtazapine (Remeron) 15 mg HS PO  Last administered on 9/13/17at 21:44;  Start 

9/4/17 at 21:00;  Stop 9/14/17 at 13:04;  Status DC


Ondansetron HCl (Zofran  Odt) 4 mg Q6H  PRN PO nausea;  Start 9/4/17 at 11:00


Pantoprazole Sodium (Protonix) 40 mg DAILY PO  Last administered on 9/22/17at 07

:59;  Start 9/5/17 at 11:00


Methylprednisolone Sodium Succinate (SoluMEDROL INJ) 60 mg Q12H IV PUSH  Last 

administered on 9/15/17at 14:52;  Start 9/6/17 at 14:00;  Stop 9/15/17 at 20:05

;  Status DC


Sodium Chloride 1,000 ml @  84 mls/hr [id number] IV  Last administered on 9/19/ 17at 03:05;  Start 9/7/17 at 17:00;  Stop 9/19/17 at 13:39;  Status DC


Megestrol Acetate (Megace Liq) 400 mg BIDAC PO  Last administered on 9/21/17at 

06:49;  Start 9/7/17 at 16:30;  Stop 9/21/17 at 13:11;  Status DC


Lactated Ringer's 1,000 ml @  30 mls/hr Q24H PRN IV SEE LABEL COMMENTS;  Start 9 /8/17 at 03:15;  Stop 9/11/17 at 03:14;  Status DC


Sodium Chloride 500 ml @  30 mls/hr [id number] PRN IV SEE LABEL COMMENTS;  Start 9/ 8/17 at 03:15;  Stop 9/11/17 at 03:14;  Status DC


Povidone Iodine (Betadine 5% Antisepsis Kit) 1 applic ON CALL  PRN EACH NARE 

SEE LABEL COMMENTS;  Start 9/8/17 at 03:15;  Stop 9/11/17 at 03:14;  Status DC


Chlorhexidine Gluconate (Chlorhexidine 2% Cloth) 3 pack ON CALL  PRN TOPICAL 

SEE LABEL COMMENTS;  Start 9/8/17 at 03:15;  Stop 9/11/17 at 03:14;  Status DC


Ketamine HCl (Ketalar Inj) 500 mg STK-MED ONCE .ROUTE ;  Start 9/8/17 at 08:55;

  Stop 9/8/17 at 08:56;  Status DC


Miscellaneous Information ALL NURSING DEPARTME... UNSCH  PRN .XX SEE LABEL 

COMMENTS;  Start 9/8/17 at 10:53;  Stop 9/9/17 at 10:52;  Status DC


Fluoxetine HCl (PROzac) 10 mg ONCE  ONCE PO  Last administered on 9/8/17at 17:45

;  Start 9/8/17 at 17:30;  Stop 9/8/17 at 17:36;  Status DC


Fluoxetine HCl (PROzac) 10 mg DAILY PO  Last administered on 9/13/17at 07:43;  

Start 9/9/17 at 09:00;  Stop 9/13/17 at 15:01;  Status DC


Sodium Chloride 1,000 ml @  30 mls/hr Q24H IV  Last administered on 9/11/17at 18

:17;  Start 9/11/17 at 18:00


Clonidine (Catapres) 0.1 mg ONCE  ONCE PO  Last administered on 9/12/17at 02:28

;  Start 9/12/17 at 02:15;  Stop 9/12/17 at 02:16;  Status DC


Fentanyl Citrate (fentaNYL INJ) 100 mcg STK-MED ONCE .ROUTE  Last administered 

on 9/12/17at 08:15;  Start 9/12/17 at 08:15;  Stop 9/12/17 at 08:16;  Status DC


Midazolam HCl (Versed Inj) 5 mg STK-MED ONCE .ROUTE  Last administered on 9/12/ 17at 08:15;  Start 9/12/17 at 08:15;  Stop 9/12/17 at 08:16;  Status DC


Lidocaine HCl (Xylocaine 1% Inj) 20 ml STK-MED ONCE .ROUTE  Last administered 

on 9/12/17at 08:19;  Start 9/12/17 at 08:19;  Stop 9/12/17 at 08:20;  Status DC


Midazolam HCl (Versed Inj) 2 mg STK-MED ONCE IV ;  Start 9/8/17 at 12:00;  Stop 

9/12/17 at 10:29;  Status DC


Propofol (Diprivan  200 Mg/20 ml Inj) 30 mg STK-MED ONCE IV ;  Start 9/8/17 at 

12:00;  Stop 9/12/17 at 10:29;  Status DC


Acetaminophen/ Hydrocodone Bitart (Norco  Mg) 1 tab Q6H  PRN PO pain 6-10 

Last administered on 9/19/17at 06:27;  Start 9/13/17 at 15:30


Mirtazapine (Remeron) 30 mg HS PO  Last administered on 9/21/17at 22:53;  Start 

9/14/17 at 21:00


Clonazepam (KlonoPIN) 0.5 mg Q12HR PO  Last administered on 9/22/17at 07:59;  

Start 9/14/17 at 13:30


Mycophenolate Mofetil (Cellcept) 500 mg BID@06,18 PO  Last administered on 9/18/ 17at 17:12;  Start 9/15/17 at 18:00;  Stop 9/18/17 at 19:07;  Status DC


Prednisone (Deltasone) 60 mg DAILY PO ;  Start 9/16/17 at 09:00;  Stop 9/16/17 

at 10:33;  Status DC


Prednisone (Deltasone) 60 mg DAILY PO  Last administered on 9/19/17at 09:31;  

Start 9/16/17 at 11:00;  Stop 9/19/17 at 11:01;  Status DC


Mycophenolate Mofetil (Cellcept) 500 mg Q8HR PO  Last administered on 9/22/17at 

07:57;  Start 9/18/17 at 22:00


Prednisone (Deltasone) 40 mg DAILY PO  Last administered on 9/20/17at 08:05;  

Start 9/20/17 at 09:00;  Stop 9/20/17 at 12:12;  Status DC


Dextrose (D50w (Vial) Inj) 50 ml UNSCH  PRN IV PUSH HYPOGLYCEMIA-SEE COMMENTS;  

Start 9/19/17 at 13:30;  Stop 9/19/17 at 18:39;  Status DC


Glucagon (Glucagon Inj) 1 mg UNSCH  PRN OTHER HYPOGLYCEMIA-SEE COMMENTS;  Start 

9/19/17 at 13:30;  Stop 9/19/17 at 18:39;  Status DC


Insulin Human Regular (NovoLIN R SUPPLEMENTAL SCALE) 1 ACHS SLIDING  SCALE SQ ;

  Start 9/19/17 at 17:00;  Stop 9/19/17 at 18:38;  Status DC


Dextrose (D50w (Vial) Inj) 50 ml UNSCH  PRN IV PUSH HYPOGLYCEMIA - SEE COMMENTS

;  Start 9/19/17 at 19:00;  Stop 9/19/17 at 19:00;  Status DC


Glucagon (Glucagon Inj) 1 mg UNSCH  PRN OTHER HYPOGLYCEMIA-SEE COMMENTS;  Start 

9/19/17 at 19:00;  Stop 9/19/17 at 19:00;  Status DC


Insulin Human Regular (NovoLIN R SUPPLEMENTAL SCALE) 1 ACHS SLIDING  SCALE SQ  

Last administered on 9/21/17at 21:00;  Start 9/19/17 at 17:00


Dextrose (D50w (Vial) Inj) 50 ml UNSCH  PRN IV PUSH HYPOGLYCEMIA - SEE COMMENTS

;  Start 9/19/17 at 17:00


Glucagon (Glucagon Inj) 1 mg UNSCH  PRN OTHER HYPOGLYCEMIA-SEE COMMENTS;  Start 

9/19/17 at 17:00


Insulin Human Regular (NovoLIN R INJ) 10 units UNSCH X1  PRN IV PUSH if BS>350 

on recheck Last administered on 9/20/17at 01:10;  Start 9/20/17 at 00:45;  Stop 

9/20/17 at 01:30;  Status DC


Prednisone (Deltasone) 20 mg DAILY PO  Last administered on 9/22/17at 07:59;  

Start 9/21/17 at 09:00


Insulin Human Regular (NovoLIN R INJ) 10 units ONCE  ONCE IV PUSH  Last 

administered on 9/20/17at 18:02;  Start 9/20/17 at 17:00;  Stop 9/20/17 at 17:37

;  Status DC





A/P


Assessment and Plan


A/P





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





   FOCAL PROLIFERATIVE AND NECROTIZING GLOMERULONEPHRITIS.  


   NODULAR DIABETIC GLOMERULOSCLEROSIS.  


   GLOBAL& SEGMENTAL GLOMERULOSCLEROSIS.  


   INTERSTITIAL FIBROSIS AND TUBULAR ATROPHY, SEVERE.  


continue prednisone and Cellcept.


Will need rheumatology evaluation- as outpatient and this was d/w the patient.





-  possible autoimmune hepatitis- GI follow-up appreciated- GI has signed off- f

/u as outpatient.   


-hyperglycemia- steroid induced- A1c 5.5- started on accu-check with sliding 

scale coverage.


  blood sugar trend noted; runs around 300's despite decreasing prednisone and 

sliding scale coverge;


  will start levemir and consult diabetic educator.


  this was d/w the patient.


-  Severe hypothyroidism - free T4 is low, severe hypothyroidism based on TSH 

being over 100 , continue synthroid. 


-  Generalized weakness - likely secondary to hypothyroidism, continue therapy


-  VALENTIN on CKD- renal US unremarkable, SLE by serology as mentioned above, 

continue prednisone and Cellcept. nephrology following.


- Worsening depression:Appreciate psychiatry consultation, no need for psych 

hospitalization, increased Remeron and add clonazepam for anxiety


-   DVT Prophylaxis:  SCD/Teds.


Discharge Planning


blood sugar levels still elevated despite reducing prednisone and receiving 

coverage.


will start long- acting insulin and diabetic educator will be consulted.


dc home within the next 24-48 hrs- if stable.


d/w the case management.











Dorothy Lozoya MD Sep 22, 2017 09:41

## 2017-09-22 NOTE — HHI.NPPN
Subjective


History of Present Illness


62 year old with ARF/Pancreatitis/Cirrhosis Autoimmune disease





Review of Systems


General


Constitutional:  Fatigue





Gastrointestinal


Gastrointestinal:  Abdominal Pain





Musculoskeletal


MS:  Pain/Stiffness





Objective Data


Data





Vital Signs








  Date Time  Temp Pulse Resp B/P (MAP) Pulse Ox O2 Delivery O2 Flow Rate FiO2


 


9/22/17 08:00 97.5 93 18 156/89 (111) 96   


 


9/22/17 04:05 97.1 97 16 120/68 (85) 97   


 


9/22/17 00:10 97.3 81 16 137/84 (101) 97   


 


9/21/17 20:00 96.3 86 17 142/73 (96) 96   


 


9/21/17 15:59 97.7 91 18 124/70 (88) 93   








-:  


9/19/17 0630                                                                   

             9/21/17 0643








Physical Exam


General


Appearance:  Well Developed





Neck


Neck Exam:  Neck Supple





Pulmonary


Resp Exam:  Clear Bilaterally, Breath Sounds Equal





Cardiology


CV Exam:  Regular, Normal Sinus Rhythm





Gastrointestinal/Abdomen


GI Exam:  Soft, Bowel Sounds Present





Extremeties


Extremities Exam:  No Edema





Assessment/Plan


Problem List:  


(1) VALENTIN (acute kidney injury)


ICD Codes:  N17.9 - Acute kidney failure, unspecified


Plan:  ARF Autoimmune process, has advanced kidney disease due to lupus 

nephritis and diabetic kidney disease


STEPHANIE positive


C3, C4 low


 SLE by serology STEPHANIE 1:1280 Anti dsDNA 775 very high


follow renal functions


on prednisone Cr 2.45


 kidney biopsy, showed Focal Proliferative GN, with chronicity started on 

Cellcept goal is to keep her on Prednisone and Cellcept and adjust dose as 

tolerated


She also has diabetic kidney disease


Prognosis for kidney is worse possibility of dialysis is high


Need to arrange her medication as outpatient increase CellCept to 500 mg 3 

times a day


I again discussed above


Cr 2.45


GFR 20


FU as out patient


she needs long term fu with Nephrology


she is here for diabetes management


i will follow as out patient





(2) Pancreatitis


ICD Codes:  K85.90 - Acute pancreatitis without necrosis or infection, 

unspecified


Status:  Acute


Plan:  She has undiagnosed underlying autoimmune disease possibility of SLE 

versus autoimmune hepatitis exists


STEPHANIE was 1:1280 and Antismooth antibody positive in 6/12





(3) Autoimmune disease


ICD Codes:  M35.9 - Systemic involvement of connective tissue, unspecified


Plan:  Continue to monitor she needs to be followed by Delia Nunez MD Sep 22, 2017 13:49

## 2017-09-23 VITALS
DIASTOLIC BLOOD PRESSURE: 73 MMHG | SYSTOLIC BLOOD PRESSURE: 132 MMHG | HEART RATE: 82 BPM | OXYGEN SATURATION: 95 % | RESPIRATION RATE: 17 BRPM | TEMPERATURE: 98.1 F

## 2017-09-23 VITALS
DIASTOLIC BLOOD PRESSURE: 78 MMHG | SYSTOLIC BLOOD PRESSURE: 123 MMHG | RESPIRATION RATE: 19 BRPM | TEMPERATURE: 97.7 F | OXYGEN SATURATION: 93 % | HEART RATE: 92 BPM

## 2017-09-23 VITALS
DIASTOLIC BLOOD PRESSURE: 99 MMHG | TEMPERATURE: 97.2 F | OXYGEN SATURATION: 94 % | HEART RATE: 101 BPM | SYSTOLIC BLOOD PRESSURE: 166 MMHG | RESPIRATION RATE: 19 BRPM

## 2017-09-23 VITALS
RESPIRATION RATE: 19 BRPM | HEART RATE: 97 BPM | TEMPERATURE: 97.8 F | DIASTOLIC BLOOD PRESSURE: 84 MMHG | SYSTOLIC BLOOD PRESSURE: 128 MMHG | OXYGEN SATURATION: 93 %

## 2017-09-23 RX ADMIN — MYCOPHENOLATE MOFETIL SCH MG: 500 TABLET, FILM COATED ORAL at 05:30

## 2017-09-23 RX ADMIN — POLYETHYLENE GLYCOL 3350 SCH GM: 17 POWDER, FOR SOLUTION ORAL at 09:02

## 2017-09-23 RX ADMIN — HUMAN INSULIN SCH: 100 INJECTION, SOLUTION SUBCUTANEOUS at 12:53

## 2017-09-23 RX ADMIN — HYDROCODONE BITARTRATE AND ACETAMINOPHEN PRN TAB: 10; 325 TABLET ORAL at 05:30

## 2017-09-23 RX ADMIN — STANDARDIZED SENNA CONCENTRATE AND DOCUSATE SODIUM SCH TAB: 8.6; 5 TABLET, FILM COATED ORAL at 09:00

## 2017-09-23 RX ADMIN — LEVOTHYROXINE SODIUM SCH MCG: 150 TABLET ORAL at 05:30

## 2017-09-23 RX ADMIN — Medication SCH ML: at 09:02

## 2017-09-23 RX ADMIN — PANTOPRAZOLE SCH MG: 40 TABLET, DELAYED RELEASE ORAL at 09:02

## 2017-09-23 RX ADMIN — MYCOPHENOLATE MOFETIL SCH MG: 500 TABLET, FILM COATED ORAL at 12:56

## 2017-09-23 RX ADMIN — HUMAN INSULIN SCH: 100 INJECTION, SOLUTION SUBCUTANEOUS at 08:00

## 2017-09-23 NOTE — HHI.NPPN
Subjective


History of Present Illness


62 year old with ARF/Pancreatitis/Cirrhosis Autoimmune disease


Additional Remarks


Patient is alert, no SOB, still has swelling in legs, no nausea.





Review of Systems


General


Constitutional:  Fatigue





Gastrointestinal


Gastrointestinal:  Abdominal Pain





Musculoskeletal


MS:  Pain/Stiffness





Objective Data


Data





Vital Signs








  Date Time  Temp Pulse Resp B/P (MAP) Pulse Ox O2 Delivery O2 Flow Rate FiO2


 


9/23/17 11:43 97.8 97 19 128/84 (99) 93   


 


9/23/17 07:48 97.2 101 19 166/99 (121) 94   


 


9/23/17 00:00 98.1 82 17 132/73 (92) 95   


 


9/22/17 20:00 97.9 86 16 120/69 (86) 95   


 


9/22/17 16:00 98.3 85 18 137/71 (93) 95   








-:  


9/19/17 0630                                                                   

             9/21/17 0643








Physical Exam


General


Appearance:  No Acute Distress, Comfortable





Neck


Neck Exam:  Neck Supple





Pulmonary


Resp Exam:  Clear Bilaterally, Breath Sounds Equal





Cardiology


CV Exam:  Regular, Normal Sinus Rhythm





Gastrointestinal/Abdomen


GI Exam:  Soft, Bowel Sounds Present





Extremeties


Extremities Exam:  Moderate Edema, Pitting Edema





Neurologic


Neuro Exam:  Alert, Awake, Oriented





Psychiatric


Psych Exam:  Appropriate Responses





Assessment/Plan


Problem List:  


(1) VALENTIN (acute kidney injury)


ICD Codes:  N17.9 - Acute kidney failure, unspecified


Plan:  ARF Autoimmune process, has advanced kidney disease due to lupus 

nephritis and diabetic kidney disease


STEPHANIE positive


C3, C4 low


 SLE by serology STEPHANIE 1:1280 Anti dsDNA 775 very high


follow renal functions


on prednisone Cr 2.45


 kidney biopsy, showed Focal Proliferative GN, with chronicity started on 

Cellcept goal is to keep her on Prednisone and Cellcept and adjust dose as 

tolerated


She also has diabetic kidney disease


Prognosis for kidney is worse possibility of dialysis is high


Need to arrange her medication as outpatient increase CellCept to 500 mg 3 

times a day.


Creatinine is slightly better.





(2) Pancreatitis


ICD Codes:  K85.90 - Acute pancreatitis without necrosis or infection, 

unspecified


Status:  Acute


Plan:  She has undiagnosed underlying autoimmune disease possibility of SLE 

versus autoimmune hepatitis exists


STEPHANIE was 1:1280 and Antismooth antibody positive in 6/12





(3) Autoimmune disease


ICD Codes:  M35.9 - Systemic involvement of connective tissue, unspecified


Plan:  Continue to monitor she needs to be followed by Smita Rosen MD Sep 23, 2017 12:59

## 2017-09-23 NOTE — HHI.PR
Subjective


Remarks


in no acute distress.


denies pain.


afebrile.


d/w the RN and no acute issues over night.





Objective


Vitals





Vital Signs








  Date Time  Temp Pulse Resp B/P (MAP) Pulse Ox O2 Delivery O2 Flow Rate FiO2


 


9/23/17 07:48 97.2 101 19 166/99 (121) 94   


 


9/23/17 00:00 98.1 82 17 132/73 (92) 95   


 


9/22/17 20:00 97.9 86 16 120/69 (86) 95   


 


9/22/17 16:00 98.3 85 18 137/71 (93) 95   


 


9/22/17 12:00 97.8 90 18 133/75 (94) 93   














I/O      


 


 9/22/17 9/22/17 9/22/17 9/23/17 9/23/17 9/23/17





 07:00 15:00 23:00 07:00 15:00 23:00


 


Intake Total 360 ml 720 ml 480 ml 480 ml  


 


Balance 360 ml 720 ml 480 ml 480 ml  


 


      


 


Intake Oral 360 ml 720 ml 480 ml 480 ml  


 


# Voids 2 2 2 1  


 


# Bowel Movements  1    








Result Diagram:  


9/19/17 0630                                                                   

             9/21/17 0643





Imaging





Last Impressions








Renal Biopsy CT 9/12/17 0000 Signed





Impressions: 





 Service Date/Time:  Tuesday, September 12, 2017 08:59 - CONCLUSION: Successful 





 18 gauge core biopsies of left renal cortex.     Silviano Aggarwal MD ADDENDUM: 

  





 A two hour delayed scan through the kidneys demonstrates stability of the 





 perinephric hematoma with no evidence of increase in the size of this 

hematoma. 





 H&H are also stable. The patient is also clinically stable without evidence of 





 ongoing bleeding status post renal biopsy.   Silviano Aggarwal MD 


 


Liver Ultrasound 9/6/17 0000 Signed





Impressions: 





 Service Date/Time:  Wednesday, September 6, 2017 08:44 - CONCLUSION:  1. 





 Hepatomegaly with heterogeneous echotexture, slightly larger than on prior 

exam 





 in 2016. 2. Shadowing right renal stones without evidence of hydronephrosis.  

   





 Jesse Martínez MD 


 


Renal Ultrasound 9/5/17 0000 Signed





Impressions: 





 Service Date/Time:  Tuesday, September 5, 2017 17:45 - CONCLUSION:  1. 





 Nonobstructing right renal calculi. Right renal cysts. No perinephric fluid. 





 Bladder unremarkable.     Michael Forrest MD 


 


Lung Scan-VQ Nuclear Medicine 9/2/17 0000 Signed





Impressions: 





 Service Date/Time:  Saturday, September 2, 2017 02:35 - CONCLUSION:  1. Low 





 probability for pulmonary embolus.     Michael Forrest MD 


 


Head CT 9/2/17 0000 Signed





Impressions: 





 Service Date/Time:  Saturday, September 2, 2017 00:29 - CONCLUSION:  Normal 





 examination.       Michael Forrest MD 


 


Chest X-Ray 9/1/17 2217 Signed





Impressions: 





 Service Date/Time:  Friday, September 1, 2017 22:32 - CONCLUSION:  Minimal 





 patchy areas of consolidation or atelectasis at the lateral bases.     Scooter Tatum MD 


 


Abdomen/Pelvis CT 9/1/17 0000 Signed





Impressions: 





 Service Date/Time:  Saturday, September 2, 2017 00:32 - CONCLUSION:  1. No 

acute 





 findings within the abdomen. Partial staghorn type calcifications upper and 





 lower pole right kidney and tiny calculi upper pole left kidney. No bowel 





 obstruction, free air, free fluid or obstructive uropathy. 2. Probable liver 





 cirrhosis. Previous cholecystectomy, appendectomy.     Michael Forrest MD 








Objective Remarks


GENERAL: This is a well-nourished, well-developed patient, in no apparent 

distress.


CARDIOVASCULAR: Regular rate and regular rhythm without murmurs, gallops, or 

rubs. 


RESPIRATORY: Clear to auscultation. Breath sounds equal bilaterally. No wheezes

, rales, or rhonchi.  


GASTROINTESTINAL: Abdomen soft, non-tender, nondistended. 


Normal, active bowel sounds


MUSCULOSKELETAL: Extremities without clubbing, cyanosis, or edema.


NEURO:  Alert & Oriented x4 to person, place, time, situation.  Moves all ext x4


Procedures


kidney biopsy


Medications and IVs





Current Medications


Sodium Chloride (NS Flush) 2 ml UNSCH  PRN IVF FLUSH AFTER USING IV ACCESS Last 

administered on 9/1/17at 22:54;  Start 9/1/17 at 22:30;  Stop 9/2/17 at 02:16;  

Status DC


Nitroglycerin (Nitrostat Sl) 0.3 mg ONCE  ONCE SL ;  Start 9/1/17 at 22:30;  

Stop 9/1/17 at 22:31;  Status DC


Morphine Sulfate (Morphine Inj) 4 mg ONCE  ONCE IV PUSH  Last administered on 9/ 1/17at 22:53;  Start 9/1/17 at 22:30;  Stop 9/1/17 at 22:31;  Status DC


Ondansetron HCl (Zofran Inj) 4 mg ONCE  ONCE IV PUSH  Last administered on 9/1/ 17at 22:54;  Start 9/1/17 at 22:30;  Stop 9/1/17 at 22:31;  Status DC


Sodium Chloride (NS Flush) 2 ml BID IV FLUSH ;  Start 9/2/17 at 09:00;  Stop 9/2 /17 at 09:00;  Status DC


Sodium Chloride (NS Flush) 2 ml UNSCH  PRN IVF FLUSH AFTER USING IV ACCESS;  

Start 9/2/17 at 02:00;  Stop 9/2/17 at 02:16;  Status DC


Famotidine (Pepcid Inj) 20 mg Q12H IV PUSH  Last administered on 9/3/17at 00:18

;  Start 9/2/17 at 02:00;  Stop 9/3/17 at 08:43;  Status DC


Sodium Chloride 1,000 ml @  100 mls/hr Q10H IV  Last administered on 9/4/17at 04

:27;  Start 9/2/17 at 01:59;  Stop 9/4/17 at 10:54;  Status DC


Sodium Chloride (NS Flush) 2 ml UNSCH  PRN IV FLUSH FLUSH AFTER USING IV ACCESS

;  Start 9/2/17 at 02:00


Sodium Chloride (NS Flush) 2 ml BID IV FLUSH  Last administered on 9/23/17at 09:

02;  Start 9/2/17 at 09:00


Ondansetron HCl (Zofran Inj) 4 mg Q6H  PRN IVP NAUSEA OR VOMITING;  Start 9/2/ 17 at 02:00;  Stop 9/4/17 at 10:54;  Status DC


Acetaminophen (Tylenol) 650 mg Q6H  PRN PO FEVER/PAIN SCALE 1 TO 2;  Start 9/2/ 17 at 02:00


Acetaminophen/ Hydrocodone Bitart (Norco  5-325 Mg) 1 tab Q4H  PRN PO PAIN 

SCALE 3 TO 5 Last administered on 9/22/17at 20:55;  Start 9/2/17 at 02:00


Morphine Sulfate (Morphine Inj) 2 mg Q3H  PRN IV Pain 6-10 Last administered on 

9/3/17at 06:21;  Start 9/2/17 at 02:00;  Stop 9/3/17 at 13:16;  Status DC


Senna/Docusate Sodium (Palma-Colace) 1 tab BID PO  Last administered on 9/23/ 17at 09:00;  Start 9/2/17 at 09:00


Magnesium Hydroxide (Milk Of Magnesia Liq) 30 ml Q12H  PRN PO MILD - MODERATE 

CONSTIPATION Last administered on 9/11/17at 19:47;  Start 9/2/17 at 02:00


Sennosides (Senokot) 17.2 mg Q12H  PRN PO MODERATE - SEVERE CONSTIPATION;  

Start 9/2/17 at 02:00;  Stop 9/5/17 at 11:01;  Status DC


Bisacodyl (Dulcolax Supp) 10 mg DAILY  PRN RECTAL SEVERE CONSITIPATION;  Start 9 /2/17 at 02:00


Lactulose (Lactulose Liq) 30 ml DAILY  PRN PO SEVERE CONSITIPATION;  Start 9/2/ 17 at 02:00


Albuterol/ Ipratropium (Duoneb Neb) 1 ampule Q4HR  NEB NEB  Last administered 

on 9/6/17at 08:57;  Start 9/2/17 at 12:00;  Stop 9/6/17 at 11:59;  Status DC


Albuterol Sulfate (Albuterol Neb) 2.5 mg Q2HR NEB  PRN NEB wheezing Last 

administered on 9/6/17at 06:28;  Start 9/2/17 at 10:45


Clonidine (Catapres) 0.1 mg ONCE  ONCE PO  Last administered on 9/3/17at 00:17;

  Start 9/2/17 at 23:45;  Stop 9/2/17 at 23:46;  Status DC


Famotidine (Pepcid Inj) 10 mg Q12H IV PUSH  Last administered on 9/4/17at 01:49

;  Start 9/3/17 at 14:00;  Stop 9/4/17 at 10:54;  Status DC


Levothyroxine Sodium (Synthroid) 150 mcg DAILY@0600 PO  Last administered on 9/ 23/17at 05:30;  Start 9/3/17 at 14:30


Polyethylene Glycol (Miralax) 17 gm DAILY PO  Last administered on 9/23/17at 09:

02;  Start 9/3/17 at 14:45


Glycerin (Glycerin Adult Supp) 2 gm ONCE  ONCE RECTAL  Last administered on 9/3/

17at 17:47;  Start 9/3/17 at 15:00;  Stop 9/3/17 at 15:01;  Status DC


Mirtazapine (Remeron) 15 mg HS PO  Last administered on 9/13/17at 21:44;  Start 

9/4/17 at 21:00;  Stop 9/14/17 at 13:04;  Status DC


Ondansetron HCl (Zofran  Odt) 4 mg Q6H  PRN PO nausea;  Start 9/4/17 at 11:00


Pantoprazole Sodium (Protonix) 40 mg DAILY PO  Last administered on 9/23/17at 09

:02;  Start 9/5/17 at 11:00


Methylprednisolone Sodium Succinate (SoluMEDROL INJ) 60 mg Q12H IV PUSH  Last 

administered on 9/15/17at 14:52;  Start 9/6/17 at 14:00;  Stop 9/15/17 at 20:05

;  Status DC


Sodium Chloride 1,000 ml @  84 mls/hr [id number] IV  Last administered on 9/19/ 17at 03:05;  Start 9/7/17 at 17:00;  Stop 9/19/17 at 13:39;  Status DC


Megestrol Acetate (Megace Liq) 400 mg BIDAC PO  Last administered on 9/21/17at 

06:49;  Start 9/7/17 at 16:30;  Stop 9/21/17 at 13:11;  Status DC


Lactated Ringer's 1,000 ml @  30 mls/hr Q24H PRN IV SEE LABEL COMMENTS;  Start 9 /8/17 at 03:15;  Stop 9/11/17 at 03:14;  Status DC


Sodium Chloride 500 ml @  30 mls/hr [id number] PRN IV SEE LABEL COMMENTS;  Start 9/ 8/17 at 03:15;  Stop 9/11/17 at 03:14;  Status DC


Povidone Iodine (Betadine 5% Antisepsis Kit) 1 applic ON CALL  PRN EACH NARE 

SEE LABEL COMMENTS;  Start 9/8/17 at 03:15;  Stop 9/11/17 at 03:14;  Status DC


Chlorhexidine Gluconate (Chlorhexidine 2% Cloth) 3 pack ON CALL  PRN TOPICAL 

SEE LABEL COMMENTS;  Start 9/8/17 at 03:15;  Stop 9/11/17 at 03:14;  Status DC


Ketamine HCl (Ketalar Inj) 500 mg STK-MED ONCE .ROUTE ;  Start 9/8/17 at 08:55;

  Stop 9/8/17 at 08:56;  Status DC


Miscellaneous Information ALL NURSING DEPARTME... UNSCH  PRN .XX SEE LABEL 

COMMENTS;  Start 9/8/17 at 10:53;  Stop 9/9/17 at 10:52;  Status DC


Fluoxetine HCl (PROzac) 10 mg ONCE  ONCE PO  Last administered on 9/8/17at 17:45

;  Start 9/8/17 at 17:30;  Stop 9/8/17 at 17:36;  Status DC


Fluoxetine HCl (PROzac) 10 mg DAILY PO  Last administered on 9/13/17at 07:43;  

Start 9/9/17 at 09:00;  Stop 9/13/17 at 15:01;  Status DC


Sodium Chloride 1,000 ml @  30 mls/hr Q24H IV  Last administered on 9/11/17at 18

:17;  Start 9/11/17 at 18:00


Clonidine (Catapres) 0.1 mg ONCE  ONCE PO  Last administered on 9/12/17at 02:28

;  Start 9/12/17 at 02:15;  Stop 9/12/17 at 02:16;  Status DC


Fentanyl Citrate (fentaNYL INJ) 100 mcg STK-MED ONCE .ROUTE  Last administered 

on 9/12/17at 08:15;  Start 9/12/17 at 08:15;  Stop 9/12/17 at 08:16;  Status DC


Midazolam HCl (Versed Inj) 5 mg STK-MED ONCE .ROUTE  Last administered on 9/12/ 17at 08:15;  Start 9/12/17 at 08:15;  Stop 9/12/17 at 08:16;  Status DC


Lidocaine HCl (Xylocaine 1% Inj) 20 ml STK-MED ONCE .ROUTE  Last administered 

on 9/12/17at 08:19;  Start 9/12/17 at 08:19;  Stop 9/12/17 at 08:20;  Status DC


Midazolam HCl (Versed Inj) 2 mg STK-MED ONCE IV ;  Start 9/8/17 at 12:00;  Stop 

9/12/17 at 10:29;  Status DC


Propofol (Diprivan  200 Mg/20 ml Inj) 30 mg STK-MED ONCE IV ;  Start 9/8/17 at 

12:00;  Stop 9/12/17 at 10:29;  Status DC


Acetaminophen/ Hydrocodone Bitart (Norco  Mg) 1 tab Q6H  PRN PO pain 6-10 

Last administered on 9/23/17at 05:30;  Start 9/13/17 at 15:30


Mirtazapine (Remeron) 30 mg HS PO  Last administered on 9/22/17at 20:55;  Start 

9/14/17 at 21:00


Clonazepam (KlonoPIN) 0.5 mg Q12HR PO  Last administered on 9/23/17at 09:02;  

Start 9/14/17 at 13:30


Mycophenolate Mofetil (Cellcept) 500 mg BID@06,18 PO  Last administered on 9/18/ 17at 17:12;  Start 9/15/17 at 18:00;  Stop 9/18/17 at 19:07;  Status DC


Prednisone (Deltasone) 60 mg DAILY PO ;  Start 9/16/17 at 09:00;  Stop 9/16/17 

at 10:33;  Status DC


Prednisone (Deltasone) 60 mg DAILY PO  Last administered on 9/19/17at 09:31;  

Start 9/16/17 at 11:00;  Stop 9/19/17 at 11:01;  Status DC


Mycophenolate Mofetil (Cellcept) 500 mg Q8HR PO  Last administered on 9/23/17at 

05:30;  Start 9/18/17 at 22:00


Prednisone (Deltasone) 40 mg DAILY PO  Last administered on 9/20/17at 08:05;  

Start 9/20/17 at 09:00;  Stop 9/20/17 at 12:12;  Status DC


Dextrose (D50w (Vial) Inj) 50 ml UNSCH  PRN IV PUSH HYPOGLYCEMIA-SEE COMMENTS;  

Start 9/19/17 at 13:30;  Stop 9/19/17 at 18:39;  Status DC


Glucagon (Glucagon Inj) 1 mg UNSCH  PRN OTHER HYPOGLYCEMIA-SEE COMMENTS;  Start 

9/19/17 at 13:30;  Stop 9/19/17 at 18:39;  Status DC


Insulin Human Regular (NovoLIN R SUPPLEMENTAL SCALE) 1 ACHS SLIDING  SCALE SQ ;

  Start 9/19/17 at 17:00;  Stop 9/19/17 at 18:38;  Status DC


Dextrose (D50w (Vial) Inj) 50 ml UNSCH  PRN IV PUSH HYPOGLYCEMIA - SEE COMMENTS

;  Start 9/19/17 at 19:00;  Stop 9/19/17 at 19:00;  Status DC


Glucagon (Glucagon Inj) 1 mg UNSCH  PRN OTHER HYPOGLYCEMIA-SEE COMMENTS;  Start 

9/19/17 at 19:00;  Stop 9/19/17 at 19:00;  Status DC


Insulin Human Regular (NovoLIN R SUPPLEMENTAL SCALE) 1 ACHS SLIDING  SCALE SQ  

Last administered on 9/23/17at 08:00;  Start 9/19/17 at 17:00


Dextrose (D50w (Vial) Inj) 50 ml UNSCH  PRN IV PUSH HYPOGLYCEMIA - SEE COMMENTS

;  Start 9/19/17 at 17:00


Glucagon (Glucagon Inj) 1 mg UNSCH  PRN OTHER HYPOGLYCEMIA-SEE COMMENTS;  Start 

9/19/17 at 17:00


Insulin Human Regular (NovoLIN R INJ) 10 units UNSCH X1  PRN IV PUSH if BS>350 

on recheck Last administered on 9/20/17at 01:10;  Start 9/20/17 at 00:45;  Stop 

9/20/17 at 01:30;  Status DC


Prednisone (Deltasone) 20 mg DAILY PO  Last administered on 9/23/17at 09:02;  

Start 9/21/17 at 09:00


Insulin Human Regular (NovoLIN R INJ) 10 units ONCE  ONCE IV PUSH  Last 

administered on 9/20/17at 18:02;  Start 9/20/17 at 17:00;  Stop 9/20/17 at 17:37

;  Status DC


Insulin Detemir (Levemir Inj) 10 units HS SQ  Last administered on 9/22/17at 21:

04;  Start 9/22/17 at 21:00





A/P


Assessment and Plan


A/P





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





   FOCAL PROLIFERATIVE AND NECROTIZING GLOMERULONEPHRITIS.  


   NODULAR DIABETIC GLOMERULOSCLEROSIS.  


   GLOBAL& SEGMENTAL GLOMERULOSCLEROSIS.  


   INTERSTITIAL FIBROSIS AND TUBULAR ATROPHY, SEVERE.  


continue prednisone and Cellcept.


Will need rheumatology evaluation- as outpatient and this was d/w the patient.





-  possible autoimmune hepatitis- GI follow-up appreciated- GI has signed off- f

/u as outpatient.   


-hyperglycemia- steroid induced- A1c 5.5- started on accu-check with sliding 

scale coverage.


  blood sugar trend noted; runs around 300's despite decreasing prednisone and 

sliding scale coverge;


  started on levemir- diabetic educator consulted.


 -  Severe hypothyroidism - free T4 is low, severe hypothyroidism based on TSH 

being over 100 , continue synthroid. 


-  Generalized weakness - likely secondary to hypothyroidism, continue therapy


-  VALENTIN on CKD- renal US unremarkable, SLE by serology as mentioned above, 

continue prednisone and Cellcept. nephrology following.


- Worsening depression:Appreciate psychiatry consultation, no need for psych 

hospitalization, increased Remeron and add clonazepam for anxiety


-   DVT Prophylaxis:  SCD/Teds.


Discharge Planning


dc home today after case management evaluation.


f/u; pcp,nephrology and rheumatology.


see med list.


d/w the patient and RN.





time spent 35 min.











Dorothy Lozoya MD Sep 23, 2017 10:13

## 2017-09-23 NOTE — HHI.DS
__________________________________________________





Discharge Summary


Admission Date


Sep 2, 2017 at 02:03


Discharge Date:  Sep 23, 2017


Admitting Diagnosis





pancreatitis; h/o CAD; chest pain





(1) Glomerulonephritis


ICD Code:  N05.9 - Unspecified nephritic syndrome with unspecified morphologic 

changes


Diagnosis:  Principal





(2) Pancreatitis


ICD Code:  K85.90 - Acute pancreatitis without necrosis or infection, 

unspecified


Diagnosis:  Secondary


Status:  Acute


(3) Chest pain


ICD Code:  R07.9 - Chest pain, unspecified


Diagnosis:  Secondary


Status:  Acute


(4) VALENTIN (acute kidney injury)


ICD Code:  N17.9 - Acute kidney failure, unspecified


Diagnosis:  Principal





(5) Rhabdomyolysis


ICD Code:  M62.82 - Rhabdomyolysis


Diagnosis:  Secondary





Procedures


kidney biopsy


Brief History - From Admission


This is a 62-year-old female with a PMH of HTN, CHF (Unknown EF), COPD, 

Depression and h/o DM who presented to the ER w/ complaints of chest pain x1 

day.  States pain is intermittent, pressure-like and non-radiating.  Denies 

fever, chills, cough or sick contacts.  On arrival, /68, HR 58, O2 sat 95

% on RA, Afebrile.  CBC is essentially unremarkable.  Creatinine 3.25, 

previously 0.99 on 12/27/16.  .  Troponin negative.  Lipase 732.  INR 

1.0.  D-dimer 1.77.  V/Q pending.  While in ER, pt w/ complaints of 

intermittent weakness RUE and RLE, now resolved.  CT Head w/ no acute findings.

  CXR w/ minimal patchy areas of consolidation/atelectasis.  CT Abd/Pelvis w/ 

no acute findings.


CBC/BMP:  


9/19/17 0630                                                                   

             9/21/17 0643





Significant Findings





Laboratory Tests








Test


  9/20/17


16:50 9/21/17


06:43


 


Random Glucose


  623 MG/DL


() 169 MG/DL


()


 


Blood Urea Nitrogen


  


  78 MG/DL


(7-18)


 


Creatinine


  


  2.45 MG/DL


(0.50-1.00)


 


Estimat Glomerular Filtration


Rate 


  20 ML/MIN


(>89)








Imaging





Last Impressions








Renal Biopsy CT 9/12/17 0000 Signed





Impressions: 





 Service Date/Time:  Tuesday, September 12, 2017 08:59 - CONCLUSION: Successful 





 18 gauge core biopsies of left renal cortex.     Silviano Aggarwal MD ADDENDUM: 

  





 A two hour delayed scan through the kidneys demonstrates stability of the 





 perinephric hematoma with no evidence of increase in the size of this 

hematoma. 





 H&H are also stable. The patient is also clinically stable without evidence of 





 ongoing bleeding status post renal biopsy.   Silviano Aggarwal MD 


 


Liver Ultrasound 9/6/17 0000 Signed





Impressions: 





 Service Date/Time:  Wednesday, September 6, 2017 08:44 - CONCLUSION:  1. 





 Hepatomegaly with heterogeneous echotexture, slightly larger than on prior 

exam 





 in 2016. 2. Shadowing right renal stones without evidence of hydronephrosis.  

   





 Jesse Martínez MD 


 


Renal Ultrasound 9/5/17 0000 Signed





Impressions: 





 Service Date/Time:  Tuesday, September 5, 2017 17:45 - CONCLUSION:  1. 





 Nonobstructing right renal calculi. Right renal cysts. No perinephric fluid. 





 Bladder unremarkable.     Michael Forrest MD 


 


Lung Scan-V Nuclear Medicine 9/2/17 0000 Signed





Impressions: 





 Service Date/Time:  Saturday, September 2, 2017 02:35 - CONCLUSION:  1. Low 





 probability for pulmonary embolus.     Michael Forrest MD 


 


Head CT 9/2/17 0000 Signed





Impressions: 





 Service Date/Time:  Saturday, September 2, 2017 00:29 - CONCLUSION:  Normal 





 examination.       Michael Forrest MD 


 


Chest X-Ray 9/1/17 2217 Signed





Impressions: 





 Service Date/Time:  Friday, September 1, 2017 22:32 - CONCLUSION:  Minimal 





 patchy areas of consolidation or atelectasis at the lateral bases.     Scooter Tatum MD 


 


Abdomen/Pelvis CT 9/1/17 0000 Signed





Impressions: 





 Service Date/Time:  Saturday, September 2, 2017 00:32 - CONCLUSION:  1. No 

acute 





 findings within the abdomen. Partial staghorn type calcifications upper and 





 lower pole right kidney and tiny calculi upper pole left kidney. No bowel 





 obstruction, free air, free fluid or obstructive uropathy. 2. Probable liver 





 cirrhosis. Previous cholecystectomy, appendectomy.     Michael Forrest MD 








PE at Discharge


GENERAL: This is a well-nourished, well-developed patient, in no apparent 

distress.


CARDIOVASCULAR: Regular rate and regular rhythm without murmurs, gallops, or 

rubs. 


RESPIRATORY: Clear to auscultation. Breath sounds equal bilaterally. No wheezes

, rales, or rhonchi.  


GASTROINTESTINAL: Abdomen soft, non-tender, nondistended. 


Normal, active bowel sounds


MUSCULOSKELETAL: Extremities without clubbing, cyanosis, or edema.


NEURO:  Alert & Oriented x4 to person, place, time, situation.  Moves all ext x4


Hospital Course


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





   FOCAL PROLIFERATIVE AND NECROTIZING GLOMERULONEPHRITIS.  


   NODULAR DIABETIC GLOMERULOSCLEROSIS.  


   GLOBAL& SEGMENTAL GLOMERULOSCLEROSIS.  


   INTERSTITIAL FIBROSIS AND TUBULAR ATROPHY, SEVERE.  


continue prednisone and Cellcept.


Will need rheumatology evaluation- as outpatient and this was d/w the patient.





-  possible autoimmune hepatitis- GI follow-up appreciated- GI has signed off- f

/u as outpatient.   


-hyperglycemia- steroid induced- A1c 5.5- started on accu-check with sliding 

scale coverage.


  blood sugar trend noted; runs around 300's despite decreasing prednisone and 

sliding scale coverge;


  started on levemir- diabetic educator consulted.


 -  Severe hypothyroidism - free T4 is low, severe hypothyroidism based on TSH 

being over 100 , continue synthroid. 


-  Generalized weakness - likely secondary to hypothyroidism, continue therapy


-  VALENTIN on CKD- renal US unremarkable, SLE by serology as mentioned above, 

continue prednisone and Cellcept. nephrology following.


- Worsening depression:Appreciate psychiatry consultation, no need for psych 

hospitalization, increased Remeron and add clonazepam for anxiety


-   DVT Prophylaxis:  SCD/Teds.


Pt Condition on Discharge:  Fair


Discharge Disposition:  Discharge Home


Discharge Time:  > 30 minutes


Discharge Instructions


DIET: Follow Instructions for:  Heart Healthy Diet, Diabetic Diet


Activities you can perform:  Regular-No Restrictions


Follow up Referrals:  


Gastroenterology


Nephrology


PCP Follow-up





New Medications:  


Insulin Aspart Inj (Novolog Inj) 1,000 Unit/10 Ml Vial


1-9 UNITS SQ ACHS for Blood Sugar Management, #10 ML 0 Refills


sugars less than 70,(0)units; sugars


 150-199,(1) unit; sugars 200-249,(3) units; sugars 250-299,(5) units;


 sugars 300-349,(7) units; sugars greater than 349,(9) units.


Insulin Detemir Inj (Levemir Inj) 1,000 unit/ 10 ML Vial


10 UNITS SQ HS for Blood Sugar Management for 30 Days, VIAL 0 Refills


Do not mix with any other Insulin.


Clonazepam (Klonopin) 0.5 Mg Tab


0.5 MG PO Q12HR for Depression Control for 30 Days, TAB 0 Refills





Levothyroxine (Synthroid) 150 Mcg Tab


150 MCG PO DAILY@0600 for hypothyroidism for 30 Days, TAB 0 Refills





Megestrol Liq (Megestrol Liq) 40 Mg/Ml Susp


400 MG PO BIDAC for appetite stimulant for 30 Days, BOTTLE 0 Refills





Mirtazapine (Mirtazapine) 15 Mg Tab


30 MG PO HS for Depression Control for 30 Days, TAB 0 Refills





Mycophenolate (Cellcept) 500 Mg Tab


500 MG PO Q8HR for lupus for 30 Days, TAB 0 Refills





Pantoprazole (Pantoprazole) 40 Mg Tab


40 MG PO DAILY for PUD prophylaxis for 30 Days, #30 TAB 0 Refills





Prednisone (Prednisone) 20 Mg Tab


20 MG PO DAILY for lupus for 30 Days, #30 TAB 0 Refills

















Dorothy Lozoya MD Sep 23, 2017 10:14

## 2017-10-24 ENCOUNTER — HOSPITAL ENCOUNTER (INPATIENT)
Dept: HOSPITAL 17 - NEPE | Age: 62
LOS: 13 days | Discharge: HOME HEALTH | DRG: 300 | End: 2017-11-06
Attending: HOSPITALIST | Admitting: HOSPITALIST
Payer: COMMERCIAL

## 2017-10-24 VITALS
SYSTOLIC BLOOD PRESSURE: 169 MMHG | RESPIRATION RATE: 18 BRPM | OXYGEN SATURATION: 97 % | TEMPERATURE: 98.7 F | HEART RATE: 82 BPM | DIASTOLIC BLOOD PRESSURE: 81 MMHG

## 2017-10-24 VITALS
DIASTOLIC BLOOD PRESSURE: 81 MMHG | OXYGEN SATURATION: 96 % | SYSTOLIC BLOOD PRESSURE: 185 MMHG | HEART RATE: 81 BPM | RESPIRATION RATE: 18 BRPM

## 2017-10-24 VITALS — DIASTOLIC BLOOD PRESSURE: 75 MMHG | SYSTOLIC BLOOD PRESSURE: 157 MMHG | OXYGEN SATURATION: 97 %

## 2017-10-24 VITALS — BODY MASS INDEX: 36.76 KG/M2 | HEIGHT: 59 IN | WEIGHT: 182.32 LBS

## 2017-10-24 VITALS — OXYGEN SATURATION: 96 % | RESPIRATION RATE: 18 BRPM

## 2017-10-24 DIAGNOSIS — J44.9: ICD-10-CM

## 2017-10-24 DIAGNOSIS — E66.9: ICD-10-CM

## 2017-10-24 DIAGNOSIS — M32.14: ICD-10-CM

## 2017-10-24 DIAGNOSIS — E11.22: ICD-10-CM

## 2017-10-24 DIAGNOSIS — E11.21: ICD-10-CM

## 2017-10-24 DIAGNOSIS — R07.89: ICD-10-CM

## 2017-10-24 DIAGNOSIS — E03.9: ICD-10-CM

## 2017-10-24 DIAGNOSIS — M79.671: ICD-10-CM

## 2017-10-24 DIAGNOSIS — R60.0: ICD-10-CM

## 2017-10-24 DIAGNOSIS — M79.672: ICD-10-CM

## 2017-10-24 DIAGNOSIS — E11.65: ICD-10-CM

## 2017-10-24 DIAGNOSIS — N18.4: ICD-10-CM

## 2017-10-24 DIAGNOSIS — Z79.4: ICD-10-CM

## 2017-10-24 DIAGNOSIS — F32.9: ICD-10-CM

## 2017-10-24 DIAGNOSIS — N17.9: ICD-10-CM

## 2017-10-24 DIAGNOSIS — I82.491: Primary | ICD-10-CM

## 2017-10-24 LAB
ALBUMIN SERPL-MCNC: 1.9 GM/DL (ref 3.4–5)
ALP SERPL-CCNC: 227 U/L (ref 45–117)
ALT SERPL-CCNC: 25 U/L (ref 10–53)
AMORPHOUS SEDIMENT, URINE: (no result)
AST SERPL-CCNC: 21 U/L (ref 15–37)
BASOPHILS # BLD AUTO: 0.1 TH/MM3 (ref 0–0.2)
BASOPHILS NFR BLD: 1 % (ref 0–2)
BILIRUB SERPL-MCNC: 0.2 MG/DL (ref 0.2–1)
BUN SERPL-MCNC: 50 MG/DL (ref 7–18)
CALCIUM SERPL-MCNC: 8.1 MG/DL (ref 8.5–10.1)
CHLORIDE SERPL-SCNC: 108 MEQ/L (ref 98–107)
COLOR UR: (no result)
CREAT SERPL-MCNC: 2.23 MG/DL (ref 0.5–1)
EOSINOPHIL # BLD: 0.1 TH/MM3 (ref 0–0.4)
EOSINOPHIL NFR BLD: 1.4 % (ref 0–4)
ERYTHROCYTE [DISTWIDTH] IN BLOOD BY AUTOMATED COUNT: 15 % (ref 11.6–17.2)
GFR SERPLBLD BASED ON 1.73 SQ M-ARVRAT: 22 ML/MIN (ref 89–?)
GLUCOSE SERPL-MCNC: 414 MG/DL (ref 74–106)
GLUCOSE UR STRIP-MCNC: 1000 MG/DL
HCO3 BLD-SCNC: 22.3 MEQ/L (ref 21–32)
HCT VFR BLD CALC: 33.8 % (ref 35–46)
HGB BLD-MCNC: 11.1 GM/DL (ref 11.6–15.3)
HGB UR QL STRIP: (no result)
INR PPP: 0.9 RATIO
KETONES UR STRIP-MCNC: (no result) MG/DL
LIPASE: 713 U/L (ref 73–393)
LYMPHOCYTES # BLD AUTO: 1.9 TH/MM3 (ref 1–4.8)
LYMPHOCYTES NFR BLD AUTO: 30.4 % (ref 9–44)
LYMPHOCYTES: 31 % (ref 9–44)
MAGNESIUM SERPL-MCNC: 1.6 MG/DL (ref 1.5–2.5)
MCH RBC QN AUTO: 32 PG (ref 27–34)
MCHC RBC AUTO-ENTMCNC: 32.7 % (ref 32–36)
MCV RBC AUTO: 97.6 FL (ref 80–100)
METAMYELOCYTES NFR BLD: 1 % (ref 0–1)
MONOCYTE #: 0.5 TH/MM3 (ref 0–0.9)
MONOCYTES NFR BLD: 7.7 % (ref 0–8)
MONOCYTES: 8 % (ref 0–8)
MYELOCYTES NFR BLD: 1 % (ref 0–0)
NEUTROPHILS # BLD AUTO: 3.8 TH/MM3 (ref 1.8–7.7)
NEUTROPHILS NFR BLD AUTO: 59.5 % (ref 16–70)
NEUTS BAND # BLD MANUAL: 3.8 TH/MM3 (ref 1.8–7.7)
NEUTS BAND NFR BLD: 5 % (ref 0–6)
NEUTS SEG NFR BLD MANUAL: 53 % (ref 16–70)
NITRITE UR QL STRIP: (no result)
PLATELET # BLD: 192 TH/MM3 (ref 150–450)
PMV BLD AUTO: 8.6 FL (ref 7–11)
PROT SERPL-MCNC: 5.9 GM/DL (ref 6.4–8.2)
PROTHROMBIN TIME: 10.1 SEC (ref 9.8–11.6)
RBC # BLD AUTO: 3.46 MIL/MM3 (ref 4–5.3)
SODIUM SERPL-SCNC: 138 MEQ/L (ref 136–145)
SP GR UR STRIP: 1.01 (ref 1–1.03)
SQUAMOUS #/AREA URNS HPF: 1 /HPF (ref 0–5)
TROPONIN I SERPL-MCNC: (no result) NG/ML (ref 0.02–0.05)
URINE LEUKOCYTE ESTERASE: (no result)
WBC # BLD AUTO: 6.4 TH/MM3 (ref 4–11)

## 2017-10-24 PROCEDURE — 96372 THER/PROPH/DIAG INJ SC/IM: CPT

## 2017-10-24 PROCEDURE — 83036 HEMOGLOBIN GLYCOSYLATED A1C: CPT

## 2017-10-24 PROCEDURE — 93970 EXTREMITY STUDY: CPT

## 2017-10-24 PROCEDURE — 70450 CT HEAD/BRAIN W/O DYE: CPT

## 2017-10-24 PROCEDURE — 84443 ASSAY THYROID STIM HORMONE: CPT

## 2017-10-24 PROCEDURE — 96375 TX/PRO/DX INJ NEW DRUG ADDON: CPT

## 2017-10-24 PROCEDURE — 83880 ASSAY OF NATRIURETIC PEPTIDE: CPT

## 2017-10-24 PROCEDURE — 84100 ASSAY OF PHOSPHORUS: CPT

## 2017-10-24 PROCEDURE — 83605 ASSAY OF LACTIC ACID: CPT

## 2017-10-24 PROCEDURE — 84157 ASSAY OF PROTEIN OTHER: CPT

## 2017-10-24 PROCEDURE — 71010: CPT

## 2017-10-24 PROCEDURE — 93306 TTE W/DOPPLER COMPLETE: CPT

## 2017-10-24 PROCEDURE — 80048 BASIC METABOLIC PNL TOTAL CA: CPT

## 2017-10-24 PROCEDURE — 87040 BLOOD CULTURE FOR BACTERIA: CPT

## 2017-10-24 PROCEDURE — 80069 RENAL FUNCTION PANEL: CPT

## 2017-10-24 PROCEDURE — 85730 THROMBOPLASTIN TIME PARTIAL: CPT

## 2017-10-24 PROCEDURE — 85027 COMPLETE CBC AUTOMATED: CPT

## 2017-10-24 PROCEDURE — 86160 COMPLEMENT ANTIGEN: CPT

## 2017-10-24 PROCEDURE — 96361 HYDRATE IV INFUSION ADD-ON: CPT

## 2017-10-24 PROCEDURE — 82948 REAGENT STRIP/BLOOD GLUCOSE: CPT

## 2017-10-24 PROCEDURE — 87086 URINE CULTURE/COLONY COUNT: CPT

## 2017-10-24 PROCEDURE — 73630 X-RAY EXAM OF FOOT: CPT

## 2017-10-24 PROCEDURE — 86038 ANTINUCLEAR ANTIBODIES: CPT

## 2017-10-24 PROCEDURE — 84484 ASSAY OF TROPONIN QUANT: CPT

## 2017-10-24 PROCEDURE — 84481 FREE ASSAY (FT-3): CPT

## 2017-10-24 PROCEDURE — 85025 COMPLETE CBC W/AUTO DIFF WBC: CPT

## 2017-10-24 PROCEDURE — 84439 ASSAY OF FREE THYROXINE: CPT

## 2017-10-24 PROCEDURE — 84480 ASSAY TRIIODOTHYRONINE (T3): CPT

## 2017-10-24 PROCEDURE — 82550 ASSAY OF CK (CPK): CPT

## 2017-10-24 PROCEDURE — 94150 VITAL CAPACITY TEST: CPT

## 2017-10-24 PROCEDURE — 81001 URINALYSIS AUTO W/SCOPE: CPT

## 2017-10-24 PROCEDURE — 85610 PROTHROMBIN TIME: CPT

## 2017-10-24 PROCEDURE — 86225 DNA ANTIBODY NATIVE: CPT

## 2017-10-24 PROCEDURE — 86039 ANTINUCLEAR ANTIBODIES (ANA): CPT

## 2017-10-24 PROCEDURE — 85007 BL SMEAR W/DIFF WBC COUNT: CPT

## 2017-10-24 PROCEDURE — 80053 COMPREHEN METABOLIC PANEL: CPT

## 2017-10-24 PROCEDURE — 96374 THER/PROPH/DIAG INJ IV PUSH: CPT

## 2017-10-24 PROCEDURE — 93005 ELECTROCARDIOGRAM TRACING: CPT

## 2017-10-24 PROCEDURE — 83735 ASSAY OF MAGNESIUM: CPT

## 2017-10-24 PROCEDURE — 83690 ASSAY OF LIPASE: CPT

## 2017-10-24 PROCEDURE — 93308 TTE F-UP OR LMTD: CPT

## 2017-10-24 RX ADMIN — HEPARIN SODIUM PRN MLS/HR: 10000 INJECTION, SOLUTION INTRAVENOUS at 23:22

## 2017-10-24 NOTE — PD
HPI


Chief Complaint:  Abdominal Pain


Time Seen by Provider:  19:05


Travel History


International Travel<30 days:  No


Contact w/Intl Traveler<30days:  No


Traveled to known affect area:  No





History of Present Illness


HPI


62-year-old female that presents to the ED for evaluation of chest pain and 

abdominal pain as well as lower leg swelling.  Patient states that she has a 

history of CHF, cardiac disease as well as liver disease and history of kidney 

injury.  Patient was admitted about a month ago for similar symptoms.  Patient 

has had this for about a couple of days.  She was discharged and has not been 

able to follow up.  The patient she's been compliant with the medications given 

to her.  She does report that she has no insurance and has no way to see her 

primary care doctor.  She also reports that she is not able to take care of 

self.  She states that she has chest pain or shortness of breath with exertion.

  She denies taking any blood thinners.  No head injuries or falls.  She states 

that the pain is almost on the chest.  But she also has abdominal pain.  Has an 

allergy to milk.  Per patient the pain is 8 out of 10.  Patient was apparently 

walking into a store when the pain got more severe and she called the 

ambulance.  Per patient she was on her way here.  She was brought here by 

ambulance.





PFSH


Past Medical History


Hx Anticoagulant Therapy:  No


Blood Disorders:  No


Anxiety:  Yes


Depression:  Yes


Heart Rhythm Problems:  Yes


Cancer:  No


Cardiovascular Problems:  Yes (HTN)


High Cholesterol:  Yes


Chemotherapy:  No


Chest Pain:  Yes (ENLARGED HEART)


Congestive Heart Failure:  Yes


COPD:  Yes


Cerebrovascular Accident:  Yes


Diabetes:  Yes (pt stated she used to have DM)


Patient Takes Glucophage:  No


Diminished Hearing:  Yes


Endocrine:  No


Genitourinary:  Yes (UTI'S)


Headaches:  No


Hypertension:  Yes


Kidney Stones:  No


Musculoskeletal:  Yes (BONES ACHE)


Psychiatric:  Yes


Reproductive:  No


Respiratory:  Yes (COPD,SOB)


Pneumonia:  Yes


Radiation Therapy:  No


Seizures:  No


Sleep Apnea:  Yes


Thyroid Disease:  Yes (HYPOTHYROID/GOITER)


Ulcer:  Yes (BLEEDING)


Tetanus Vaccination:  > 5 Years


Influenza Vaccination:  No


Pregnant?:  Not Pregnant


Menopausal:  Yes


Tubal Ligation:  Yes





Past Surgical History


Appendectomy:  Yes


 Section:  Yes


Cholecystectomy:  Yes


Hysterectomy:  Yes


Pacemaker:  No


Other Surgery:  Yes (APPENDECTOMY, JAIME, TUBAL LIGATION, C SECTIONS)





Social History


Alcohol Use:  No


Tobacco Use:  No


Substance Use:  No





Allergies-Medications


(Allergen,Severity, Reaction):  


Coded Allergies:  


     milk (Verified  Allergy, Mild, Cramping, 10/24/17)


 lactose intolerant


Reported Meds & Prescriptions





Reported Meds & Active Scripts


Active


Prednisone 20 Mg Tab 20 Mg PO DAILY 30 Days


Novolog Inj (Insulin Aspart) 1,000 Unit/10 Ml Vial 1-9 Units SQ ACHS


      sugars less than 70,(0)units; sugars


     150-199,(1) unit; sugars 200-249,(3) units; sugars 250-299,(5) units;


     sugars 300-349,(7) units; sugars greater than 349,(9) units.


Levemir Inj (Insulin Detemir) 1,000 unit/ 10 ML Vial 10 Units SQ HS 30 Days


     Do not mix with any other Insulin.


Megestrol Liq (Megestrol Acetate) 40 Mg/Ml Susp 400 Mg PO BIDAC 30 Days


Cellcept (Mycophenolate Mofetil) 500 Mg Tab 500 Mg PO Q8HR 30 Days


Synthroid (Levothyroxine Sodium) 150 Mcg Tab 150 Mcg PO DAILY@0600 30 Days


Pantoprazole (Pantoprazole Sodium) 40 Mg Tab 40 Mg PO DAILY 30 Days


Mirtazapine 15 Mg Tab 30 Mg PO HS 30 Days


Klonopin (Clonazepam) 0.5 Mg Tab 0.5 Mg PO Q12HR 30 Days








Review of Systems


Except as stated in HPI:  all other systems reviewed are Neg





Physical Exam


Narrative


GENERAL: 


SKIN: Warm and dry.


HEAD: Atraumatic. Normocephalic. 


EYES: Pupils equal and round. No scleral icterus. No injection or drainage. 


ENT: No nasal bleeding or discharge.  Mucous membranes pink and moist.  Tongue 

is midline.  No uvula deviation.


NECK: Trachea midline. No JVD. 


CARDIOVASCULAR: Regular rate and rhythm.  No murmurs, S3, S4.


RESPIRATORY: No accessory muscle use. Clear to auscultation. Breath sounds 

equal bilaterally. 


GASTROINTESTINAL: Abdomen soft, non-tender, nondistended. Hepatic and splenic 

margins not palpable. 


MUSCULOSKELETAL: Extremities without clubbing, cyanosis, or edema. No obvious 

deformities.  Full range of motion of the upper and lower extremities 

bilaterally.  2+ pulses bilaterally.  Patient does have 2+ pitting edema in the 

lower extremities bilaterally.


NEUROLOGICAL: Awake and alert. No obvious cranial nerve deficits.  Motor 

grossly within normal limits. Five out of 5 muscle strength in the arms and 

legs.  Normal speech.


PSYCHIATRIC: Appropriate mood and affect; insight and judgment normal.





Data


Data


Last Documented VS





Vital Signs








  Date Time  Temp Pulse Resp B/P (MAP) Pulse Ox O2 Delivery O2 Flow Rate FiO2


 


10/24/17 19:40   18  96 Room Air  


 


10/24/17 19:01  81      


 


10/24/17 18:25 98.7       








Orders





 Orders


Electrocardiogram (10/24/17 19:09)


Complete Blood Count With Diff (10/24/17 19:09)


Comprehensive Metabolic Panel (10/24/17 19:09)


Ckmb (Isoenzyme) Profile (10/24/17 19:09)


Troponin I (10/24/17 19:09)


B-Type Natriuretic Peptide (10/24/17 19:09)


Prothrombin Time / Inr (Pt) (10/24/17 19:09)


Act Partial Throm Time (Ptt) (10/24/17 19:09)


Lipase (10/24/17 19:09)


Urinalysis - C+S If Indicated (10/24/17 19:09)


Magnesium (Mg) (10/24/17 19:09)


Thyroid Stimulating Hormone (10/24/17 19:09)


Chest, Single Ap (10/24/17 19:09)


Ct Brain W/O Iv Contrast(Rout) (10/24/17 19:09)


Blood Culture (10/24/17 19:09)


Iv Access Insert/Monitor (10/24/17 19:09)


Ecg Monitoring (10/24/17 19:09)


Oximetry (10/24/17 19:09)


Lactic Acid Sepsis Protocol (10/24/17 19:09)


Us Leg Venous Doppler Bilat (10/24/17 )


Morphine Inj (Morphine Inj) (10/24/17 19:30)


Ondansetron Inj (Zofran Inj) (10/24/17 19:30)


Urine Culture (10/24/17 19:21)


Insulin Human Regular Inj (Novolin R Inj (10/24/17 21:45)


Sodium Chlorid 0.9% 500 Ml Inj (Ns 500 M (10/24/17 21:45)


Bedside Glucose TC.CSUGAR (10/24/17 22:11)


Heparin-D5w 25,000 U/250 Ml (Heparin-D5w (10/24/17 22:45)


Act Partial Throm Time (Ptt) (10/24/17 22:41)


Prothrombin Time / Inr (Pt) (10/24/17 22:41)


Cbc No Diff, Includes Plts (10/24/17 22:41)


Cbc No Diff, Includes Plts (10/27/17 06:00)


Act Partial Throm Time (Ptt) (10/25/17 05:41)


Occult Blood (Hemoccult) Stool (10/24/17 22:41)





Labs





Laboratory Tests








Test


  10/24/17


19:21 10/24/17


19:55


 


White Blood Count 6.4 TH/MM3  


 


Red Blood Count 3.46 MIL/MM3  


 


Hemoglobin 11.1 GM/DL  


 


Hematocrit 33.8 %  


 


Mean Corpuscular Volume 97.6 FL  


 


Mean Corpuscular Hemoglobin 32.0 PG  


 


Mean Corpuscular Hemoglobin


Concent 32.7 % 


  


 


 


Red Cell Distribution Width 15.0 %  


 


Platelet Count 192 TH/MM3  


 


Mean Platelet Volume 8.6 FL  


 


Neutrophils (%) (Auto) 59.5 %  


 


Lymphocytes (%) (Auto) 30.4 %  


 


Monocytes (%) (Auto) 7.7 %  


 


Eosinophils (%) (Auto) 1.4 %  


 


Basophils (%) (Auto) 1.0 %  


 


Neutrophils # (Auto) 3.8 TH/MM3  


 


Lymphocytes # (Auto) 1.9 TH/MM3  


 


Monocytes # (Auto) 0.5 TH/MM3  


 


Eosinophils # (Auto) 0.1 TH/MM3  


 


Basophils # (Auto) 0.1 TH/MM3  


 


CBC Comment AUTO DIFF  


 


Differential Total Cells


Counted 100 


  


 


 


Neutrophils % (Manual) 53 %  


 


Band Neutrophils % 5 %  


 


Lymphocytes % 31 %  


 


Monocytes % 8 %  


 


Eosinophils % 1 %  


 


Neutrophils # (Manual) 3.8 TH/MM3  


 


Metamyelocytes 1 %  


 


Myelocytes 1 %  


 


Differential Comment


  FINAL DIFF


MANUAL 


 


 


Platelet Estimate NORMAL  


 


Platelet Morphology Comment NORMAL  


 


Prothrombin Time 10.1 SEC  


 


Prothromb Time International


Ratio 0.9 RATIO 


  


 


 


Activated Partial


Thromboplast Time 25.0 SEC 


  


 


 


Urine Color LIGHT-YELLOW  


 


Urine Turbidity CLEAR  


 


Urine pH 6.5  


 


Urine Specific Gravity 1.014  


 


Urine Protein 300 mg/dL  


 


Urine Glucose (UA) 1000 mg/dL  


 


Urine Ketones NEG mg/dL  


 


Urine Occult Blood MOD  


 


Urine Nitrite NEG  


 


Urine Bilirubin NEG  


 


Urine Urobilinogen


  LESS THAN 2.0


MG/DL 


 


 


Urine Leukocyte Esterase NEG  


 


Urine RBC 22 /hpf  


 


Urine WBC 10 /hpf  


 


Urine Squamous Epithelial


Cells 1 /hpf 


  


 


 


Urine Amorphous Sediment RARE  


 


Microscopic Urinalysis Comment


  CULTURE


INDICATED 


 


 


Blood Urea Nitrogen 50 MG/DL  


 


Creatinine 2.23 MG/DL  


 


Random Glucose 414 MG/DL  


 


Total Protein 5.9 GM/DL  


 


Albumin 1.9 GM/DL  


 


Calcium Level 8.1 MG/DL  


 


Magnesium Level 1.6 MG/DL  


 


Alkaline Phosphatase 227 U/L  


 


Aspartate Amino Transf


(AST/SGOT) 21 U/L 


  


 


 


Alanine Aminotransferase


(ALT/SGPT) 25 U/L 


  


 


 


Total Bilirubin 0.2 MG/DL  


 


Sodium Level 138 MEQ/L  


 


Potassium Level 4.0 MEQ/L  


 


Chloride Level 108 MEQ/L  


 


Carbon Dioxide Level 22.3 MEQ/L  


 


Anion Gap 8 MEQ/L  


 


Estimat Glomerular Filtration


Rate 22 ML/MIN 


  


 


 


Total Creatine Kinase 29 U/L  


 


Troponin I


  LESS THAN 0.02


NG/ML 


 


 


B-Type Natriuretic Peptide 33 PG/ML  


 


Lipase 713 U/L  


 


Thyroid Stimulating Hormone


3rd Gen 69.900 uIU/ML 


  


 


 


Lactic Acid Level  1.0 mmol/L 











MDM


Medical Decision Making


Medical Screen Exam Complete:  Yes


Emergency Medical Condition:  Yes


Medical Record Reviewed:  Yes


Interpretation(s)


CBC & BMP Diagram


10/24/17 19:21








Total Protein 5.9 L, Albumin 1.9 L, Calcium Level 8.1 L, Magnesium Level 1.6, 

Alkaline Phosphatase 227 H, Aspartate Amino Transf (AST/SGOT) 21, Alanine 

Aminotransferase (ALT/SGPT) 25, Total Bilirubin 0.2








Last Impressions








Head CT 10/24/17 1909 Signed





Impressions: 





 Service Date/Time:  2017 20:34 - CONCLUSION: Unremarkable 





 study except for mild chronic sinusitis.     TYLER Magana MD 


 


Chest X-Ray 10/24/17 1909 Signed





Impressions: 





 Service Date/Time:  2017 19:27 - CONCLUSION:  No acute 





 cardiopulmonary disease.     TYLER Magana MD 


 


Lower Extremity Ultrasound 10/24/17 0000 Signed





Impressions: 





 Service Date/Time:  2017 20:03 - CONCLUSION:  Right 





 peroneal vein thrombus.     TYLER Magana MD 





UA negative


lactic acid WNL


TSH in the 60s


troponin and CKMB negative


EKG shows sinus rhythm with no sign of acute ischemia or arrhythmia read by me 

and attending.


Differential Diagnosis


Acute on chronic pain versus pancreatitis versus rhabdomyolysis versus kidney 

injury versus kidney disease versus a meeting disease versus acute abdomen 

versus ACS


Narrative Course


62-year-old female that presents to the ED for evaluation of multiple 

complaints.  Patient was properly examined and was found to have signs and 

symptoms very definite concerning for acute disease.  Patient does have 

multiple risk factors.  She has no follow-up.  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  She also was found to have 

rhabdomyolysis and acute kidney injury.  She also has a history of significant 

diabetes.  At this time I recommend labs and imaging.  Case was discussed my 

attending Dr. Dobbs who is in agreement with plan.  Labs and imaging show 

what appears to be acute on chronic kidney disease as well as peritoneal right 

DVT.  BNP still pending.  Patient unfortunately is noncompliant and will not do 

well outpatient.  She also has significant kidney disease.  My attending 

recommends admission for heparin and treatment of the DVT as well as assessment 

of the diabetes.  [facility] was paged and Dr Coley agrees to admission.





Diagnosis





 Primary Impression:  


 Peroneal DVT (deep venous thrombosis)


 Qualified Codes:  I82.491 - Acute embolism and thrombosis of other specified 

deep vein of right lower extremity


 Additional Impression:  


 Acute on chronic kidney failure


 Qualified Codes:  N17.9 - Acute kidney failure, unspecified; N18.9 - Chronic 

kidney disease, unspecified





Admitting Information


Admitting Physician Requests:  Rodriguez Calix Oct 24, 2017 19:46

## 2017-10-24 NOTE — RADRPT
EXAM DATE/TIME:  10/24/2017 20:03 

 

[facility] COMPARISON:     

No previous studies available for comparison.

        

 

 

INDICATIONS :                

Bilateral leg swelling.

            

 

MEDICAL HISTORY :     

Chronic obstructive pulmonary disease. Hypercholesterolemia. Hypertension. Hypothyroidism. Goiter. Nu
mbness/tingling. Cerebrovascular accident. Congestive heart failure. Irregular heartbeat. Enlarged he
art. Pneumonia. Sleep apnea. Bleeding ulcer. Pregnancy. UTI. Bone pain. Diabetes. Depression. Anxiety
. Hears voices.

 

SURGICAL HISTORY :     

Tubal ligation.Appendectomy. Cholecystectomy.Hysterectomy.  section.

 

ENCOUNTER:     

Initial

 

ACUITY:     

2 day

 

PAIN SCORE:      

2/10

 

LOCATION:      

Bilateral  legs.

                       

 

TECHNIQUE:     

Venous ultrasound of the left and right leg was performed from the inguinal ligament to the proximal 
calf.  Real-time, color Doppler and spectral tracing, compression and augmentation techniques were us
ed.  

 

FINDINGS:     

 

RIGHT LEG:     

There is normal compressibility of the deep venous system from the inguinal region to the proximal ca
lf.  No echogenic clot is seen in the lumen of the common femoral, femoral, popliteal, and posterior 
tibial veins.  There is a normal response of the venous system to proximal and distal augmentation an
d respiration.  There is however thrombus within the right peroneal vein.

 

LEFT LEG:     

There is normal compressibility of the deep venous system from the inguinal region to the proximal ca
lf.  No echogenic clot is seen in the lumen of the common femoral, femoral, popliteal, and posterior 
tibial veins.  There is a normal response of the venous system to proximal and distal augmentation an
d respiration.  

 

CONCLUSION:     

Right peroneal vein thrombus.

 

 

 

 TYLER Magana MD on 2017 at 20:57           

Board Certified Radiologist.

 This report was verified electronically.

## 2017-10-24 NOTE — RADRPT
EXAM DATE/TIME:  10/24/2017 20:34 

 

[facility] COMPARISON:     

CT BRAIN W/O CONTRAST, September 02, 2017, 0:29.

 

 

INDICATIONS :     

Patient complains of headache.

                      

 

RADIATION DOSE:     

29.73 CTDIvol (mGy) 

 

 

 

MEDICAL HISTORY :     

Cardiovascular disease. Hypertension. Chronic obstructive pulmonary disease.

 

SURGICAL HISTORY :      

Appendectomy. Cholecystectomy.Hysterectomy.

 

ENCOUNTER:      

Initial

 

ACUITY:      

1 day

 

PAIN SCALE:      

10/10

 

LOCATION:        

cranial 

 

TECHNIQUE:     

Multiple contiguous axial images were obtained of the head.  Using automated exposure control and adj
ustment of the mA and/or kV according to patient size, radiation dose was kept as low as reasonably a
chievable to obtain optimal diagnostic quality images.   DICOM format image data is available electro
nically for review and comparison.  

 

FINDINGS:     

There is no evidence for intracranial hemorrhage, mass effect, mass lesions, edema, or extra-axial fl
uid collections.  The visualized bony structures appear intact.  The ventricles are normal size for t
he patient's age.  There are no signs of acute infarction for technique. There is mucoperiosteal thic
kening within the right maxillary sinus and some of the eithmoid air cells.

 

CONCLUSION:          Unremarkable study except for mild chronic sinusitis.

 

 

 

 TYLER Magana MD on October 24, 2017 at 21:07           

Board Certified Radiologist.

 This report was verified electronically.

## 2017-10-24 NOTE — EKG
Date Performed: 10/24/2017       Time Performed: 19:55:24

 

PTAGE:      62 years

 

EKG:      Sinus rhythm 

 

 NORMAL ECG

 

PREVIOUS TRACING       : 09/01/2017 22.13

 

DOCTOR:   Macario Kulkarni  Interpretating Date/Time  10/24/2017 21:27:45

## 2017-10-24 NOTE — RADRPT
EXAM DATE/TIME:  10/24/2017 19:27 

 

[facility] COMPARISON:     

CHEST SINGLE AP, September 01, 2017, 22:32.

 

                     

INDICATIONS :     

Chest pain.

                     

 

MEDICAL HISTORY :            

Hypertension. Chronic obstructive pulmonary disease. Congestive heart failure.   

 

SURGICAL HISTORY :        

Cholecystectomy. Appendectomy. Hysterectomy.

 

ENCOUNTER:     

Initial                                        

 

ACUITY:     

2 days      

 

PAIN SCORE:     

4/10

 

LOCATION:     

Bilateral chest 

 

FINDINGS:     

The lungs are clear without infiltrate, nodule, or mass.  There is no appreciable pleural effusion fo
r technique.  Heart and mediastinum are unremarkable.

 

CONCLUSION:         

No acute cardiopulmonary disease.

 

 

 

 TYLER Magana MD on October 24, 2017 at 19:54           

Board Certified Radiologist.

 This report was verified electronically.

## 2017-10-25 VITALS
DIASTOLIC BLOOD PRESSURE: 91 MMHG | SYSTOLIC BLOOD PRESSURE: 164 MMHG | HEART RATE: 76 BPM | RESPIRATION RATE: 18 BRPM | OXYGEN SATURATION: 94 % | TEMPERATURE: 96.5 F

## 2017-10-25 VITALS
TEMPERATURE: 97.5 F | DIASTOLIC BLOOD PRESSURE: 82 MMHG | SYSTOLIC BLOOD PRESSURE: 138 MMHG | OXYGEN SATURATION: 97 % | HEART RATE: 71 BPM | RESPIRATION RATE: 16 BRPM

## 2017-10-25 VITALS
HEART RATE: 62 BPM | OXYGEN SATURATION: 97 % | SYSTOLIC BLOOD PRESSURE: 164 MMHG | RESPIRATION RATE: 16 BRPM | TEMPERATURE: 96.7 F | DIASTOLIC BLOOD PRESSURE: 100 MMHG

## 2017-10-25 VITALS — HEART RATE: 64 BPM

## 2017-10-25 VITALS
HEART RATE: 82 BPM | OXYGEN SATURATION: 96 % | TEMPERATURE: 97.2 F | SYSTOLIC BLOOD PRESSURE: 150 MMHG | RESPIRATION RATE: 17 BRPM | DIASTOLIC BLOOD PRESSURE: 84 MMHG

## 2017-10-25 VITALS
DIASTOLIC BLOOD PRESSURE: 76 MMHG | HEART RATE: 71 BPM | TEMPERATURE: 97.8 F | RESPIRATION RATE: 19 BRPM | OXYGEN SATURATION: 95 % | SYSTOLIC BLOOD PRESSURE: 150 MMHG

## 2017-10-25 VITALS
RESPIRATION RATE: 17 BRPM | HEART RATE: 70 BPM | TEMPERATURE: 96.3 F | OXYGEN SATURATION: 96 % | SYSTOLIC BLOOD PRESSURE: 133 MMHG | DIASTOLIC BLOOD PRESSURE: 72 MMHG

## 2017-10-25 LAB
BACTERIA #/AREA URNS HPF: (no result) /HPF
BASOPHILS # BLD AUTO: 0.1 TH/MM3 (ref 0–0.2)
BASOPHILS NFR BLD: 1 % (ref 0–2)
BUN SERPL-MCNC: 44 MG/DL (ref 7–18)
CALCIUM SERPL-MCNC: 8.2 MG/DL (ref 8.5–10.1)
CHLORIDE SERPL-SCNC: 106 MEQ/L (ref 98–107)
COLOR UR: (no result)
CREAT SERPL-MCNC: 1.98 MG/DL (ref 0.5–1)
EOSINOPHIL # BLD: 0.2 TH/MM3 (ref 0–0.4)
EOSINOPHIL NFR BLD: 2.7 % (ref 0–4)
ERYTHROCYTE [DISTWIDTH] IN BLOOD BY AUTOMATED COUNT: 15.3 % (ref 11.6–17.2)
GFR SERPLBLD BASED ON 1.73 SQ M-ARVRAT: 26 ML/MIN (ref 89–?)
GLUCOSE SERPL-MCNC: 264 MG/DL (ref 74–106)
GLUCOSE UR STRIP-MCNC: 70 MG/DL
HBA1C MFR BLD: 10.4 % (ref 4.3–6)
HCO3 BLD-SCNC: 25.1 MEQ/L (ref 21–32)
HCT VFR BLD CALC: 33 % (ref 35–46)
HGB BLD-MCNC: 10.8 GM/DL (ref 11.6–15.3)
HGB UR QL STRIP: (no result)
HYALINE CASTS #/AREA URNS LPF: 3 /LPF
KETONES UR STRIP-MCNC: (no result) MG/DL
LYMPHOCYTES # BLD AUTO: 2.3 TH/MM3 (ref 1–4.8)
LYMPHOCYTES NFR BLD AUTO: 34.7 % (ref 9–44)
MCH RBC QN AUTO: 31.6 PG (ref 27–34)
MCHC RBC AUTO-ENTMCNC: 32.8 % (ref 32–36)
MCV RBC AUTO: 96.3 FL (ref 80–100)
MONOCYTE #: 0.5 TH/MM3 (ref 0–0.9)
MONOCYTES NFR BLD: 7.8 % (ref 0–8)
NEUTROPHILS # BLD AUTO: 3.5 TH/MM3 (ref 1.8–7.7)
NEUTROPHILS NFR BLD AUTO: 53.8 % (ref 16–70)
NITRITE UR QL STRIP: (no result)
PLATELET # BLD: 197 TH/MM3 (ref 150–450)
PMV BLD AUTO: 8.2 FL (ref 7–11)
RBC # BLD AUTO: 3.43 MIL/MM3 (ref 4–5.3)
SODIUM SERPL-SCNC: 138 MEQ/L (ref 136–145)
SP GR UR STRIP: 1.01 (ref 1–1.03)
SQUAMOUS #/AREA URNS HPF: <1 /HPF (ref 0–5)
URINE LEUKOCYTE ESTERASE: (no result)
WBC # BLD AUTO: 6.6 TH/MM3 (ref 4–11)

## 2017-10-25 RX ADMIN — HYDROCODONE BITARTRATE AND ACETAMINOPHEN PRN TAB: 10; 325 TABLET ORAL at 20:43

## 2017-10-25 RX ADMIN — MYCOPHENOLATE MOFETIL SCH MG: 500 TABLET, FILM COATED ORAL at 06:08

## 2017-10-25 RX ADMIN — DOCUSATE SODIUM SCH MG: 100 CAPSULE, LIQUID FILLED ORAL at 20:32

## 2017-10-25 RX ADMIN — INSULIN ASPART SCH: 100 INJECTION, SOLUTION INTRAVENOUS; SUBCUTANEOUS at 09:33

## 2017-10-25 RX ADMIN — INSULIN ASPART SCH: 100 INJECTION, SOLUTION INTRAVENOUS; SUBCUTANEOUS at 16:55

## 2017-10-25 RX ADMIN — HYDROCODONE BITARTRATE AND ACETAMINOPHEN PRN TAB: 10; 325 TABLET ORAL at 09:33

## 2017-10-25 RX ADMIN — INSULIN ASPART SCH: 100 INJECTION, SOLUTION INTRAVENOUS; SUBCUTANEOUS at 12:08

## 2017-10-25 RX ADMIN — MYCOPHENOLATE MOFETIL SCH MG: 500 TABLET, FILM COATED ORAL at 20:32

## 2017-10-25 RX ADMIN — Medication SCH ML: at 20:32

## 2017-10-25 RX ADMIN — MAGNESIUM HYDROXIDE PRN ML: 400 SUSPENSION ORAL at 12:09

## 2017-10-25 RX ADMIN — HEPARIN SODIUM PRN MLS/HR: 10000 INJECTION, SOLUTION INTRAVENOUS at 16:57

## 2017-10-25 RX ADMIN — MYCOPHENOLATE MOFETIL SCH MG: 500 TABLET, FILM COATED ORAL at 12:09

## 2017-10-25 RX ADMIN — MIRTAZAPINE SCH MG: 15 TABLET, FILM COATED ORAL at 20:32

## 2017-10-25 RX ADMIN — PANTOPRAZOLE SCH MG: 40 TABLET, DELAYED RELEASE ORAL at 09:33

## 2017-10-25 RX ADMIN — INSULIN ASPART SCH: 100 INJECTION, SOLUTION INTRAVENOUS; SUBCUTANEOUS at 20:31

## 2017-10-25 RX ADMIN — Medication SCH ML: at 09:33

## 2017-10-25 RX ADMIN — HYDROCODONE BITARTRATE AND ACETAMINOPHEN PRN TAB: 10; 325 TABLET ORAL at 16:54

## 2017-10-25 NOTE — HHI.HP
__________________________________________________





HPI


Service


[facility]


Primary Care Physician


No Primary Care Physician


Admission Diagnosis





right peroneal DVT, hypothyrodism, kidney disease


Diagnoses:  


Chief Complaint:  


chest pain, bilateral lower extremity edema


Travel History


International Travel<30 Days:  No


Contact w/Intl Traveler <30 Da:  No


Traveled to Known Affected Are:  No


History of Present Illness


63 y/o female with a history of DM, chronic pancreatitis, CHF, CAD, liver 

disease, Systemic lupus, depression, hypothyroid and COPD presented to the ED 

with complaints of chest pain and lower extremity edema. She states for the 

last week she has been having substernal chest pain and bilateral lower 

extremity swelling for the last week. She states the chest pain is pressure like

, 8/10 with radiation to her left arm. She states she is compliant with all her 

medications at home. She states her diabetes is not controlled at home and runs 

in the 300s. She lives at home with her son but does not feel he can take care 

of her and she feels she can not care for her self. She complains of numbness 

and weakness in her bilateral hands. She denies any sob, fever or chills. She 

knows she has CHF but does not know her EF or the last time a echo was 

completed.





Review of Systems


Except as stated in HPI:  all other systems reviewed are Neg





Past Family Social History


Past Medical History


DM


chronic pancreatitis


CHF


CAD


liver disease


Systemic lupus


depression


hypothyroid 


COPD


Past Surgical History


Appendectomy


Cholecystectomy





Hysterectomy


Reported Medications





Reported Meds & Active Scripts


Active


Prednisone 20 Mg Tab 20 Mg PO DAILY 30 Days


Novolog Inj (Insulin Aspart) 1,000 Unit/10 Ml Vial 1-9 Units SQ ACHS


      sugars less than 70,(0)units; sugars


     150-199,(1) unit; sugars 200-249,(3) units; sugars 250-299,(5) units;


     sugars 300-349,(7) units; sugars greater than 349,(9) units.


Levemir Inj (Insulin Detemir) 1,000 unit/ 10 ML Vial 10 Units SQ HS 30 Days


     Do not mix with any other Insulin.


Megestrol Liq (Megestrol Acetate) 40 Mg/Ml Susp 400 Mg PO BIDAC 30 Days


Cellcept (Mycophenolate Mofetil) 500 Mg Tab 500 Mg PO Q8HR 30 Days


Synthroid (Levothyroxine Sodium) 150 Mcg Tab 150 Mcg PO DAILY@0600 30 Days


Pantoprazole (Pantoprazole Sodium) 40 Mg Tab 40 Mg PO DAILY 30 Days


Mirtazapine 15 Mg Tab 30 Mg PO HS 30 Days


Klonopin (Clonazepam) 0.5 Mg Tab 0.5 Mg PO Q12HR 30 Days


Allergies:  


Coded Allergies:  


     milk (Verified  Allergy, Mild, Cramping, 10/24/17)


 lactose intolerant


Active Ordered Medications





Current Medications








 Medications


  (Trade)  Dose


 Ordered  Sig/Kandis


 Route  Start Time


 Stop Time Status Last Admin


 


 Heparin Sodium/


 Dextrose  250 ml @ 


 16 mls/hr  TITRATE  PRN


 IV  10/24/17 22:45


    10/24/17 23:22


 


 


  (NS Flush)  2 ml  UNSCH  PRN


 IV FLUSH  10/24/17 23:30


     


 


 


  (NS Flush)  2 ml  BID


 IV FLUSH  10/25/17 09:00


     


 


 


  (Narcan Inj)  0.4 mg  UNSCH  PRN


 IV PUSH  10/24/17 23:30


     


 


 


  (D50w (Vial) Inj)  50 ml  UNSCH  PRN


 IV PUSH  10/24/17 23:30


     


 


 


  (Glucagon Inj)  1 mg  UNSCH  PRN


 OTHER  10/24/17 23:30


     


 


 


  (NovoLOG


 SUPPLEMENTAL


 SCALE)  1  ACHS SLIDING  SCALE


 SQ  10/25/17 08:00


     


 








Family History


She does not know her mom or dad, she was adopted by her grandmother


Social History


Tobacco use: Denies


Alcohol use: Prior ETOH abuse


Illicit drug use: Denies





Physical Exam


Vital Signs





Vital Signs








  Date Time  Temp Pulse Resp B/P (MAP) Pulse Ox O2 Delivery O2 Flow Rate FiO2


 


10/25/17 00:38 96.7 62 16 164/100 (121) 97   


 


10/25/17 00:30        


 


10/24/17 23:35  66 18 157/75 (102) 97 Room Air  


 


10/24/17 19:40   18  96 Room Air  


 


10/24/17 19:01  81 18 185/81 (115) 96 Room Air  


 


10/24/17 18:25 98.7 82 18 169/81 (110) 97   








Physical Exam


GENERAL: This is a well-nourished, well-developed patient, in no apparent 

distress.


SKIN: No rashes, ecchymoses or lesions. Cool and dry.


HEAD: Atraumatic. Normocephalic. 


EYES: Pupils equal round and reactive. 


ENT: Nose without bleeding, purulent drainage or septal hematoma. Airway patent.


NECK: Trachea midline. No JVD or lymphadenopathy.


CARDIOVASCULAR: Regular rate and rhythm without murmurs, gallops, or rubs. 


RESPIRATORY: Clear to auscultation. Breath sounds equal bilaterally. No wheezes

, rales, or rhonchi.  


GASTROINTESTINAL: Abdomen soft, non-tender, nondistended. 


MUSCULOSKELETAL: Bilateral lower extremity +3 pitting edema No calf tenderness. 


NEUROLOGICAL: Awake and alert. Motor and sensory grossly within normal limits. 

Normal speech.


Laboratory





Laboratory Tests








Test


  10/24/17


19:21 10/24/17


19:55


 


White Blood Count 6.4  


 


Red Blood Count 3.46  


 


Hemoglobin 11.1  


 


Hematocrit 33.8  


 


Mean Corpuscular Volume 97.6  


 


Mean Corpuscular Hemoglobin 32.0  


 


Mean Corpuscular Hemoglobin


Concent 32.7 


  


 


 


Red Cell Distribution Width 15.0  


 


Platelet Count 192  


 


Mean Platelet Volume 8.6  


 


Neutrophils (%) (Auto) 59.5  


 


Lymphocytes (%) (Auto) 30.4  


 


Monocytes (%) (Auto) 7.7  


 


Eosinophils (%) (Auto) 1.4  


 


Basophils (%) (Auto) 1.0  


 


Neutrophils # (Auto) 3.8  


 


Lymphocytes # (Auto) 1.9  


 


Monocytes # (Auto) 0.5  


 


Eosinophils # (Auto) 0.1  


 


Basophils # (Auto) 0.1  


 


CBC Comment AUTO DIFF  


 


Differential Total Cells


Counted 100 


  


 


 


Neutrophils % (Manual) 53  


 


Band Neutrophils % 5  


 


Lymphocytes % 31  


 


Monocytes % 8  


 


Eosinophils % 1  


 


Neutrophils # (Manual) 3.8  


 


Metamyelocytes 1  


 


Myelocytes 1  


 


Differential Comment


  FINAL DIFF


MANUAL 


 


 


Platelet Estimate NORMAL  


 


Platelet Morphology Comment NORMAL  


 


Prothrombin Time 10.1  


 


Prothromb Time International


Ratio 0.9 


  


 


 


Activated Partial


Thromboplast Time 25.0 


  


 


 


Urine Color LIGHT-YELLOW  


 


Urine Turbidity CLEAR  


 


Urine pH 6.5  


 


Urine Specific Gravity 1.014  


 


Urine Protein 300  


 


Urine Glucose (UA) 1000  


 


Urine Ketones NEG  


 


Urine Occult Blood MOD  


 


Urine Nitrite NEG  


 


Urine Bilirubin NEG  


 


Urine Urobilinogen LESS THAN 2.0  


 


Urine Leukocyte Esterase NEG  


 


Urine RBC 22  


 


Urine WBC 10  


 


Urine Squamous Epithelial


Cells 1 


  


 


 


Urine Amorphous Sediment RARE  


 


Microscopic Urinalysis Comment


  CULTURE


INDICATED 


 


 


Blood Urea Nitrogen 50  


 


Creatinine 2.23  


 


Random Glucose 414  


 


Total Protein 5.9  


 


Albumin 1.9  


 


Calcium Level 8.1  


 


Magnesium Level 1.6  


 


Alkaline Phosphatase 227  


 


Aspartate Amino Transf


(AST/SGOT) 21 


  


 


 


Alanine Aminotransferase


(ALT/SGPT) 25 


  


 


 


Total Bilirubin 0.2  


 


Sodium Level 138  


 


Potassium Level 4.0  


 


Chloride Level 108  


 


Carbon Dioxide Level 22.3  


 


Anion Gap 8  


 


Estimat Glomerular Filtration


Rate 22 


  


 


 


Total Creatine Kinase 29  


 


Troponin I LESS THAN 0.02  


 


B-Type Natriuretic Peptide 33  


 


Lipase 713  


 


Thyroid Stimulating Hormone


3rd Gen 69.900 


  


 


 


Lactic Acid Level  1.0 














 Date/Time


Source Procedure


Growth Status


 


 


 10/24/17 19:56


Blood Peripheral Aerobic Blood Culture


Pending Received


 


 10/24/17 19:56


Blood Peripheral Anaerobic Blood Culture


Pending Received


 


 10/24/17 19:21


Urine Random Urine Urine Culture


Pending Worksheet








Result Diagram:  


10/24/17 1921                                                                  

              10/24/17 1921





Imaging





Last Impressions








Head CT 10/24/17 1909 Signed





Impressions: 





 Service Date/Time:  2017 20:34 - CONCLUSION: Unremarkable 





 study except for mild chronic sinusitis.     TYLER Magana MD 


 


Chest X-Ray 10/24/17 1909 Signed





Impressions: 





 Service Date/Time:  2017 19:27 - CONCLUSION:  No acute 





 cardiopulmonary disease.     TYLER Magana MD 


 


Lower Extremity Ultrasound 10/24/17 0000 Signed





Impressions: 





 Service Date/Time:  2017 20:03 - CONCLUSION:  Right 





 peroneal vein thrombus.     TYLER Magana MD 











Capsveta VTE Risk Assessment


Caprini VTE Risk Assessment:  Mod/High Risk (score >= 2)


Caprini Risk Assessment Model











 Point Value = 1          Point Value = 2  Point Value = 3  Point Value = 5


 


Age 41-60


Minor surgery


BMI > 25 kg/m2


Swollen legs


Varicose veins


Pregnancy or postpartum


History of unexplained or recurrent


   spontaneous 


Oral contraceptives or hormone


   replacement


Sepsis (< 1 month)


Serious lung disease, including


   pneumonia (< 1 month)


Abnormal pulmonary function


Acute myocardial infarction


Congestive heart failure (< 1 month)


History of inflammatory bowel disease


Medical patient at bed rest Age 61-74


Arthroscopic surgery


Major open surgery (> 45 min)


Laparoscopic surgery (> 45 min)


Malignancy


Confined to bed (> 72 hours)


Immobilizing plaster cast


Central venous access Age >= 75


History of VTE


Family history of VTE


Factor V Leiden


Prothrombin 39423O


Lupus anticoagulant


Anticardiolipin antibodies


Elevated serum homocysteine


Heparin-induced thrombocytopenia


Other congenital or acquired


   thrombophilia Stroke (< 1 month)


Elective arthroplasty


Hip, pelvis, or leg fracture


Acute spinal cord injury (< 1 month)








Prophylaxis Regimen











   Total Risk


Factor Score Risk Level Prophylaxis Regimen


 


0-1      Low Early ambulation


 


2 Moderate Order ONE of the following:


*Sequential Compression Device (SCD)


*Heparin 5000 units SQ BID


 


3-4 Higher Order ONE of the following medications:


*Heparin 5000 units SQ TID


*Enoxaparin/Lovenox 40 mg SQ daily (WT < 150 kg, CrCl > 30 mL/min)


*Enoxaparin/Lovenox 30 mg SQ daily (WT < 150 kg, CrCl > 10-29 mL/min)


*Enoxaparin/Lovenox 30 mg SQ BID (WT < 150 kg, CrCl > 30 mL/min)


AND/OR


*Sequential Compression Device (SCD)


 


5 or more Highest Order ONE of the following medications:


*Heparin 5000 units SQ TID (Preferred with Epidurals)


*Enoxaparin/Lovenox 40 mg SQ daily (WT < 150 kg, CrCl > 30 mL/min)


*Enoxaparin/Lovenox 30 mg SQ daily (WT < 150 kg, CrCl > 10-29 mL/min)


*Enoxaparin/Lovenox 30 mg SQ BID (WT < 150 kg, CrCl > 30 mL/min)


AND


*Sequential Compression Device (SCD)











Assessment and Plan


Problem List:  


(1) DVT (deep venous thrombosis)


ICD Code:  I82.409 - Acute embolism and thrombosis of unspecified deep veins of 

unspecified lower extremity


Status:  Acute


(2) CKD (chronic kidney disease)


ICD Code:  N18.9 - Chronic kidney disease, unspecified


(3) Hypothyroid


ICD Code:  E03.9 - Hypothyroidism, unspecified


Status:  Chronic


(4) Type 2 diabetes mellitus


ICD Code:  E11.9 - Type 2 diabetes mellitus


Status:  Chronic


Assessment and Plan


63 y/o female with a history of DM, chronic pancreatitis, CHF, CAD, liver 

disease, Systemic lupus, depression, hypothyroid and COPD presented to the ED 

with complaints of chest pain and lower extremity edema. 





DVT, right 


US venous Doppler reviewed and shows a peroneal vein thrombus


   -Heparin drip ordered


.


Chest pain, atypical, r/o ACS, troponin .02


EKG reviewed and shows sinus rhythm


   -Serial troponin





Acute on chronic CHF, patient with +3 bilateral extremity edema, hypothyroid 

may be contributing


BNP 33


   -Lasix 40mg x1 given 


   -Elevated legs


   -2D echo ordered





Hyperglycemia on chronic DM, blood glucose 414, patient is poorly controlled at 

home


   -Accu checks with SSI


   -Resume home Levemir


 


Hypothyroid, severe, chronic, TSH 69.9, down from 100 on 9/3/17


   -Cont home Synthroid dose 150mcg 


   -Patient will need outpatient following to get to correct dosing


   -T4 and T3 ordered





CKD, chronic, creatine 2.2, at baseline


   -Avoid nephrotoxins


   -Cont to monitor labs





Self care deficit, patient states she is unable to care for self


   -Consult case management for options





Lupus, chronic


   -Resume home medication prednisone


 


DVT prophylaxis: Heparin


Discussed Condition With


Patient





Physician Certification


2 Midnight Certification Type:  Admission for Inpatient Services


Order for Inpatient Services


The services are ordered in accordance with Medicare regulations or non-

Medicare payer requirements, as applicable.  In the case of services not 

specified as inpatient-only, they are appropriately provided as inpatient 

services in accordance with the 2-midnight benchmark.


Estimated LOS (days):  3


 days is the estimated time the patient will need to remain in the hospital, 

assuming treatment plan goals are met and no additional complications.


Post-Hospital Plan:  Home





Problem Qualifiers





(1) DVT (deep venous thrombosis):  


Qualified Codes:  I82.492 - Acute embolism and thrombosis of other specified 

deep vein of left lower extremity


(2) CKD (chronic kidney disease):  


Qualified Codes:  N18.4 - Chronic kidney disease, stage 4 (severe)


(3) Hypothyroid:  


Qualified Codes:  E03.9 - Hypothyroidism, unspecified


(4) Type 2 diabetes mellitus:  


Qualified Codes:  E11.9 - Type 2 diabetes mellitus without complications; Z79.4 

- Long term (current) use of insulin








Jackie Mendoza Oct 25, 2017 02:35

## 2017-10-25 NOTE — ECHRPT
Indication:   Heart Failure

 

 CONCLUSIONS

 Normal left ventricular size. 

 Wall thickness is normal. 

 The left ventricular systolic function is normal with an estimated ejection fraction in the range of
 55-60%. 

 Mitral annular calcification is present. 

 

 

 BP:  150   / 84      HR: 82                       Rhythm:           Sinus

 

 MEASUREMENTS  (Male / Female) Normal Values       Technical Quality:Good

 2D ECHO

 LV Diastolic Diameter PLAX        4.6 cm                4.2 - 5.9 / 3.9 - 5.3 cm

 LV Systolic Diameter PLAX         3.5 cm                

 IVS Diastolic Thickness           1.0 cm                0.6 - 1.0 / 0.6 - 0.9 cm

 LVPW Diastolic Thickness          0.8 cm                0.6 - 1.0 / 0.6 - 0.9 cm

 LV Relative Wall Thickness        0.4                   

 RV Internal Dim ED PLAX           1.6 cm                

 LA Systolic Diameter LX           3.5 cm                3.0 - 4.0 / 2.7 - 3.8 cm

 

 DOPPLER

 Mitral E Point Velocity           75.5 cm/s             

 Mitral A Point Velocity           83.9 cm/s             

 Mitral E to A Ratio               0.9                   

 TR Peak Velocity                  191.0 cm/s            

 TR Peak Gradient                  14.6 mmHg             

 

 

 FINDINGS

 

 LEFT VENTRICLE

 Normal left ventricular size. 

 Wall thickness is normal. 

 The left ventricular systolic function is normal with an estimated ejection fraction in the range of
 55-60%. 

 

 RIGHT VENTRICLE

 Normal right ventricular size and systolic function.  

 

 LEFT ATRIUM

 The left atrial size is normal.  

 

 RIGHT ATRIUM

 The right atrial size is normal.  

 

 ATRIAL SEPTUM

 Normal atrial septal thickness without atrial level shunting by limited color doppler interrogation.
  

 

 AORTA

 The aortic root and proximal ascending aorta are normal in size on limited imaging.  

 

 MITRAL VALVE

 Mitral annular calcification is present. 

 

 AORTIC VALVE

 Trileaflet aortic valve. No aortic valve stenosis or regurgitation.  

 

 TRICUSPID VALVE

 Structurally normal tricuspid valve. No tricuspid valve stenosis or regurgitation.  

 

 PULMONARY VALVE

 The pulmonary valve is not well visualized.  

 

 VESSELS

 The inferior vena cava is normal in size.  

 

 PERICARDIUM

 No pericardial effusion.  

 

 

 

 

  Macario Kulkarni MD, FACC

  (Electronically Signed)

  Final Date:25 October 2017 20:45

## 2017-10-25 NOTE — HHI.HP
__________________________________________________





HPI


Service


[facility]


Primary Care Physician


No Primary Care Physician


Admission Diagnosis





right peroneal DVT, hypothyrodism, kidney disease


Diagnoses:  


Chief Complaint:  


chest pain, bilateral lower extremity edema


Travel History


International Travel<30 Days:  No


Contact w/Intl Traveler <30 Da:  No


Traveled to Known Affected Are:  No


History of Present Illness


63 y/o female with a history of DM, chronic pancreatitis, CHF, CAD, liver 

disease, Systemic lupus, depression, hypothyroid and COPD presented to the ED 

with complaints of chest pain and lower extremity edema. She states for the 

last week she has been having substernal chest pain and bilateral lower 

extremity swelling for the last week. She states the chest pain is pressure like

, 8/10 with radiation to her left arm. She states she is compliant with all her 

medications at home. She states her diabetes is not controlled at home and runs 

in the 300s. She lives at home with her son but does not feel he can take care 

of her and she feels she can not care for her self. She complains of numbness 

and weakness in her bilateral hands. She denies any sob, fever or chills. She 

knows she has CHF but does not know her EF or the last time a echo was 

completed.





Review of Systems


Except as stated in HPI:  all other systems reviewed are Neg





Past Family Social History


Past Medical History


DM


chronic pancreatitis


CHF


CAD


liver disease


Systemic lupus


depression


hypothyroid 


COPD


Past Surgical History


Appendectomy


Cholecystectomy





Hysterectomy


Reported Medications





Reported Meds & Active Scripts


Active


Prednisone 20 Mg Tab 20 Mg PO DAILY 30 Days


Novolog Inj (Insulin Aspart) 1,000 Unit/10 Ml Vial 1-9 Units SQ ACHS


      sugars less than 70,(0)units; sugars


     150-199,(1) unit; sugars 200-249,(3) units; sugars 250-299,(5) units;


     sugars 300-349,(7) units; sugars greater than 349,(9) units.


Levemir Inj (Insulin Detemir) 1,000 unit/ 10 ML Vial 10 Units SQ HS 30 Days


     Do not mix with any other Insulin.


Megestrol Liq (Megestrol Acetate) 40 Mg/Ml Susp 400 Mg PO BIDAC 30 Days


Cellcept (Mycophenolate Mofetil) 500 Mg Tab 500 Mg PO Q8HR 30 Days


Synthroid (Levothyroxine Sodium) 150 Mcg Tab 150 Mcg PO DAILY@0600 30 Days


Pantoprazole (Pantoprazole Sodium) 40 Mg Tab 40 Mg PO DAILY 30 Days


Mirtazapine 15 Mg Tab 30 Mg PO HS 30 Days


Klonopin (Clonazepam) 0.5 Mg Tab 0.5 Mg PO Q12HR 30 Days


Allergies:  


Coded Allergies:  


     milk (Verified  Allergy, Mild, Cramping, 10/24/17)


 lactose intolerant


Active Ordered Medications





Current Medications








 Medications


  (Trade)  Dose


 Ordered  Sig/Kandis


 Route  Start Time


 Stop Time Status Last Admin


 


 Heparin Sodium/


 Dextrose  250 ml @ 


 16 mls/hr  TITRATE  PRN


 IV  10/24/17 22:45


    10/24/17 23:22


 


 


  (NS Flush)  2 ml  UNSCH  PRN


 IV FLUSH  10/24/17 23:30


     


 


 


  (NS Flush)  2 ml  BID


 IV FLUSH  10/25/17 09:00


     


 


 


  (Narcan Inj)  0.4 mg  UNSCH  PRN


 IV PUSH  10/24/17 23:30


     


 


 


  (D50w (Vial) Inj)  50 ml  UNSCH  PRN


 IV PUSH  10/24/17 23:30


     


 


 


  (Glucagon Inj)  1 mg  UNSCH  PRN


 OTHER  10/24/17 23:30


     


 


 


  (NovoLOG


 SUPPLEMENTAL


 SCALE)  1  ACHS SLIDING  SCALE


 SQ  10/25/17 08:00


     


 








Family History


She does not know her mom or dad, she was adopted by her grandmother


Social History


Tobacco use: Denies


Alcohol use: Prior ETOH abuse


Illicit drug use: Denies





Physical Exam


Vital Signs





Vital Signs








  Date Time  Temp Pulse Resp B/P (MAP) Pulse Ox O2 Delivery O2 Flow Rate FiO2


 


10/25/17 00:38 96.7 62 16 164/100 (121) 97   


 


10/25/17 00:30        


 


10/24/17 23:35  66 18 157/75 (102) 97 Room Air  


 


10/24/17 19:40   18  96 Room Air  


 


10/24/17 19:01  81 18 185/81 (115) 96 Room Air  


 


10/24/17 18:25 98.7 82 18 169/81 (110) 97   








Physical Exam


GENERAL: This is a well-nourished, well-developed patient, in no apparent 

distress.


SKIN: No rashes, ecchymoses or lesions. Cool and dry.


HEAD: Atraumatic. Normocephalic. 


EYES: Pupils equal round and reactive. 


ENT: Nose without bleeding, purulent drainage or septal hematoma. Airway patent.


NECK: Trachea midline. No JVD or lymphadenopathy.


CARDIOVASCULAR: Regular rate and rhythm without murmurs, gallops, or rubs. 


RESPIRATORY: Clear to auscultation. Breath sounds equal bilaterally. No wheezes

, rales, or rhonchi.  


GASTROINTESTINAL: Abdomen soft, non-tender, nondistended. 


MUSCULOSKELETAL: Bilateral lower extremity +3 pitting edema No calf tenderness. 


NEUROLOGICAL: Awake and alert. Motor and sensory grossly within normal limits. 

Normal speech.


Laboratory





Laboratory Tests








Test


  10/24/17


19:21 10/24/17


19:55


 


White Blood Count 6.4  


 


Red Blood Count 3.46  


 


Hemoglobin 11.1  


 


Hematocrit 33.8  


 


Mean Corpuscular Volume 97.6  


 


Mean Corpuscular Hemoglobin 32.0  


 


Mean Corpuscular Hemoglobin


Concent 32.7 


  


 


 


Red Cell Distribution Width 15.0  


 


Platelet Count 192  


 


Mean Platelet Volume 8.6  


 


Neutrophils (%) (Auto) 59.5  


 


Lymphocytes (%) (Auto) 30.4  


 


Monocytes (%) (Auto) 7.7  


 


Eosinophils (%) (Auto) 1.4  


 


Basophils (%) (Auto) 1.0  


 


Neutrophils # (Auto) 3.8  


 


Lymphocytes # (Auto) 1.9  


 


Monocytes # (Auto) 0.5  


 


Eosinophils # (Auto) 0.1  


 


Basophils # (Auto) 0.1  


 


CBC Comment AUTO DIFF  


 


Differential Total Cells


Counted 100 


  


 


 


Neutrophils % (Manual) 53  


 


Band Neutrophils % 5  


 


Lymphocytes % 31  


 


Monocytes % 8  


 


Eosinophils % 1  


 


Neutrophils # (Manual) 3.8  


 


Metamyelocytes 1  


 


Myelocytes 1  


 


Differential Comment


  FINAL DIFF


MANUAL 


 


 


Platelet Estimate NORMAL  


 


Platelet Morphology Comment NORMAL  


 


Prothrombin Time 10.1  


 


Prothromb Time International


Ratio 0.9 


  


 


 


Activated Partial


Thromboplast Time 25.0 


  


 


 


Urine Color LIGHT-YELLOW  


 


Urine Turbidity CLEAR  


 


Urine pH 6.5  


 


Urine Specific Gravity 1.014  


 


Urine Protein 300  


 


Urine Glucose (UA) 1000  


 


Urine Ketones NEG  


 


Urine Occult Blood MOD  


 


Urine Nitrite NEG  


 


Urine Bilirubin NEG  


 


Urine Urobilinogen LESS THAN 2.0  


 


Urine Leukocyte Esterase NEG  


 


Urine RBC 22  


 


Urine WBC 10  


 


Urine Squamous Epithelial


Cells 1 


  


 


 


Urine Amorphous Sediment RARE  


 


Microscopic Urinalysis Comment


  CULTURE


INDICATED 


 


 


Blood Urea Nitrogen 50  


 


Creatinine 2.23  


 


Random Glucose 414  


 


Total Protein 5.9  


 


Albumin 1.9  


 


Calcium Level 8.1  


 


Magnesium Level 1.6  


 


Alkaline Phosphatase 227  


 


Aspartate Amino Transf


(AST/SGOT) 21 


  


 


 


Alanine Aminotransferase


(ALT/SGPT) 25 


  


 


 


Total Bilirubin 0.2  


 


Sodium Level 138  


 


Potassium Level 4.0  


 


Chloride Level 108  


 


Carbon Dioxide Level 22.3  


 


Anion Gap 8  


 


Estimat Glomerular Filtration


Rate 22 


  


 


 


Total Creatine Kinase 29  


 


Troponin I LESS THAN 0.02  


 


B-Type Natriuretic Peptide 33  


 


Lipase 713  


 


Thyroid Stimulating Hormone


3rd Gen 69.900 


  


 


 


Lactic Acid Level  1.0 














 Date/Time


Source Procedure


Growth Status


 


 


 10/24/17 19:56


Blood Peripheral Aerobic Blood Culture


Pending Received


 


 10/24/17 19:56


Blood Peripheral Anaerobic Blood Culture


Pending Received


 


 10/24/17 19:21


Urine Random Urine Urine Culture


Pending Worksheet








Result Diagram:  


10/24/17 1921                                                                  

              10/24/17 1921





Imaging





Last Impressions








Head CT 10/24/17 1909 Signed





Impressions: 





 Service Date/Time:  2017 20:34 - CONCLUSION: Unremarkable 





 study except for mild chronic sinusitis.     TYLER Magana MD 


 


Chest X-Ray 10/24/17 1909 Signed





Impressions: 





 Service Date/Time:  2017 19:27 - CONCLUSION:  No acute 





 cardiopulmonary disease.     TYLER Magana MD 


 


Lower Extremity Ultrasound 10/24/17 0000 Signed





Impressions: 





 Service Date/Time:  2017 20:03 - CONCLUSION:  Right 





 peroneal vein thrombus.     TYLER Magana MD 











Capsveta VTE Risk Assessment


Caprini VTE Risk Assessment:  Mod/High Risk (score >= 2)


Caprini Risk Assessment Model











 Point Value = 1          Point Value = 2  Point Value = 3  Point Value = 5


 


Age 41-60


Minor surgery


BMI > 25 kg/m2


Swollen legs


Varicose veins


Pregnancy or postpartum


History of unexplained or recurrent


   spontaneous 


Oral contraceptives or hormone


   replacement


Sepsis (< 1 month)


Serious lung disease, including


   pneumonia (< 1 month)


Abnormal pulmonary function


Acute myocardial infarction


Congestive heart failure (< 1 month)


History of inflammatory bowel disease


Medical patient at bed rest Age 61-74


Arthroscopic surgery


Major open surgery (> 45 min)


Laparoscopic surgery (> 45 min)


Malignancy


Confined to bed (> 72 hours)


Immobilizing plaster cast


Central venous access Age >= 75


History of VTE


Family history of VTE


Factor V Leiden


Prothrombin 03968H


Lupus anticoagulant


Anticardiolipin antibodies


Elevated serum homocysteine


Heparin-induced thrombocytopenia


Other congenital or acquired


   thrombophilia Stroke (< 1 month)


Elective arthroplasty


Hip, pelvis, or leg fracture


Acute spinal cord injury (< 1 month)








Prophylaxis Regimen











   Total Risk


Factor Score Risk Level Prophylaxis Regimen


 


0-1      Low Early ambulation


 


2 Moderate Order ONE of the following:


*Sequential Compression Device (SCD)


*Heparin 5000 units SQ BID


 


3-4 Higher Order ONE of the following medications:


*Heparin 5000 units SQ TID


*Enoxaparin/Lovenox 40 mg SQ daily (WT < 150 kg, CrCl > 30 mL/min)


*Enoxaparin/Lovenox 30 mg SQ daily (WT < 150 kg, CrCl > 10-29 mL/min)


*Enoxaparin/Lovenox 30 mg SQ BID (WT < 150 kg, CrCl > 30 mL/min)


AND/OR


*Sequential Compression Device (SCD)


 


5 or more Highest Order ONE of the following medications:


*Heparin 5000 units SQ TID (Preferred with Epidurals)


*Enoxaparin/Lovenox 40 mg SQ daily (WT < 150 kg, CrCl > 30 mL/min)


*Enoxaparin/Lovenox 30 mg SQ daily (WT < 150 kg, CrCl > 10-29 mL/min)


*Enoxaparin/Lovenox 30 mg SQ BID (WT < 150 kg, CrCl > 30 mL/min)


AND


*Sequential Compression Device (SCD)











Assessment and Plan


Problem List:  


(1) DVT (deep venous thrombosis)


ICD Code:  I82.409 - Acute embolism and thrombosis of unspecified deep veins of 

unspecified lower extremity


Status:  Acute


(2) CKD (chronic kidney disease)


ICD Code:  N18.9 - Chronic kidney disease, unspecified


(3) Hypothyroid


ICD Code:  E03.9 - Hypothyroidism, unspecified


Status:  Chronic


(4) Type 2 diabetes mellitus


ICD Code:  E11.9 - Type 2 diabetes mellitus


Status:  Chronic


Assessment and Plan


63 y/o female with a history of DM, chronic pancreatitis, CHF, CAD, liver 

disease, Systemic lupus, depression, hypothyroid and COPD presented to the ED 

with complaints of chest pain and lower extremity edema. 





DVT, right 


US venous Doppler reviewed and shows a peroneal vein thrombus


   -Heparin drip ordered


.


Chest pain, atypical, r/o ACS, troponin .02


EKG reviewed and shows sinus rhythm


   -Serial troponin





Acute on chronic CHF, patient with +3 bilateral extremity edema, hypothyroid 

may be contributing


BNP 33


   -Lasix 40mg x1 given 


   -Elevated legs


   -2D echo ordered





Hyperglycemia on chronic DM, blood glucose 414, patient is poorly controlled at 

home


   -Accu checks with SSI


   -Resume home Levemir


 


Hypothyroid, severe, chronic, TSH 69.9, down from 100 on 9/3/17


   -Cont home Synthroid dose 150mcg 


   -Patient will need outpatient following to get to correct dosing


   -T4 and T3 ordered





CKD, chronic, creatine 2.2, at baseline


   -Avoid nephrotoxins


   -Cont to monitor labs





Self care deficit, patient states she is unable to care for self


   -Consult case management for options





Lupus, chronic


   -Resume home medication prednisone


 


DVT prophylaxis: Heparin


Discussed Condition With


Patient





Physician Certification


2 Midnight Certification Type:  Admission for Inpatient Services


Order for Inpatient Services


The services are ordered in accordance with Medicare regulations or non-

Medicare payer requirements, as applicable.  In the case of services not 

specified as inpatient-only, they are appropriately provided as inpatient 

services in accordance with the 2-midnight benchmark.


Estimated LOS (days):  3


 days is the estimated time the patient will need to remain in the hospital, 

assuming treatment plan goals are met and no additional complications.


Post-Hospital Plan:  Home





Problem Qualifiers





(1) DVT (deep venous thrombosis):  


Qualified Codes:  I82.492 - Acute embolism and thrombosis of other specified 

deep vein of left lower extremity


(2) CKD (chronic kidney disease):  


Qualified Codes:  N18.4 - Chronic kidney disease, stage 4 (severe)


(3) Hypothyroid:  


Qualified Codes:  E03.9 - Hypothyroidism, unspecified


(4) Type 2 diabetes mellitus:  


Qualified Codes:  E11.9 - Type 2 diabetes mellitus without complications; Z79.4 

- Long term (current) use of insulin








Jackie Mendoza Oct 25, 2017 02:35

## 2017-10-25 NOTE — HHI.HP
__________________________________________________





HPI


Service


[facility]


Primary Care Physician


No Primary Care Physician


Admission Diagnosis





right peroneal DVT, hypothyrodism, kidney disease


Diagnoses:  


Chief Complaint:  


chest pain, bilateral lower extremity edema


Travel History


International Travel<30 Days:  No


Contact w/Intl Traveler <30 Da:  No


Traveled to Known Affected Are:  No


History of Present Illness


63 y/o female with a history of DM, chronic pancreatitis, CHF, CAD, liver 

disease, Systemic lupus, depression, hypothyroid and COPD presented to the ED 

with complaints of chest pain and lower extremity edema. She states for the 

last week she has been having substernal chest pain and bilateral lower 

extremity swelling for the last week. She states the chest pain is pressure like

, 8/10 with radiation to her left arm. She states she is compliant with all her 

medications at home. She states her diabetes is not controlled at home and runs 

in the 300s. She lives at home with her son but does not feel he can take care 

of her and she feels she can not care for her self. She complains of numbness 

and weakness in her bilateral hands. She denies any sob, fever or chills. She 

knows she has CHF but does not know her EF or the last time a echo was 

completed.





Review of Systems


Except as stated in HPI:  all other systems reviewed are Neg





Past Family Social History


Past Medical History


DM


chronic pancreatitis


CHF


CAD


liver disease


Systemic lupus


depression


hypothyroid 


COPD


Past Surgical History


Appendectomy


Cholecystectomy





Hysterectomy


Reported Medications





Reported Meds & Active Scripts


Active


Prednisone 20 Mg Tab 20 Mg PO DAILY 30 Days


Novolog Inj (Insulin Aspart) 1,000 Unit/10 Ml Vial 1-9 Units SQ ACHS


      sugars less than 70,(0)units; sugars


     150-199,(1) unit; sugars 200-249,(3) units; sugars 250-299,(5) units;


     sugars 300-349,(7) units; sugars greater than 349,(9) units.


Levemir Inj (Insulin Detemir) 1,000 unit/ 10 ML Vial 10 Units SQ HS 30 Days


     Do not mix with any other Insulin.


Megestrol Liq (Megestrol Acetate) 40 Mg/Ml Susp 400 Mg PO BIDAC 30 Days


Cellcept (Mycophenolate Mofetil) 500 Mg Tab 500 Mg PO Q8HR 30 Days


Synthroid (Levothyroxine Sodium) 150 Mcg Tab 150 Mcg PO DAILY@0600 30 Days


Pantoprazole (Pantoprazole Sodium) 40 Mg Tab 40 Mg PO DAILY 30 Days


Mirtazapine 15 Mg Tab 30 Mg PO HS 30 Days


Klonopin (Clonazepam) 0.5 Mg Tab 0.5 Mg PO Q12HR 30 Days


Allergies:  


Coded Allergies:  


     milk (Verified  Allergy, Mild, Cramping, 10/24/17)


 lactose intolerant


Active Ordered Medications





Current Medications








 Medications


  (Trade)  Dose


 Ordered  Sig/Kandis


 Route  Start Time


 Stop Time Status Last Admin


 


 Heparin Sodium/


 Dextrose  250 ml @ 


 16 mls/hr  TITRATE  PRN


 IV  10/24/17 22:45


    10/24/17 23:22


 


 


  (NS Flush)  2 ml  UNSCH  PRN


 IV FLUSH  10/24/17 23:30


     


 


 


  (NS Flush)  2 ml  BID


 IV FLUSH  10/25/17 09:00


     


 


 


  (Narcan Inj)  0.4 mg  UNSCH  PRN


 IV PUSH  10/24/17 23:30


     


 


 


  (D50w (Vial) Inj)  50 ml  UNSCH  PRN


 IV PUSH  10/24/17 23:30


     


 


 


  (Glucagon Inj)  1 mg  UNSCH  PRN


 OTHER  10/24/17 23:30


     


 


 


  (NovoLOG


 SUPPLEMENTAL


 SCALE)  1  ACHS SLIDING  SCALE


 SQ  10/25/17 08:00


     


 








Family History


She does not know her mom or dad, she was adopted by her grandmother


Social History


Tobacco use: Denies


Alcohol use: Prior ETOH abuse


Illicit drug use: Denies





Physical Exam


Vital Signs





Vital Signs








  Date Time  Temp Pulse Resp B/P (MAP) Pulse Ox O2 Delivery O2 Flow Rate FiO2


 


10/25/17 00:38 96.7 62 16 164/100 (121) 97   


 


10/25/17 00:30        


 


10/24/17 23:35  66 18 157/75 (102) 97 Room Air  


 


10/24/17 19:40   18  96 Room Air  


 


10/24/17 19:01  81 18 185/81 (115) 96 Room Air  


 


10/24/17 18:25 98.7 82 18 169/81 (110) 97   








Physical Exam


GENERAL: This is a well-nourished, well-developed patient, in no apparent 

distress.


SKIN: No rashes, ecchymoses or lesions. Cool and dry.


HEAD: Atraumatic. Normocephalic. 


EYES: Pupils equal round and reactive. 


ENT: Nose without bleeding, purulent drainage or septal hematoma. Airway patent.


NECK: Trachea midline. No JVD or lymphadenopathy.


CARDIOVASCULAR: Regular rate and rhythm without murmurs, gallops, or rubs. 


RESPIRATORY: Clear to auscultation. Breath sounds equal bilaterally. No wheezes

, rales, or rhonchi.  


GASTROINTESTINAL: Abdomen soft, non-tender, nondistended. 


MUSCULOSKELETAL: Bilateral lower extremity +3 pitting edema No calf tenderness. 


NEUROLOGICAL: Awake and alert. Motor and sensory grossly within normal limits. 

Normal speech.


Laboratory





Laboratory Tests








Test


  10/24/17


19:21 10/24/17


19:55


 


White Blood Count 6.4  


 


Red Blood Count 3.46  


 


Hemoglobin 11.1  


 


Hematocrit 33.8  


 


Mean Corpuscular Volume 97.6  


 


Mean Corpuscular Hemoglobin 32.0  


 


Mean Corpuscular Hemoglobin


Concent 32.7 


  


 


 


Red Cell Distribution Width 15.0  


 


Platelet Count 192  


 


Mean Platelet Volume 8.6  


 


Neutrophils (%) (Auto) 59.5  


 


Lymphocytes (%) (Auto) 30.4  


 


Monocytes (%) (Auto) 7.7  


 


Eosinophils (%) (Auto) 1.4  


 


Basophils (%) (Auto) 1.0  


 


Neutrophils # (Auto) 3.8  


 


Lymphocytes # (Auto) 1.9  


 


Monocytes # (Auto) 0.5  


 


Eosinophils # (Auto) 0.1  


 


Basophils # (Auto) 0.1  


 


CBC Comment AUTO DIFF  


 


Differential Total Cells


Counted 100 


  


 


 


Neutrophils % (Manual) 53  


 


Band Neutrophils % 5  


 


Lymphocytes % 31  


 


Monocytes % 8  


 


Eosinophils % 1  


 


Neutrophils # (Manual) 3.8  


 


Metamyelocytes 1  


 


Myelocytes 1  


 


Differential Comment


  FINAL DIFF


MANUAL 


 


 


Platelet Estimate NORMAL  


 


Platelet Morphology Comment NORMAL  


 


Prothrombin Time 10.1  


 


Prothromb Time International


Ratio 0.9 


  


 


 


Activated Partial


Thromboplast Time 25.0 


  


 


 


Urine Color LIGHT-YELLOW  


 


Urine Turbidity CLEAR  


 


Urine pH 6.5  


 


Urine Specific Gravity 1.014  


 


Urine Protein 300  


 


Urine Glucose (UA) 1000  


 


Urine Ketones NEG  


 


Urine Occult Blood MOD  


 


Urine Nitrite NEG  


 


Urine Bilirubin NEG  


 


Urine Urobilinogen LESS THAN 2.0  


 


Urine Leukocyte Esterase NEG  


 


Urine RBC 22  


 


Urine WBC 10  


 


Urine Squamous Epithelial


Cells 1 


  


 


 


Urine Amorphous Sediment RARE  


 


Microscopic Urinalysis Comment


  CULTURE


INDICATED 


 


 


Blood Urea Nitrogen 50  


 


Creatinine 2.23  


 


Random Glucose 414  


 


Total Protein 5.9  


 


Albumin 1.9  


 


Calcium Level 8.1  


 


Magnesium Level 1.6  


 


Alkaline Phosphatase 227  


 


Aspartate Amino Transf


(AST/SGOT) 21 


  


 


 


Alanine Aminotransferase


(ALT/SGPT) 25 


  


 


 


Total Bilirubin 0.2  


 


Sodium Level 138  


 


Potassium Level 4.0  


 


Chloride Level 108  


 


Carbon Dioxide Level 22.3  


 


Anion Gap 8  


 


Estimat Glomerular Filtration


Rate 22 


  


 


 


Total Creatine Kinase 29  


 


Troponin I LESS THAN 0.02  


 


B-Type Natriuretic Peptide 33  


 


Lipase 713  


 


Thyroid Stimulating Hormone


3rd Gen 69.900 


  


 


 


Lactic Acid Level  1.0 














 Date/Time


Source Procedure


Growth Status


 


 


 10/24/17 19:56


Blood Peripheral Aerobic Blood Culture


Pending Received


 


 10/24/17 19:56


Blood Peripheral Anaerobic Blood Culture


Pending Received


 


 10/24/17 19:21


Urine Random Urine Urine Culture


Pending Worksheet








Result Diagram:  


10/24/17 1921                                                                  

              10/24/17 1921





Imaging





Last Impressions








Head CT 10/24/17 1909 Signed





Impressions: 





 Service Date/Time:  2017 20:34 - CONCLUSION: Unremarkable 





 study except for mild chronic sinusitis.     TYLER Magana MD 


 


Chest X-Ray 10/24/17 1909 Signed





Impressions: 





 Service Date/Time:  2017 19:27 - CONCLUSION:  No acute 





 cardiopulmonary disease.     TYLER Magana MD 


 


Lower Extremity Ultrasound 10/24/17 0000 Signed





Impressions: 





 Service Date/Time:  2017 20:03 - CONCLUSION:  Right 





 peroneal vein thrombus.     TYLER Magana MD 











Capsveta VTE Risk Assessment


Caprini VTE Risk Assessment:  Mod/High Risk (score >= 2)


Caprini Risk Assessment Model











 Point Value = 1          Point Value = 2  Point Value = 3  Point Value = 5


 


Age 41-60


Minor surgery


BMI > 25 kg/m2


Swollen legs


Varicose veins


Pregnancy or postpartum


History of unexplained or recurrent


   spontaneous 


Oral contraceptives or hormone


   replacement


Sepsis (< 1 month)


Serious lung disease, including


   pneumonia (< 1 month)


Abnormal pulmonary function


Acute myocardial infarction


Congestive heart failure (< 1 month)


History of inflammatory bowel disease


Medical patient at bed rest Age 61-74


Arthroscopic surgery


Major open surgery (> 45 min)


Laparoscopic surgery (> 45 min)


Malignancy


Confined to bed (> 72 hours)


Immobilizing plaster cast


Central venous access Age >= 75


History of VTE


Family history of VTE


Factor V Leiden


Prothrombin 41938P


Lupus anticoagulant


Anticardiolipin antibodies


Elevated serum homocysteine


Heparin-induced thrombocytopenia


Other congenital or acquired


   thrombophilia Stroke (< 1 month)


Elective arthroplasty


Hip, pelvis, or leg fracture


Acute spinal cord injury (< 1 month)








Prophylaxis Regimen











   Total Risk


Factor Score Risk Level Prophylaxis Regimen


 


0-1      Low Early ambulation


 


2 Moderate Order ONE of the following:


*Sequential Compression Device (SCD)


*Heparin 5000 units SQ BID


 


3-4 Higher Order ONE of the following medications:


*Heparin 5000 units SQ TID


*Enoxaparin/Lovenox 40 mg SQ daily (WT < 150 kg, CrCl > 30 mL/min)


*Enoxaparin/Lovenox 30 mg SQ daily (WT < 150 kg, CrCl > 10-29 mL/min)


*Enoxaparin/Lovenox 30 mg SQ BID (WT < 150 kg, CrCl > 30 mL/min)


AND/OR


*Sequential Compression Device (SCD)


 


5 or more Highest Order ONE of the following medications:


*Heparin 5000 units SQ TID (Preferred with Epidurals)


*Enoxaparin/Lovenox 40 mg SQ daily (WT < 150 kg, CrCl > 30 mL/min)


*Enoxaparin/Lovenox 30 mg SQ daily (WT < 150 kg, CrCl > 10-29 mL/min)


*Enoxaparin/Lovenox 30 mg SQ BID (WT < 150 kg, CrCl > 30 mL/min)


AND


*Sequential Compression Device (SCD)











Assessment and Plan


Problem List:  


(1) DVT (deep venous thrombosis)


ICD Code:  I82.409 - Acute embolism and thrombosis of unspecified deep veins of 

unspecified lower extremity


Status:  Acute


(2) CKD (chronic kidney disease)


ICD Code:  N18.9 - Chronic kidney disease, unspecified


(3) Hypothyroid


ICD Code:  E03.9 - Hypothyroidism, unspecified


Status:  Chronic


(4) Type 2 diabetes mellitus


ICD Code:  E11.9 - Type 2 diabetes mellitus


Status:  Chronic


Assessment and Plan


63 y/o female with a history of DM, chronic pancreatitis, CHF, CAD, liver 

disease, Systemic lupus, depression, hypothyroid and COPD presented to the ED 

with complaints of chest pain and lower extremity edema. 





DVT, right 


US venous Doppler reviewed and shows a peroneal vein thrombus


   -Heparin drip ordered


.


Chest pain, atypical, r/o ACS, troponin .02


EKG reviewed and shows sinus rhythm


   -Serial troponin





Acute on chronic CHF, patient with +3 bilateral extremity edema, hypothyroid 

may be contributing


BNP 33


   -Lasix 40mg x1 given 


   -Elevated legs


   -2D echo ordered





Hyperglycemia on chronic DM, blood glucose 414, patient is poorly controlled at 

home


   -Accu checks with SSI


   -Resume home Levemir


 


Hypothyroid, severe, chronic, TSH 69.9, down from 100 on 9/3/17


   -Cont home Synthroid dose 150mcg 


   -Patient will need outpatient following to get to correct dosing


   -T4 and T3 ordered





CKD, chronic, creatine 2.2, at baseline


   -Avoid nephrotoxins


   -Cont to monitor labs





Self care deficit, patient states she is unable to care for self


   -Consult case management for options





Lupus, chronic


   -Resume home medication prednisone


 


DVT prophylaxis: Heparin


Discussed Condition With


Patient





Physician Certification


2 Midnight Certification Type:  Admission for Inpatient Services


Order for Inpatient Services


The services are ordered in accordance with Medicare regulations or non-

Medicare payer requirements, as applicable.  In the case of services not 

specified as inpatient-only, they are appropriately provided as inpatient 

services in accordance with the 2-midnight benchmark.


Estimated LOS (days):  3


 days is the estimated time the patient will need to remain in the hospital, 

assuming treatment plan goals are met and no additional complications.


Post-Hospital Plan:  Home





Problem Qualifiers





(1) DVT (deep venous thrombosis):  


Qualified Codes:  I82.492 - Acute embolism and thrombosis of other specified 

deep vein of left lower extremity


(2) CKD (chronic kidney disease):  


Qualified Codes:  N18.4 - Chronic kidney disease, stage 4 (severe)


(3) Hypothyroid:  


Qualified Codes:  E03.9 - Hypothyroidism, unspecified


(4) Type 2 diabetes mellitus:  


Qualified Codes:  E11.9 - Type 2 diabetes mellitus without complications; Z79.4 

- Long term (current) use of insulin








Jackie Mendoza Oct 25, 2017 02:35

## 2017-10-26 VITALS
DIASTOLIC BLOOD PRESSURE: 88 MMHG | RESPIRATION RATE: 18 BRPM | OXYGEN SATURATION: 97 % | TEMPERATURE: 95.7 F | HEART RATE: 71 BPM | SYSTOLIC BLOOD PRESSURE: 164 MMHG

## 2017-10-26 VITALS
DIASTOLIC BLOOD PRESSURE: 89 MMHG | SYSTOLIC BLOOD PRESSURE: 158 MMHG | RESPIRATION RATE: 18 BRPM | HEART RATE: 61 BPM | OXYGEN SATURATION: 96 % | TEMPERATURE: 97.4 F

## 2017-10-26 VITALS
RESPIRATION RATE: 20 BRPM | SYSTOLIC BLOOD PRESSURE: 138 MMHG | DIASTOLIC BLOOD PRESSURE: 61 MMHG | OXYGEN SATURATION: 94 % | HEART RATE: 68 BPM | TEMPERATURE: 96.6 F

## 2017-10-26 VITALS
SYSTOLIC BLOOD PRESSURE: 177 MMHG | DIASTOLIC BLOOD PRESSURE: 81 MMHG | OXYGEN SATURATION: 96 % | RESPIRATION RATE: 18 BRPM | HEART RATE: 54 BPM | TEMPERATURE: 95.8 F

## 2017-10-26 VITALS
TEMPERATURE: 96.3 F | OXYGEN SATURATION: 95 % | HEART RATE: 62 BPM | RESPIRATION RATE: 18 BRPM | SYSTOLIC BLOOD PRESSURE: 170 MMHG | DIASTOLIC BLOOD PRESSURE: 75 MMHG

## 2017-10-26 VITALS — HEART RATE: 68 BPM

## 2017-10-26 LAB
ALBUMIN SERPL-MCNC: 1.9 GM/DL (ref 3.4–5)
ALP SERPL-CCNC: 106 U/L (ref 45–117)
ALT SERPL-CCNC: 20 U/L (ref 10–53)
AST SERPL-CCNC: 19 U/L (ref 15–37)
BASOPHILS # BLD AUTO: 0.1 TH/MM3 (ref 0–0.2)
BASOPHILS NFR BLD: 0.7 % (ref 0–2)
BILIRUB SERPL-MCNC: 0.3 MG/DL (ref 0.2–1)
BUN SERPL-MCNC: 47 MG/DL (ref 7–18)
CALCIUM SERPL-MCNC: 8.6 MG/DL (ref 8.5–10.1)
CHLORIDE SERPL-SCNC: 104 MEQ/L (ref 98–107)
CREAT SERPL-MCNC: 2.02 MG/DL (ref 0.5–1)
EOSINOPHIL # BLD: 0.1 TH/MM3 (ref 0–0.4)
EOSINOPHIL NFR BLD: 1.1 % (ref 0–4)
ERYTHROCYTE [DISTWIDTH] IN BLOOD BY AUTOMATED COUNT: 14.9 % (ref 11.6–17.2)
GFR SERPLBLD BASED ON 1.73 SQ M-ARVRAT: 25 ML/MIN (ref 89–?)
GLUCOSE SERPL-MCNC: 131 MG/DL (ref 74–106)
HCO3 BLD-SCNC: 24.7 MEQ/L (ref 21–32)
HCT VFR BLD CALC: 31.3 % (ref 35–46)
HGB BLD-MCNC: 10.5 GM/DL (ref 11.6–15.3)
LYMPHOCYTES # BLD AUTO: 2.9 TH/MM3 (ref 1–4.8)
LYMPHOCYTES NFR BLD AUTO: 33 % (ref 9–44)
MCH RBC QN AUTO: 32.1 PG (ref 27–34)
MCHC RBC AUTO-ENTMCNC: 33.6 % (ref 32–36)
MCV RBC AUTO: 95.4 FL (ref 80–100)
MONOCYTE #: 0.5 TH/MM3 (ref 0–0.9)
MONOCYTES NFR BLD: 5.7 % (ref 0–8)
NEUTROPHILS # BLD AUTO: 5.2 TH/MM3 (ref 1.8–7.7)
NEUTROPHILS NFR BLD AUTO: 59.5 % (ref 16–70)
PLATELET # BLD: 194 TH/MM3 (ref 150–450)
PMV BLD AUTO: 8.2 FL (ref 7–11)
PROT SERPL-MCNC: 5.8 GM/DL (ref 6.4–8.2)
RBC # BLD AUTO: 3.28 MIL/MM3 (ref 4–5.3)
SODIUM SERPL-SCNC: 136 MEQ/L (ref 136–145)
WBC # BLD AUTO: 8.8 TH/MM3 (ref 4–11)

## 2017-10-26 RX ADMIN — MIRTAZAPINE SCH MG: 15 TABLET, FILM COATED ORAL at 21:30

## 2017-10-26 RX ADMIN — DOCUSATE SODIUM SCH MG: 100 CAPSULE, LIQUID FILLED ORAL at 09:17

## 2017-10-26 RX ADMIN — HEPARIN SODIUM PRN MLS/HR: 10000 INJECTION, SOLUTION INTRAVENOUS at 17:42

## 2017-10-26 RX ADMIN — MYCOPHENOLATE MOFETIL SCH MG: 500 TABLET, FILM COATED ORAL at 16:02

## 2017-10-26 RX ADMIN — DOCUSATE SODIUM SCH MG: 100 CAPSULE, LIQUID FILLED ORAL at 21:29

## 2017-10-26 RX ADMIN — MYCOPHENOLATE MOFETIL SCH MG: 500 TABLET, FILM COATED ORAL at 21:29

## 2017-10-26 RX ADMIN — WARFARIN SODIUM SCH MG: 5 TABLET ORAL at 16:02

## 2017-10-26 RX ADMIN — INSULIN ASPART SCH: 100 INJECTION, SOLUTION INTRAVENOUS; SUBCUTANEOUS at 08:00

## 2017-10-26 RX ADMIN — INSULIN ASPART SCH: 100 INJECTION, SOLUTION INTRAVENOUS; SUBCUTANEOUS at 16:13

## 2017-10-26 RX ADMIN — MYCOPHENOLATE MOFETIL SCH MG: 500 TABLET, FILM COATED ORAL at 04:02

## 2017-10-26 RX ADMIN — PANTOPRAZOLE SCH MG: 40 TABLET, DELAYED RELEASE ORAL at 09:17

## 2017-10-26 RX ADMIN — INSULIN ASPART SCH: 100 INJECTION, SOLUTION INTRAVENOUS; SUBCUTANEOUS at 21:29

## 2017-10-26 RX ADMIN — INSULIN ASPART SCH: 100 INJECTION, SOLUTION INTRAVENOUS; SUBCUTANEOUS at 12:00

## 2017-10-26 RX ADMIN — ACYCLOVIR SCH UNITS: 800 TABLET ORAL at 21:29

## 2017-10-26 RX ADMIN — HYDROCODONE BITARTRATE AND ACETAMINOPHEN PRN TAB: 10; 325 TABLET ORAL at 16:08

## 2017-10-26 RX ADMIN — LEVOTHYROXINE SODIUM SCH MCG: 200 TABLET ORAL at 04:02

## 2017-10-26 RX ADMIN — Medication SCH ML: at 21:00

## 2017-10-26 RX ADMIN — OXYBUTYNIN CHLORIDE SCH MG: 5 TABLET ORAL at 21:29

## 2017-10-26 RX ADMIN — Medication SCH ML: at 09:18

## 2017-10-26 NOTE — HHI.PR
Subjective


Remarks


Patient feels no better today.  She complains of persistent urination.  Some of 

this may be related to her blood sugars that she says she has problems with 

frequent urination at baseline.  She's never been on oxybutynin.  She also 

complains of peripheral edema including the arms.  Some of this edema lower 

extremities may be related to her DVT but upper extremity edema could represent 

some fluid retention.  Blood sugars not yet controlled.





Objective





Vital Signs








  Date Time  Temp Pulse Resp B/P (MAP) Pulse Ox O2 Delivery O2 Flow Rate FiO2


 


10/26/17 12:00 96.6 68 20 138/61 (86) 94   


 


10/26/17 05:37 95.8 54 18 177/81 (113) 96   


 


10/26/17 00:41 96.3 62 18 170/75 (106) 95   


 


10/25/17 20:00 96.5 76 18 164/91 (115) 94   


 


10/25/17 19:35  64      


 


10/25/17 16:00 96.3 70 17 133/72 (92) 96   














I/O      


 


 10/25/17 10/25/17 10/25/17 10/26/17 10/26/17 10/26/17





 07:00 15:00 23:00 07:00 15:00 23:00


 


Intake Total 1078 ml  1407 ml 67 ml  


 


Output Total   150 ml   


 


Balance 1078 ml  1257 ml 67 ml  


 


      


 


Intake Oral 480 ml  1200 ml   


 


IV Total 598 ml  207 ml 67 ml  


 


Output Urine Total   150 ml   


 


# Voids 3  8   


 


# Bowel Movements   0   








Result Diagram:  


10/26/17 0350                                                                  

              10/26/17 0350





Objective Remarks


GENERAL: NAD, A&Ox3


HEAD: Normocephalic. 


NECK: Supple, trachea midline. No lymphadenopathy.


EYES: No scleral icterus. No injection or drainage. 


CARDIOVASCULAR: Regular rate and rhythm without murmurs, gallops, or rubs. 


RESPIRATORY: Breath sounds equal bilaterally. No accessory muscle use.


GASTROINTESTINAL: Abdomen soft, non-tender, nondistended. 


MUSCULOSKELETAL: No cyanosis.  Peripheral edema in all 4 limbs.


SKIN: Warm and dry.


NEURO:  No focal neurological deficitis.





A/P


Problem List:  


(1) DVT (deep venous thrombosis)


ICD Code:  I82.409 - Acute embolism and thrombosis of unspecified deep veins of 

unspecified lower extremity


Status:  Acute


Assessment and Plan





Assessment and Plan


62-year-old female admitted secondary to acute right DVT





Right lower extremity DVT


Continue heparin drip


Coumadin started


Follow INR


Follow clinically for improvement of symptoms


Lupus is an independent risk factor for clotting, in her





Chest pain


Negative serial enzymes





Acute CHF exacerbation on chronic systolic CHF


Start daily Lasix


Echo pending





Uncontrolled diabetes mellitus type 2


Diabetes mellitus type 2


Follow blood sugars


Insulin sliding scale


Diabetic diet


Continue Lantus and increase to 24 units daily at bedtime





Hypothyroidism


TSH, T3, and T4 showed that she is under dosed.


Synthroid increased to 200 g daily


Follow-up as an outpatient





Acute kidney injury on chronic kidney disease


Follow renal function


Avoid nephrotoxins





Lupus, chronic


Continue prednisone


 


DVT prophylaxis


Heparin IV drip


Coumadin started





Problem Qualifiers





(1) DVT (deep venous thrombosis):  


Qualified Codes:  I82.492 - Acute embolism and thrombosis of other specified 

deep vein of left lower extremity








Weston Calderon MD Oct 26, 2017 15:10

## 2017-10-27 VITALS
OXYGEN SATURATION: 96 % | SYSTOLIC BLOOD PRESSURE: 165 MMHG | TEMPERATURE: 95.9 F | DIASTOLIC BLOOD PRESSURE: 81 MMHG | RESPIRATION RATE: 18 BRPM | HEART RATE: 56 BPM

## 2017-10-27 VITALS
RESPIRATION RATE: 18 BRPM | TEMPERATURE: 97.3 F | OXYGEN SATURATION: 94 % | HEART RATE: 66 BPM | DIASTOLIC BLOOD PRESSURE: 79 MMHG | SYSTOLIC BLOOD PRESSURE: 157 MMHG

## 2017-10-27 VITALS
OXYGEN SATURATION: 95 % | TEMPERATURE: 98.9 F | HEART RATE: 89 BPM | RESPIRATION RATE: 20 BRPM | SYSTOLIC BLOOD PRESSURE: 151 MMHG | DIASTOLIC BLOOD PRESSURE: 82 MMHG

## 2017-10-27 VITALS
DIASTOLIC BLOOD PRESSURE: 73 MMHG | SYSTOLIC BLOOD PRESSURE: 146 MMHG | OXYGEN SATURATION: 93 % | RESPIRATION RATE: 17 BRPM | HEART RATE: 86 BPM | TEMPERATURE: 97.1 F

## 2017-10-27 VITALS
TEMPERATURE: 96.6 F | RESPIRATION RATE: 18 BRPM | SYSTOLIC BLOOD PRESSURE: 151 MMHG | HEART RATE: 69 BPM | DIASTOLIC BLOOD PRESSURE: 69 MMHG | OXYGEN SATURATION: 93 %

## 2017-10-27 VITALS
SYSTOLIC BLOOD PRESSURE: 144 MMHG | OXYGEN SATURATION: 92 % | DIASTOLIC BLOOD PRESSURE: 71 MMHG | TEMPERATURE: 97.3 F | HEART RATE: 69 BPM | RESPIRATION RATE: 18 BRPM

## 2017-10-27 VITALS — HEART RATE: 111 BPM

## 2017-10-27 LAB
ALBUMIN SERPL-MCNC: 1.8 GM/DL (ref 3.4–5)
ALP SERPL-CCNC: 106 U/L (ref 45–117)
ALT SERPL-CCNC: 17 U/L (ref 10–53)
AST SERPL-CCNC: 19 U/L (ref 15–37)
BASOPHILS # BLD AUTO: 0.1 TH/MM3 (ref 0–0.2)
BASOPHILS NFR BLD: 0.8 % (ref 0–2)
BILIRUB SERPL-MCNC: 0.2 MG/DL (ref 0.2–1)
BUN SERPL-MCNC: 46 MG/DL (ref 7–18)
CALCIUM SERPL-MCNC: 8.2 MG/DL (ref 8.5–10.1)
CHLORIDE SERPL-SCNC: 103 MEQ/L (ref 98–107)
CREAT SERPL-MCNC: 2.12 MG/DL (ref 0.5–1)
EOSINOPHIL # BLD: 0.1 TH/MM3 (ref 0–0.4)
EOSINOPHIL NFR BLD: 1.3 % (ref 0–4)
ERYTHROCYTE [DISTWIDTH] IN BLOOD BY AUTOMATED COUNT: 14.8 % (ref 11.6–17.2)
GFR SERPLBLD BASED ON 1.73 SQ M-ARVRAT: 24 ML/MIN (ref 89–?)
GLUCOSE SERPL-MCNC: 140 MG/DL (ref 74–106)
HCO3 BLD-SCNC: 24.2 MEQ/L (ref 21–32)
HCT VFR BLD CALC: 31.1 % (ref 35–46)
HGB BLD-MCNC: 10.2 GM/DL (ref 11.6–15.3)
INR PPP: 1 RATIO
LYMPHOCYTES # BLD AUTO: 2.7 TH/MM3 (ref 1–4.8)
LYMPHOCYTES NFR BLD AUTO: 32.7 % (ref 9–44)
MCH RBC QN AUTO: 31.3 PG (ref 27–34)
MCHC RBC AUTO-ENTMCNC: 32.8 % (ref 32–36)
MCV RBC AUTO: 95.3 FL (ref 80–100)
MONOCYTE #: 0.5 TH/MM3 (ref 0–0.9)
MONOCYTES NFR BLD: 6.6 % (ref 0–8)
NEUTROPHILS # BLD AUTO: 4.9 TH/MM3 (ref 1.8–7.7)
NEUTROPHILS NFR BLD AUTO: 58.6 % (ref 16–70)
PLATELET # BLD: 203 TH/MM3 (ref 150–450)
PMV BLD AUTO: 8.1 FL (ref 7–11)
PROT SERPL-MCNC: 5.6 GM/DL (ref 6.4–8.2)
PROTHROMBIN TIME: 10.8 SEC (ref 9.8–11.6)
RBC # BLD AUTO: 3.26 MIL/MM3 (ref 4–5.3)
SODIUM SERPL-SCNC: 135 MEQ/L (ref 136–145)
WBC # BLD AUTO: 8.3 TH/MM3 (ref 4–11)

## 2017-10-27 RX ADMIN — MIRTAZAPINE SCH MG: 15 TABLET, FILM COATED ORAL at 20:27

## 2017-10-27 RX ADMIN — OXYBUTYNIN CHLORIDE SCH MG: 5 TABLET ORAL at 20:28

## 2017-10-27 RX ADMIN — INSULIN ASPART SCH: 100 INJECTION, SOLUTION INTRAVENOUS; SUBCUTANEOUS at 17:04

## 2017-10-27 RX ADMIN — ACYCLOVIR SCH UNITS: 800 TABLET ORAL at 22:13

## 2017-10-27 RX ADMIN — LEVOTHYROXINE SODIUM SCH MCG: 200 TABLET ORAL at 04:57

## 2017-10-27 RX ADMIN — HEPARIN SODIUM PRN MLS/HR: 10000 INJECTION, SOLUTION INTRAVENOUS at 15:31

## 2017-10-27 RX ADMIN — MYCOPHENOLATE MOFETIL SCH MG: 500 TABLET, FILM COATED ORAL at 04:57

## 2017-10-27 RX ADMIN — HYDROCODONE BITARTRATE AND ACETAMINOPHEN PRN TAB: 10; 325 TABLET ORAL at 20:27

## 2017-10-27 RX ADMIN — HYDROCODONE BITARTRATE AND ACETAMINOPHEN PRN TAB: 10; 325 TABLET ORAL at 13:55

## 2017-10-27 RX ADMIN — DOCUSATE SODIUM SCH MG: 100 CAPSULE, LIQUID FILLED ORAL at 08:19

## 2017-10-27 RX ADMIN — INSULIN ASPART SCH: 100 INJECTION, SOLUTION INTRAVENOUS; SUBCUTANEOUS at 12:29

## 2017-10-27 RX ADMIN — WARFARIN SODIUM SCH MG: 5 TABLET ORAL at 15:28

## 2017-10-27 RX ADMIN — INSULIN ASPART SCH: 100 INJECTION, SOLUTION INTRAVENOUS; SUBCUTANEOUS at 21:00

## 2017-10-27 RX ADMIN — Medication SCH ML: at 08:19

## 2017-10-27 RX ADMIN — DOCUSATE SODIUM SCH MG: 100 CAPSULE, LIQUID FILLED ORAL at 20:28

## 2017-10-27 RX ADMIN — INSULIN ASPART SCH: 100 INJECTION, SOLUTION INTRAVENOUS; SUBCUTANEOUS at 08:00

## 2017-10-27 RX ADMIN — MYCOPHENOLATE MOFETIL SCH MG: 500 TABLET, FILM COATED ORAL at 13:54

## 2017-10-27 RX ADMIN — Medication SCH ML: at 20:29

## 2017-10-27 RX ADMIN — FUROSEMIDE SCH MG: 10 INJECTION, SOLUTION INTRAMUSCULAR; INTRAVENOUS at 08:19

## 2017-10-27 RX ADMIN — MYCOPHENOLATE MOFETIL SCH MG: 500 TABLET, FILM COATED ORAL at 20:28

## 2017-10-27 RX ADMIN — PANTOPRAZOLE SCH MG: 40 TABLET, DELAYED RELEASE ORAL at 08:18

## 2017-10-27 NOTE — HHI.PR
Subjective


Remarks


Starting to feel better today.  Patient is able to ambulate short distances.  

She still complains of peripheral swelling greater in her legs but also present 

in her arms.





Objective





Vital Signs








  Date Time  Temp Pulse Resp B/P (MAP) Pulse Ox O2 Delivery O2 Flow Rate FiO2


 


10/27/17 12:00 97.1 86 17 146/73 (97) 93   


 


10/27/17 07:46 97.3 66 18 157/79 (105) 94   


 


10/27/17 04:24 96.6 69 18 151/69 (96) 93   


 


10/27/17 00:28 95.9 56 18 165/81 (109) 96   


 


10/26/17 20:00 95.7 71 18 164/88 (113) 97   


 


10/26/17 19:00  68      


 


10/26/17 17:10   16     


 


10/26/17 15:42 97.4 61 18 158/89 (112) 96   














I/O      


 


 10/26/17 10/26/17 10/26/17 10/27/17 10/27/17 10/27/17





 07:00 15:00 23:00 07:00 15:00 23:00


 


Intake Total 67 ml  620 ml   


 


Balance 67 ml  620 ml   


 


      


 


Intake Oral   620 ml   


 


IV Total 67 ml     


 


# Voids   5   


 


# Bowel Movements   0   








Result Diagram:  


10/27/17 0421                                                                  

              10/27/17 0421





Objective Remarks


GENERAL: NAD, A&Ox3


HEAD: Normocephalic. 


NECK: Supple, trachea midline. No lymphadenopathy.


EYES: No scleral icterus. No injection or drainage. 


CARDIOVASCULAR: Regular rate and rhythm without murmurs, gallops, or rubs. 


RESPIRATORY: Breath sounds equal bilaterally. No accessory muscle use.


GASTROINTESTINAL: Abdomen soft, non-tender, nondistended. 


MUSCULOSKELETAL: No cyanosis.  Peripheral edema in all 4 limbs.


SKIN: Warm and dry.


NEURO:  No focal neurological deficitis.





A/P


Problem List:  


(1) DVT (deep venous thrombosis)


ICD Code:  I82.409 - Acute embolism and thrombosis of unspecified deep veins of 

unspecified lower extremity


Status:  Acute


Assessment and Plan





Assessment and Plan


62-year-old female admitted secondary to acute right DVT.  Slowly improving.  

Coumadin has been started.  Follow INR.  Labs reviewed.  Labs ordered.  No 

signs of bleeding.





Right lower extremity DVT


Continue heparin drip


Coumadin started


Follow INR


Follow clinically for improvement of symptoms


Lupus is an independent risk factor for clotting, in her





Chest pain


Negative serial enzymes





Acute CHF exacerbation on chronic systolic CHF


Start daily Lasix


Echo pending





Uncontrolled diabetes mellitus type 2


Diabetes mellitus type 2


Follow blood sugars


Insulin sliding scale


Diabetic diet


Continue Lantus and increase to 24 units daily at bedtime





Hypothyroidism


TSH, T3, and T4 showed that she is under dosed.


Synthroid increased to 200 g daily


Follow-up as an outpatient





Acute kidney injury on chronic kidney disease


Follow renal function


Avoid nephrotoxins





Lupus, chronic


Continue prednisone


 


DVT prophylaxis


Heparin IV drip


Coumadin started





Problem Qualifiers





(1) DVT (deep venous thrombosis):  


Qualified Codes:  I82.492 - Acute embolism and thrombosis of other specified 

deep vein of left lower extremity








Weston Calderon MD Oct 27, 2017 13:43

## 2017-10-28 VITALS
SYSTOLIC BLOOD PRESSURE: 129 MMHG | HEART RATE: 66 BPM | TEMPERATURE: 98 F | RESPIRATION RATE: 20 BRPM | OXYGEN SATURATION: 96 % | DIASTOLIC BLOOD PRESSURE: 62 MMHG

## 2017-10-28 VITALS
DIASTOLIC BLOOD PRESSURE: 74 MMHG | HEART RATE: 72 BPM | SYSTOLIC BLOOD PRESSURE: 160 MMHG | RESPIRATION RATE: 18 BRPM | OXYGEN SATURATION: 96 % | TEMPERATURE: 97.9 F

## 2017-10-28 VITALS
DIASTOLIC BLOOD PRESSURE: 77 MMHG | OXYGEN SATURATION: 97 % | SYSTOLIC BLOOD PRESSURE: 164 MMHG | TEMPERATURE: 96.8 F | HEART RATE: 63 BPM | RESPIRATION RATE: 17 BRPM

## 2017-10-28 VITALS
HEART RATE: 75 BPM | TEMPERATURE: 97.4 F | RESPIRATION RATE: 18 BRPM | OXYGEN SATURATION: 97 % | SYSTOLIC BLOOD PRESSURE: 160 MMHG | DIASTOLIC BLOOD PRESSURE: 81 MMHG

## 2017-10-28 VITALS
HEART RATE: 68 BPM | SYSTOLIC BLOOD PRESSURE: 131 MMHG | OXYGEN SATURATION: 97 % | RESPIRATION RATE: 18 BRPM | TEMPERATURE: 98 F | DIASTOLIC BLOOD PRESSURE: 68 MMHG

## 2017-10-28 VITALS
OXYGEN SATURATION: 95 % | TEMPERATURE: 97.7 F | HEART RATE: 65 BPM | RESPIRATION RATE: 16 BRPM | DIASTOLIC BLOOD PRESSURE: 56 MMHG | SYSTOLIC BLOOD PRESSURE: 110 MMHG

## 2017-10-28 VITALS
DIASTOLIC BLOOD PRESSURE: 84 MMHG | HEART RATE: 78 BPM | OXYGEN SATURATION: 97 % | RESPIRATION RATE: 18 BRPM | SYSTOLIC BLOOD PRESSURE: 160 MMHG | TEMPERATURE: 98 F

## 2017-10-28 VITALS — HEART RATE: 66 BPM

## 2017-10-28 LAB
BUN SERPL-MCNC: 51 MG/DL (ref 7–18)
CALCIUM SERPL-MCNC: 8.6 MG/DL (ref 8.5–10.1)
CHLORIDE SERPL-SCNC: 103 MEQ/L (ref 98–107)
CREAT SERPL-MCNC: 2.36 MG/DL (ref 0.5–1)
ERYTHROCYTE [DISTWIDTH] IN BLOOD BY AUTOMATED COUNT: 15 % (ref 11.6–17.2)
GFR SERPLBLD BASED ON 1.73 SQ M-ARVRAT: 21 ML/MIN (ref 89–?)
GLUCOSE SERPL-MCNC: 76 MG/DL (ref 74–106)
HCO3 BLD-SCNC: 26.8 MEQ/L (ref 21–32)
HCT VFR BLD CALC: 34 % (ref 35–46)
HGB BLD-MCNC: 11.4 GM/DL (ref 11.6–15.3)
INR PPP: 1.2 RATIO
MCH RBC QN AUTO: 32 PG (ref 27–34)
MCHC RBC AUTO-ENTMCNC: 33.5 % (ref 32–36)
MCV RBC AUTO: 95.5 FL (ref 80–100)
PLATELET # BLD: 249 TH/MM3 (ref 150–450)
PMV BLD AUTO: 8 FL (ref 7–11)
PROTHROMBIN TIME: 13.4 SEC (ref 9.8–11.6)
RBC # BLD AUTO: 3.56 MIL/MM3 (ref 4–5.3)
SODIUM SERPL-SCNC: 138 MEQ/L (ref 136–145)
WBC # BLD AUTO: 9.7 TH/MM3 (ref 4–11)

## 2017-10-28 RX ADMIN — DOCUSATE SODIUM SCH MG: 100 CAPSULE, LIQUID FILLED ORAL at 09:11

## 2017-10-28 RX ADMIN — MYCOPHENOLATE MOFETIL SCH MG: 500 TABLET, FILM COATED ORAL at 14:22

## 2017-10-28 RX ADMIN — INSULIN ASPART SCH: 100 INJECTION, SOLUTION INTRAVENOUS; SUBCUTANEOUS at 14:22

## 2017-10-28 RX ADMIN — INSULIN ASPART SCH: 100 INJECTION, SOLUTION INTRAVENOUS; SUBCUTANEOUS at 16:36

## 2017-10-28 RX ADMIN — HYDROCODONE BITARTRATE AND ACETAMINOPHEN PRN TAB: 10; 325 TABLET ORAL at 20:30

## 2017-10-28 RX ADMIN — FUROSEMIDE SCH MG: 10 INJECTION, SOLUTION INTRAMUSCULAR; INTRAVENOUS at 09:15

## 2017-10-28 RX ADMIN — OXYBUTYNIN CHLORIDE SCH MG: 5 TABLET ORAL at 20:26

## 2017-10-28 RX ADMIN — MYCOPHENOLATE MOFETIL SCH MG: 500 TABLET, FILM COATED ORAL at 20:27

## 2017-10-28 RX ADMIN — INSULIN ASPART SCH: 100 INJECTION, SOLUTION INTRAVENOUS; SUBCUTANEOUS at 09:00

## 2017-10-28 RX ADMIN — Medication SCH ML: at 09:00

## 2017-10-28 RX ADMIN — ACYCLOVIR SCH UNITS: 800 TABLET ORAL at 20:30

## 2017-10-28 RX ADMIN — WARFARIN SODIUM SCH MG: 5 TABLET ORAL at 16:35

## 2017-10-28 RX ADMIN — HYDROCODONE BITARTRATE AND ACETAMINOPHEN PRN TAB: 10; 325 TABLET ORAL at 05:40

## 2017-10-28 RX ADMIN — LEVOTHYROXINE SODIUM SCH MCG: 200 TABLET ORAL at 05:40

## 2017-10-28 RX ADMIN — MIRTAZAPINE SCH MG: 15 TABLET, FILM COATED ORAL at 20:26

## 2017-10-28 RX ADMIN — Medication SCH ML: at 20:26

## 2017-10-28 RX ADMIN — INSULIN ASPART SCH: 100 INJECTION, SOLUTION INTRAVENOUS; SUBCUTANEOUS at 20:31

## 2017-10-28 RX ADMIN — DOCUSATE SODIUM SCH MG: 100 CAPSULE, LIQUID FILLED ORAL at 20:26

## 2017-10-28 RX ADMIN — MYCOPHENOLATE MOFETIL SCH MG: 500 TABLET, FILM COATED ORAL at 05:40

## 2017-10-28 RX ADMIN — PANTOPRAZOLE SCH MG: 40 TABLET, DELAYED RELEASE ORAL at 09:11

## 2017-10-28 NOTE — RADRPT
EXAM DATE/TIME:  10/28/2017 18:54 

 

[facility] COMPARISON:     

No previous studies available for comparison.

 

                     

INDICATIONS :     

Swelling and pain to bilateral feet. 

                     

 

MEDICAL HISTORY :     

None.          

 

SURGICAL HISTORY :     

None.   

 

ENCOUNTER:     

Initial                                        

 

ACUITY:     

1 week      

 

PAIN SCORE:     

5/10

 

LOCATION:     

Right  Foot

 

FINDINGS:     

Osseous structures are osteopenic.  No evidence of fracture, dislocation, or periosteal reaction.  La
rge plantar calcaneal spur.  There is significant soft tissue swelling of the dorsal aspect of the mi
d foot.  No radiopaque foreign body seen.

 

CONCLUSION:     

Significant dorsal soft tissue swelling.  The osseous structures of the forefoot are grossly intact.

 

 

 

 Jesse Martínez MD on October 28, 2017 at 19:46           

Board Certified Radiologist.

 This report was verified electronically.

## 2017-10-28 NOTE — RADRPT
EXAM DATE/TIME:  10/28/2017 18:52 

 

[facility] COMPARISON:     

No previous studies available for comparison.

 

                     

INDICATIONS :     

Swelling and pain to bilateral feet. 

                     

 

MEDICAL HISTORY :     

None.          

 

SURGICAL HISTORY :        

Left ankle ORIF. 

 

ENCOUNTER:     

Initial                                        

 

ACUITY:     

1 week      

 

PAIN SCORE:     

5/10

 

LOCATION:     

Left  Foot

 

FINDINGS:     

Diffuse osteopenia.  The osseous structures of the forefoot are in normal alignment.  No evidence of 
bony destruction or periosteal reaction.  This is significant soft tissue swelling about the dorsal a
spect of the foot.  No radiopaque foreign bodies.  Lateral fibular plate.

 

CONCLUSION:     

Significant dorsal soft tissue swelling.  The osseous structures of the forefoot are grossly intact.

 

 

 

 Jesse Martínez MD on October 28, 2017 at 19:45           

Board Certified Radiologist.

 This report was verified electronically.

## 2017-10-28 NOTE — HHI.PR
Subjective


Remarks


Patient complains of bilateral foot pain.  She feels like she might have 

fractures.  She cannot recall any incident in which she could've fractured her 

feet but she says she is fractured her feet in the past and it feels like that.

  She also complains of anxiety and depression and wishes to see a psychiatrist 

in regards to this.





Objective





Vital Signs








  Date Time  Temp Pulse Resp B/P (MAP) Pulse Ox O2 Delivery O2 Flow Rate FiO2


 


10/28/17 16:00 98.0 68 18 131/68 (89) 97   


 


10/28/17 12:00 97.7 65 16 110/56 (74) 95   


 


10/28/17 08:00 96.8 63 17 164/77 (106) 97   


 


10/28/17 04:00 98.0 66 20 129/62 (84) 96   


 


10/28/17 00:00 97.4 75 18 160/81 (107) 97   


 


10/27/17 21:27   18     


 


10/27/17 20:15  111      


 


10/27/17 20:00 98.9 89 20 151/82 (105) 95   














I/O      


 


 10/27/17 10/27/17 10/27/17 10/28/17 10/28/17 10/28/17





 07:00 15:00 23:00 07:00 15:00 23:00


 


Intake Total   620 ml 720 ml  


 


Balance   620 ml 720 ml  


 


      


 


Intake Oral   620 ml 720 ml  


 


# Voids   6 4  


 


# Bowel Movements   0   








Result Diagram:  


10/28/17 0548                                                                  

              10/28/17 0548





Objective Remarks


GENERAL: NAD, A&Ox3


HEAD: Normocephalic. 


NECK: Supple, trachea midline. No lymphadenopathy.


EYES: No scleral icterus. No injection or drainage. 


CARDIOVASCULAR: Regular rate and rhythm without murmurs, gallops, or rubs. 


RESPIRATORY: Breath sounds equal bilaterally. No accessory muscle use.


GASTROINTESTINAL: Abdomen soft, non-tender, nondistended. 


MUSCULOSKELETAL: No cyanosis.  Peripheral edema in all 4 limbs.


SKIN: Warm and dry.


NEURO:  No focal neurological deficitis.





A/P


Problem List:  


(1) DVT (deep venous thrombosis)


ICD Code:  I82.409 - Acute embolism and thrombosis of unspecified deep veins of 

unspecified lower extremity


Status:  Acute


Assessment and Plan





Assessment and Plan


62-year-old female admitted secondary to acute right DVT.  Continue to follow 

INR in regards to Coumadin.  Bilateral foot x-rays to rule out any occult 

fractures.  Psychiatry consult regarding patient's expression of uncontrolled 

anxiety and depression.





Right lower extremity DVT


Continue heparin drip


Coumadin started


Follow INR


Follow clinically for improvement of symptoms


Lupus is an independent risk factor for clotting, in her





Chest pain


Negative serial enzymes





Acute CHF exacerbation on chronic systolic CHF


Start daily Lasix


Echo pending





Uncontrolled diabetes mellitus type 2


Diabetes mellitus type 2


Follow blood sugars


Insulin sliding scale


Diabetic diet


Continue Lantus and increase to 24 units daily at bedtime





Hypothyroidism


TSH, T3, and T4 showed that she is under dosed.


Synthroid increased to 200 g daily


Follow-up as an outpatient





Acute kidney injury on chronic kidney disease


Follow renal function


Avoid nephrotoxins





Lupus, chronic


Continue prednisone


 


DVT prophylaxis


Heparin IV drip


Coumadin started





Problem Qualifiers





(1) DVT (deep venous thrombosis):  


Qualified Codes:  I82.492 - Acute embolism and thrombosis of other specified 

deep vein of left lower extremity








Weston Calderon MD Oct 28, 2017 17:34

## 2017-10-29 VITALS
RESPIRATION RATE: 18 BRPM | OXYGEN SATURATION: 94 % | HEART RATE: 92 BPM | DIASTOLIC BLOOD PRESSURE: 81 MMHG | SYSTOLIC BLOOD PRESSURE: 164 MMHG | TEMPERATURE: 97.6 F

## 2017-10-29 VITALS
DIASTOLIC BLOOD PRESSURE: 68 MMHG | OXYGEN SATURATION: 98 % | SYSTOLIC BLOOD PRESSURE: 143 MMHG | HEART RATE: 68 BPM | TEMPERATURE: 96.6 F | RESPIRATION RATE: 16 BRPM

## 2017-10-29 VITALS
TEMPERATURE: 97.8 F | SYSTOLIC BLOOD PRESSURE: 138 MMHG | RESPIRATION RATE: 15 BRPM | OXYGEN SATURATION: 97 % | HEART RATE: 65 BPM | DIASTOLIC BLOOD PRESSURE: 71 MMHG

## 2017-10-29 VITALS
DIASTOLIC BLOOD PRESSURE: 75 MMHG | TEMPERATURE: 98.7 F | RESPIRATION RATE: 18 BRPM | OXYGEN SATURATION: 97 % | SYSTOLIC BLOOD PRESSURE: 160 MMHG | HEART RATE: 72 BPM

## 2017-10-29 VITALS
OXYGEN SATURATION: 92 % | DIASTOLIC BLOOD PRESSURE: 80 MMHG | HEART RATE: 63 BPM | TEMPERATURE: 96.7 F | RESPIRATION RATE: 18 BRPM | SYSTOLIC BLOOD PRESSURE: 165 MMHG

## 2017-10-29 VITALS
SYSTOLIC BLOOD PRESSURE: 133 MMHG | RESPIRATION RATE: 17 BRPM | OXYGEN SATURATION: 98 % | DIASTOLIC BLOOD PRESSURE: 76 MMHG | TEMPERATURE: 97.2 F | HEART RATE: 68 BPM

## 2017-10-29 VITALS — HEART RATE: 55 BPM

## 2017-10-29 LAB
BUN SERPL-MCNC: 48 MG/DL (ref 7–18)
CALCIUM SERPL-MCNC: 8.9 MG/DL (ref 8.5–10.1)
CHLORIDE SERPL-SCNC: 103 MEQ/L (ref 98–107)
CREAT SERPL-MCNC: 2.39 MG/DL (ref 0.5–1)
ERYTHROCYTE [DISTWIDTH] IN BLOOD BY AUTOMATED COUNT: 14.8 % (ref 11.6–17.2)
GFR SERPLBLD BASED ON 1.73 SQ M-ARVRAT: 21 ML/MIN (ref 89–?)
GLUCOSE SERPL-MCNC: 106 MG/DL (ref 74–106)
HCO3 BLD-SCNC: 25.7 MEQ/L (ref 21–32)
HCT VFR BLD CALC: 30.3 % (ref 35–46)
HGB BLD-MCNC: 10.2 GM/DL (ref 11.6–15.3)
INR PPP: 1.8 RATIO
MCH RBC QN AUTO: 32 PG (ref 27–34)
MCHC RBC AUTO-ENTMCNC: 33.7 % (ref 32–36)
MCV RBC AUTO: 94.8 FL (ref 80–100)
PLATELET # BLD: 211 TH/MM3 (ref 150–450)
PMV BLD AUTO: 8.3 FL (ref 7–11)
PROTHROMBIN TIME: 20.1 SEC (ref 9.8–11.6)
RBC # BLD AUTO: 3.19 MIL/MM3 (ref 4–5.3)
SODIUM SERPL-SCNC: 137 MEQ/L (ref 136–145)
WBC # BLD AUTO: 8 TH/MM3 (ref 4–11)

## 2017-10-29 RX ADMIN — ACYCLOVIR SCH UNITS: 800 TABLET ORAL at 20:41

## 2017-10-29 RX ADMIN — INSULIN ASPART SCH: 100 INJECTION, SOLUTION INTRAVENOUS; SUBCUTANEOUS at 08:00

## 2017-10-29 RX ADMIN — HYDROCODONE BITARTRATE AND ACETAMINOPHEN PRN TAB: 10; 325 TABLET ORAL at 09:53

## 2017-10-29 RX ADMIN — INSULIN ASPART SCH: 100 INJECTION, SOLUTION INTRAVENOUS; SUBCUTANEOUS at 12:43

## 2017-10-29 RX ADMIN — INSULIN ASPART SCH: 100 INJECTION, SOLUTION INTRAVENOUS; SUBCUTANEOUS at 16:35

## 2017-10-29 RX ADMIN — HYDROCODONE BITARTRATE AND ACETAMINOPHEN PRN TAB: 10; 325 TABLET ORAL at 05:42

## 2017-10-29 RX ADMIN — HYDROCODONE BITARTRATE AND ACETAMINOPHEN PRN TAB: 10; 325 TABLET ORAL at 16:35

## 2017-10-29 RX ADMIN — Medication SCH ML: at 20:35

## 2017-10-29 RX ADMIN — ESCITALOPRAM OXALATE SCH MG: 10 TABLET, FILM COATED ORAL at 12:43

## 2017-10-29 RX ADMIN — Medication SCH ML: at 09:51

## 2017-10-29 RX ADMIN — LEVOTHYROXINE SODIUM SCH MCG: 200 TABLET ORAL at 05:42

## 2017-10-29 RX ADMIN — HEPARIN SODIUM PRN MLS/HR: 10000 INJECTION, SOLUTION INTRAVENOUS at 23:14

## 2017-10-29 RX ADMIN — DOCUSATE SODIUM SCH MG: 100 CAPSULE, LIQUID FILLED ORAL at 20:33

## 2017-10-29 RX ADMIN — DOCUSATE SODIUM SCH MG: 100 CAPSULE, LIQUID FILLED ORAL at 09:51

## 2017-10-29 RX ADMIN — FUROSEMIDE SCH MG: 10 INJECTION, SOLUTION INTRAMUSCULAR; INTRAVENOUS at 14:28

## 2017-10-29 RX ADMIN — MYCOPHENOLATE MOFETIL SCH MG: 500 TABLET, FILM COATED ORAL at 05:42

## 2017-10-29 RX ADMIN — MYCOPHENOLATE MOFETIL SCH MG: 500 TABLET, FILM COATED ORAL at 20:33

## 2017-10-29 RX ADMIN — PANTOPRAZOLE SCH MG: 40 TABLET, DELAYED RELEASE ORAL at 09:52

## 2017-10-29 RX ADMIN — INSULIN ASPART SCH: 100 INJECTION, SOLUTION INTRAVENOUS; SUBCUTANEOUS at 20:41

## 2017-10-29 RX ADMIN — HYDROCODONE BITARTRATE AND ACETAMINOPHEN PRN TAB: 10; 325 TABLET ORAL at 20:35

## 2017-10-29 RX ADMIN — MYCOPHENOLATE MOFETIL SCH MG: 500 TABLET, FILM COATED ORAL at 14:27

## 2017-10-29 RX ADMIN — OXYBUTYNIN CHLORIDE SCH MG: 5 TABLET ORAL at 20:33

## 2017-10-29 RX ADMIN — WARFARIN SODIUM SCH MG: 5 TABLET ORAL at 16:22

## 2017-10-29 NOTE — PD.PSY.CON
Provisional Diagnosis


Admission Date


Oct 24, 2017 at 23:17


Axis I.


Adjustment disorder with depressed mood





History of Present Illness


Service


Psychiatry


Consult Requested By


Attending physician


Reason for Consult


Reevaluation of psychiatry medicines.


Primary Care Physician


No Primary Care Physician


HPI


Patient's clonazepam is very low dose and unlikely to be helping with anxiety.  

This physician increased the dose to 1 mg by mouth every 12 hours.  Furthermore

, patient's prescription for Remeron has not worked in over a month.  Therefore 

this physician changed her antidepressant to Lexapro.





Review of Systems


Psychiatric:  COMPLAINS OF: Anxiety, Depression


Except as stated in HPI:  all other systems reviewed are Neg





Past Family Social History


Coded Allergies:  


     milk (Verified  Allergy, Mild, Cramping, 10/24/17)


 lactose intolerant


Active Scripts


Prednisone (Prednisone) 20 Mg Tab, 20 MG PO DAILY for lupus for 30 Days, #30 

TAB 0 Refills


   Prov:Dorothy Lozoya MD         9/22/17


Insulin Aspart Inj (Novolog Inj) 1,000 Unit/10 Ml Vial, 1-9 UNITS SQ ACHS for 

Blood Sugar Management, #10 ML 0 Refills


    sugars less than 70,(0)units; sugars


   150-199,(1) unit; sugars 200-249,(3) units; sugars 250-299,(5) units;


   sugars 300-349,(7) units; sugars greater than 349,(9) units.


   Prov:Dorothy Lozoya MD         9/22/17


Insulin Detemir Inj (Levemir Inj) 1,000 unit/ 10 ML Vial, 10 UNITS SQ HS for 

Blood Sugar Management for 30 Days, VIAL 0 Refills


   Do not mix with any other Insulin.


   Prov:Dorothy Lozoya MD         9/22/17


Megestrol Liq (Megestrol Liq) 40 Mg/Ml Susp, 400 MG PO BIDAC for appetite 

stimulant for 30 Days, BOTTLE 0 Refills


   Prov:Dorothy Lozoya MD         9/19/17


Mycophenolate (Cellcept) 500 Mg Tab, 500 MG PO Q8HR for lupus for 30 Days, TAB 

0 Refills


   Prov:Dorothy Lozoya MD         9/19/17


Levothyroxine (Synthroid) 150 Mcg Tab, 150 MCG PO DAILY@0600 for hypothyroidism 

for 30 Days, TAB 0 Refills


   Prov:Dorothy Lozoya MD         9/19/17


Pantoprazole (Pantoprazole) 40 Mg Tab, 40 MG PO DAILY for PUD prophylaxis for 

30 Days, #30 TAB 0 Refills


   Prov:Dorothy Lozoya MD         9/19/17


Mirtazapine (Mirtazapine) 15 Mg Tab, 30 MG PO HS for Depression Control for 30 

Days, TAB 0 Refills


   Prov:Dorothy Lozoya MD         9/19/17


Clonazepam (Klonopin) 0.5 Mg Tab, 0.5 MG PO Q12HR for Depression Control for 30 

Days, TAB 0 Refills


   Prov:Dorothy Lozoya MD         9/19/17





Current Medications








 Medications


  (Trade)  Dose


 Ordered  Sig/Kandis


 Route  Start Time


 Stop Time Status Last Admin


 


 Heparin Sodium/


 Dextrose  250 ml @ 


 16 mls/hr  TITRATE  PRN


 IV  10/24/17 22:45


    10/27/17 15:31


 


 


  (NS Flush)  2 ml  UNSCH  PRN


 IV FLUSH  10/24/17 23:30


     


 


 


  (NS Flush)  2 ml  BID


 IV FLUSH  10/25/17 09:00


    10/29/17 09:51


 


 


  (Narcan Inj)  0.4 mg  UNSCH  PRN


 IV PUSH  10/24/17 23:30


     


 


 


  (D50w (Vial) Inj)  50 ml  UNSCH  PRN


 IV PUSH  10/24/17 23:30


     


 


 


  (Glucagon Inj)  1 mg  UNSCH  PRN


 OTHER  10/24/17 23:30


     


 


 


  (NovoLOG


 SUPPLEMENTAL


 SCALE)  1  ACHS SLIDING  SCALE


 SQ  10/25/17 08:00


    10/28/17 20:31


 


 


  (Cellcept)  500 mg  Q8HR


 PO  10/25/17 06:00


    10/29/17 05:42


 


 


  (Protonix)  40 mg  DAILY


 PO  10/25/17 09:00


    10/29/17 09:52


 


 


  (Deltasone)  20 mg  DAILY


 PO  10/25/17 09:00


    10/29/17 09:51


 


 


  (Catapres)  0.1 mg  Q6H  PRN


 PO  10/25/17 03:30


    10/28/17 23:49


 


 


  (Norco  5-325 Mg)  1 tab  Q4H  PRN


 PO  10/25/17 08:30


     


 


 


  (Norco  Mg)  1 tab  Q4H  PRN


 PO  10/25/17 08:30


    10/29/17 09:53


 


 


  (Colace)  100 mg  BID


 PO  10/25/17 21:00


    10/29/17 09:51


 


 


  (Milk Of


 Magnesia Liq)  30 ml  BID  PRN


 PO  10/25/17 10:45


    10/25/17 12:09


 


 


  (Synthroid)  200 mcg  DAILY@0600


 PO  10/26/17 06:00


    10/29/17 05:42


 


 


  (Coumadin)  5 mg  DAILY@1600


 PO  10/26/17 16:00


    10/28/17 16:35


 


 


  (Levemir Inj)  24 units  HS


 SQ  10/26/17 21:00


    10/28/17 20:30


 


 


  (Ditropan)  5 mg  HS


 PO  10/26/17 21:00


    10/28/17 20:26


 


 


  (Lasix Inj)  20 mg  Q24H


 IV PUSH  10/30/17 08:00


     


 


 


  (Lasix Inj)  20 mg  Q24H


 IV PUSH  10/29/17 14:00


     


 


 


  (Deltasone)  10 mg  DAILY


 PO  10/30/17 09:00


     


 


 


  (KlonoPIN)  1 mg  Q12HR


 PO  10/29/17 21:00


   UNV  


 


 


  (Lexapro)  5 mg  DAILY


 PO  10/29/17 12:00


   UNV  


 








Family Psych History


Positive for anxiety and mood issues.


Social History


Patient lives in her son and daughter-in-law's home.  Multiple family issues as 

a result.


Patient's Strengths (min. 2)


Verbal and has access to healthcare.





Physical Exam


Vital Signs





Vital Signs








  Date Time  Temp Pulse Resp B/P (MAP) Pulse Ox O2 Delivery O2 Flow Rate FiO2


 


10/29/17 11:08   18     


 


10/29/17 08:00 97.8 65  138/71 (93) 97   














I/O   


 


 10/29/17 10/29/17 10/30/17





 08:00 16:00 00:00


 


Intake Total 580 ml  


 


Balance 580 ml  








Lab Results











Test


  10/29/17


07:26


 


White Blood Count 8.0 TH/MM3 


 


Red Blood Count 3.19 MIL/MM3 


 


Hemoglobin 10.2 GM/DL 


 


Hematocrit 30.3 % 


 


Mean Corpuscular Volume 94.8 FL 


 


Mean Corpuscular Hemoglobin 32.0 PG 


 


Mean Corpuscular Hemoglobin


Concent 33.7 % 


 


 


Red Cell Distribution Width 14.8 % 


 


Platelet Count 211 TH/MM3 


 


Mean Platelet Volume 8.3 FL 


 


Prothrombin Time 20.1 SEC 


 


Prothromb Time International


Ratio 1.8 RATIO 


 


 


Activated Partial


Thromboplast Time 79.4 SEC 


 


 


Blood Urea Nitrogen 48 MG/DL 


 


Creatinine 2.39 MG/DL 


 


Random Glucose 106 MG/DL 


 


Calcium Level 8.9 MG/DL 


 


Sodium Level 137 MEQ/L 


 


Potassium Level 4.1 MEQ/L 


 


Chloride Level 103 MEQ/L 


 


Carbon Dioxide Level 25.7 MEQ/L 


 


Anion Gap 8 MEQ/L 


 


Estimat Glomerular Filtration


Rate 21 ML/MIN 


 














 Date/Time


Source Procedure


Growth Status


 


 


 10/24/17 19:56


Blood Peripheral Aerobic Blood Culture - Final


NO GROWTH IN 5 DAYS Complete


 


 10/24/17 19:56


Blood Peripheral Anaerobic Blood Culture - Final


NO GROWTH IN 5 DAYS Complete


 


 10/24/17 19:21


Urine Random Urine Urine Culture - Final


,000 CFU/ML MIXED GRAM POSITIVE... Complete











Mental Status Examination


Appearance:  Appropriate


Consciousness:  Alert


Orientation:  x4


Motor Activity:  Normal gait


Speech:  Unremarkable


Language:  Adequate


Fund of Knowledge:  Adequate


Attention and Concentration:  Adequate


Memory:  Unremarkable


Mood:  Sad, Anxious


Affect:  Sad, Anxious


Thought Process & Associations:  Intact


Thought Content:  Appropriate


Hallucination Type:  None


Delusion Type:  None


Suicidal Ideation:  No


Suicidal Plan:  No


Suicidal Intention:  No


Homicidal Ideation:  No


Homicidal Plan:  No


Homicidal Intention:  No


Insight:  Adequate


Judgment:  Adequate





Assessment & Plan


Problem List:  


(1) Adjustment disorder with depressed mood


ICD Codes:  F43.21 - Adjustment disorder with depressed mood


Assessment & Plan


Estimated LOS:  days patient has multiple social issues, including living 

situation which are unchangeable by this physician.  However, this physician 

did increase the dose of her Klonopin for anxiety and change antidepressant to 

Lexapro.











David Rosas MD Oct 29, 2017 12:03

## 2017-10-29 NOTE — PD.PSY.CON
Provisional Diagnosis


Admission Date


Oct 24, 2017 at 23:17


Axis I.


Adjustment disorder with depressed mood





History of Present Illness


Service


Psychiatry


Consult Requested By


Attending physician


Reason for Consult


Reevaluation of psychiatry medicines.


Primary Care Physician


No Primary Care Physician


HPI


Patient's clonazepam is very low dose and unlikely to be helping with anxiety.  

This physician increased the dose to 1 mg by mouth every 12 hours.  Furthermore

, patient's prescription for Remeron has not worked in over a month.  Therefore 

this physician changed her antidepressant to Lexapro.





Review of Systems


Psychiatric:  COMPLAINS OF: Anxiety, Depression


Except as stated in HPI:  all other systems reviewed are Neg





Past Family Social History


Coded Allergies:  


     milk (Verified  Allergy, Mild, Cramping, 10/24/17)


 lactose intolerant


Active Scripts


Prednisone (Prednisone) 20 Mg Tab, 20 MG PO DAILY for lupus for 30 Days, #30 

TAB 0 Refills


   Prov:Dorothy Lozoya MD         9/22/17


Insulin Aspart Inj (Novolog Inj) 1,000 Unit/10 Ml Vial, 1-9 UNITS SQ ACHS for 

Blood Sugar Management, #10 ML 0 Refills


    sugars less than 70,(0)units; sugars


   150-199,(1) unit; sugars 200-249,(3) units; sugars 250-299,(5) units;


   sugars 300-349,(7) units; sugars greater than 349,(9) units.


   Prov:Dorothy Lozoya MD         9/22/17


Insulin Detemir Inj (Levemir Inj) 1,000 unit/ 10 ML Vial, 10 UNITS SQ HS for 

Blood Sugar Management for 30 Days, VIAL 0 Refills


   Do not mix with any other Insulin.


   Prov:Dorothy Lozoya MD         9/22/17


Megestrol Liq (Megestrol Liq) 40 Mg/Ml Susp, 400 MG PO BIDAC for appetite 

stimulant for 30 Days, BOTTLE 0 Refills


   Prov:Dorothy Lozyoa MD         9/19/17


Mycophenolate (Cellcept) 500 Mg Tab, 500 MG PO Q8HR for lupus for 30 Days, TAB 

0 Refills


   Prov:Dorothy Lozoya MD         9/19/17


Levothyroxine (Synthroid) 150 Mcg Tab, 150 MCG PO DAILY@0600 for hypothyroidism 

for 30 Days, TAB 0 Refills


   Prov:Dorothy Lozoya MD         9/19/17


Pantoprazole (Pantoprazole) 40 Mg Tab, 40 MG PO DAILY for PUD prophylaxis for 

30 Days, #30 TAB 0 Refills


   Prov:Dorothy Lozoya MD         9/19/17


Mirtazapine (Mirtazapine) 15 Mg Tab, 30 MG PO HS for Depression Control for 30 

Days, TAB 0 Refills


   Prov:Dorothy Lozoya MD         9/19/17


Clonazepam (Klonopin) 0.5 Mg Tab, 0.5 MG PO Q12HR for Depression Control for 30 

Days, TAB 0 Refills


   Prov:Dorothy Lozoya MD         9/19/17





Current Medications








 Medications


  (Trade)  Dose


 Ordered  Sig/Kandis


 Route  Start Time


 Stop Time Status Last Admin


 


 Heparin Sodium/


 Dextrose  250 ml @ 


 16 mls/hr  TITRATE  PRN


 IV  10/24/17 22:45


    10/27/17 15:31


 


 


  (NS Flush)  2 ml  UNSCH  PRN


 IV FLUSH  10/24/17 23:30


     


 


 


  (NS Flush)  2 ml  BID


 IV FLUSH  10/25/17 09:00


    10/29/17 09:51


 


 


  (Narcan Inj)  0.4 mg  UNSCH  PRN


 IV PUSH  10/24/17 23:30


     


 


 


  (D50w (Vial) Inj)  50 ml  UNSCH  PRN


 IV PUSH  10/24/17 23:30


     


 


 


  (Glucagon Inj)  1 mg  UNSCH  PRN


 OTHER  10/24/17 23:30


     


 


 


  (NovoLOG


 SUPPLEMENTAL


 SCALE)  1  ACHS SLIDING  SCALE


 SQ  10/25/17 08:00


    10/28/17 20:31


 


 


  (Cellcept)  500 mg  Q8HR


 PO  10/25/17 06:00


    10/29/17 05:42


 


 


  (Protonix)  40 mg  DAILY


 PO  10/25/17 09:00


    10/29/17 09:52


 


 


  (Deltasone)  20 mg  DAILY


 PO  10/25/17 09:00


    10/29/17 09:51


 


 


  (Catapres)  0.1 mg  Q6H  PRN


 PO  10/25/17 03:30


    10/28/17 23:49


 


 


  (Norco  5-325 Mg)  1 tab  Q4H  PRN


 PO  10/25/17 08:30


     


 


 


  (Norco  Mg)  1 tab  Q4H  PRN


 PO  10/25/17 08:30


    10/29/17 09:53


 


 


  (Colace)  100 mg  BID


 PO  10/25/17 21:00


    10/29/17 09:51


 


 


  (Milk Of


 Magnesia Liq)  30 ml  BID  PRN


 PO  10/25/17 10:45


    10/25/17 12:09


 


 


  (Synthroid)  200 mcg  DAILY@0600


 PO  10/26/17 06:00


    10/29/17 05:42


 


 


  (Coumadin)  5 mg  DAILY@1600


 PO  10/26/17 16:00


    10/28/17 16:35


 


 


  (Levemir Inj)  24 units  HS


 SQ  10/26/17 21:00


    10/28/17 20:30


 


 


  (Ditropan)  5 mg  HS


 PO  10/26/17 21:00


    10/28/17 20:26


 


 


  (Lasix Inj)  20 mg  Q24H


 IV PUSH  10/30/17 08:00


     


 


 


  (Lasix Inj)  20 mg  Q24H


 IV PUSH  10/29/17 14:00


     


 


 


  (Deltasone)  10 mg  DAILY


 PO  10/30/17 09:00


     


 


 


  (KlonoPIN)  1 mg  Q12HR


 PO  10/29/17 21:00


   UNV  


 


 


  (Lexapro)  5 mg  DAILY


 PO  10/29/17 12:00


   UNV  


 








Family Psych History


Positive for anxiety and mood issues.


Social History


Patient lives in her son and daughter-in-law's home.  Multiple family issues as 

a result.


Patient's Strengths (min. 2)


Verbal and has access to healthcare.





Physical Exam


Vital Signs





Vital Signs








  Date Time  Temp Pulse Resp B/P (MAP) Pulse Ox O2 Delivery O2 Flow Rate FiO2


 


10/29/17 11:08   18     


 


10/29/17 08:00 97.8 65  138/71 (93) 97   














I/O   


 


 10/29/17 10/29/17 10/30/17





 08:00 16:00 00:00


 


Intake Total 580 ml  


 


Balance 580 ml  








Lab Results











Test


  10/29/17


07:26


 


White Blood Count 8.0 TH/MM3 


 


Red Blood Count 3.19 MIL/MM3 


 


Hemoglobin 10.2 GM/DL 


 


Hematocrit 30.3 % 


 


Mean Corpuscular Volume 94.8 FL 


 


Mean Corpuscular Hemoglobin 32.0 PG 


 


Mean Corpuscular Hemoglobin


Concent 33.7 % 


 


 


Red Cell Distribution Width 14.8 % 


 


Platelet Count 211 TH/MM3 


 


Mean Platelet Volume 8.3 FL 


 


Prothrombin Time 20.1 SEC 


 


Prothromb Time International


Ratio 1.8 RATIO 


 


 


Activated Partial


Thromboplast Time 79.4 SEC 


 


 


Blood Urea Nitrogen 48 MG/DL 


 


Creatinine 2.39 MG/DL 


 


Random Glucose 106 MG/DL 


 


Calcium Level 8.9 MG/DL 


 


Sodium Level 137 MEQ/L 


 


Potassium Level 4.1 MEQ/L 


 


Chloride Level 103 MEQ/L 


 


Carbon Dioxide Level 25.7 MEQ/L 


 


Anion Gap 8 MEQ/L 


 


Estimat Glomerular Filtration


Rate 21 ML/MIN 


 














 Date/Time


Source Procedure


Growth Status


 


 


 10/24/17 19:56


Blood Peripheral Aerobic Blood Culture - Final


NO GROWTH IN 5 DAYS Complete


 


 10/24/17 19:56


Blood Peripheral Anaerobic Blood Culture - Final


NO GROWTH IN 5 DAYS Complete


 


 10/24/17 19:21


Urine Random Urine Urine Culture - Final


,000 CFU/ML MIXED GRAM POSITIVE... Complete











Mental Status Examination


Appearance:  Appropriate


Consciousness:  Alert


Orientation:  x4


Motor Activity:  Normal gait


Speech:  Unremarkable


Language:  Adequate


Fund of Knowledge:  Adequate


Attention and Concentration:  Adequate


Memory:  Unremarkable


Mood:  Sad, Anxious


Affect:  Sad, Anxious


Thought Process & Associations:  Intact


Thought Content:  Appropriate


Hallucination Type:  None


Delusion Type:  None


Suicidal Ideation:  No


Suicidal Plan:  No


Suicidal Intention:  No


Homicidal Ideation:  No


Homicidal Plan:  No


Homicidal Intention:  No


Insight:  Adequate


Judgment:  Adequate





Assessment & Plan


Problem List:  


(1) Adjustment disorder with depressed mood


ICD Codes:  F43.21 - Adjustment disorder with depressed mood


Assessment & Plan


Estimated LOS:  days patient has multiple social issues, including living 

situation which are unchangeable by this physician.  However, this physician 

did increase the dose of her Klonopin for anxiety and change antidepressant to 

Lexapro.











David Rosas MD Oct 29, 2017 12:03

## 2017-10-29 NOTE — HHI.PR
Subjective


Remarks


INR is 1.8 today.  Bilateral foot x-rays show swelling without evidence of bone 

involvement.  Patient still complains of peripheral edema in the arms bilateral 

legs and even the hips.  She has no prior history like this.  No known history 

of CHF.





Objective





Vital Signs








  Date Time  Temp Pulse Resp B/P (MAP) Pulse Ox O2 Delivery O2 Flow Rate FiO2


 


10/29/17 08:00 97.8 65 15 138/71 (93) 97   


 


10/29/17 04:03 98.7 72 18 160/75 (103) 97   


 


10/28/17 23:25 98.0 78 18 160/84 (109) 97   


 


10/28/17 20:26  66      


 


10/28/17 20:00 97.9 72 18 160/74 (102) 96   


 


10/28/17 16:00 98.0 68 18 131/68 (89) 97   


 


10/28/17 12:00 97.7 65 16 110/56 (74) 95   














I/O      


 


 10/28/17 10/28/17 10/28/17 10/29/17 10/29/17 10/29/17





 07:00 15:00 23:00 07:00 15:00 23:00


 


Intake Total 720 ml   580 ml  


 


Balance 720 ml   580 ml  


 


      


 


Intake Oral 720 ml   580 ml  


 


# Voids 4   4  








Result Diagram:  


10/29/17 0726                                                                  

              10/29/17 0726





Objective Remarks


GENERAL: NAD, A&Ox3


HEAD: Normocephalic. 


NECK: Supple, trachea midline. No lymphadenopathy.


EYES: No scleral icterus. No injection or drainage. 


CARDIOVASCULAR: Regular rate and rhythm without murmurs, gallops, or rubs. 


RESPIRATORY: Breath sounds equal bilaterally. No accessory muscle use.


GASTROINTESTINAL: Abdomen soft, non-tender, nondistended. 


MUSCULOSKELETAL: No cyanosis.  Peripheral edema in all 4 limbs.


SKIN: Warm and dry.


NEURO:  No focal neurological deficitis.





A/P


Problem List:  


(1) DVT (deep venous thrombosis)


ICD Code:  I82.409 - Acute embolism and thrombosis of unspecified deep veins of 

unspecified lower extremity


Status:  Acute


Assessment and Plan





Assessment and Plan


62-year-old female admitted secondary to acute right DVT.  Continue to follow 

INR in regards to Coumadin.  INR 1.8 today.  No fractures on foot x-rays.  

Persistent peripheral edema.  Start prednisone for possible vasculitis.  Obtain 

echocardiogram.  Increase Lasix to twice a day dosing.





Right lower extremity DVT


Continue heparin drip


Coumadin started


Follow INR


Follow clinically for improvement of symptoms


Lupus is an independent risk factor for clotting, in her





Chest pain


Negative serial enzymes





Acute CHF exacerbation on chronic systolic CHF


Start daily Lasix


Echo pending





Uncontrolled diabetes mellitus type 2


Diabetes mellitus type 2


Follow blood sugars


Insulin sliding scale


Diabetic diet


Continue Lantus and increase to 24 units daily at bedtime





Hypothyroidism


TSH, T3, and T4 showed that she is under dosed.


Synthroid increased to 200 g daily


Follow-up as an outpatient





Acute kidney injury on chronic kidney disease


Follow renal function


Avoid nephrotoxins





Lupus, chronic


Continue prednisone


 


DVT prophylaxis


Heparin IV drip


Coumadin started





Problem Qualifiers





(1) DVT (deep venous thrombosis):  


Qualified Codes:  I82.492 - Acute embolism and thrombosis of other specified 

deep vein of left lower extremity








Weston Calderon MD Oct 29, 2017 09:51

## 2017-10-30 VITALS
TEMPERATURE: 96.5 F | HEART RATE: 62 BPM | OXYGEN SATURATION: 95 % | RESPIRATION RATE: 18 BRPM | DIASTOLIC BLOOD PRESSURE: 69 MMHG | SYSTOLIC BLOOD PRESSURE: 151 MMHG

## 2017-10-30 VITALS
OXYGEN SATURATION: 95 % | DIASTOLIC BLOOD PRESSURE: 63 MMHG | SYSTOLIC BLOOD PRESSURE: 127 MMHG | TEMPERATURE: 96.9 F | RESPIRATION RATE: 19 BRPM | HEART RATE: 61 BPM

## 2017-10-30 VITALS
TEMPERATURE: 96.3 F | RESPIRATION RATE: 16 BRPM | DIASTOLIC BLOOD PRESSURE: 66 MMHG | OXYGEN SATURATION: 93 % | HEART RATE: 64 BPM | SYSTOLIC BLOOD PRESSURE: 112 MMHG

## 2017-10-30 VITALS
TEMPERATURE: 95.4 F | SYSTOLIC BLOOD PRESSURE: 126 MMHG | OXYGEN SATURATION: 94 % | DIASTOLIC BLOOD PRESSURE: 61 MMHG | HEART RATE: 65 BPM | RESPIRATION RATE: 18 BRPM

## 2017-10-30 VITALS
DIASTOLIC BLOOD PRESSURE: 54 MMHG | TEMPERATURE: 96.1 F | HEART RATE: 57 BPM | RESPIRATION RATE: 17 BRPM | SYSTOLIC BLOOD PRESSURE: 127 MMHG | OXYGEN SATURATION: 95 %

## 2017-10-30 LAB
BUN SERPL-MCNC: 58 MG/DL (ref 7–18)
CALCIUM SERPL-MCNC: 8.2 MG/DL (ref 8.5–10.1)
CHLORIDE SERPL-SCNC: 99 MEQ/L (ref 98–107)
CREAT SERPL-MCNC: 2.94 MG/DL (ref 0.5–1)
ERYTHROCYTE [DISTWIDTH] IN BLOOD BY AUTOMATED COUNT: 14.8 % (ref 11.6–17.2)
GFR SERPLBLD BASED ON 1.73 SQ M-ARVRAT: 16 ML/MIN (ref 89–?)
GLUCOSE SERPL-MCNC: 213 MG/DL (ref 74–106)
HCO3 BLD-SCNC: 25.7 MEQ/L (ref 21–32)
HCT VFR BLD CALC: 33.8 % (ref 35–46)
HGB BLD-MCNC: 11.2 GM/DL (ref 11.6–15.3)
MCH RBC QN AUTO: 31.3 PG (ref 27–34)
MCHC RBC AUTO-ENTMCNC: 33.2 % (ref 32–36)
MCV RBC AUTO: 94.5 FL (ref 80–100)
PLATELET # BLD: 250 TH/MM3 (ref 150–450)
PMV BLD AUTO: 8.4 FL (ref 7–11)
RBC # BLD AUTO: 3.58 MIL/MM3 (ref 4–5.3)
SODIUM SERPL-SCNC: 133 MEQ/L (ref 136–145)
WBC # BLD AUTO: 9.7 TH/MM3 (ref 4–11)

## 2017-10-30 RX ADMIN — INSULIN ASPART SCH: 100 INJECTION, SOLUTION INTRAVENOUS; SUBCUTANEOUS at 07:45

## 2017-10-30 RX ADMIN — DOCUSATE SODIUM SCH MG: 100 CAPSULE, LIQUID FILLED ORAL at 08:53

## 2017-10-30 RX ADMIN — INSULIN ASPART SCH: 100 INJECTION, SOLUTION INTRAVENOUS; SUBCUTANEOUS at 12:34

## 2017-10-30 RX ADMIN — Medication SCH ML: at 20:41

## 2017-10-30 RX ADMIN — HYDROCODONE BITARTRATE AND ACETAMINOPHEN PRN TAB: 10; 325 TABLET ORAL at 16:22

## 2017-10-30 RX ADMIN — HYDROCODONE BITARTRATE AND ACETAMINOPHEN PRN TAB: 10; 325 TABLET ORAL at 09:21

## 2017-10-30 RX ADMIN — INSULIN ASPART SCH: 100 INJECTION, SOLUTION INTRAVENOUS; SUBCUTANEOUS at 20:51

## 2017-10-30 RX ADMIN — Medication SCH ML: at 08:54

## 2017-10-30 RX ADMIN — DOCUSATE SODIUM SCH MG: 100 CAPSULE, LIQUID FILLED ORAL at 20:40

## 2017-10-30 RX ADMIN — FUROSEMIDE SCH MG: 10 INJECTION, SOLUTION INTRAMUSCULAR; INTRAVENOUS at 14:00

## 2017-10-30 RX ADMIN — MAGNESIUM HYDROXIDE PRN ML: 400 SUSPENSION ORAL at 09:20

## 2017-10-30 RX ADMIN — INSULIN ASPART SCH: 100 INJECTION, SOLUTION INTRAVENOUS; SUBCUTANEOUS at 17:53

## 2017-10-30 RX ADMIN — MYCOPHENOLATE MOFETIL SCH MG: 500 TABLET, FILM COATED ORAL at 14:01

## 2017-10-30 RX ADMIN — HYDROCODONE BITARTRATE AND ACETAMINOPHEN PRN TAB: 10; 325 TABLET ORAL at 05:16

## 2017-10-30 RX ADMIN — WARFARIN SODIUM SCH MG: 5 TABLET ORAL at 16:22

## 2017-10-30 RX ADMIN — HYDROCODONE BITARTRATE AND ACETAMINOPHEN PRN TAB: 10; 325 TABLET ORAL at 01:16

## 2017-10-30 RX ADMIN — LEVOTHYROXINE SODIUM SCH MCG: 200 TABLET ORAL at 05:14

## 2017-10-30 RX ADMIN — ACYCLOVIR SCH UNITS: 800 TABLET ORAL at 20:51

## 2017-10-30 RX ADMIN — HYDROCODONE BITARTRATE AND ACETAMINOPHEN PRN TAB: 10; 325 TABLET ORAL at 20:41

## 2017-10-30 RX ADMIN — OXYBUTYNIN CHLORIDE SCH MG: 5 TABLET ORAL at 20:40

## 2017-10-30 RX ADMIN — ESCITALOPRAM OXALATE SCH MG: 10 TABLET, FILM COATED ORAL at 08:53

## 2017-10-30 RX ADMIN — MYCOPHENOLATE MOFETIL SCH MG: 500 TABLET, FILM COATED ORAL at 20:40

## 2017-10-30 RX ADMIN — MYCOPHENOLATE MOFETIL SCH MG: 500 TABLET, FILM COATED ORAL at 05:14

## 2017-10-30 RX ADMIN — PANTOPRAZOLE SCH MG: 40 TABLET, DELAYED RELEASE ORAL at 08:54

## 2017-10-30 NOTE — HHI.PR
Subjective


Remarks


Resting comfortably in bed


No event overnight


Denied chest and or short of breath


No fever or chills





Objective


Vitals





Vital Signs








  Date Time  Temp Pulse Resp B/P (MAP) Pulse Ox O2 Delivery O2 Flow Rate FiO2


 


10/30/17 15:55 95.4 65 18 126/61 (82) 94   


 


10/30/17 12:00 96.1 57 17 127/54 (78) 95   


 


10/30/17 08:00 96.9 61 19 127/63 (84) 95   


 


10/30/17 05:23 96.5 62 18 151/69 (96) 95   


 


10/29/17 23:47 96.7 63 18 165/80 (108) 92   


 


10/29/17 20:27 97.6 92 18 164/81 (108) 94   


 


10/29/17 20:07  55      














I/O      


 


 10/29/17 10/29/17 10/29/17 10/30/17 10/30/17 10/30/17





 07:00 15:00 23:00 07:00 15:00 23:00


 


Intake Total 580 ml   680 ml  


 


Balance 580 ml   680 ml  


 


      


 


Intake Oral 580 ml   680 ml  


 


# Voids 4   4  








Result Diagram:  


10/30/17 0557                                                                  

              10/29/17 0726





Objective Remarks


GENERAL: This is a well-nourished, well-developed patient, in no apparent 

distress.


SKIN: No rashes, warm and dry 


HEAD: Atraumatic. Normocephalic. 


EYES: Pupils equal round and reactive. Extraocular motions intact. No scleral 

icterus. 


ENT: Nose without bleeding, or drainage, Airway patent.


NECK: Trachea midline.  Supple


CARDIOVASCULAR: Regular rate and rhythm without murmurs, gallops, or rubs. 


RESPIRATORY: Fair air entry bilaterally. No wheezes, rales, or rhonchi.  


GASTROINTESTINAL: Abdomen soft, non-tender, nondistended.  Positive bowel sounds


MUSCULOSKELETAL: Extremities without clubbing, cyanosis, or edema.  Pedal 

pulses appreciated


NEUROLOGICAL: Awake and alert.  Moves all extremity.  Normal speech.no focal 

neurological deficit





A/P


Problem List:  


(1) DVT (deep venous thrombosis)


ICD Code:  I82.409 - Acute embolism and thrombosis of unspecified deep veins of 

unspecified lower extremity


Status:  Acute


(2) CKD (chronic kidney disease)


ICD Code:  N18.9 - Chronic kidney disease, unspecified


(3) Hypothyroid


ICD Code:  E03.9 - Hypothyroidism, unspecified


Status:  Chronic


(4) Type 2 diabetes mellitus


ICD Code:  E11.9 - Type 2 diabetes mellitus


Status:  Chronic


Assessment and Plan


Right lower extremity DVT


Lower extremity edema mostly due to renal failure


SLE / with glomerulonephritis- s/p kidney biopsy;


            FOCAL PROLIFERATIVE AND NECROTIZING GLOMERULONEPHRITIS.  


   NODULAR DIABETIC GLOMERULOSCLEROSIS.  


   GLOBAL& SEGMENTAL GLOMERULOSCLEROSIS.  


   INTERSTITIAL FIBROSIS AND TUBULAR ATROPHY, SEVERE.


VALENTIN on CKD due to above mostly due to glomerulonephritis nothing to suggest CHF(

BNP and 2-D echo negative)


Diabetes mellitus


History pancreatitis


DVT prophylaxis on Coumadin and heparin drip





Plan:





Continue heparin drip and Coumadin bridging


Avoid nephrotoxin, monitor BMP


BNP is normal, 2-D echo reviewed by me unremarkable for cardiomyopathy


I will hold Lasix and repeat BMP in a.m. we'll consider nephrology consultation


Strict monitoring of I's and O's


Daily PT/INR





Problem Qualifiers





(1) DVT (deep venous thrombosis):  


Qualified Codes:  I82.492 - Acute embolism and thrombosis of other specified 

deep vein of left lower extremity


(2) CKD (chronic kidney disease):  


Qualified Codes:  N18.4 - Chronic kidney disease, stage 4 (severe)


(3) Hypothyroid:  


Qualified Codes:  E03.9 - Hypothyroidism, unspecified


(4) Type 2 diabetes mellitus:  


Qualified Codes:  E11.9 - Type 2 diabetes mellitus without complications; Z79.4 

- Long term (current) use of insulin








Shai Vasquez MD Oct 30, 2017 17:36

## 2017-10-30 NOTE — ECHRPT
Indication:

 

 CONCLUSIONS

 Normal LV dimension and function

 Estimated Ejection Fraction 50%

 No wall motion abnormlaites

 No pericardial effusion

 No significant valvulopathies

 

 BP:  151   / 69      HR: 62                       Rhythm:

 

 MEASUREMENTS  (Male / Female) Normal Values       Technical Quality:Good

 2D ECHO

 LV Diastolic Diameter PLAX        4.3 cm                4.2 - 5.9 / 3.9 - 5.3 cm

 LV Systolic Diameter PLAX         3.3 cm                

 IVS Diastolic Thickness           1.1 cm                0.6 - 1.0 / 0.6 - 0.9 cm

 LVPW Diastolic Thickness          1.1 cm                0.6 - 1.0 / 0.6 - 0.9 cm

 LV Relative Wall Thickness        0.5                   

 RV Internal Dim ED PLAX           2.3 cm                

 

 

 FINDINGS

 

 LEFT VENTRICLE

 Normal left ventricular size and wall thickness. The left ventricular systolic function is normal wi
th an 

 estimated ejection fraction in the range of 55-60 . Left ventricular diastolic function parameters a
re normal. 

 

 RIGHT VENTRICLE

 

 Normal right ventricular size and systolic function. 

 

 LEFT ATRIUM

 The left atrial size is normal. 

 

 RIGHT ATRIUM

 The right atrial size is normal. 

 

 ATRIAL SEPTUM

 Normal atrial septal thickness without atrial level shunting by limited color doppler interrogation.
 

 

 

 

 PERICARDIUM

 No pericardial effusion. 

 

 

 

 

  Rishabh Sanchez MD

  (Electronically Signed)

  Final Date:30 October 2017 20:41

## 2017-10-31 VITALS
RESPIRATION RATE: 16 BRPM | DIASTOLIC BLOOD PRESSURE: 81 MMHG | OXYGEN SATURATION: 96 % | HEART RATE: 73 BPM | TEMPERATURE: 97.8 F | SYSTOLIC BLOOD PRESSURE: 163 MMHG

## 2017-10-31 VITALS
SYSTOLIC BLOOD PRESSURE: 126 MMHG | DIASTOLIC BLOOD PRESSURE: 63 MMHG | OXYGEN SATURATION: 95 % | TEMPERATURE: 98 F | RESPIRATION RATE: 16 BRPM | HEART RATE: 67 BPM

## 2017-10-31 VITALS
DIASTOLIC BLOOD PRESSURE: 78 MMHG | HEART RATE: 93 BPM | RESPIRATION RATE: 18 BRPM | TEMPERATURE: 97.7 F | SYSTOLIC BLOOD PRESSURE: 140 MMHG | OXYGEN SATURATION: 95 %

## 2017-10-31 VITALS
SYSTOLIC BLOOD PRESSURE: 139 MMHG | TEMPERATURE: 97.3 F | HEART RATE: 60 BPM | DIASTOLIC BLOOD PRESSURE: 72 MMHG | RESPIRATION RATE: 16 BRPM | OXYGEN SATURATION: 96 %

## 2017-10-31 VITALS
TEMPERATURE: 98.3 F | RESPIRATION RATE: 18 BRPM | SYSTOLIC BLOOD PRESSURE: 140 MMHG | DIASTOLIC BLOOD PRESSURE: 69 MMHG | HEART RATE: 60 BPM | OXYGEN SATURATION: 92 %

## 2017-10-31 VITALS
OXYGEN SATURATION: 92 % | RESPIRATION RATE: 16 BRPM | SYSTOLIC BLOOD PRESSURE: 118 MMHG | HEART RATE: 70 BPM | TEMPERATURE: 96.6 F | DIASTOLIC BLOOD PRESSURE: 69 MMHG

## 2017-10-31 LAB
BUN SERPL-MCNC: 57 MG/DL (ref 7–18)
CALCIUM SERPL-MCNC: 8.8 MG/DL (ref 8.5–10.1)
CHLORIDE SERPL-SCNC: 99 MEQ/L (ref 98–107)
CREAT SERPL-MCNC: 2.64 MG/DL (ref 0.5–1)
GFR SERPLBLD BASED ON 1.73 SQ M-ARVRAT: 18 ML/MIN (ref 89–?)
GLUCOSE SERPL-MCNC: 83 MG/DL (ref 74–106)
HCO3 BLD-SCNC: 26.4 MEQ/L (ref 21–32)
INR PPP: 2.1 RATIO
MAGNESIUM SERPL-MCNC: 2.2 MG/DL (ref 1.5–2.5)
PHOSPHATE SERPL-MCNC: 3.6 MG/DL (ref 2.5–4.9)
PROTHROMBIN TIME: 24.1 SEC (ref 9.8–11.6)
SODIUM SERPL-SCNC: 133 MEQ/L (ref 136–145)

## 2017-10-31 RX ADMIN — Medication SCH ML: at 07:52

## 2017-10-31 RX ADMIN — HYDROCODONE BITARTRATE AND ACETAMINOPHEN PRN TAB: 10; 325 TABLET ORAL at 18:27

## 2017-10-31 RX ADMIN — HYDROCODONE BITARTRATE AND ACETAMINOPHEN PRN TAB: 10; 325 TABLET ORAL at 02:34

## 2017-10-31 RX ADMIN — INSULIN ASPART SCH: 100 INJECTION, SOLUTION INTRAVENOUS; SUBCUTANEOUS at 07:39

## 2017-10-31 RX ADMIN — INSULIN ASPART SCH: 100 INJECTION, SOLUTION INTRAVENOUS; SUBCUTANEOUS at 17:31

## 2017-10-31 RX ADMIN — DOCUSATE SODIUM SCH MG: 100 CAPSULE, LIQUID FILLED ORAL at 07:51

## 2017-10-31 RX ADMIN — WARFARIN SODIUM SCH MG: 5 TABLET ORAL at 16:10

## 2017-10-31 RX ADMIN — HEPARIN SODIUM PRN MLS/HR: 10000 INJECTION, SOLUTION INTRAVENOUS at 09:00

## 2017-10-31 RX ADMIN — LEVOTHYROXINE SODIUM SCH MCG: 200 TABLET ORAL at 05:42

## 2017-10-31 RX ADMIN — MYCOPHENOLATE MOFETIL SCH MG: 500 TABLET, FILM COATED ORAL at 23:02

## 2017-10-31 RX ADMIN — ACYCLOVIR SCH UNITS: 800 TABLET ORAL at 23:04

## 2017-10-31 RX ADMIN — OXYBUTYNIN CHLORIDE SCH MG: 5 TABLET ORAL at 23:02

## 2017-10-31 RX ADMIN — DOCUSATE SODIUM SCH MG: 100 CAPSULE, LIQUID FILLED ORAL at 23:01

## 2017-10-31 RX ADMIN — Medication SCH ML: at 21:00

## 2017-10-31 RX ADMIN — INSULIN ASPART SCH: 100 INJECTION, SOLUTION INTRAVENOUS; SUBCUTANEOUS at 12:47

## 2017-10-31 RX ADMIN — HYDROCODONE BITARTRATE AND ACETAMINOPHEN PRN TAB: 10; 325 TABLET ORAL at 06:27

## 2017-10-31 RX ADMIN — MYCOPHENOLATE MOFETIL SCH MG: 500 TABLET, FILM COATED ORAL at 05:42

## 2017-10-31 RX ADMIN — PANTOPRAZOLE SCH MG: 40 TABLET, DELAYED RELEASE ORAL at 07:52

## 2017-10-31 RX ADMIN — HYDROCODONE BITARTRATE AND ACETAMINOPHEN PRN TAB: 10; 325 TABLET ORAL at 23:08

## 2017-10-31 RX ADMIN — MYCOPHENOLATE MOFETIL SCH MG: 500 TABLET, FILM COATED ORAL at 14:25

## 2017-10-31 RX ADMIN — INSULIN ASPART SCH: 100 INJECTION, SOLUTION INTRAVENOUS; SUBCUTANEOUS at 23:04

## 2017-10-31 RX ADMIN — ESCITALOPRAM OXALATE SCH MG: 10 TABLET, FILM COATED ORAL at 07:51

## 2017-10-31 NOTE — HHI.PR
Subjective


Remarks


The nurse asking me about resuming the patient's Lasix


Patient resting in bed, I discussed with her her BNP and 2-D echo does not 

significant or consistent with CHF


Her lower extremity edema is mostly due to her renal failure and I explained 

that to the nurse


No other complain by the patient today





Objective


Vitals





Vital Signs








  Date Time  Temp Pulse Resp B/P (MAP) Pulse Ox O2 Delivery O2 Flow Rate FiO2


 


10/31/17 20:00 98.0 67 16 126/63 (84) 95   


 


10/31/17 16:00 97.8 73 16 163/81 (108) 96   


 


10/31/17 12:00 97.7 93 18 140/78 (98) 95   


 


10/31/17 08:00 98.3 60 18 140/69 (92) 92   


 


10/31/17 04:27 96.6 70 16 118/69 (85) 92   


 


10/31/17 00:00 97.3 60 16 139/72 (94) 96   














I/O      


 


 10/31/17 10/31/17 10/31/17 11/1/17 11/1/17 11/1/17





 07:00 15:00 23:00 07:00 15:00 23:00


 


Intake Total   720 ml   


 


Balance   720 ml   


 


      


 


Intake Oral   720 ml   


 


# Voids 1  3   


 


# Bowel Movements 1  1   








Result Diagram:  


10/30/17 0557                                                                  

              10/31/17 0730





Objective Remarks


GENERAL: This is a well-nourished, well-developed patient, in no apparent 

distress.


SKIN: No rashes, warm and dry 


HEAD: Atraumatic. Normocephalic. 


EYES: Pupils equal round and reactive. Extraocular motions intact. No scleral 

icterus. 


ENT: Nose without bleeding, or drainage, Airway patent.


NECK: Trachea midline.  Supple


CARDIOVASCULAR: Regular rate and rhythm without murmurs, gallops, or rubs. 


RESPIRATORY: Fair air entry bilaterally. No wheezes, rales, or rhonchi.  


GASTROINTESTINAL: Abdomen soft, non-tender, nondistended.  Positive bowel sounds


MUSCULOSKELETAL: Extremities without clubbing, cyanosis, or edema.  Pedal 

pulses appreciated


NEUROLOGICAL: Awake and alert.  Moves all extremity.  Normal speech.no focal 

neurological deficit





A/P


Problem List:  


(1) DVT (deep venous thrombosis)


ICD Code:  I82.409 - Acute embolism and thrombosis of unspecified deep veins of 

unspecified lower extremity


Status:  Acute


(2) CKD (chronic kidney disease)


ICD Code:  N18.9 - Chronic kidney disease, unspecified


(3) Hypothyroid


ICD Code:  E03.9 - Hypothyroidism, unspecified


Status:  Chronic


(4) Type 2 diabetes mellitus


ICD Code:  E11.9 - Type 2 diabetes mellitus


Status:  Chronic


Assessment and Plan


Right lower extremity DVT


Lower extremity edema mostly due to renal failure


SLE / with glomerulonephritis- s/p kidney biopsy;


            FOCAL PROLIFERATIVE AND NECROTIZING GLOMERULONEPHRITIS.  


   NODULAR DIABETIC GLOMERULOSCLEROSIS.  


   GLOBAL& SEGMENTAL GLOMERULOSCLEROSIS.  


   INTERSTITIAL FIBROSIS AND TUBULAR ATROPHY, SEVERE.


VALENTIN on CKD due to above mostly due to glomerulonephritis nothing to suggest CHF(

BNP and 2-D echo negative)


Diabetes mellitus


History pancreatitis


DVT prophylaxis on Coumadin and heparin drip





Plan:





INR therapeutic 2.1 today, stop heparin drip


Avoid nephrotoxin, monitor BMP


BNP is normal, 2-D echo reviewed by me unremarkable for cardiomyopathy


Continue holding Lasix and repeat BMP in a.m. until seen by nephrology, consult 

placed


Strict monitoring of I's and O's, apply high knees KELLEY hose


Daily PT/INR





Problem Qualifiers





(1) DVT (deep venous thrombosis):  


Qualified Codes:  I82.492 - Acute embolism and thrombosis of other specified 

deep vein of left lower extremity


(2) CKD (chronic kidney disease):  


Qualified Codes:  N18.4 - Chronic kidney disease, stage 4 (severe)


(3) Hypothyroid:  


Qualified Codes:  E03.9 - Hypothyroidism, unspecified


(4) Type 2 diabetes mellitus:  


Qualified Codes:  E11.9 - Type 2 diabetes mellitus without complications; Z79.4 

- Long term (current) use of insulin








Shai Vasquez MD Oct 31, 2017 23:03

## 2017-11-01 VITALS
OXYGEN SATURATION: 95 % | RESPIRATION RATE: 17 BRPM | DIASTOLIC BLOOD PRESSURE: 56 MMHG | SYSTOLIC BLOOD PRESSURE: 115 MMHG | TEMPERATURE: 96 F | HEART RATE: 69 BPM

## 2017-11-01 VITALS
DIASTOLIC BLOOD PRESSURE: 70 MMHG | SYSTOLIC BLOOD PRESSURE: 122 MMHG | OXYGEN SATURATION: 96 % | RESPIRATION RATE: 17 BRPM | TEMPERATURE: 97.4 F | HEART RATE: 86 BPM

## 2017-11-01 VITALS
HEART RATE: 76 BPM | OXYGEN SATURATION: 93 % | RESPIRATION RATE: 16 BRPM | SYSTOLIC BLOOD PRESSURE: 185 MMHG | DIASTOLIC BLOOD PRESSURE: 79 MMHG | TEMPERATURE: 97.7 F

## 2017-11-01 VITALS
RESPIRATION RATE: 18 BRPM | OXYGEN SATURATION: 96 % | DIASTOLIC BLOOD PRESSURE: 78 MMHG | HEART RATE: 68 BPM | TEMPERATURE: 99.2 F | SYSTOLIC BLOOD PRESSURE: 157 MMHG

## 2017-11-01 VITALS
RESPIRATION RATE: 17 BRPM | HEART RATE: 61 BPM | DIASTOLIC BLOOD PRESSURE: 60 MMHG | OXYGEN SATURATION: 93 % | TEMPERATURE: 96.9 F | SYSTOLIC BLOOD PRESSURE: 115 MMHG

## 2017-11-01 VITALS
SYSTOLIC BLOOD PRESSURE: 137 MMHG | TEMPERATURE: 97.3 F | DIASTOLIC BLOOD PRESSURE: 65 MMHG | HEART RATE: 69 BPM | OXYGEN SATURATION: 98 % | RESPIRATION RATE: 16 BRPM

## 2017-11-01 LAB
INR PPP: 2.1 RATIO
PROTHROMBIN TIME: 24 SEC (ref 9.8–11.6)

## 2017-11-01 RX ADMIN — INSULIN ASPART SCH: 100 INJECTION, SOLUTION INTRAVENOUS; SUBCUTANEOUS at 12:39

## 2017-11-01 RX ADMIN — DOCUSATE SODIUM SCH MG: 100 CAPSULE, LIQUID FILLED ORAL at 21:17

## 2017-11-01 RX ADMIN — ESCITALOPRAM OXALATE SCH MG: 10 TABLET, FILM COATED ORAL at 08:55

## 2017-11-01 RX ADMIN — INSULIN ASPART SCH: 100 INJECTION, SOLUTION INTRAVENOUS; SUBCUTANEOUS at 07:52

## 2017-11-01 RX ADMIN — OXYBUTYNIN CHLORIDE SCH MG: 5 TABLET ORAL at 21:17

## 2017-11-01 RX ADMIN — WARFARIN SODIUM SCH MG: 5 TABLET ORAL at 17:01

## 2017-11-01 RX ADMIN — MYCOPHENOLATE MOFETIL SCH MG: 500 TABLET, FILM COATED ORAL at 12:39

## 2017-11-01 RX ADMIN — HYDROCODONE BITARTRATE AND ACETAMINOPHEN PRN TAB: 10; 325 TABLET ORAL at 04:14

## 2017-11-01 RX ADMIN — DOCUSATE SODIUM SCH MG: 100 CAPSULE, LIQUID FILLED ORAL at 08:55

## 2017-11-01 RX ADMIN — LEVOTHYROXINE SODIUM SCH MCG: 200 TABLET ORAL at 05:44

## 2017-11-01 RX ADMIN — PANTOPRAZOLE SCH MG: 40 TABLET, DELAYED RELEASE ORAL at 08:55

## 2017-11-01 RX ADMIN — HYDROCODONE BITARTRATE AND ACETAMINOPHEN PRN TAB: 10; 325 TABLET ORAL at 21:18

## 2017-11-01 RX ADMIN — Medication SCH ML: at 08:54

## 2017-11-01 RX ADMIN — ACYCLOVIR SCH UNITS: 800 TABLET ORAL at 22:58

## 2017-11-01 RX ADMIN — Medication SCH ML: at 21:00

## 2017-11-01 RX ADMIN — INSULIN ASPART SCH: 100 INJECTION, SOLUTION INTRAVENOUS; SUBCUTANEOUS at 17:01

## 2017-11-01 RX ADMIN — MYCOPHENOLATE MOFETIL SCH MG: 500 TABLET, FILM COATED ORAL at 21:17

## 2017-11-01 RX ADMIN — HEPARIN SODIUM PRN MLS/HR: 10000 INJECTION, SOLUTION INTRAVENOUS at 17:31

## 2017-11-01 RX ADMIN — INSULIN ASPART SCH: 100 INJECTION, SOLUTION INTRAVENOUS; SUBCUTANEOUS at 21:00

## 2017-11-01 RX ADMIN — HYDROCODONE BITARTRATE AND ACETAMINOPHEN PRN TAB: 10; 325 TABLET ORAL at 08:59

## 2017-11-01 RX ADMIN — MYCOPHENOLATE MOFETIL SCH MG: 500 TABLET, FILM COATED ORAL at 05:44

## 2017-11-01 NOTE — HHI.PR
Subjective


Remarks


Patient c/o BL lower extremity swelling and edema


denies fevers/chills


denies cp/sob





Objective


Vitals





Vital Signs








  Date Time  Temp Pulse Resp B/P (MAP) Pulse Ox O2 Delivery O2 Flow Rate FiO2


 


11/1/17 16:00 96.9 61 17 115/60 (78) 93   


 


11/1/17 12:00 97.4 86 17 122/70 (87) 96   


 


11/1/17 08:00 96.0 69 17 115/56 (75) 95   


 


11/1/17 04:36 97.7 76 16 185/79 (114) 93   


 


11/1/17 00:29 97.3 69 16 137/65 (89) 98   


 


10/31/17 20:00 98.0 67 16 126/63 (84) 95   














I/O      


 


 10/31/17 10/31/17 10/31/17 11/1/17 11/1/17 11/1/17





 07:00 15:00 23:00 07:00 15:00 23:00


 


Intake Total   720 ml   1680 ml


 


Output Total    600 ml  


 


Balance   720 ml -600 ml  1680 ml


 


      


 


Intake Oral   720 ml   1680 ml


 


Output Urine Total    600 ml  


 


# Voids 1  3   10


 


# Bowel Movements 1  1   0








Result Diagram:  


10/30/17 0557                                                                  

              10/31/17 0730





Imaging





Last Impressions








Foot X-Ray 10/28/17 0000 Signed





Impressions: 





 Service Date/Time:  Saturday, October 28, 2017 18:54 - CONCLUSION:  

Significant 





 dorsal soft tissue swelling.  The osseous structures of the forefoot are 

grossly 





 intact.     Jesse Martínez MD 


 


Head CT 10/24/17 1909 Signed





Impressions: 





 Service Date/Time:  Tuesday, October 24, 2017 20:34 - CONCLUSION: Unremarkable 





 study except for mild chronic sinusitis.     TYLER Magana MD 


 


Chest X-Ray 10/24/17 1909 Signed





Impressions: 





 Service Date/Time:  Tuesday, October 24, 2017 19:27 - CONCLUSION:  No acute 





 cardiopulmonary disease.     TYLER Magana MD 


 


Lower Extremity Ultrasound 10/24/17 0000 Signed





Impressions: 





 Service Date/Time:  Tuesday, October 24, 2017 20:03 - CONCLUSION:  Right 





 peroneal vein thrombus.     TYLER Magana MD 








Objective Remarks


AAOx3


Clear lungs BL


S1S2 RRR


significant BL lower extremity edema


Procedures


none


Medications and IVs





Current Medications








 Medications


  (Trade)  Dose


 Ordered  Sig/Kandis


 Route  Start Time


 Stop Time Status Last Admin


 


 Heparin Sodium/


 Dextrose  250 ml @ 


 16 mls/hr  TITRATE  PRN


 IV  10/24/17 22:45


    11/1/17 17:31


 


 


  (NS Flush)  2 ml  UNSCH  PRN


 IV FLUSH  10/24/17 23:30


     


 


 


  (NS Flush)  2 ml  BID


 IV FLUSH  10/25/17 09:00


    10/30/17 20:41


 


 


  (Narcan Inj)  0.4 mg  UNSCH  PRN


 IV PUSH  10/24/17 23:30


     


 


 


  (D50w (Vial) Inj)  50 ml  UNSCH  PRN


 IV PUSH  10/24/17 23:30


     


 


 


  (Glucagon Inj)  1 mg  UNSCH  PRN


 OTHER  10/24/17 23:30


     


 


 


  (NovoLOG


 SUPPLEMENTAL


 SCALE)  1  ACHS SLIDING  SCALE


 SQ  10/25/17 08:00


    11/1/17 17:01


 


 


  (Cellcept)  500 mg  Q8HR


 PO  10/25/17 06:00


    11/1/17 21:17


 


 


  (Protonix)  40 mg  DAILY


 PO  10/25/17 09:00


    11/1/17 08:55


 


 


  (Catapres)  0.1 mg  Q6H  PRN


 PO  10/25/17 03:30


    11/1/17 04:14


 


 


  (Norco  5-325 Mg)  1 tab  Q4H  PRN


 PO  10/25/17 08:30


     


 


 


  (Norco  Mg)  1 tab  Q4H  PRN


 PO  10/25/17 08:30


    11/1/17 21:18


 


 


  (Colace)  100 mg  BID


 PO  10/25/17 21:00


    11/1/17 21:17


 


 


  (Milk Of


 Magnesia Liq)  30 ml  BID  PRN


 PO  10/25/17 10:45


    10/30/17 09:20


 


 


  (Synthroid)  200 mcg  DAILY@0600


 PO  10/26/17 06:00


    11/1/17 05:44


 


 


  (Coumadin)  5 mg  DAILY@1600


 PO  10/26/17 16:00


    11/1/17 17:01


 


 


  (Levemir Inj)  24 units  HS


 SQ  10/26/17 21:00


    10/31/17 23:04


 


 


  (Ditropan)  5 mg  HS


 PO  10/26/17 21:00


    11/1/17 21:17


 


 


  (KlonoPIN)  1 mg  Q12HR


 PO  10/29/17 21:00


    11/1/17 21:17


 


 


  (Lexapro)  5 mg  DAILY


 PO  10/29/17 12:00


    11/1/17 08:55


 


 


  (Pill Splitter)  1 ea  UNSCH  PRN


 OTHER  10/29/17 12:30


     


 


 


  (SoluMEDROL INJ)  60 mg  Q12HR


 IV PUSH  11/2/17 09:00


     


 











A/P


Problem List:  


(1) DVT (deep venous thrombosis)


ICD Code:  I82.409 - Acute embolism and thrombosis of unspecified deep veins of 

unspecified lower extremity


Status:  Acute


(2) CKD (chronic kidney disease)


ICD Code:  N18.9 - Chronic kidney disease, unspecified


(3) Hypothyroid


ICD Code:  E03.9 - Hypothyroidism, unspecified


Status:  Chronic


(4) Type 2 diabetes mellitus


ICD Code:  E11.9 - Type 2 diabetes mellitus


Status:  Chronic


Assessment and Plan


Right lower extremity DVT


Lower extremity edema mostly due to renal failure


SLE / with glomerulonephritis- s/p kidney biopsy;


            FOCAL PROLIFERATIVE AND NECROTIZING GLOMERULONEPHRITIS.  


   NODULAR DIABETIC GLOMERULOSCLEROSIS.  


   GLOBAL& SEGMENTAL GLOMERULOSCLEROSIS.  


   INTERSTITIAL FIBROSIS AND TUBULAR ATROPHY, SEVERE.


VALENTIN on CKD due to above mostly due to glomerulonephritis nothing to suggest CHF(

BNP and 2-D echo negative)


Diabetes mellitus


History pancreatitis


DVT prophylaxis on Coumadin and heparin drip





Plan:





INR therapeutic 2.1 today, stop heparin drip


Avoid nephrotoxin, monitor BMP


BNP is normal, 2-D echo reviewed by me unremarkable for cardiomyopathy


Continue to monitor BUN and creatinine --> creatinone trending down slowly


Strict monitoring of I's and O's, apply high knees KELLEY hose


Daily PT/INR





Problem Qualifiers





(1) DVT (deep venous thrombosis):  


Qualified Codes:  I82.492 - Acute embolism and thrombosis of other specified 

deep vein of left lower extremity


(2) CKD (chronic kidney disease):  


Qualified Codes:  N18.4 - Chronic kidney disease, stage 4 (severe)


(3) Hypothyroid:  


Qualified Codes:  E03.9 - Hypothyroidism, unspecified


(4) Type 2 diabetes mellitus:  


Qualified Codes:  E11.9 - Type 2 diabetes mellitus without complications; Z79.4 

- Long term (current) use of insulin








Humphrey Díaz MD Nov 1, 2017 18:25

## 2017-11-01 NOTE — PD.CONS
HPI


Service


Nephrology


Consult Requested By


Dr. Fox


Reason for Consult


Lupus nephritis


Primary Care Physician


No Primary Care Physician


History of Present Illness


Patient is 62-year-old the female obesity, SLE, lupus nephritis class III/IV, 

who was treated with prednisone 10 mg daily and CellCept 500 mg 3 times a day 

for the past month and a half, she is not admitted with peripheral edema, DVT 

in the right leg, her creatinine is rising at 2.6, she has persistent 

proteinuria she is losing hair, he is fatigued and with limited mobility, UA 

showed 300 protein.





Review of Systems


Constitutional:  COMPLAINS OF: Fatigue


Respiratory:  COMPLAINS OF: Shortness of breath


Cardiovascular:  COMPLAINS OF: Lower Extremity Edema


Musculoskeletal:  COMPLAINS OF: Joint pain, Muscle aches, Stiffness


Neurologic:  COMPLAINS OF: Abnormal gait


Psychiatric:  COMPLAINS OF: Mood changes, Depression





Past Family Social History


Allergies:  


Coded Allergies:  


     milk (Verified  Allergy, Mild, Cramping, 10/24/17)


 lactose intolerant


Past Medical History


DM


chronic pancreatitis


CHF


CAD


liver disease


Systemic lupus


depression


hypothyroid 


COPD


Past Surgical History





Appendectomy


Cholecystectomy





Hysterectomy


Reported Medications





Reported Meds & Active Scripts


Active


Prednisone 20 Mg Tab 20 Mg PO DAILY 30 Days


Novolog Inj (Insulin Aspart) 1,000 Unit/10 Ml Vial 1-9 Units SQ ACHS


      sugars less than 70,(0)units; sugars


     150-199,(1) unit; sugars 200-249,(3) units; sugars 250-299,(5) units;


     sugars 300-349,(7) units; sugars greater than 349,(9) units.


Levemir Inj (Insulin Detemir) 1,000 unit/ 10 ML Vial 10 Units SQ HS 30 Days


     Do not mix with any other Insulin.


Megestrol Liq (Megestrol Acetate) 40 Mg/Ml Susp 400 Mg PO BIDAC 30 Days


Cellcept (Mycophenolate Mofetil) 500 Mg Tab 500 Mg PO Q8HR 30 Days


Synthroid (Levothyroxine Sodium) 150 Mcg Tab 150 Mcg PO DAILY@0600 30 Days


Pantoprazole (Pantoprazole Sodium) 40 Mg Tab 40 Mg PO DAILY 30 Days


Mirtazapine 15 Mg Tab 30 Mg PO HS 30 Days


Klonopin (Clonazepam) 0.5 Mg Tab 0.5 Mg PO Q12HR 30 Days


Active Ordered Medications





Current Medications








 Medications


  (Trade)  Dose


 Ordered  Sig/Kandis


 Route  Start Time


 Stop Time Status Last Admin


 


 Heparin Sodium/


 Dextrose  250 ml @ 


 16 mls/hr  TITRATE  PRN


 IV  10/24/17 22:45


    17 17:31


 


 


  (NS Flush)  2 ml  UNSCH  PRN


 IV FLUSH  10/24/17 23:30


     


 


 


  (NS Flush)  2 ml  BID


 IV FLUSH  10/25/17 09:00


    10/30/17 20:41


 


 


  (Narcan Inj)  0.4 mg  UNSCH  PRN


 IV PUSH  10/24/17 23:30


     


 


 


  (D50w (Vial) Inj)  50 ml  UNSCH  PRN


 IV PUSH  10/24/17 23:30


     


 


 


  (Glucagon Inj)  1 mg  UNSCH  PRN


 OTHER  10/24/17 23:30


     


 


 


  (NovoLOG


 SUPPLEMENTAL


 SCALE)  1  ACHS SLIDING  SCALE


 SQ  10/25/17 08:00


    17 17:01


 


 


  (Cellcept)  500 mg  Q8HR


 PO  10/25/17 06:00


    17 12:39


 


 


  (Protonix)  40 mg  DAILY


 PO  10/25/17 09:00


    17 08:55


 


 


  (Catapres)  0.1 mg  Q6H  PRN


 PO  10/25/17 03:30


    17 04:14


 


 


  (Norco  5-325 Mg)  1 tab  Q4H  PRN


 PO  10/25/17 08:30


     


 


 


  (Norco  Mg)  1 tab  Q4H  PRN


 PO  10/25/17 08:30


    17 08:59


 


 


  (Colace)  100 mg  BID


 PO  10/25/17 21:00


    17 08:55


 


 


  (Milk Of


 Magnesia Liq)  30 ml  BID  PRN


 PO  10/25/17 10:45


    10/30/17 09:20


 


 


  (Synthroid)  200 mcg  DAILY@0600


 PO  10/26/17 06:00


    17 05:44


 


 


  (Coumadin)  5 mg  DAILY@1600


 PO  10/26/17 16:00


    17 17:01


 


 


  (Levemir Inj)  24 units  HS


 SQ  10/26/17 21:00


    10/31/17 23:04


 


 


  (Ditropan)  5 mg  HS


 PO  10/26/17 21:00


    10/31/17 23:02


 


 


  (Deltasone)  10 mg  DAILY


 PO  10/30/17 09:00


    17 08:54


 


 


  (KlonoPIN)  1 mg  Q12HR


 PO  10/29/17 21:00


    17 08:55


 


 


  (Lexapro)  5 mg  DAILY


 PO  10/29/17 12:00


    17 08:55


 


 


  (Pill Splitter)  1 ea  UNSCH  PRN


 OTHER  10/29/17 12:30


     


 








Family History


Noncontributory


Social History


Denies smoking 


Alcohol use in the past





Physical Exam


Vital Signs





Vital Signs








  Date Time  Temp Pulse Resp B/P (MAP) Pulse Ox O2 Delivery O2 Flow Rate FiO2


 


17 16:00 96.9 61 17 115/60 (78) 93   


 


17 12:00 97.4 86 17 122/70 (87) 96   


 


17 08:00 96.0 69 17 115/56 (75) 95   


 


17 04:36 97.7 76 16 185/79 (114) 93   


 


17 00:29 97.3 69 16 137/65 (89) 98   


 


10/31/17 20:00 98.0 67 16 126/63 (84) 95   








Physical Exam


GENERAL: Well-nourished, well-developed morbidly obese patient.


SKIN: Warm and dry.


HEAD: Normocephalic.


EYES: No scleral icterus. No injection or drainage. 


NECK: Supple, trachea midline. No JVD or lymphadenopathy.


CARDIOVASCULAR: Regular rate and rhythm without murmurs, gallops, or rubs. 


RESPIRATORY: Breath sounds equal bilaterally. No accessory muscle use.


GASTROINTESTINAL: Abdomen soft, non-tender, nondistended. 


EXTREMITIES: No cyanosis, 3+ edema. 


NEUROLOGICAL: Awake, alert, and oriented x 3. Non-focal.


Laboratory





Laboratory Tests








Test


  17


08:37


 


Prothrombin Time 24.0 


 


Prothromb Time International


Ratio 2.1 


 


 


Activated Partial


Thromboplast Time 53.2 


 














 Date/Time


Source Procedure


Growth Status


 


 


 10/24/17 19:56


Blood Peripheral Aerobic Blood Culture - Final


NO GROWTH IN 5 DAYS Complete


 


 10/24/17 19:56


Blood Peripheral Anaerobic Blood Culture - Final


NO GROWTH IN 5 DAYS Complete


 


 10/24/17 19:21


Urine Random Urine Urine Culture - Final


,000 CFU/ML MIXED GRAM POSITIVE... Complete








Result Diagram:  


10/30/17 0557                                                                  

              10/31/17 0730





Imaging





Last Impressions








Foot X-Ray 10/28/17 0000 Signed





Impressions: 





 Service Date/Time:  2017 18:54 - CONCLUSION:  

Significant 





 dorsal soft tissue swelling.  The osseous structures of the forefoot are 

grossly 





 intact.     Jesse Martínez MD 


 


Head CT 10/24/17 1909 Signed





Impressions: 





 Service Date/Time:  2017 20:34 - CONCLUSION: Unremarkable 





 study except for mild chronic sinusitis.     TYLER Magana MD 


 


Chest X-Ray 10/24/17 1909 Signed





Impressions: 





 Service Date/Time:  2017 19:27 - CONCLUSION:  No acute 





 cardiopulmonary disease.     TYLER Magana MD 


 


Lower Extremity Ultrasound 10/24/17 0000 Signed





Impressions: 





 Service Date/Time:  2017 20:03 - CONCLUSION:  Right 





 peroneal vein thrombus.     TYLER Magana MD 











Assessment and Plan


Problem List:  


(1) Lupus nephritis, ISN/RPS class III


ICD Codes:  M32.14 - Glomerular disease in systemic lupus erythematosus


Plan:  Patient is going to get Solu-Medrol stop prednisone she failed 

mycophenolate


Consider cyclophosphamide IV monthly


She had focal proliferative lupus nephritis/diabetic nephropathy/severe 

interstitial fibrosis and tubular atrophy on kidney biopsy she has poor risk 

factors and possibly will have end-stage renal disease in near future


Check STEPHANIE and serum complements 24-hour urine for protein





(2) Acute on chronic kidney failure


ICD Codes:  N17.9 - Acute kidney failure, unspecified; N18.9 - Chronic kidney 

disease, unspecified


Status:  Acute


Plan:  Doing poorly advanced renal failure with proteinuria due to systemic 

lupus erythematosus





(3) Type 2 diabetes mellitus


ICD Codes:  E11.9 - Type 2 diabetes mellitus


Status:  Chronic


Plan:  Continue to monitor





(4) Peroneal DVT (deep venous thrombosis)


ICD Codes:  I82.499 - Acute embolism and thrombosis of other specified deep 

vein of unspecified lower extremity


Status:  Acute


Plan:  On heparin








Problem Qualifiers





(1) Acute on chronic kidney failure:  


Qualified Codes:  N17.9 - Acute kidney failure, unspecified; N18.9 - Chronic 

kidney disease, unspecified


(2) Type 2 diabetes mellitus:  


Qualified Codes:  E11.9 - Type 2 diabetes mellitus without complications; Z79.4 

- Long term (current) use of insulin


(3) Peroneal DVT (deep venous thrombosis):  


Qualified Codes:  I82.491 - Acute embolism and thrombosis of other specified 

deep vein of right lower extremity








Delia Moreno MD 2017 19:37

## 2017-11-02 VITALS
SYSTOLIC BLOOD PRESSURE: 158 MMHG | OXYGEN SATURATION: 95 % | DIASTOLIC BLOOD PRESSURE: 79 MMHG | RESPIRATION RATE: 19 BRPM | TEMPERATURE: 97.7 F | HEART RATE: 70 BPM

## 2017-11-02 VITALS — HEART RATE: 96 BPM

## 2017-11-02 VITALS
OXYGEN SATURATION: 93 % | SYSTOLIC BLOOD PRESSURE: 173 MMHG | RESPIRATION RATE: 20 BRPM | TEMPERATURE: 99.1 F | DIASTOLIC BLOOD PRESSURE: 94 MMHG | HEART RATE: 86 BPM

## 2017-11-02 VITALS
HEART RATE: 75 BPM | TEMPERATURE: 98.1 F | OXYGEN SATURATION: 95 % | SYSTOLIC BLOOD PRESSURE: 142 MMHG | RESPIRATION RATE: 19 BRPM | DIASTOLIC BLOOD PRESSURE: 65 MMHG

## 2017-11-02 VITALS
SYSTOLIC BLOOD PRESSURE: 129 MMHG | RESPIRATION RATE: 20 BRPM | DIASTOLIC BLOOD PRESSURE: 60 MMHG | OXYGEN SATURATION: 95 % | TEMPERATURE: 97.6 F | HEART RATE: 64 BPM

## 2017-11-02 VITALS
HEART RATE: 78 BPM | DIASTOLIC BLOOD PRESSURE: 70 MMHG | TEMPERATURE: 98.8 F | SYSTOLIC BLOOD PRESSURE: 130 MMHG | OXYGEN SATURATION: 98 % | RESPIRATION RATE: 19 BRPM

## 2017-11-02 VITALS
TEMPERATURE: 97.4 F | SYSTOLIC BLOOD PRESSURE: 132 MMHG | HEART RATE: 85 BPM | RESPIRATION RATE: 19 BRPM | OXYGEN SATURATION: 95 % | DIASTOLIC BLOOD PRESSURE: 66 MMHG

## 2017-11-02 LAB
ALBUMIN SERPL-MCNC: 2.4 GM/DL (ref 3.4–5)
BUN SERPL-MCNC: 52 MG/DL (ref 7–18)
C3 SERPL-MCNC: 95 MG/DL (ref 90–180)
C4 SERPL-MCNC: 16 MG/DL (ref 10–40)
CALCIUM SERPL-MCNC: 9.1 MG/DL (ref 8.5–10.1)
CHLORIDE SERPL-SCNC: 97 MEQ/L (ref 98–107)
CREAT SERPL-MCNC: 2.36 MG/DL (ref 0.5–1)
ERYTHROCYTE [DISTWIDTH] IN BLOOD BY AUTOMATED COUNT: 14.8 % (ref 11.6–17.2)
GFR SERPLBLD BASED ON 1.73 SQ M-ARVRAT: 21 ML/MIN (ref 89–?)
GLUCOSE SERPL-MCNC: 241 MG/DL (ref 74–106)
HCO3 BLD-SCNC: 21.9 MEQ/L (ref 21–32)
HCT VFR BLD CALC: 34.9 % (ref 35–46)
HGB BLD-MCNC: 11.5 GM/DL (ref 11.6–15.3)
MCH RBC QN AUTO: 31.7 PG (ref 27–34)
MCHC RBC AUTO-ENTMCNC: 33.1 % (ref 32–36)
MCV RBC AUTO: 95.9 FL (ref 80–100)
PHOSPHATE SERPL-MCNC: 2.7 MG/DL (ref 2.5–4.9)
PLATELET # BLD: 242 TH/MM3 (ref 150–450)
PMV BLD AUTO: 8.5 FL (ref 7–11)
RBC # BLD AUTO: 3.64 MIL/MM3 (ref 4–5.3)
SODIUM SERPL-SCNC: 130 MEQ/L (ref 136–145)
WBC # BLD AUTO: 9.5 TH/MM3 (ref 4–11)

## 2017-11-02 RX ADMIN — Medication SCH ML: at 20:40

## 2017-11-02 RX ADMIN — OXYBUTYNIN CHLORIDE SCH MG: 5 TABLET ORAL at 20:36

## 2017-11-02 RX ADMIN — ACYCLOVIR SCH UNITS: 800 TABLET ORAL at 20:39

## 2017-11-02 RX ADMIN — MYCOPHENOLATE MOFETIL SCH MG: 500 TABLET, FILM COATED ORAL at 05:49

## 2017-11-02 RX ADMIN — DOCUSATE SODIUM SCH MG: 100 CAPSULE, LIQUID FILLED ORAL at 08:45

## 2017-11-02 RX ADMIN — MYCOPHENOLATE MOFETIL SCH MG: 500 TABLET, FILM COATED ORAL at 16:39

## 2017-11-02 RX ADMIN — PANTOPRAZOLE SCH MG: 40 TABLET, DELAYED RELEASE ORAL at 08:44

## 2017-11-02 RX ADMIN — INSULIN ASPART SCH: 100 INJECTION, SOLUTION INTRAVENOUS; SUBCUTANEOUS at 12:05

## 2017-11-02 RX ADMIN — HYDROCODONE BITARTRATE AND ACETAMINOPHEN PRN TAB: 10; 325 TABLET ORAL at 05:50

## 2017-11-02 RX ADMIN — MYCOPHENOLATE MOFETIL SCH MG: 500 TABLET, FILM COATED ORAL at 20:36

## 2017-11-02 RX ADMIN — Medication SCH ML: at 08:45

## 2017-11-02 RX ADMIN — WARFARIN SODIUM SCH MG: 5 TABLET ORAL at 16:37

## 2017-11-02 RX ADMIN — INSULIN ASPART SCH: 100 INJECTION, SOLUTION INTRAVENOUS; SUBCUTANEOUS at 08:57

## 2017-11-02 RX ADMIN — INSULIN ASPART SCH: 100 INJECTION, SOLUTION INTRAVENOUS; SUBCUTANEOUS at 16:36

## 2017-11-02 RX ADMIN — INSULIN ASPART SCH: 100 INJECTION, SOLUTION INTRAVENOUS; SUBCUTANEOUS at 20:39

## 2017-11-02 RX ADMIN — ESCITALOPRAM OXALATE SCH MG: 10 TABLET, FILM COATED ORAL at 08:45

## 2017-11-02 RX ADMIN — LEVOTHYROXINE SODIUM SCH MCG: 200 TABLET ORAL at 05:49

## 2017-11-02 RX ADMIN — DOCUSATE SODIUM SCH MG: 100 CAPSULE, LIQUID FILLED ORAL at 20:36

## 2017-11-02 RX ADMIN — HYDROCODONE BITARTRATE AND ACETAMINOPHEN PRN TAB: 10; 325 TABLET ORAL at 01:55

## 2017-11-02 NOTE — MB
cc:

JAMES MORENO M.D., RUBY ANNE E. M.D.

****

 

 

DATE OF CONSULTATION:  2017

 

 1955

 

REFERRING PHYSICIAN

Dr. James Moreno.

 

REASON FOR CONSULTATION

Dr. Moreno requested consultation for Ms. Negrete regarding lupus nephritis and

need of Cytoxan.

 

HISTORY OF PRESENT ILLNESS

Ms. Negrete is a 62-year-old woman with multiple medical problems.  She has

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 She has been on prednisone

and CellCept three times a day for the past month and a half.

 

She is followed by Dr. James Moreno on outpatient basis.  She looks like she has

recently been admitted to the hospital in 2017.  Workup

demonstrated a possible underlying autoimmune disease, possibly lupus versus

autoimmune hepatitis with STEPHANIE positivity 1 to 1280 and anti-smooth antibody

positivity.  During her last admission a renal biopsy was performed on

2017 that shows focal proliferative and  necrotizing glomerulonephritis.

There is global 13 out of 27 in segmental glomerulosclerosis. There is

interstitial fibrosis and tubular atrophy which is severe.  For this reason she

was started on immunosuppressive therapy.  She has worsening renal function,

appeared to not have responded.

 

Ms. Negrete has poor insight into her disease.  Much of her history is

supplemented from review of the electronic medical records.  She lives with her

daughter-in-law and her son.  They apparently are in good health.  She was

walking to the store to get some change and the store owner called the

ambulance for her.

She reports that the swelling began all of a sudden. She had progressive

symptoms in terms of the swelling lasting only about a week.  She presented to

the emergency room with chest pains, lower extremity edema.  She had workup

including ultrasound of the lower extremity that confirmed a right peroneal

vein thrombus. She has evidence of anasarca.  Nephrology was consulted.  She

had a mood disorder and Dr. Rosas was consulted and her anxiety medication,

antidepressant medication was adjusted.  In light of her worsening edema,

nephrotic syndrome and worsening renal function nephrology recommended changing

her immunosuppressive medication to include Cytoxan.

 

PAST MEDICAL HISTORY

1.  Diabetes.

2.  Chronic pancreatitis.

3.  Congestive heart failure.

4.  Coronary artery disease.

5.  Liver disease.

6.  Systemic lupus.

7.  Questionable autoimmune hepatitis.

8.  Depression.

9.  Anxiety.

10. Hypothyroidism.

11. COPD.

12. Lupus nephritis.

13. Nephrotic range proteinuria.

 

PAST SURGICAL HISTORY

1.  Appendectomy.

2.  Cholecystectomy.

3.  .

4.  Hysterectomy.

 

FAMILY HISTORY

No family history of mother and father is known.  She was a preemie and was

taken care of and adopted by her paternal grandmother.  She has one sister that

 of drowning at the age of 9.

 

SOCIAL HISTORY

Denies any tobacco, alcohol or illicit drug use.  She previously drank

heavily.

 

ALLERGIES

MILK.

 

MEDICATIONS

Current medication:

1. Lasix.

2. Solu-Medrol.

3. Klonopin.

4. Lexapro.

5. Levemir.

6. Ditropan.

7. Warfarin.

8. Colace.

9. Norco.

10. CellCept.

 

PHYSICAL EXAMINATION

VITAL SIGNS: Temperature 98.1, heart rate 75, respiratory rate 19, blood

pressure 142/65, saturation 95%.

GENERAL: Ms. Negrete is a well-developed, obese woman.  She has anasarca.

HEENT: Her pupils are round, reactive to light and accommodation.

Oropharynx is clear.

NECK: Supple.

LUNGS: Clear.

CARDIOVASCULAR: Exam reveals normal rate, rhythm.

ABDOMEN: Abdomen is distended.

EXTREMITIES:  There is edema in dependent portions.  There is 2+ pitting edema

of the lower extremity.  There is no asymmetry as both legs are equally

swollen.

NEUROLOGIC: Exam is nonfocal.  She is awake, alert and conversant and

cooperative.

 

LABORATORY DATA

Significant for mild normocytic anemia.  BUN of 52, creatinine 2.36, sodium of

130, ferritin of 160 from September, albumin is 2.4.

 

ASSESSMENT/PLAN

Ms. Negrete is a 62-year-old woman with multiple medical problems described

above.  We discussed the risk and benefit of immunosuppressive therapy.  We

discussed the risk associated with cytopenias.  We discussed the potential

benefit of preserving her renal function and avoiding hemodialysis. This would

also help and hope to reverse the nephrotic syndrome.

 

She has tried other immunosuppressive regimen and appears to have failed and

worsened.  Swelling is what brought her in, which was not present previously.

Case was discussed with Dr. Moreno. We discussed coordinating her treatment with

Cytoxan in the standard protocol of 0.5 grams per meter squared.  We will

administer peripherally.  We will coordinate one of our oncology nurses to

administer the patient.  Information and consenting will be performed.  We will

dose according to her ideal body weight as currently she has anasarca.

 

Her renal function and blood counts will be monitored.  She will need followup

on an outpatient basis.  We discussed briefly the option of a port.  In light

of the once monthly treatment, I expect that we should be able to use the

peripheral access and avoid central line placement.  Her questions were

answered to her satisfaction.

 

 

                              _________________________________

                              Karly Clark MD

 

 

 

RAD/TLL

D:  2017/2:04 PM

T:  2017/2:56 PM

Visit #:  [id number]

Job #:  [id number]

## 2017-11-02 NOTE — MB
cc:

JAMES MORENO M.D., RUBY ANNE E. M.D.

****

 

 

DATE OF CONSULTATION:  2017

 

 1955

 

REFERRING PHYSICIAN

Dr. James Moreno.

 

REASON FOR CONSULTATION

Dr. Moreno requested consultation for Ms. Negrete regarding lupus nephritis and

need of Cytoxan.

 

HISTORY OF PRESENT ILLNESS

Ms. Negrete is a 62-year-old woman with multiple medical problems.  She has

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 She has been on prednisone

and CellCept three times a day for the past month and a half.

 

She is followed by Dr. James Moreno on outpatient basis.  She looks like she has

recently been admitted to the hospital in 2017.  Workup

demonstrated a possible underlying autoimmune disease, possibly lupus versus

autoimmune hepatitis with STEPHANIE positivity 1 to 1280 and anti-smooth antibody

positivity.  During her last admission a renal biopsy was performed on

2017 that shows focal proliferative and  necrotizing glomerulonephritis.

There is global 13 out of 27 in segmental glomerulosclerosis. There is

interstitial fibrosis and tubular atrophy which is severe.  For this reason she

was started on immunosuppressive therapy.  She has worsening renal function,

appeared to not have responded.

 

Ms. Negrete has poor insight into her disease.  Much of her history is

supplemented from review of the electronic medical records.  She lives with her

daughter-in-law and her son.  They apparently are in good health.  She was

walking to the store to get some change and the store owner called the

ambulance for her.

She reports that the swelling began all of a sudden. She had progressive

symptoms in terms of the swelling lasting only about a week.  She presented to

the emergency room with chest pains, lower extremity edema.  She had workup

including ultrasound of the lower extremity that confirmed a right peroneal

vein thrombus. She has evidence of anasarca.  Nephrology was consulted.  She

had a mood disorder and Dr. Rosas was consulted and her anxiety medication,

antidepressant medication was adjusted.  In light of her worsening edema,

nephrotic syndrome and worsening renal function nephrology recommended changing

her immunosuppressive medication to include Cytoxan.

 

PAST MEDICAL HISTORY

1.  Diabetes.

2.  Chronic pancreatitis.

3.  Congestive heart failure.

4.  Coronary artery disease.

5.  Liver disease.

6.  Systemic lupus.

7.  Questionable autoimmune hepatitis.

8.  Depression.

9.  Anxiety.

10. Hypothyroidism.

11. COPD.

12. Lupus nephritis.

13. Nephrotic range proteinuria.

 

PAST SURGICAL HISTORY

1.  Appendectomy.

2.  Cholecystectomy.

3.  .

4.  Hysterectomy.

 

FAMILY HISTORY

No family history of mother and father is known.  She was a preemie and was

taken care of and adopted by her paternal grandmother.  She has one sister that

 of drowning at the age of 9.

 

SOCIAL HISTORY

Denies any tobacco, alcohol or illicit drug use.  She previously drank

heavily.

 

ALLERGIES

MILK.

 

MEDICATIONS

Current medication:

1. Lasix.

2. Solu-Medrol.

3. Klonopin.

4. Lexapro.

5. Levemir.

6. Ditropan.

7. Warfarin.

8. Colace.

9. Norco.

10. CellCept.

 

PHYSICAL EXAMINATION

VITAL SIGNS: Temperature 98.1, heart rate 75, respiratory rate 19, blood

pressure 142/65, saturation 95%.

GENERAL: Ms. Negrete is a well-developed, obese woman.  She has anasarca.

HEENT: Her pupils are round, reactive to light and accommodation.

Oropharynx is clear.

NECK: Supple.

LUNGS: Clear.

CARDIOVASCULAR: Exam reveals normal rate, rhythm.

ABDOMEN: Abdomen is distended.

EXTREMITIES:  There is edema in dependent portions.  There is 2+ pitting edema

of the lower extremity.  There is no asymmetry as both legs are equally

swollen.

NEUROLOGIC: Exam is nonfocal.  She is awake, alert and conversant and

cooperative.

 

LABORATORY DATA

Significant for mild normocytic anemia.  BUN of 52, creatinine 2.36, sodium of

130, ferritin of 160 from September, albumin is 2.4.

 

ASSESSMENT/PLAN

Ms. Negrete is a 62-year-old woman with multiple medical problems described

above.  We discussed the risk and benefit of immunosuppressive therapy.  We

discussed the risk associated with cytopenias.  We discussed the potential

benefit of preserving her renal function and avoiding hemodialysis. This would

also help and hope to reverse the nephrotic syndrome.

 

She has tried other immunosuppressive regimen and appears to have failed and

worsened.  Swelling is what brought her in, which was not present previously.

Case was discussed with Dr. Moreno. We discussed coordinating her treatment with

Cytoxan in the standard protocol of 0.5 grams per meter squared.  We will

administer peripherally.  We will coordinate one of our oncology nurses to

administer the patient.  Information and consenting will be performed.  We will

dose according to her ideal body weight as currently she has anasarca.

 

Her renal function and blood counts will be monitored.  She will need followup

on an outpatient basis.  We discussed briefly the option of a port.  In light

of the once monthly treatment, I expect that we should be able to use the

peripheral access and avoid central line placement.  Her questions were

answered to her satisfaction.

 

 

                              _________________________________

                              Karly Clark MD

 

 

 

RAD/TLL

D:  2017/2:04 PM

T:  2017/2:56 PM

Visit #:  [id number]

Job #:  [id number]

## 2017-11-02 NOTE — MB
cc:

JAMES MORENO M.D., RUBY ANNE E. M.D.

****

 

 

DATE OF CONSULTATION:  2017

 

 1955

 

REFERRING PHYSICIAN

Dr. James Moreno.

 

REASON FOR CONSULTATION

Dr. Moreno requested consultation for Ms. Negrete regarding lupus nephritis and

need of Cytoxan.

 

HISTORY OF PRESENT ILLNESS

Ms. Negrete is a 62-year-old woman with multiple medical problems.  She has

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 She has been on prednisone

and CellCept three times a day for the past month and a half.

 

She is followed by Dr. James Moreno on outpatient basis.  She looks like she has

recently been admitted to the hospital in 2017.  Workup

demonstrated a possible underlying autoimmune disease, possibly lupus versus

autoimmune hepatitis with STEPHANIE positivity 1 to 1280 and anti-smooth antibody

positivity.  During her last admission a renal biopsy was performed on

2017 that shows focal proliferative and  necrotizing glomerulonephritis.

There is global 13 out of 27 in segmental glomerulosclerosis. There is

interstitial fibrosis and tubular atrophy which is severe.  For this reason she

was started on immunosuppressive therapy.  She has worsening renal function,

appeared to not have responded.

 

Ms. Negrete has poor insight into her disease.  Much of her history is

supplemented from review of the electronic medical records.  She lives with her

daughter-in-law and her son.  They apparently are in good health.  She was

walking to the store to get some change and the store owner called the

ambulance for her.

She reports that the swelling began all of a sudden. She had progressive

symptoms in terms of the swelling lasting only about a week.  She presented to

the emergency room with chest pains, lower extremity edema.  She had workup

including ultrasound of the lower extremity that confirmed a right peroneal

vein thrombus. She has evidence of anasarca.  Nephrology was consulted.  She

had a mood disorder and Dr. Rosas was consulted and her anxiety medication,

antidepressant medication was adjusted.  In light of her worsening edema,

nephrotic syndrome and worsening renal function nephrology recommended changing

her immunosuppressive medication to include Cytoxan.

 

PAST MEDICAL HISTORY

1.  Diabetes.

2.  Chronic pancreatitis.

3.  Congestive heart failure.

4.  Coronary artery disease.

5.  Liver disease.

6.  Systemic lupus.

7.  Questionable autoimmune hepatitis.

8.  Depression.

9.  Anxiety.

10. Hypothyroidism.

11. COPD.

12. Lupus nephritis.

13. Nephrotic range proteinuria.

 

PAST SURGICAL HISTORY

1.  Appendectomy.

2.  Cholecystectomy.

3.  .

4.  Hysterectomy.

 

FAMILY HISTORY

No family history of mother and father is known.  She was a preemie and was

taken care of and adopted by her paternal grandmother.  She has one sister that

 of drowning at the age of 9.

 

SOCIAL HISTORY

Denies any tobacco, alcohol or illicit drug use.  She previously drank

heavily.

 

ALLERGIES

MILK.

 

MEDICATIONS

Current medication:

1. Lasix.

2. Solu-Medrol.

3. Klonopin.

4. Lexapro.

5. Levemir.

6. Ditropan.

7. Warfarin.

8. Colace.

9. Norco.

10. CellCept.

 

PHYSICAL EXAMINATION

VITAL SIGNS: Temperature 98.1, heart rate 75, respiratory rate 19, blood

pressure 142/65, saturation 95%.

GENERAL: Ms. Negrete is a well-developed, obese woman.  She has anasarca.

HEENT: Her pupils are round, reactive to light and accommodation.

Oropharynx is clear.

NECK: Supple.

LUNGS: Clear.

CARDIOVASCULAR: Exam reveals normal rate, rhythm.

ABDOMEN: Abdomen is distended.

EXTREMITIES:  There is edema in dependent portions.  There is 2+ pitting edema

of the lower extremity.  There is no asymmetry as both legs are equally

swollen.

NEUROLOGIC: Exam is nonfocal.  She is awake, alert and conversant and

cooperative.

 

LABORATORY DATA

Significant for mild normocytic anemia.  BUN of 52, creatinine 2.36, sodium of

130, ferritin of 160 from September, albumin is 2.4.

 

ASSESSMENT/PLAN

Ms. Negrete is a 62-year-old woman with multiple medical problems described

above.  We discussed the risk and benefit of immunosuppressive therapy.  We

discussed the risk associated with cytopenias.  We discussed the potential

benefit of preserving her renal function and avoiding hemodialysis. This would

also help and hope to reverse the nephrotic syndrome.

 

She has tried other immunosuppressive regimen and appears to have failed and

worsened.  Swelling is what brought her in, which was not present previously.

Case was discussed with Dr. Moreno. We discussed coordinating her treatment with

Cytoxan in the standard protocol of 0.5 grams per meter squared.  We will

administer peripherally.  We will coordinate one of our oncology nurses to

administer the patient.  Information and consenting will be performed.  We will

dose according to her ideal body weight as currently she has anasarca.

 

Her renal function and blood counts will be monitored.  She will need followup

on an outpatient basis.  We discussed briefly the option of a port.  In light

of the once monthly treatment, I expect that we should be able to use the

peripheral access and avoid central line placement.  Her questions were

answered to her satisfaction.

 

 

                              _________________________________

                              Karly Clark MD

 

 

 

RAD/TLL

D:  2017/2:04 PM

T:  2017/2:56 PM

Visit #:  [id number]

Job #:  [id number]

## 2017-11-02 NOTE — HHI.PR
Subjective


Remarks


Patient states she does not feel well


she feels overall sick and with generalized pain


afebrile with stable vital signs





Objective


Vitals





Vital Signs








  Date Time  Temp Pulse Resp B/P (MAP) Pulse Ox O2 Delivery O2 Flow Rate FiO2


 


11/2/17 16:00 97.7 70 19 158/79 (105) 95   


 


11/2/17 12:00 98.1 75 19 142/65 (90) 95   


 


11/2/17 09:10  96      


 


11/2/17 08:00 97.4 85 19 132/66 (88) 95   


 


11/2/17 04:00 97.6 64 20 129/60 (83) 95   


 


11/2/17 00:00 98.8 78 19 130/70 (90) 98   


 


11/1/17 20:54 99.2 68 18 157/78 (104) 96   














I/O      


 


 11/1/17 11/1/17 11/1/17 11/2/17 11/2/17 11/2/17





 07:00 15:00 23:00 07:00 15:00 23:00


 


Intake Total   2196 ml 840 ml  


 


Output Total 600 ml     


 


Balance -600 ml  2196 ml 840 ml  


 


      


 


Intake Oral   1680 ml 840 ml  


 


IV Total   516 ml   


 


Output Urine Total 600 ml     


 


# Voids   10 2  


 


# Bowel Movements   0 0  








Result Diagram:  


11/2/17 0838                                                                   

             11/2/17 0838





Imaging





Last Impressions








Foot X-Ray 10/28/17 0000 Signed





Impressions: 





 Service Date/Time:  Saturday, October 28, 2017 18:54 - CONCLUSION:  

Significant 





 dorsal soft tissue swelling.  The osseous structures of the forefoot are 

grossly 





 intact.     Jesse Martínez MD 


 


Head CT 10/24/17 1909 Signed





Impressions: 





 Service Date/Time:  Tuesday, October 24, 2017 20:34 - CONCLUSION: Unremarkable 





 study except for mild chronic sinusitis.     TYLER Magana MD 


 


Chest X-Ray 10/24/17 1909 Signed





Impressions: 





 Service Date/Time:  Tuesday, October 24, 2017 19:27 - CONCLUSION:  No acute 





 cardiopulmonary disease.     TYLER Magana MD 


 


Lower Extremity Ultrasound 10/24/17 0000 Signed





Impressions: 





 Service Date/Time:  Tuesday, October 24, 2017 20:03 - CONCLUSION:  Right 





 peroneal vein thrombus.     TYLER Magana MD 








Objective Remarks


AAOx3


Clear lungs BL


S1S2 RRR


significant BL lower extremity edema


Procedures


none


Medications and IVs





Current Medications








 Medications


  (Trade)  Dose


 Ordered  Sig/Kandis


 Route  Start Time


 Stop Time Status Last Admin


 


  (NS Flush)  2 ml  UNSCH  PRN


 IV FLUSH  10/24/17 23:30


     


 


 


  (NS Flush)  2 ml  BID


 IV FLUSH  10/25/17 09:00


    11/2/17 08:45


 


 


  (Narcan Inj)  0.4 mg  UNSCH  PRN


 IV PUSH  10/24/17 23:30


     


 


 


  (D50w (Vial) Inj)  50 ml  UNSCH  PRN


 IV PUSH  10/24/17 23:30


     


 


 


  (Glucagon Inj)  1 mg  UNSCH  PRN


 OTHER  10/24/17 23:30


     


 


 


  (NovoLOG


 SUPPLEMENTAL


 SCALE)  1  ACHS SLIDING  SCALE


 SQ  10/25/17 08:00


    11/2/17 12:05


 


 


  (Cellcept)  500 mg  Q8HR


 PO  10/25/17 06:00


    11/2/17 16:39


 


 


  (Protonix)  40 mg  DAILY


 PO  10/25/17 09:00


    11/2/17 08:44


 


 


  (Catapres)  0.1 mg  Q6H  PRN


 PO  10/25/17 03:30


    11/1/17 04:14


 


 


  (Norco  5-325 Mg)  1 tab  Q4H  PRN


 PO  10/25/17 08:30


     


 


 


  (Norco  Mg)  1 tab  Q4H  PRN


 PO  10/25/17 08:30


    11/2/17 05:50


 


 


  (Colace)  100 mg  BID


 PO  10/25/17 21:00


    11/2/17 08:45


 


 


  (Milk Of


 Magnesia Liq)  30 ml  BID  PRN


 PO  10/25/17 10:45


    10/30/17 09:20


 


 


  (Synthroid)  200 mcg  DAILY@0600


 PO  10/26/17 06:00


    11/2/17 05:49


 


 


  (Coumadin)  5 mg  DAILY@1600


 PO  10/26/17 16:00


    11/2/17 16:37


 


 


  (Levemir Inj)  24 units  HS


 SQ  10/26/17 21:00


    11/1/17 22:58


 


 


  (Ditropan)  5 mg  HS


 PO  10/26/17 21:00


    11/1/17 21:17


 


 


  (KlonoPIN)  1 mg  Q12HR


 PO  10/29/17 21:00


    11/2/17 08:45


 


 


  (Lexapro)  5 mg  DAILY


 PO  10/29/17 12:00


    11/2/17 08:45


 


 


  (Pill Splitter)  1 ea  UNSCH  PRN


 OTHER  10/29/17 12:30


     


 


 


  (SoluMEDROL INJ)  60 mg  Q12HR


 IV PUSH  11/2/17 09:00


    11/2/17 08:44


 


 


  (Lasix Inj)  40 mg  DAILY


 IV PUSH  11/3/17 09:00


     


 











A/P


Problem List:  


(1) Lupus nephritis, ISN/RPS class III


ICD Code:  M32.14 - Glomerular disease in systemic lupus erythematosus


Plan:  Nephrology consulted.  Patient currently on Solu Medrol, she failed 

mycophenolate.


And its management as per nephrology recommendations.  Plan is to stop 

mycophenolate and Solu-Medrol once plan to give cyclophosphamide.


She had focal proliferative lupus nephritis/diabetic nephropathy/severe 

interstitial fibrosis and tubular atrophy on kidney biopsy she has poor risk 

factors and possibly will have end-stage renal disease in near future.


On Lasix 40 mg IV daily as per nephrology recommendations.





(2) DVT (deep venous thrombosis)


ICD Code:  I82.409 - Acute embolism and thrombosis of unspecified deep veins of 

unspecified lower extremity


Status:  Acute


Plan:  Treated with IV heparin which has been discontinued.


Therapeutic INR at 2.1.  Continue to monitor PT/INR daily.





(3) Hypothyroid


ICD Code:  E03.9 - Hypothyroidism, unspecified


Status:  Chronic


Plan:  TSH very elevated at 69.9 with very low T3 and low T4.  Lower extremity 

edema could be also secondary to myxedema.


Continue levothyroxine 200 g by mouth daily.


Will recheck TSH, T3 and T4.





(4) Type 2 diabetes mellitus


ICD Code:  E11.9 - Type 2 diabetes mellitus


Status:  Chronic


Plan:  Sugars are labile, very elevated today at a level in the 300s with 

improved control down to 143 on last Accu-Cheks.


Continue insulin Levemir 24 units subcutaneous at at bedtime as well as SSI 

with insulin NovoLog.





(5) Acute on chronic kidney failure


ICD Code:  N17.9 - Acute kidney failure, unspecified; N18.9 - Chronic kidney 

disease, unspecified


Plan:  As above.  Management as per nephrology.  Continue to monitor BUN/

creatinine, stress and O's, avoid nephrotoxins.





Assessment and Plan


Right lower extremity DVT


Lower extremity edema mostly due to renal failure


SLE / with glomerulonephritis- s/p kidney biopsy;


            FOCAL PROLIFERATIVE AND NECROTIZING GLOMERULONEPHRITIS.  


   NODULAR DIABETIC GLOMERULOSCLEROSIS.  


   GLOBAL& SEGMENTAL GLOMERULOSCLEROSIS.  


   INTERSTITIAL FIBROSIS AND TUBULAR ATROPHY, SEVERE.


VALENTIN on CKD due to above mostly due to glomerulonephritis nothing to suggest CHF(

BNP and 2-D echo negative)


Diabetes mellitus


History pancreatitis


DVT prophylaxis on Coumadin and heparin drip





Plan:





INR therapeutic 2.1 today, stop heparin drip


Avoid nephrotoxin, monitor BMP





Continue to monitor BUN and creatinine --> creatinone trending down slowly


Strict monitoring of I's and O's, apply high knees KELLEY hose


Daily PT/INR


Discharge Planning


And Veronica to monitor in the medical floor.  Patient to start treatment with 

Cytoxan.





Problem Qualifiers





(1) DVT (deep venous thrombosis):  


Qualified Codes:  I82.492 - Acute embolism and thrombosis of other specified 

deep vein of left lower extremity


(2) Hypothyroid:  


Qualified Codes:  E03.9 - Hypothyroidism, unspecified


(3) Type 2 diabetes mellitus:  


Qualified Codes:  E11.9 - Type 2 diabetes mellitus without complications; Z79.4 

- Long term (current) use of insulin


(4) Acute on chronic kidney failure:  


Qualified Codes:  N17.8 - Other acute kidney failure; N18.9 - Chronic kidney 

disease, unspecified








Humphrey Díaz MD Nov 2, 2017 17:49

## 2017-11-03 VITALS
DIASTOLIC BLOOD PRESSURE: 88 MMHG | HEART RATE: 69 BPM | TEMPERATURE: 99 F | OXYGEN SATURATION: 94 % | RESPIRATION RATE: 21 BRPM | SYSTOLIC BLOOD PRESSURE: 155 MMHG

## 2017-11-03 VITALS
RESPIRATION RATE: 20 BRPM | TEMPERATURE: 98.6 F | OXYGEN SATURATION: 94 % | SYSTOLIC BLOOD PRESSURE: 140 MMHG | HEART RATE: 90 BPM | DIASTOLIC BLOOD PRESSURE: 69 MMHG

## 2017-11-03 VITALS
OXYGEN SATURATION: 95 % | DIASTOLIC BLOOD PRESSURE: 73 MMHG | SYSTOLIC BLOOD PRESSURE: 132 MMHG | TEMPERATURE: 98.1 F | RESPIRATION RATE: 14 BRPM | HEART RATE: 78 BPM

## 2017-11-03 VITALS
TEMPERATURE: 98.6 F | DIASTOLIC BLOOD PRESSURE: 98 MMHG | HEART RATE: 70 BPM | OXYGEN SATURATION: 94 % | SYSTOLIC BLOOD PRESSURE: 162 MMHG | RESPIRATION RATE: 16 BRPM

## 2017-11-03 VITALS — HEART RATE: 67 BPM

## 2017-11-03 VITALS
TEMPERATURE: 98.4 F | OXYGEN SATURATION: 97 % | DIASTOLIC BLOOD PRESSURE: 68 MMHG | RESPIRATION RATE: 20 BRPM | HEART RATE: 77 BPM | SYSTOLIC BLOOD PRESSURE: 144 MMHG

## 2017-11-03 VITALS
RESPIRATION RATE: 17 BRPM | TEMPERATURE: 96.9 F | SYSTOLIC BLOOD PRESSURE: 151 MMHG | OXYGEN SATURATION: 94 % | DIASTOLIC BLOOD PRESSURE: 77 MMHG | HEART RATE: 78 BPM

## 2017-11-03 VITALS — HEART RATE: 78 BPM

## 2017-11-03 LAB
ANA SER QL: (no result)
BASOPHILS # BLD AUTO: 0.1 TH/MM3 (ref 0–0.2)
BASOPHILS NFR BLD: 0.5 % (ref 0–2)
BUN SERPL-MCNC: 55 MG/DL (ref 7–18)
CALCIUM SERPL-MCNC: 9.7 MG/DL (ref 8.5–10.1)
CHLORIDE SERPL-SCNC: 98 MEQ/L (ref 98–107)
CREAT SERPL-MCNC: 2.47 MG/DL (ref 0.5–1)
EOSINOPHIL # BLD: 0 TH/MM3 (ref 0–0.4)
EOSINOPHIL NFR BLD: 0 % (ref 0–4)
ERYTHROCYTE [DISTWIDTH] IN BLOOD BY AUTOMATED COUNT: 14.3 % (ref 11.6–17.2)
GFR SERPLBLD BASED ON 1.73 SQ M-ARVRAT: 20 ML/MIN (ref 89–?)
GLUCOSE SERPL-MCNC: 249 MG/DL (ref 74–106)
HCO3 BLD-SCNC: 25.9 MEQ/L (ref 21–32)
HCT VFR BLD CALC: 35.4 % (ref 35–46)
HGB BLD-MCNC: 11.7 GM/DL (ref 11.6–15.3)
LYMPHOCYTES # BLD AUTO: 2 TH/MM3 (ref 1–4.8)
LYMPHOCYTES NFR BLD AUTO: 16.8 % (ref 9–44)
MCH RBC QN AUTO: 31.5 PG (ref 27–34)
MCHC RBC AUTO-ENTMCNC: 33.2 % (ref 32–36)
MCV RBC AUTO: 95 FL (ref 80–100)
MONOCYTE #: 0.7 TH/MM3 (ref 0–0.9)
MONOCYTES NFR BLD: 5.5 % (ref 0–8)
NEUTROPHILS # BLD AUTO: 9.4 TH/MM3 (ref 1.8–7.7)
NEUTROPHILS NFR BLD AUTO: 77.2 % (ref 16–70)
PLATELET # BLD: 304 TH/MM3 (ref 150–450)
PMV BLD AUTO: 8.3 FL (ref 7–11)
RBC # BLD AUTO: 3.72 MIL/MM3 (ref 4–5.3)
SODIUM SERPL-SCNC: 133 MEQ/L (ref 136–145)
T3FREE SERPL-MCNC: 1.3 PG/ML (ref 2.18–3.98)
T4 FREE SERPL-MCNC: 1 NG/DL (ref 0.76–1.46)
WBC # BLD AUTO: 12.1 TH/MM3 (ref 4–11)

## 2017-11-03 RX ADMIN — MYCOPHENOLATE MOFETIL SCH MG: 500 TABLET, FILM COATED ORAL at 05:00

## 2017-11-03 RX ADMIN — PANTOPRAZOLE SCH MG: 40 TABLET, DELAYED RELEASE ORAL at 08:49

## 2017-11-03 RX ADMIN — FUROSEMIDE SCH MG: 10 INJECTION, SOLUTION INTRAMUSCULAR; INTRAVENOUS at 08:49

## 2017-11-03 RX ADMIN — INSULIN ASPART SCH UNITS: 100 INJECTION, SOLUTION INTRAVENOUS; SUBCUTANEOUS at 12:25

## 2017-11-03 RX ADMIN — DOCUSATE SODIUM SCH MG: 100 CAPSULE, LIQUID FILLED ORAL at 21:17

## 2017-11-03 RX ADMIN — ESCITALOPRAM OXALATE SCH MG: 10 TABLET, FILM COATED ORAL at 08:49

## 2017-11-03 RX ADMIN — ACYCLOVIR SCH UNITS: 800 TABLET ORAL at 22:57

## 2017-11-03 RX ADMIN — Medication SCH ML: at 21:17

## 2017-11-03 RX ADMIN — MYCOPHENOLATE MOFETIL SCH MG: 500 TABLET, FILM COATED ORAL at 12:23

## 2017-11-03 RX ADMIN — INSULIN ASPART SCH UNITS: 100 INJECTION, SOLUTION INTRAVENOUS; SUBCUTANEOUS at 17:02

## 2017-11-03 RX ADMIN — DOCUSATE SODIUM SCH MG: 100 CAPSULE, LIQUID FILLED ORAL at 08:49

## 2017-11-03 RX ADMIN — LEVOTHYROXINE SODIUM SCH MCG: 200 TABLET ORAL at 05:00

## 2017-11-03 RX ADMIN — INSULIN ASPART SCH: 100 INJECTION, SOLUTION INTRAVENOUS; SUBCUTANEOUS at 08:48

## 2017-11-03 RX ADMIN — INSULIN ASPART SCH: 100 INJECTION, SOLUTION INTRAVENOUS; SUBCUTANEOUS at 12:25

## 2017-11-03 RX ADMIN — INSULIN ASPART SCH: 100 INJECTION, SOLUTION INTRAVENOUS; SUBCUTANEOUS at 22:58

## 2017-11-03 RX ADMIN — Medication SCH ML: at 08:50

## 2017-11-03 RX ADMIN — HYDROCODONE BITARTRATE AND ACETAMINOPHEN PRN TAB: 10; 325 TABLET ORAL at 18:03

## 2017-11-03 RX ADMIN — INSULIN ASPART SCH: 100 INJECTION, SOLUTION INTRAVENOUS; SUBCUTANEOUS at 17:02

## 2017-11-03 RX ADMIN — OXYBUTYNIN CHLORIDE SCH MG: 5 TABLET ORAL at 21:17

## 2017-11-03 RX ADMIN — WARFARIN SODIUM SCH MG: 5 TABLET ORAL at 16:37

## 2017-11-03 NOTE — PD.ONC.PN
Subjective


Subjective


Remarks


Afebrile overnight. 


Patient resting in room. 


States feet are cold. 


Otherwise without complaints. 


Waiting to be transferred to oncology area so she can receive chemotherapy.





Objective


Data











  Date Time  Temp Pulse Resp B/P (MAP) Pulse Ox O2 Delivery O2 Flow Rate FiO2


 


11/3/17 08:00 98.1 78 14 132/73 (92) 95   


 


11/3/17 04:00 98.6 90 20 140/69 (92) 94   


 


11/3/17 00:00 98.4 77 20 144/68 (93) 97   


 


11/2/17 20:00 99.1 86 20 173/94 (120) 93   


 


11/2/17 16:00 97.7 70 19 158/79 (105) 95   


 


11/2/17 12:00 98.1 75 19 142/65 (90) 95   














 11/3/17 11/3/17 11/3/17





 07:00 15:00 23:00


 


Intake Total 320 ml  


 


Output Total 1350 ml  


 


Balance -1030 ml  








Result Diagram:  


11/3/17 0647                                                                   

             11/3/17 0647





Laboratory Results





Laboratory Tests








Test


  11/2/17


21:15 11/3/17


06:47


 


Urine Total Volume 24 Hours 4530 ML  


 


Urine Total Protein 24 Hour 87803 MG/24HR  


 


White Blood Count  12.1 TH/MM3 


 


Red Blood Count  3.72 MIL/MM3 


 


Hemoglobin  11.7 GM/DL 


 


Hematocrit  35.4 % 


 


Mean Corpuscular Volume  95.0 FL 


 


Mean Corpuscular Hemoglobin  31.5 PG 


 


Mean Corpuscular Hemoglobin


Concent 


  33.2 % 


 


 


Red Cell Distribution Width  14.3 % 


 


Platelet Count  304 TH/MM3 


 


Mean Platelet Volume  8.3 FL 


 


Neutrophils (%) (Auto)  77.2 % 


 


Lymphocytes (%) (Auto)  16.8 % 


 


Monocytes (%) (Auto)  5.5 % 


 


Eosinophils (%) (Auto)  0.0 % 


 


Basophils (%) (Auto)  0.5 % 


 


Neutrophils # (Auto)  9.4 TH/MM3 


 


Lymphocytes # (Auto)  2.0 TH/MM3 


 


Monocytes # (Auto)  0.7 TH/MM3 


 


Eosinophils # (Auto)  0.0 TH/MM3 


 


Basophils # (Auto)  0.1 TH/MM3 


 


CBC Comment  DIFF FINAL 


 


Differential Comment   


 


Activated Partial


Thromboplast Time 


  35.9 SEC 


 


 


Blood Urea Nitrogen  55 MG/DL 


 


Creatinine  2.47 MG/DL 


 


Random Glucose  249 MG/DL 


 


Calcium Level  9.7 MG/DL 


 


Sodium Level  133 MEQ/L 


 


Potassium Level  4.3 MEQ/L 


 


Chloride Level  98 MEQ/L 


 


Carbon Dioxide Level  25.9 MEQ/L 


 


Anion Gap  9 MEQ/L 


 


Estimat Glomerular Filtration


Rate 


  20 ML/MIN 


 











Administered Medications








 Medications


  (Trade)  Dose


 Ordered  Sig/Kandis


 Route


 PRN Reason  Start Time


 Stop Time Status Last Admin


Dose Admin


 


 Sodium Chloride


  (NS Flush)  2 ml  BID


 IV FLUSH


   10/25/17 09:00


    11/3/17 08:50


 


 


 Insulin Aspart


  (NovoLOG


 SUPPLEMENTAL


 SCALE)  1  ACHS SLIDING  SCALE


 SQ


   10/25/17 08:00


    11/3/17 08:48


 


 


 Mycophenolate


 Mofetil


  (Cellcept)  500 mg  Q8HR


 PO


   10/25/17 06:00


    11/3/17 05:00


 


 


 Pantoprazole


 Sodium


  (Protonix)  40 mg  DAILY


 PO


   10/25/17 09:00


    11/3/17 08:49


 


 


 Clonidine


  (Catapres)  0.1 mg  Q6H  PRN


 PO


 SBP>160, DBP>90  10/25/17 03:30


    11/1/17 04:14


 


 


 Acetaminophen/


 Hydrocodone Bitart


  (Norco  Mg)  1 tab  Q4H  PRN


 PO


 Pain 7 to 10  10/25/17 08:30


    11/2/17 05:50


 


 


 Docusate Sodium


  (Colace)  100 mg  BID


 PO


   10/25/17 21:00


    11/3/17 08:49


 


 


 Magnesium


 Hydroxide


  (Milk Of


 Magnesia Liq)  30 ml  BID  PRN


 PO


 Constipation  10/25/17 10:45


    10/30/17 09:20


 


 


 Levothyroxine


 Sodium


  (Synthroid)  200 mcg  DAILY@0600


 PO


   10/26/17 06:00


    11/3/17 05:00


 


 


 Warfarin Sodium


  (Coumadin)  5 mg  DAILY@1600


 PO


   10/26/17 16:00


    11/2/17 16:37


 


 


 Insulin Detemir


  (Levemir Inj)  24 units  HS


 SQ


   10/26/17 21:00


    11/2/17 20:39


 


 


 Oxybutynin


 Chloride


  (Ditropan)  5 mg  HS


 PO


   10/26/17 21:00


    11/2/17 20:36


 


 


 Clonazepam


  (KlonoPIN)  1 mg  Q12HR


 PO


   10/29/17 21:00


    11/3/17 08:49


 


 


 Escitalopram


 Oxalate


  (Lexapro)  5 mg  DAILY


 PO


   10/29/17 12:00


    11/3/17 08:49


 


 


 Methylprednisolone


 Sodium Succinate


  (SoluMEDROL INJ)  60 mg  Q12HR


 IV PUSH


   11/2/17 09:00


    11/3/17 08:50


 


 


 Furosemide


  (Lasix Inj)  40 mg  DAILY


 IV PUSH


   11/3/17 09:00


    11/3/17 08:49


 








Objective Remarks


GENERAL: Middle aged female upright in chair next to bed in nad. 


SKIN: Warm and dry.


HEAD: Normocephalic.


EYES: No injection or drainage. 


NECK: Supple, trachea midline. 


CARDIOVASCULAR: Regular rate and rhythm


RESPIRATORY: Breath sounds equal bilaterally. No accessory muscle use.


GASTROINTESTINAL: Abdomen soft, non-tender, nondistended. 


EXTREMITIES: No cyanosis. lower extremity peripheral edema noted bilaterally


MUSCULOSKELETAL: Adequate muscle tone.


NEUROLOGICAL: No obvious focal deficit. Awake, alert, and oriented x3.





Assessment/Plan


Problem List:  


(1) CKD (chronic kidney disease)


ICD Codes:  N18.9 - Chronic kidney disease, unspecified


(2) Lupus nephritis, ISN/RPS class III


ICD Codes:  M32.14 - Glomerular disease in systemic lupus erythematosus


Plan:  





history:


Workup demonstrated a possible underlying autoimmune disease, possibly lupus 

versus autoimmune hepatitis with STEPHANIE positivity 1 to 1280 and anti-smooth 

antibody positivity.  


--renal biopsy performed on 09/12/2017 that shows focal proliferative and  

necrotizing glomerulonephritis.


++global 13 out of 27 in segmental glomerulosclerosis. 


++interstitial fibrosis and tubular atrophy which is severe. 


--was started on immunosuppressive therapy. has worsening renal function, 

appeared to not have responded.


--In light of her worsening edema, nephrotic syndrome and worsening renal 

function nephrology recommended changing her immunosuppressive medication to 

include Cytoxan.





Assessment


61y/o female with lupus nephritis.  Oncology consulted for administration of 

Cytoxan.


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 

CellCept three times a day for the past month and a half.


h/o Diabetes. chronic pancreatitis. Congestive heart failure. Coronary artery 

disease.


Liver disease. Systemic lupus. Questionable autoimmune hepatitis.


Depression. Anxiety. Hypothyroidism.


COPD.


Plan


1. transfer to [facility] oncology


2. administer Cytoxan via peripheral line. 


3. monitor CBC, CMP, magnesium


Attending Statement


The exam, history, and the medical decision-making described in the above note 

were completed with the assistance of the mid-level provider. I reviewed and 

agree with the findings presented.  I attest that I had a face-to-face 

encounter with the patient on the same day, and personally performed and 

documented my assessment and findings in the medical record.





Pt seen and examined at [facility] oncology.


Awaiting to start cytoxan, confirmed with pharmacy mixed and ready to 

administer.


Pt had premeds, advised to monitor for nausea.


Discussed toxicity related to cytoxan, proceed with tx as planned.





Problem Qualifiers





(1) CKD (chronic kidney disease):  


Qualified Codes:  N18.4 - Chronic kidney disease, stage 4 (severe)








Suzanna Mccollum Nov 3, 2017 11:20


Karly Clark MD Nov 3, 2017 20:53

## 2017-11-03 NOTE — HHI.PR
Subjective


Remarks


BP labile


Patient states swelling is starting to come down


denies cp/sob





Objective


Vitals





Vital Signs








  Date Time  Temp Pulse Resp B/P (MAP) Pulse Ox O2 Delivery O2 Flow Rate FiO2


 


11/3/17 17:53 98.6 70 16 162/98 (119) 94   


 


11/3/17 12:00 96.9 78 17 151/77 (101) 94   


 


11/3/17 09:30  67      


 


11/3/17 08:00 98.1 78 14 132/73 (92) 95   


 


11/3/17 04:00 98.6 90 20 140/69 (92) 94   


 


11/3/17 00:00 98.4 77 20 144/68 (93) 97   


 


11/2/17 20:00 99.1 86 20 173/94 (120) 93   














I/O      


 


 11/2/17 11/2/17 11/2/17 11/3/17 11/3/17 11/3/17





 07:00 15:00 23:00 07:00 15:00 23:00


 


Intake Total 840 ml  1680 ml 320 ml  


 


Output Total    1350 ml  


 


Balance 840 ml  1680 ml -1030 ml  


 


      


 


Intake Oral 840 ml  1680 ml 320 ml  


 


Output Urine Total    1350 ml  


 


# Voids 2  6   


 


# Bowel Movements 0  0 0  








Result Diagram:  


11/3/17 0647                                                                   

             11/3/17 0647





Imaging





Last Impressions








Foot X-Ray 10/28/17 0000 Signed





Impressions: 





 Service Date/Time:  Saturday, October 28, 2017 18:54 - CONCLUSION:  

Significant 





 dorsal soft tissue swelling.  The osseous structures of the forefoot are 

grossly 





 intact.     Jesse Martínez MD 


 


Head CT 10/24/17 1909 Signed





Impressions: 





 Service Date/Time:  Tuesday, October 24, 2017 20:34 - CONCLUSION: Unremarkable 





 study except for mild chronic sinusitis.     TYLER Magana MD 


 


Chest X-Ray 10/24/17 1909 Signed





Impressions: 





 Service Date/Time:  Tuesday, October 24, 2017 19:27 - CONCLUSION:  No acute 





 cardiopulmonary disease.     TYLER Magana MD 


 


Lower Extremity Ultrasound 10/24/17 0000 Signed





Impressions: 





 Service Date/Time:  Tuesday, October 24, 2017 20:03 - CONCLUSION:  Right 





 peroneal vein thrombus.     TYLER Magana MD 








Objective Remarks


AAOx3


Clear lungs BL


S1S2 RRR


significant BL lower extremity edema


Procedures


none


Medications and IVs





Current Medications








 Medications


  (Trade)  Dose


 Ordered  Sig/Kandis


 Route  Start Time


 Stop Time Status Last Admin


 


  (NS Flush)  2 ml  UNSCH  PRN


 IV FLUSH  10/24/17 23:30


     


 


 


  (NS Flush)  2 ml  BID


 IV FLUSH  10/25/17 09:00


    11/3/17 08:50


 


 


  (Narcan Inj)  0.4 mg  UNSCH  PRN


 IV PUSH  10/24/17 23:30


     


 


 


  (D50w (Vial) Inj)  50 ml  UNSCH  PRN


 IV PUSH  10/24/17 23:30


     


 


 


  (Glucagon Inj)  1 mg  UNSCH  PRN


 OTHER  10/24/17 23:30


     


 


 


  (NovoLOG


 SUPPLEMENTAL


 SCALE)  1  ACHS SLIDING  SCALE


 SQ  10/25/17 08:00


    11/3/17 17:02


 


 


  (Protonix)  40 mg  DAILY


 PO  10/25/17 09:00


    11/3/17 08:49


 


 


  (Catapres)  0.1 mg  Q6H  PRN


 PO  10/25/17 03:30


    11/1/17 04:14


 


 


  (Norco  5-325 Mg)  1 tab  Q4H  PRN


 PO  10/25/17 08:30


     


 


 


  (Norco  Mg)  1 tab  Q4H  PRN


 PO  10/25/17 08:30


    11/3/17 18:03


 


 


  (Colace)  100 mg  BID


 PO  10/25/17 21:00


    11/3/17 08:49


 


 


  (Milk Of


 Magnesia Liq)  30 ml  BID  PRN


 PO  10/25/17 10:45


    10/30/17 09:20


 


 


  (Synthroid)  200 mcg  DAILY@0600


 PO  10/26/17 06:00


    11/3/17 05:00


 


 


  (Coumadin)  5 mg  DAILY@1600


 PO  10/26/17 16:00


    11/3/17 16:37


 


 


  (Levemir Inj)  24 units  HS


 SQ  10/26/17 21:00


    11/2/17 20:39


 


 


  (Ditropan)  5 mg  HS


 PO  10/26/17 21:00


    11/2/17 20:36


 


 


  (KlonoPIN)  1 mg  Q12HR


 PO  10/29/17 21:00


    11/3/17 08:49


 


 


  (Lexapro)  5 mg  DAILY


 PO  10/29/17 12:00


    11/3/17 08:49


 


 


  (Pill Splitter)  1 ea  UNSCH  PRN


 OTHER  10/29/17 12:30


     


 


 


  (Lasix Inj)  40 mg  DAILY


 IV PUSH  11/3/17 09:00


    11/3/17 08:49


 


 


  (NovoLOG INJ)  7 units  TIDAC


 SQ  11/3/17 12:00


    11/3/17 17:02


 


 


  (Deltasone)  20 mg  BID


 PO  11/3/17 21:00


     


 


 


  (Kytril Inj)  1 mg  ONCE  ONCE


 IV PUSH  11/3/17 20:00


 11/3/17 20:01   


 


 


 Cyclophosphamide


 685 mg/Sodium


 Chloride  500 ml @ 


 500 mls/hr  ONCE  ONCE


 IV  11/3/17 20:30


 11/3/17 21:29   


 


 


 Sodium Chloride  250 ml @ 0


 mls/hr  ONCE  ONCE


 IV  11/3/17 20:30


 11/3/17 20:31   


 








Urinary Catheter:  No


Vascular Central Line Catheter:  No





A/P


Problem List:  


(1) Lupus nephritis, ISN/RPS class III


ICD Code:  M32.14 - Glomerular disease in systemic lupus erythematosus


Plan:  Nephrology consulted.  Patient currently on Solu Medrol, she failed 

mycophenolate.


And its management as per nephrology recommendations.  Plan is to stop 

mycophenolate and Solu-Medrol once plan to give cyclophosphamide.


She had focal proliferative lupus nephritis/diabetic nephropathy/severe 

interstitial fibrosis and tubular atrophy on kidney biopsy she has poor risk 

factors and possibly will have end-stage renal disease in near future.


On Lasix 40 mg IV daily as per nephrology recommendations.





(2) DVT (deep venous thrombosis)


ICD Code:  I82.409 - Acute embolism and thrombosis of unspecified deep veins of 

unspecified lower extremity


Status:  Acute


Plan:  Treated with IV heparin which has been discontinued.


Therapeutic INR at 2.1.  Continue to monitor PT/INR daily.





(3) Hypothyroid


ICD Code:  E03.9 - Hypothyroidism, unspecified


Status:  Chronic


Plan:  TSH very elevated at 69.9 with very low T3 and low T4.  Lower extremity 

edema could be also secondary to myxedema.


Continue levothyroxine 200 g by mouth daily.


Will recheck TSH, T3 and T4.





(4) Type 2 diabetes mellitus


ICD Code:  E11.9 - Type 2 diabetes mellitus


Status:  Chronic


Plan:  Sugars are labile, very elevated today at a level in the 300s with 

improved control down to 143 on last Accu-Cheks.


Continue insulin Levemir 24 units subcutaneous at at bedtime as well as SSI 

with insulin NovoLog.


11/3 sugar still elevated in the 200s.  Since steroids are being tapered down, 

I will continue same regime as above.  Continue Levemir and SSI with insulin 

NovoLog at same dose.  Continue to monitor Accu-Cheks.





(5) Acute on chronic kidney failure


ICD Code:  N17.9 - Acute kidney failure, unspecified; N18.9 - Chronic kidney 

disease, unspecified


Plan:  As above.  Management as per nephrology.  Continue to monitor BUN/

creatinine, stress and O's, avoid nephrotoxins.





Discharge Planning


Tinea to monitor in the medical floor.  The patient to be transferred to the 

oncology floor for treatment with Cytoxan.





Problem Qualifiers





(1) DVT (deep venous thrombosis):  


Qualified Codes:  I82.492 - Acute embolism and thrombosis of other specified 

deep vein of left lower extremity


(2) Hypothyroid:  


Qualified Codes:  E03.9 - Hypothyroidism, unspecified


(3) Type 2 diabetes mellitus:  


Qualified Codes:  E11.9 - Type 2 diabetes mellitus without complications; Z79.4 

- Long term (current) use of insulin


(4) Acute on chronic kidney failure:  


Qualified Codes:  N17.8 - Other acute kidney failure; N18.9 - Chronic kidney 

disease, unspecified








Humphrey Díaz MD Nov 3, 2017 18:44

## 2017-11-03 NOTE — PD.ONC.PN
Subjective


Subjective


Remarks


Afebrile overnight. 


Patient resting in room. 


States feet are cold. 


Otherwise without complaints. 


Waiting to be transferred to oncology area so she can receive chemotherapy.





Objective


Data











  Date Time  Temp Pulse Resp B/P (MAP) Pulse Ox O2 Delivery O2 Flow Rate FiO2


 


11/3/17 08:00 98.1 78 14 132/73 (92) 95   


 


11/3/17 04:00 98.6 90 20 140/69 (92) 94   


 


11/3/17 00:00 98.4 77 20 144/68 (93) 97   


 


11/2/17 20:00 99.1 86 20 173/94 (120) 93   


 


11/2/17 16:00 97.7 70 19 158/79 (105) 95   


 


11/2/17 12:00 98.1 75 19 142/65 (90) 95   














 11/3/17 11/3/17 11/3/17





 07:00 15:00 23:00


 


Intake Total 320 ml  


 


Output Total 1350 ml  


 


Balance -1030 ml  








Result Diagram:  


11/3/17 0647                                                                   

             11/3/17 0647





Laboratory Results





Laboratory Tests








Test


  11/2/17


21:15 11/3/17


06:47


 


Urine Total Volume 24 Hours 4530 ML  


 


Urine Total Protein 24 Hour 61689 MG/24HR  


 


White Blood Count  12.1 TH/MM3 


 


Red Blood Count  3.72 MIL/MM3 


 


Hemoglobin  11.7 GM/DL 


 


Hematocrit  35.4 % 


 


Mean Corpuscular Volume  95.0 FL 


 


Mean Corpuscular Hemoglobin  31.5 PG 


 


Mean Corpuscular Hemoglobin


Concent 


  33.2 % 


 


 


Red Cell Distribution Width  14.3 % 


 


Platelet Count  304 TH/MM3 


 


Mean Platelet Volume  8.3 FL 


 


Neutrophils (%) (Auto)  77.2 % 


 


Lymphocytes (%) (Auto)  16.8 % 


 


Monocytes (%) (Auto)  5.5 % 


 


Eosinophils (%) (Auto)  0.0 % 


 


Basophils (%) (Auto)  0.5 % 


 


Neutrophils # (Auto)  9.4 TH/MM3 


 


Lymphocytes # (Auto)  2.0 TH/MM3 


 


Monocytes # (Auto)  0.7 TH/MM3 


 


Eosinophils # (Auto)  0.0 TH/MM3 


 


Basophils # (Auto)  0.1 TH/MM3 


 


CBC Comment  DIFF FINAL 


 


Differential Comment   


 


Activated Partial


Thromboplast Time 


  35.9 SEC 


 


 


Blood Urea Nitrogen  55 MG/DL 


 


Creatinine  2.47 MG/DL 


 


Random Glucose  249 MG/DL 


 


Calcium Level  9.7 MG/DL 


 


Sodium Level  133 MEQ/L 


 


Potassium Level  4.3 MEQ/L 


 


Chloride Level  98 MEQ/L 


 


Carbon Dioxide Level  25.9 MEQ/L 


 


Anion Gap  9 MEQ/L 


 


Estimat Glomerular Filtration


Rate 


  20 ML/MIN 


 











Administered Medications








 Medications


  (Trade)  Dose


 Ordered  Sig/Kandis


 Route


 PRN Reason  Start Time


 Stop Time Status Last Admin


Dose Admin


 


 Sodium Chloride


  (NS Flush)  2 ml  BID


 IV FLUSH


   10/25/17 09:00


    11/3/17 08:50


 


 


 Insulin Aspart


  (NovoLOG


 SUPPLEMENTAL


 SCALE)  1  ACHS SLIDING  SCALE


 SQ


   10/25/17 08:00


    11/3/17 08:48


 


 


 Mycophenolate


 Mofetil


  (Cellcept)  500 mg  Q8HR


 PO


   10/25/17 06:00


    11/3/17 05:00


 


 


 Pantoprazole


 Sodium


  (Protonix)  40 mg  DAILY


 PO


   10/25/17 09:00


    11/3/17 08:49


 


 


 Clonidine


  (Catapres)  0.1 mg  Q6H  PRN


 PO


 SBP>160, DBP>90  10/25/17 03:30


    11/1/17 04:14


 


 


 Acetaminophen/


 Hydrocodone Bitart


  (Norco  Mg)  1 tab  Q4H  PRN


 PO


 Pain 7 to 10  10/25/17 08:30


    11/2/17 05:50


 


 


 Docusate Sodium


  (Colace)  100 mg  BID


 PO


   10/25/17 21:00


    11/3/17 08:49


 


 


 Magnesium


 Hydroxide


  (Milk Of


 Magnesia Liq)  30 ml  BID  PRN


 PO


 Constipation  10/25/17 10:45


    10/30/17 09:20


 


 


 Levothyroxine


 Sodium


  (Synthroid)  200 mcg  DAILY@0600


 PO


   10/26/17 06:00


    11/3/17 05:00


 


 


 Warfarin Sodium


  (Coumadin)  5 mg  DAILY@1600


 PO


   10/26/17 16:00


    11/2/17 16:37


 


 


 Insulin Detemir


  (Levemir Inj)  24 units  HS


 SQ


   10/26/17 21:00


    11/2/17 20:39


 


 


 Oxybutynin


 Chloride


  (Ditropan)  5 mg  HS


 PO


   10/26/17 21:00


    11/2/17 20:36


 


 


 Clonazepam


  (KlonoPIN)  1 mg  Q12HR


 PO


   10/29/17 21:00


    11/3/17 08:49


 


 


 Escitalopram


 Oxalate


  (Lexapro)  5 mg  DAILY


 PO


   10/29/17 12:00


    11/3/17 08:49


 


 


 Methylprednisolone


 Sodium Succinate


  (SoluMEDROL INJ)  60 mg  Q12HR


 IV PUSH


   11/2/17 09:00


    11/3/17 08:50


 


 


 Furosemide


  (Lasix Inj)  40 mg  DAILY


 IV PUSH


   11/3/17 09:00


    11/3/17 08:49


 








Objective Remarks


GENERAL: Middle aged female upright in chair next to bed in nad. 


SKIN: Warm and dry.


HEAD: Normocephalic.


EYES: No injection or drainage. 


NECK: Supple, trachea midline. 


CARDIOVASCULAR: Regular rate and rhythm


RESPIRATORY: Breath sounds equal bilaterally. No accessory muscle use.


GASTROINTESTINAL: Abdomen soft, non-tender, nondistended. 


EXTREMITIES: No cyanosis. lower extremity peripheral edema noted bilaterally


MUSCULOSKELETAL: Adequate muscle tone.


NEUROLOGICAL: No obvious focal deficit. Awake, alert, and oriented x3.





Assessment/Plan


Problem List:  


(1) CKD (chronic kidney disease)


ICD Codes:  N18.9 - Chronic kidney disease, unspecified


(2) Lupus nephritis, ISN/RPS class III


ICD Codes:  M32.14 - Glomerular disease in systemic lupus erythematosus


Plan:  





history:


Workup demonstrated a possible underlying autoimmune disease, possibly lupus 

versus autoimmune hepatitis with STEPHANIE positivity 1 to 1280 and anti-smooth 

antibody positivity.  


--renal biopsy performed on 09/12/2017 that shows focal proliferative and  

necrotizing glomerulonephritis.


++global 13 out of 27 in segmental glomerulosclerosis. 


++interstitial fibrosis and tubular atrophy which is severe. 


--was started on immunosuppressive therapy. has worsening renal function, 

appeared to not have responded.


--In light of her worsening edema, nephrotic syndrome and worsening renal 

function nephrology recommended changing her immunosuppressive medication to 

include Cytoxan.





Assessment


63y/o female with lupus nephritis.  Oncology consulted for administration of 

Cytoxan.


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 

CellCept three times a day for the past month and a half.


h/o Diabetes. chronic pancreatitis. Congestive heart failure. Coronary artery 

disease.


Liver disease. Systemic lupus. Questionable autoimmune hepatitis.


Depression. Anxiety. Hypothyroidism.


COPD.


Plan


1. transfer to [facility] oncology


2. administer Cytoxan via peripheral line. 


3. monitor CBC, CMP, magnesium


Attending Statement


The exam, history, and the medical decision-making described in the above note 

were completed with the assistance of the mid-level provider. I reviewed and 

agree with the findings presented.  I attest that I had a face-to-face 

encounter with the patient on the same day, and personally performed and 

documented my assessment and findings in the medical record.





Pt seen and examined at [facility] oncology.


Awaiting to start cytoxan, confirmed with pharmacy mixed and ready to 

administer.


Pt had premeds, advised to monitor for nausea.


Discussed toxicity related to cytoxan, proceed with tx as planned.





Problem Qualifiers





(1) CKD (chronic kidney disease):  


Qualified Codes:  N18.4 - Chronic kidney disease, stage 4 (severe)








Suzanna Mccollum Nov 3, 2017 11:20


Karly Clark MD Nov 3, 2017 20:53

## 2017-11-03 NOTE — HHI.PR
Subjective


Remarks


BP labile


Patient states swelling is starting to come down


denies cp/sob





Objective


Vitals





Vital Signs








  Date Time  Temp Pulse Resp B/P (MAP) Pulse Ox O2 Delivery O2 Flow Rate FiO2


 


11/3/17 17:53 98.6 70 16 162/98 (119) 94   


 


11/3/17 12:00 96.9 78 17 151/77 (101) 94   


 


11/3/17 09:30  67      


 


11/3/17 08:00 98.1 78 14 132/73 (92) 95   


 


11/3/17 04:00 98.6 90 20 140/69 (92) 94   


 


11/3/17 00:00 98.4 77 20 144/68 (93) 97   


 


11/2/17 20:00 99.1 86 20 173/94 (120) 93   














I/O      


 


 11/2/17 11/2/17 11/2/17 11/3/17 11/3/17 11/3/17





 07:00 15:00 23:00 07:00 15:00 23:00


 


Intake Total 840 ml  1680 ml 320 ml  


 


Output Total    1350 ml  


 


Balance 840 ml  1680 ml -1030 ml  


 


      


 


Intake Oral 840 ml  1680 ml 320 ml  


 


Output Urine Total    1350 ml  


 


# Voids 2  6   


 


# Bowel Movements 0  0 0  








Result Diagram:  


11/3/17 0647                                                                   

             11/3/17 0647





Imaging





Last Impressions








Foot X-Ray 10/28/17 0000 Signed





Impressions: 





 Service Date/Time:  Saturday, October 28, 2017 18:54 - CONCLUSION:  

Significant 





 dorsal soft tissue swelling.  The osseous structures of the forefoot are 

grossly 





 intact.     Jesse Martínez MD 


 


Head CT 10/24/17 1909 Signed





Impressions: 





 Service Date/Time:  Tuesday, October 24, 2017 20:34 - CONCLUSION: Unremarkable 





 study except for mild chronic sinusitis.     TYLER Magana MD 


 


Chest X-Ray 10/24/17 1909 Signed





Impressions: 





 Service Date/Time:  Tuesday, October 24, 2017 19:27 - CONCLUSION:  No acute 





 cardiopulmonary disease.     TYLER Magana MD 


 


Lower Extremity Ultrasound 10/24/17 0000 Signed





Impressions: 





 Service Date/Time:  Tuesday, October 24, 2017 20:03 - CONCLUSION:  Right 





 peroneal vein thrombus.     TYLER Magana MD 








Objective Remarks


AAOx3


Clear lungs BL


S1S2 RRR


significant BL lower extremity edema


Procedures


none


Medications and IVs





Current Medications








 Medications


  (Trade)  Dose


 Ordered  Sig/Knadis


 Route  Start Time


 Stop Time Status Last Admin


 


  (NS Flush)  2 ml  UNSCH  PRN


 IV FLUSH  10/24/17 23:30


     


 


 


  (NS Flush)  2 ml  BID


 IV FLUSH  10/25/17 09:00


    11/3/17 08:50


 


 


  (Narcan Inj)  0.4 mg  UNSCH  PRN


 IV PUSH  10/24/17 23:30


     


 


 


  (D50w (Vial) Inj)  50 ml  UNSCH  PRN


 IV PUSH  10/24/17 23:30


     


 


 


  (Glucagon Inj)  1 mg  UNSCH  PRN


 OTHER  10/24/17 23:30


     


 


 


  (NovoLOG


 SUPPLEMENTAL


 SCALE)  1  ACHS SLIDING  SCALE


 SQ  10/25/17 08:00


    11/3/17 17:02


 


 


  (Protonix)  40 mg  DAILY


 PO  10/25/17 09:00


    11/3/17 08:49


 


 


  (Catapres)  0.1 mg  Q6H  PRN


 PO  10/25/17 03:30


    11/1/17 04:14


 


 


  (Norco  5-325 Mg)  1 tab  Q4H  PRN


 PO  10/25/17 08:30


     


 


 


  (Norco  Mg)  1 tab  Q4H  PRN


 PO  10/25/17 08:30


    11/3/17 18:03


 


 


  (Colace)  100 mg  BID


 PO  10/25/17 21:00


    11/3/17 08:49


 


 


  (Milk Of


 Magnesia Liq)  30 ml  BID  PRN


 PO  10/25/17 10:45


    10/30/17 09:20


 


 


  (Synthroid)  200 mcg  DAILY@0600


 PO  10/26/17 06:00


    11/3/17 05:00


 


 


  (Coumadin)  5 mg  DAILY@1600


 PO  10/26/17 16:00


    11/3/17 16:37


 


 


  (Levemir Inj)  24 units  HS


 SQ  10/26/17 21:00


    11/2/17 20:39


 


 


  (Ditropan)  5 mg  HS


 PO  10/26/17 21:00


    11/2/17 20:36


 


 


  (KlonoPIN)  1 mg  Q12HR


 PO  10/29/17 21:00


    11/3/17 08:49


 


 


  (Lexapro)  5 mg  DAILY


 PO  10/29/17 12:00


    11/3/17 08:49


 


 


  (Pill Splitter)  1 ea  UNSCH  PRN


 OTHER  10/29/17 12:30


     


 


 


  (Lasix Inj)  40 mg  DAILY


 IV PUSH  11/3/17 09:00


    11/3/17 08:49


 


 


  (NovoLOG INJ)  7 units  TIDAC


 SQ  11/3/17 12:00


    11/3/17 17:02


 


 


  (Deltasone)  20 mg  BID


 PO  11/3/17 21:00


     


 


 


  (Kytril Inj)  1 mg  ONCE  ONCE


 IV PUSH  11/3/17 20:00


 11/3/17 20:01   


 


 


 Cyclophosphamide


 685 mg/Sodium


 Chloride  500 ml @ 


 500 mls/hr  ONCE  ONCE


 IV  11/3/17 20:30


 11/3/17 21:29   


 


 


 Sodium Chloride  250 ml @ 0


 mls/hr  ONCE  ONCE


 IV  11/3/17 20:30


 11/3/17 20:31   


 








Urinary Catheter:  No


Vascular Central Line Catheter:  No





A/P


Problem List:  


(1) Lupus nephritis, ISN/RPS class III


ICD Code:  M32.14 - Glomerular disease in systemic lupus erythematosus


Plan:  Nephrology consulted.  Patient currently on Solu Medrol, she failed 

mycophenolate.


And its management as per nephrology recommendations.  Plan is to stop 

mycophenolate and Solu-Medrol once plan to give cyclophosphamide.


She had focal proliferative lupus nephritis/diabetic nephropathy/severe 

interstitial fibrosis and tubular atrophy on kidney biopsy she has poor risk 

factors and possibly will have end-stage renal disease in near future.


On Lasix 40 mg IV daily as per nephrology recommendations.





(2) DVT (deep venous thrombosis)


ICD Code:  I82.409 - Acute embolism and thrombosis of unspecified deep veins of 

unspecified lower extremity


Status:  Acute


Plan:  Treated with IV heparin which has been discontinued.


Therapeutic INR at 2.1.  Continue to monitor PT/INR daily.





(3) Hypothyroid


ICD Code:  E03.9 - Hypothyroidism, unspecified


Status:  Chronic


Plan:  TSH very elevated at 69.9 with very low T3 and low T4.  Lower extremity 

edema could be also secondary to myxedema.


Continue levothyroxine 200 g by mouth daily.


Will recheck TSH, T3 and T4.





(4) Type 2 diabetes mellitus


ICD Code:  E11.9 - Type 2 diabetes mellitus


Status:  Chronic


Plan:  Sugars are labile, very elevated today at a level in the 300s with 

improved control down to 143 on last Accu-Cheks.


Continue insulin Levemir 24 units subcutaneous at at bedtime as well as SSI 

with insulin NovoLog.


11/3 sugar still elevated in the 200s.  Since steroids are being tapered down, 

I will continue same regime as above.  Continue Levemir and SSI with insulin 

NovoLog at same dose.  Continue to monitor Accu-Cheks.





(5) Acute on chronic kidney failure


ICD Code:  N17.9 - Acute kidney failure, unspecified; N18.9 - Chronic kidney 

disease, unspecified


Plan:  As above.  Management as per nephrology.  Continue to monitor BUN/

creatinine, stress and O's, avoid nephrotoxins.





Discharge Planning


Tinea to monitor in the medical floor.  The patient to be transferred to the 

oncology floor for treatment with Cytoxan.





Problem Qualifiers





(1) DVT (deep venous thrombosis):  


Qualified Codes:  I82.492 - Acute embolism and thrombosis of other specified 

deep vein of left lower extremity


(2) Hypothyroid:  


Qualified Codes:  E03.9 - Hypothyroidism, unspecified


(3) Type 2 diabetes mellitus:  


Qualified Codes:  E11.9 - Type 2 diabetes mellitus without complications; Z79.4 

- Long term (current) use of insulin


(4) Acute on chronic kidney failure:  


Qualified Codes:  N17.8 - Other acute kidney failure; N18.9 - Chronic kidney 

disease, unspecified








Humphrey Díaz MD Nov 3, 2017 18:44

## 2017-11-03 NOTE — PD.ONC.PN
Subjective


Subjective


Remarks


Afebrile overnight. 


Patient resting in room. 


States feet are cold. 


Otherwise without complaints. 


Waiting to be transferred to oncology area so she can receive chemotherapy.





Objective


Data











  Date Time  Temp Pulse Resp B/P (MAP) Pulse Ox O2 Delivery O2 Flow Rate FiO2


 


11/3/17 08:00 98.1 78 14 132/73 (92) 95   


 


11/3/17 04:00 98.6 90 20 140/69 (92) 94   


 


11/3/17 00:00 98.4 77 20 144/68 (93) 97   


 


11/2/17 20:00 99.1 86 20 173/94 (120) 93   


 


11/2/17 16:00 97.7 70 19 158/79 (105) 95   


 


11/2/17 12:00 98.1 75 19 142/65 (90) 95   














 11/3/17 11/3/17 11/3/17





 07:00 15:00 23:00


 


Intake Total 320 ml  


 


Output Total 1350 ml  


 


Balance -1030 ml  








Result Diagram:  


11/3/17 0647                                                                   

             11/3/17 0647





Laboratory Results





Laboratory Tests








Test


  11/2/17


21:15 11/3/17


06:47


 


Urine Total Volume 24 Hours 4530 ML  


 


Urine Total Protein 24 Hour 52227 MG/24HR  


 


White Blood Count  12.1 TH/MM3 


 


Red Blood Count  3.72 MIL/MM3 


 


Hemoglobin  11.7 GM/DL 


 


Hematocrit  35.4 % 


 


Mean Corpuscular Volume  95.0 FL 


 


Mean Corpuscular Hemoglobin  31.5 PG 


 


Mean Corpuscular Hemoglobin


Concent 


  33.2 % 


 


 


Red Cell Distribution Width  14.3 % 


 


Platelet Count  304 TH/MM3 


 


Mean Platelet Volume  8.3 FL 


 


Neutrophils (%) (Auto)  77.2 % 


 


Lymphocytes (%) (Auto)  16.8 % 


 


Monocytes (%) (Auto)  5.5 % 


 


Eosinophils (%) (Auto)  0.0 % 


 


Basophils (%) (Auto)  0.5 % 


 


Neutrophils # (Auto)  9.4 TH/MM3 


 


Lymphocytes # (Auto)  2.0 TH/MM3 


 


Monocytes # (Auto)  0.7 TH/MM3 


 


Eosinophils # (Auto)  0.0 TH/MM3 


 


Basophils # (Auto)  0.1 TH/MM3 


 


CBC Comment  DIFF FINAL 


 


Differential Comment   


 


Activated Partial


Thromboplast Time 


  35.9 SEC 


 


 


Blood Urea Nitrogen  55 MG/DL 


 


Creatinine  2.47 MG/DL 


 


Random Glucose  249 MG/DL 


 


Calcium Level  9.7 MG/DL 


 


Sodium Level  133 MEQ/L 


 


Potassium Level  4.3 MEQ/L 


 


Chloride Level  98 MEQ/L 


 


Carbon Dioxide Level  25.9 MEQ/L 


 


Anion Gap  9 MEQ/L 


 


Estimat Glomerular Filtration


Rate 


  20 ML/MIN 


 











Administered Medications








 Medications


  (Trade)  Dose


 Ordered  Sig/Kandis


 Route


 PRN Reason  Start Time


 Stop Time Status Last Admin


Dose Admin


 


 Sodium Chloride


  (NS Flush)  2 ml  BID


 IV FLUSH


   10/25/17 09:00


    11/3/17 08:50


 


 


 Insulin Aspart


  (NovoLOG


 SUPPLEMENTAL


 SCALE)  1  ACHS SLIDING  SCALE


 SQ


   10/25/17 08:00


    11/3/17 08:48


 


 


 Mycophenolate


 Mofetil


  (Cellcept)  500 mg  Q8HR


 PO


   10/25/17 06:00


    11/3/17 05:00


 


 


 Pantoprazole


 Sodium


  (Protonix)  40 mg  DAILY


 PO


   10/25/17 09:00


    11/3/17 08:49


 


 


 Clonidine


  (Catapres)  0.1 mg  Q6H  PRN


 PO


 SBP>160, DBP>90  10/25/17 03:30


    11/1/17 04:14


 


 


 Acetaminophen/


 Hydrocodone Bitart


  (Norco  Mg)  1 tab  Q4H  PRN


 PO


 Pain 7 to 10  10/25/17 08:30


    11/2/17 05:50


 


 


 Docusate Sodium


  (Colace)  100 mg  BID


 PO


   10/25/17 21:00


    11/3/17 08:49


 


 


 Magnesium


 Hydroxide


  (Milk Of


 Magnesia Liq)  30 ml  BID  PRN


 PO


 Constipation  10/25/17 10:45


    10/30/17 09:20


 


 


 Levothyroxine


 Sodium


  (Synthroid)  200 mcg  DAILY@0600


 PO


   10/26/17 06:00


    11/3/17 05:00


 


 


 Warfarin Sodium


  (Coumadin)  5 mg  DAILY@1600


 PO


   10/26/17 16:00


    11/2/17 16:37


 


 


 Insulin Detemir


  (Levemir Inj)  24 units  HS


 SQ


   10/26/17 21:00


    11/2/17 20:39


 


 


 Oxybutynin


 Chloride


  (Ditropan)  5 mg  HS


 PO


   10/26/17 21:00


    11/2/17 20:36


 


 


 Clonazepam


  (KlonoPIN)  1 mg  Q12HR


 PO


   10/29/17 21:00


    11/3/17 08:49


 


 


 Escitalopram


 Oxalate


  (Lexapro)  5 mg  DAILY


 PO


   10/29/17 12:00


    11/3/17 08:49


 


 


 Methylprednisolone


 Sodium Succinate


  (SoluMEDROL INJ)  60 mg  Q12HR


 IV PUSH


   11/2/17 09:00


    11/3/17 08:50


 


 


 Furosemide


  (Lasix Inj)  40 mg  DAILY


 IV PUSH


   11/3/17 09:00


    11/3/17 08:49


 








Objective Remarks


GENERAL: Middle aged female upright in chair next to bed in nad. 


SKIN: Warm and dry.


HEAD: Normocephalic.


EYES: No injection or drainage. 


NECK: Supple, trachea midline. 


CARDIOVASCULAR: Regular rate and rhythm


RESPIRATORY: Breath sounds equal bilaterally. No accessory muscle use.


GASTROINTESTINAL: Abdomen soft, non-tender, nondistended. 


EXTREMITIES: No cyanosis. lower extremity peripheral edema noted bilaterally


MUSCULOSKELETAL: Adequate muscle tone.


NEUROLOGICAL: No obvious focal deficit. Awake, alert, and oriented x3.





Assessment/Plan


Problem List:  


(1) CKD (chronic kidney disease)


ICD Codes:  N18.9 - Chronic kidney disease, unspecified


(2) Lupus nephritis, ISN/RPS class III


ICD Codes:  M32.14 - Glomerular disease in systemic lupus erythematosus


Plan:  





history:


Workup demonstrated a possible underlying autoimmune disease, possibly lupus 

versus autoimmune hepatitis with STEPHANIE positivity 1 to 1280 and anti-smooth 

antibody positivity.  


--renal biopsy performed on 09/12/2017 that shows focal proliferative and  

necrotizing glomerulonephritis.


++global 13 out of 27 in segmental glomerulosclerosis. 


++interstitial fibrosis and tubular atrophy which is severe. 


--was started on immunosuppressive therapy. has worsening renal function, 

appeared to not have responded.


--In light of her worsening edema, nephrotic syndrome and worsening renal 

function nephrology recommended changing her immunosuppressive medication to 

include Cytoxan.





Assessment


63y/o female with lupus nephritis.  Oncology consulted for administration of 

Cytoxan.


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 

CellCept three times a day for the past month and a half.


h/o Diabetes. chronic pancreatitis. Congestive heart failure. Coronary artery 

disease.


Liver disease. Systemic lupus. Questionable autoimmune hepatitis.


Depression. Anxiety. Hypothyroidism.


COPD.


Plan


1. transfer to [facility] oncology


2. administer Cytoxan via peripheral line. 


3. monitor CBC, CMP, magnesium


Attending Statement


The exam, history, and the medical decision-making described in the above note 

were completed with the assistance of the mid-level provider. I reviewed and 

agree with the findings presented.  I attest that I had a face-to-face 

encounter with the patient on the same day, and personally performed and 

documented my assessment and findings in the medical record.





Pt seen and examined at [facility] oncology.


Awaiting to start cytoxan, confirmed with pharmacy mixed and ready to 

administer.


Pt had premeds, advised to monitor for nausea.


Discussed toxicity related to cytoxan, proceed with tx as planned.





Problem Qualifiers





(1) CKD (chronic kidney disease):  


Qualified Codes:  N18.4 - Chronic kidney disease, stage 4 (severe)








Suzanna Mccollum Nov 3, 2017 11:20


Karly Clark MD Nov 3, 2017 20:53

## 2017-11-04 VITALS
DIASTOLIC BLOOD PRESSURE: 68 MMHG | SYSTOLIC BLOOD PRESSURE: 140 MMHG | TEMPERATURE: 98.2 F | RESPIRATION RATE: 16 BRPM | HEART RATE: 76 BPM | OXYGEN SATURATION: 93 %

## 2017-11-04 VITALS
HEART RATE: 73 BPM | OXYGEN SATURATION: 92 % | SYSTOLIC BLOOD PRESSURE: 149 MMHG | DIASTOLIC BLOOD PRESSURE: 75 MMHG | RESPIRATION RATE: 18 BRPM | TEMPERATURE: 98.4 F

## 2017-11-04 VITALS
OXYGEN SATURATION: 94 % | RESPIRATION RATE: 18 BRPM | TEMPERATURE: 98.6 F | HEART RATE: 76 BPM | DIASTOLIC BLOOD PRESSURE: 74 MMHG | SYSTOLIC BLOOD PRESSURE: 138 MMHG

## 2017-11-04 VITALS
OXYGEN SATURATION: 94 % | DIASTOLIC BLOOD PRESSURE: 79 MMHG | HEART RATE: 94 BPM | TEMPERATURE: 98.7 F | RESPIRATION RATE: 18 BRPM | SYSTOLIC BLOOD PRESSURE: 145 MMHG

## 2017-11-04 VITALS
RESPIRATION RATE: 18 BRPM | OXYGEN SATURATION: 95 % | DIASTOLIC BLOOD PRESSURE: 96 MMHG | TEMPERATURE: 98.2 F | SYSTOLIC BLOOD PRESSURE: 171 MMHG | HEART RATE: 65 BPM

## 2017-11-04 VITALS
RESPIRATION RATE: 15 BRPM | DIASTOLIC BLOOD PRESSURE: 79 MMHG | HEART RATE: 63 BPM | SYSTOLIC BLOOD PRESSURE: 148 MMHG | OXYGEN SATURATION: 95 % | TEMPERATURE: 98.6 F

## 2017-11-04 VITALS — HEART RATE: 67 BPM

## 2017-11-04 VITALS — HEART RATE: 94 BPM

## 2017-11-04 LAB
ALBUMIN SERPL-MCNC: 2.1 GM/DL (ref 3.4–5)
ALP SERPL-CCNC: 142 U/L (ref 45–117)
ALT SERPL-CCNC: 28 U/L (ref 10–53)
AST SERPL-CCNC: 33 U/L (ref 15–37)
BASOPHILS # BLD AUTO: 0 TH/MM3 (ref 0–0.2)
BASOPHILS NFR BLD: 0.2 % (ref 0–2)
BILIRUB SERPL-MCNC: 0.2 MG/DL (ref 0.2–1)
BUN SERPL-MCNC: 60 MG/DL (ref 7–18)
CALCIUM SERPL-MCNC: 8.6 MG/DL (ref 8.5–10.1)
CHLORIDE SERPL-SCNC: 98 MEQ/L (ref 98–107)
CREAT SERPL-MCNC: 2.27 MG/DL (ref 0.5–1)
EOSINOPHIL # BLD: 0 TH/MM3 (ref 0–0.4)
EOSINOPHIL NFR BLD: 0 % (ref 0–4)
ERYTHROCYTE [DISTWIDTH] IN BLOOD BY AUTOMATED COUNT: 14.7 % (ref 11.6–17.2)
GFR SERPLBLD BASED ON 1.73 SQ M-ARVRAT: 22 ML/MIN (ref 89–?)
GLUCOSE SERPL-MCNC: 181 MG/DL (ref 74–106)
HCO3 BLD-SCNC: 27.3 MEQ/L (ref 21–32)
HCT VFR BLD CALC: 33.8 % (ref 35–46)
HGB BLD-MCNC: 11.1 GM/DL (ref 11.6–15.3)
INR PPP: 2.5 RATIO
LYMPHOCYTES # BLD AUTO: 1.6 TH/MM3 (ref 1–4.8)
LYMPHOCYTES NFR BLD AUTO: 13.9 % (ref 9–44)
MAGNESIUM SERPL-MCNC: 1.9 MG/DL (ref 1.5–2.5)
MCH RBC QN AUTO: 31.6 PG (ref 27–34)
MCHC RBC AUTO-ENTMCNC: 32.8 % (ref 32–36)
MCV RBC AUTO: 96.3 FL (ref 80–100)
MONOCYTE #: 0.6 TH/MM3 (ref 0–0.9)
MONOCYTES NFR BLD: 4.9 % (ref 0–8)
NEUTROPHILS # BLD AUTO: 9.5 TH/MM3 (ref 1.8–7.7)
NEUTROPHILS NFR BLD AUTO: 81 % (ref 16–70)
PHOSPHATE SERPL-MCNC: 2.6 MG/DL (ref 2.5–4.9)
PLATELET # BLD: 283 TH/MM3 (ref 150–450)
PMV BLD AUTO: 8.2 FL (ref 7–11)
PROT SERPL-MCNC: 6.1 GM/DL (ref 6.4–8.2)
PROTHROMBIN TIME: 28.8 SEC (ref 9.8–11.6)
RBC # BLD AUTO: 3.51 MIL/MM3 (ref 4–5.3)
SODIUM SERPL-SCNC: 132 MEQ/L (ref 136–145)
WBC # BLD AUTO: 11.7 TH/MM3 (ref 4–11)

## 2017-11-04 RX ADMIN — INSULIN ASPART SCH UNITS: 100 INJECTION, SOLUTION INTRAVENOUS; SUBCUTANEOUS at 09:27

## 2017-11-04 RX ADMIN — ESCITALOPRAM OXALATE SCH MG: 10 TABLET, FILM COATED ORAL at 09:33

## 2017-11-04 RX ADMIN — INSULIN ASPART SCH UNITS: 100 INJECTION, SOLUTION INTRAVENOUS; SUBCUTANEOUS at 12:58

## 2017-11-04 RX ADMIN — INSULIN ASPART SCH: 100 INJECTION, SOLUTION INTRAVENOUS; SUBCUTANEOUS at 09:27

## 2017-11-04 RX ADMIN — WARFARIN SODIUM SCH MG: 5 TABLET ORAL at 17:13

## 2017-11-04 RX ADMIN — DOCUSATE SODIUM SCH MG: 100 CAPSULE, LIQUID FILLED ORAL at 21:51

## 2017-11-04 RX ADMIN — HYDROCODONE BITARTRATE AND ACETAMINOPHEN PRN TAB: 10; 325 TABLET ORAL at 23:12

## 2017-11-04 RX ADMIN — LEVOTHYROXINE SODIUM SCH MCG: 200 TABLET ORAL at 04:51

## 2017-11-04 RX ADMIN — DOCUSATE SODIUM SCH MG: 100 CAPSULE, LIQUID FILLED ORAL at 09:33

## 2017-11-04 RX ADMIN — HYDROCODONE BITARTRATE AND ACETAMINOPHEN PRN TAB: 10; 325 TABLET ORAL at 05:01

## 2017-11-04 RX ADMIN — OXYBUTYNIN CHLORIDE SCH MG: 5 TABLET ORAL at 21:52

## 2017-11-04 RX ADMIN — PANTOPRAZOLE SCH MG: 40 TABLET, DELAYED RELEASE ORAL at 09:33

## 2017-11-04 RX ADMIN — INSULIN ASPART SCH UNITS: 100 INJECTION, SOLUTION INTRAVENOUS; SUBCUTANEOUS at 17:13

## 2017-11-04 RX ADMIN — Medication SCH ML: at 21:52

## 2017-11-04 RX ADMIN — INSULIN ASPART SCH: 100 INJECTION, SOLUTION INTRAVENOUS; SUBCUTANEOUS at 12:00

## 2017-11-04 RX ADMIN — ACYCLOVIR SCH UNITS: 800 TABLET ORAL at 22:06

## 2017-11-04 RX ADMIN — INSULIN ASPART SCH: 100 INJECTION, SOLUTION INTRAVENOUS; SUBCUTANEOUS at 22:06

## 2017-11-04 RX ADMIN — FUROSEMIDE SCH MG: 10 INJECTION, SOLUTION INTRAMUSCULAR; INTRAVENOUS at 09:33

## 2017-11-04 RX ADMIN — Medication SCH ML: at 09:34

## 2017-11-04 RX ADMIN — INSULIN ASPART SCH: 100 INJECTION, SOLUTION INTRAVENOUS; SUBCUTANEOUS at 17:14

## 2017-11-04 NOTE — HHI.PR
Subjective


Remarks


Patient seen and examined this morning.  Temperature 98.4, pulse 73, 

respiratory rate 18, blood pressure 149/75, pulse ox 92 on room air.  Says she 

is still making good urine.  Her only complaint is that she's feeling chilly at 

times.  She does have a little bit of a cough that she had prior to this 

admission.  We'll start incentive spirometry.  She reports that her lower 

extremity swelling is improving.





Objective


Vitals





Vital Signs








  Date Time  Temp Pulse Resp B/P (MAP) Pulse Ox O2 Delivery O2 Flow Rate FiO2


 


11/4/17 09:16 98.4 73 18 149/75 (99) 92   


 


11/4/17 06:00   0     


 


11/4/17 04:55 98.2 65 18 171/96 (121) 95   


 


11/4/17 03:59  67      


 


11/4/17 00:15  59      


 


11/4/17 00:15 98.6 63 15 148/79 (102) 95   


 


11/3/17 20:12  78      


 


11/3/17 20:08 99.0 69 21 155/88 (110) 94   


 


11/3/17 17:53 98.6 70 16 162/98 (119) 94   


 


11/3/17 12:00 96.9 78 17 151/77 (101) 94   














I/O      


 


 11/3/17 11/3/17 11/3/17 11/4/17 11/4/17 11/4/17





 07:00 15:00 23:00 07:00 15:00 23:00


 


Intake Total 320 ml  360 ml   


 


Output Total 1350 ml     


 


Balance -1030 ml  360 ml   


 


      


 


Intake Oral 320 ml  360 ml   


 


Output Urine Total 1350 ml     


 


# Voids    1  


 


# Bowel Movements 0     








Result Diagram:  


11/4/17 0523                                                                   

             11/4/17 0523





Imaging





Last Impressions








Foot X-Ray 10/28/17 0000 Signed





Impressions: 





 Service Date/Time:  Saturday, October 28, 2017 18:54 - CONCLUSION:  

Significant 





 dorsal soft tissue swelling.  The osseous structures of the forefoot are 

grossly 





 intact.     Jesse Martínez MD 


 


Head CT 10/24/17 1909 Signed





Impressions: 





 Service Date/Time:  Tuesday, October 24, 2017 20:34 - CONCLUSION: Unremarkable 





 study except for mild chronic sinusitis.     TYLER Magana MD 


 


Chest X-Ray 10/24/17 1909 Signed





Impressions: 





 Service Date/Time:  Tuesday, October 24, 2017 19:27 - CONCLUSION:  No acute 





 cardiopulmonary disease.     TYLER Magana MD 


 


Lower Extremity Ultrasound 10/24/17 0000 Signed





Impressions: 





 Service Date/Time:  Tuesday, October 24, 2017 20:03 - CONCLUSION:  Right 





 peroneal vein thrombus.     TYLER Magana MD 








Objective Remarks


GEN: Well-developed, well-nourished patient. No acute distress.


CV: Regular rate and rhythm without obvious murmurs


LUNGS: Clear to auscultation bilaterally. Normal respiratory effort. No 

wheezes.  Crackling in the upper lobes bilaterally improved with coughing


GI: Soft, nontender, nondistended. No palpable masses. Bowel sounds WNL.


EXT: 2+ pitting edema up to mid shin


NEURO/PSYCH: Afocal. Awake, alert, and oriented x3. Appropriate insight and 

judgment.


Procedures


none


Medications and IVs





Current Medications








 Medications


  (Trade)  Dose


 Ordered  Sig/Kandis


 Route  Start Time


 Stop Time Status Last Admin


 


  (NS Flush)  2 ml  UNSCH  PRN


 IV FLUSH  10/24/17 23:30


    11/4/17 09:34


 


 


  (NS Flush)  2 ml  BID


 IV FLUSH  10/25/17 09:00


    11/4/17 09:34


 


 


  (Narcan Inj)  0.4 mg  UNSCH  PRN


 IV PUSH  10/24/17 23:30


     


 


 


  (D50w (Vial) Inj)  50 ml  UNSCH  PRN


 IV PUSH  10/24/17 23:30


     


 


 


  (Glucagon Inj)  1 mg  UNSCH  PRN


 OTHER  10/24/17 23:30


     


 


 


  (NovoLOG


 SUPPLEMENTAL


 SCALE)  1  ACHS SLIDING  SCALE


 SQ  10/25/17 08:00


    11/4/17 09:27


 


 


  (Protonix)  40 mg  DAILY


 PO  10/25/17 09:00


    11/4/17 09:33


 


 


  (Catapres)  0.1 mg  Q6H  PRN


 PO  10/25/17 03:30


    11/1/17 04:14


 


 


  (Norco  5-325 Mg)  1 tab  Q4H  PRN


 PO  10/25/17 08:30


     


 


 


  (Norco  Mg)  1 tab  Q4H  PRN


 PO  10/25/17 08:30


    11/4/17 05:01


 


 


  (Colace)  100 mg  BID


 PO  10/25/17 21:00


    11/4/17 09:33


 


 


  (Milk Of


 Magnesia Liq)  30 ml  BID  PRN


 PO  10/25/17 10:45


    10/30/17 09:20


 


 


  (Synthroid)  200 mcg  DAILY@0600


 PO  10/26/17 06:00


    11/4/17 04:51


 


 


  (Coumadin)  5 mg  DAILY@1600


 PO  10/26/17 16:00


    11/3/17 16:37


 


 


  (Levemir Inj)  24 units  HS


 SQ  10/26/17 21:00


    11/3/17 22:57


 


 


  (Ditropan)  5 mg  HS


 PO  10/26/17 21:00


    11/3/17 21:17


 


 


  (KlonoPIN)  1 mg  Q12HR


 PO  10/29/17 21:00


    11/4/17 09:33


 


 


  (Lexapro)  5 mg  DAILY


 PO  10/29/17 12:00


    11/4/17 09:33


 


 


  (Pill Splitter)  1 ea  UNSCH  PRN


 OTHER  10/29/17 12:30


     


 


 


  (Lasix Inj)  40 mg  DAILY


 IV PUSH  11/3/17 09:00


    11/4/17 09:33


 


 


  (NovoLOG INJ)  7 units  TIDAC


 SQ  11/3/17 12:00


    11/4/17 09:27


 


 


  (Deltasone)  20 mg  BID


 PO  11/3/17 21:00


    11/4/17 09:33


 


 


 Pharmacy Profile


 Note  0 ml @ 0


 mls/hr  UNSCH


 OTHER  11/3/17 18:45


     


 


 


  (Catapres)  0.1 mg  Q6H  PRN


 PO  11/4/17 10:00


   UNV  


 











A/P


Problem List:  


(1) Lupus nephritis, ISN/RPS class III


ICD Code:  M32.14 - Glomerular disease in systemic lupus erythematosus


Plan:  Focal proliferative lupus nephritis/diabetic nephropathy/severe 

interstitial fibrosis and tubular atrophy on kidney biopsy she has poor risk 

factors and possibly will have end-stage renal disease in near future





Continue Cyclophosphamide, holding steroids at this time


Consider cyclophosphamide IV monthly appreciate Hematology /Onc to assist


Check STEPHANIE and serum complements: Results pending 


24-hour urine for protein 11.651 grams Nephrotic range


Give Lasix 40 mg IV daily





(2) DVT (deep venous thrombosis)


ICD Code:  I82.409 - Acute embolism and thrombosis of unspecified deep veins of 

unspecified lower extremity


Status:  Acute


Plan:  Patient has been bridged to warfarin


Continue warfarin 5 mg by mouth daily


Therapeutic INR at 2.5.  Continue to monitor PT/INR daily.





(3) Hypothyroid


ICD Code:  E03.9 - Hypothyroidism, unspecified


Status:  Chronic


Plan:  TSH very elevated at 69.9 with very low T3 and low T4.  Lower extremity 

edema could be also secondary to myxedema.


Continue levothyroxine 200 g by mouth daily.


Will recheck TSH, T3 and T4.





(4) Type 2 diabetes mellitus


ICD Code:  E11.9 - Type 2 diabetes mellitus


Status:  Chronic


Plan:  Current bedside glucose ranging between 166-194


Continue insulin Levemir 24 units subcutaneous at at bedtime as well as SSI 

with insulin NovoLog.


Continue to monitor bedside glucose per protocol.  Anticipate improvement with 

steroid cessation





(5) Acute on chronic kidney failure


ICD Code:  N17.9 - Acute kidney failure, unspecified; N18.9 - Chronic kidney 

disease, unspecified


Plan:  As above.  Management as per nephrology.  Continue to monitor BUN/

creatinine, stress and O's, avoid nephrotoxins.





Diet: Diabetic diet


Fluids: By mouth hydration


Monitor electrolytes and replace accordingly


Out of bed with assistance


Vitals per protocol


DVT prophylaxis with warfarin


Currently on telemetry





Discharge Planning


Undetermined at this time.  Starting Cytoxan on the oncology floor for 

treatment.





Problem Qualifiers





(1) DVT (deep venous thrombosis):  


Qualified Codes:  I82.492 - Acute embolism and thrombosis of other specified 

deep vein of left lower extremity


(2) Hypothyroid:  


Qualified Codes:  E03.9 - Hypothyroidism, unspecified


(3) Type 2 diabetes mellitus:  


Qualified Codes:  E11.9 - Type 2 diabetes mellitus without complications; Z79.4 

- Long term (current) use of insulin


(4) Acute on chronic kidney failure:  


Qualified Codes:  N17.8 - Other acute kidney failure; N18.9 - Chronic kidney 

disease, unspecified








Salvador Galindo MD, R3 Nov 4, 2017 09:54

## 2017-11-04 NOTE — PD.ONC.PN
Subjective


Subjective


Remarks


Afebrile overnight. 


Patient tolerated chemotherapy yesterday without adverse reaction. 


no nausea or vomiting. 


no rash.





Objective


Data











  Date Time  Temp Pulse Resp B/P (MAP) Pulse Ox O2 Delivery O2 Flow Rate FiO2


 


11/4/17 09:16 98.4 73 18 149/75 (99) 92   


 


11/4/17 06:00   0     


 


11/4/17 04:55 98.2 65 18 171/96 (121) 95   


 


11/4/17 03:59  67      


 


11/4/17 00:15  59      


 


11/4/17 00:15 98.6 63 15 148/79 (102) 95   


 


11/3/17 20:12  78      


 


11/3/17 20:08 99.0 69 21 155/88 (110) 94   


 


11/3/17 17:53 98.6 70 16 162/98 (119) 94   


 


11/3/17 12:00 96.9 78 17 151/77 (101) 94   








Result Diagram:  


11/4/17 0523                                                                   

             11/4/17 0523





Laboratory Results





Laboratory Tests








Test


  11/4/17


05:23


 


White Blood Count 11.7 TH/MM3 


 


Red Blood Count 3.51 MIL/MM3 


 


Hemoglobin 11.1 GM/DL 


 


Hematocrit 33.8 % 


 


Mean Corpuscular Volume 96.3 FL 


 


Mean Corpuscular Hemoglobin 31.6 PG 


 


Mean Corpuscular Hemoglobin


Concent 32.8 % 


 


 


Red Cell Distribution Width 14.7 % 


 


Platelet Count 283 TH/MM3 


 


Mean Platelet Volume 8.2 FL 


 


Neutrophils (%) (Auto) 81.0 % 


 


Lymphocytes (%) (Auto) 13.9 % 


 


Monocytes (%) (Auto) 4.9 % 


 


Eosinophils (%) (Auto) 0.0 % 


 


Basophils (%) (Auto) 0.2 % 


 


Neutrophils # (Auto) 9.5 TH/MM3 


 


Lymphocytes # (Auto) 1.6 TH/MM3 


 


Monocytes # (Auto) 0.6 TH/MM3 


 


Eosinophils # (Auto) 0.0 TH/MM3 


 


Basophils # (Auto) 0.0 TH/MM3 


 


CBC Comment DIFF FINAL 


 


Differential Comment  


 


Prothrombin Time 28.8 SEC 


 


Prothromb Time International


Ratio 2.5 RATIO 


 


 


Blood Urea Nitrogen 60 MG/DL 


 


Creatinine 2.27 MG/DL 


 


Random Glucose 181 MG/DL 


 


Total Protein 6.1 GM/DL 


 


Albumin 2.1 GM/DL 


 


Calcium Level 8.6 MG/DL 


 


Phosphorus Level 2.6 MG/DL 


 


Magnesium Level 1.9 MG/DL 


 


Alkaline Phosphatase 142 U/L 


 


Aspartate Amino Transf


(AST/SGOT) 33 U/L 


 


 


Alanine Aminotransferase


(ALT/SGPT) 28 U/L 


 


 


Total Bilirubin 0.2 MG/DL 


 


Sodium Level 132 MEQ/L 


 


Potassium Level 4.1 MEQ/L 


 


Chloride Level 98 MEQ/L 


 


Carbon Dioxide Level 27.3 MEQ/L 


 


Anion Gap 7 MEQ/L 


 


Estimat Glomerular Filtration


Rate 22 ML/MIN 


 











Administered Medications








 Medications


  (Trade)  Dose


 Ordered  Sig/Kandis


 Route


 PRN Reason  Start Time


 Stop Time Status Last Admin


Dose Admin


 


 Sodium Chloride


  (NS Flush)  2 ml  UNSCH  PRN


 IV FLUSH


 FLUSH AFTER USING IV ACCESS  10/24/17 23:30


    11/4/17 09:34


 


 


 Sodium Chloride


  (NS Flush)  2 ml  BID


 IV FLUSH


   10/25/17 09:00


    11/4/17 09:34


 


 


 Insulin Aspart


  (NovoLOG


 SUPPLEMENTAL


 SCALE)  1  ACHS SLIDING  SCALE


 SQ


   10/25/17 08:00


    11/4/17 09:27


 


 


 Pantoprazole


 Sodium


  (Protonix)  40 mg  DAILY


 PO


   10/25/17 09:00


    11/4/17 09:33


 


 


 Clonidine


  (Catapres)  0.1 mg  Q6H  PRN


 PO


 SBP>160, DBP>90  10/25/17 03:30


    11/1/17 04:14


 


 


 Acetaminophen/


 Hydrocodone Bitart


  (Norco  Mg)  1 tab  Q4H  PRN


 PO


 Pain 7 to 10  10/25/17 08:30


    11/4/17 05:01


 


 


 Docusate Sodium


  (Colace)  100 mg  BID


 PO


   10/25/17 21:00


    11/4/17 09:33


 


 


 Magnesium


 Hydroxide


  (Milk Of


 Magnesia Liq)  30 ml  BID  PRN


 PO


 Constipation  10/25/17 10:45


    10/30/17 09:20


 


 


 Levothyroxine


 Sodium


  (Synthroid)  200 mcg  DAILY@0600


 PO


   10/26/17 06:00


    11/4/17 04:51


 


 


 Warfarin Sodium


  (Coumadin)  5 mg  DAILY@1600


 PO


   10/26/17 16:00


    11/3/17 16:37


 


 


 Insulin Detemir


  (Levemir Inj)  24 units  HS


 SQ


   10/26/17 21:00


    11/3/17 22:57


 


 


 Oxybutynin


 Chloride


  (Ditropan)  5 mg  HS


 PO


   10/26/17 21:00


    11/3/17 21:17


 


 


 Clonazepam


  (KlonoPIN)  1 mg  Q12HR


 PO


   10/29/17 21:00


    11/4/17 09:33


 


 


 Escitalopram


 Oxalate


  (Lexapro)  5 mg  DAILY


 PO


   10/29/17 12:00


    11/4/17 09:33


 


 


 Furosemide


  (Lasix Inj)  40 mg  DAILY


 IV PUSH


   11/3/17 09:00


    11/4/17 09:33


 


 


 Insulin Aspart


  (NovoLOG INJ)  7 units  TIDAC


 SQ


   11/3/17 12:00


    11/4/17 09:27


 


 


 Prednisone


  (Deltasone)  20 mg  BID


 PO


   11/3/17 21:00


    11/4/17 09:33


 








Objective Remarks


GENERAL: Middle aged female walking around room in nad.


SKIN: Warm and dry.


HEAD: Normocephalic.


EYES: No injection or drainage. 


NECK: Supple, trachea midline.


CARDIOVASCULAR: Regular rate and rhythm


RESPIRATORY: occasional rhonchi.


GASTROINTESTINAL: Abdomen soft, non-tender, nondistended. 


EXTREMITIES: No cyanosis. + edema in all extremities. 


NEUROLOGICAL: awake and alert, normal speech.





Assessment/Plan


Problem List:  


(1) CKD (chronic kidney disease)


ICD Codes:  N18.9 - Chronic kidney disease, unspecified


(2) Lupus nephritis, ISN/RPS class III


ICD Codes:  M32.14 - Glomerular disease in systemic lupus erythematosus


Plan:  --received Cytoxan 11/3/17


history:


Workup demonstrated a possible underlying autoimmune disease, possibly lupus 

versus autoimmune hepatitis with STEPHANIE positivity 1 to 1280 and anti-smooth 

antibody positivity.  


--renal biopsy performed on 09/12/2017 that shows focal proliferative and  

necrotizing glomerulonephritis.


++global 13 out of 27 in segmental glomerulosclerosis. 


++interstitial fibrosis and tubular atrophy which is severe. 


--was started on immunosuppressive therapy. has worsening renal function, 

appeared to not have responded.


--In light of her worsening edema, nephrotic syndrome and worsening renal 

function nephrology recommended changing her immunosuppressive medication to 

include Cytoxan.





Assessment


63y/o female with lupus nephritis.  Oncology consulted for administration of 

Cytoxan.


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 

CellCept three times a day for the past month and a half.


h/o Diabetes. chronic pancreatitis. Congestive heart failure. Coronary artery 

disease.


Liver disease. Systemic lupus. Questionable autoimmune hepatitis.


Depression. Anxiety. Hypothyroidism.


COPD.


Plan


1. monitor CBC, CMP, magnesium


2. continue supportive care


Attending Statement


The exam, history, and the medical decision-making described in the above note 

were completed with the assistance of the mid-level provider. I reviewed and 

agree with the findings presented.  I attest that I had a face-to-face 

encounter with the patient on the same day, and personally performed and 

documented my assessment and findings in the medical record.  62 yoF with SLE 

with lupus nephrtitis s/p cytoxan





Problem Qualifiers





(1) CKD (chronic kidney disease):  


Qualified Codes:  N18.4 - Chronic kidney disease, stage 4 (severe)








Suzanna Mccollum Nov 4, 2017 10:41


Mikki Manzano MD Nov 4, 2017 16:39

## 2017-11-05 VITALS
HEART RATE: 80 BPM | SYSTOLIC BLOOD PRESSURE: 135 MMHG | TEMPERATURE: 98 F | DIASTOLIC BLOOD PRESSURE: 78 MMHG | RESPIRATION RATE: 18 BRPM | OXYGEN SATURATION: 95 %

## 2017-11-05 VITALS
SYSTOLIC BLOOD PRESSURE: 140 MMHG | HEART RATE: 80 BPM | OXYGEN SATURATION: 98 % | TEMPERATURE: 98.4 F | RESPIRATION RATE: 16 BRPM | DIASTOLIC BLOOD PRESSURE: 79 MMHG

## 2017-11-05 VITALS
RESPIRATION RATE: 18 BRPM | DIASTOLIC BLOOD PRESSURE: 78 MMHG | SYSTOLIC BLOOD PRESSURE: 122 MMHG | OXYGEN SATURATION: 95 % | HEART RATE: 80 BPM | TEMPERATURE: 98.6 F

## 2017-11-05 VITALS
HEART RATE: 73 BPM | TEMPERATURE: 98.8 F | SYSTOLIC BLOOD PRESSURE: 158 MMHG | OXYGEN SATURATION: 96 % | RESPIRATION RATE: 18 BRPM | DIASTOLIC BLOOD PRESSURE: 86 MMHG

## 2017-11-05 VITALS
RESPIRATION RATE: 18 BRPM | OXYGEN SATURATION: 93 % | TEMPERATURE: 98.5 F | SYSTOLIC BLOOD PRESSURE: 119 MMHG | DIASTOLIC BLOOD PRESSURE: 73 MMHG | HEART RATE: 98 BPM

## 2017-11-05 VITALS — HEART RATE: 85 BPM

## 2017-11-05 VITALS
TEMPERATURE: 98.5 F | DIASTOLIC BLOOD PRESSURE: 86 MMHG | OXYGEN SATURATION: 96 % | SYSTOLIC BLOOD PRESSURE: 157 MMHG | HEART RATE: 87 BPM | RESPIRATION RATE: 16 BRPM

## 2017-11-05 VITALS — HEART RATE: 102 BPM

## 2017-11-05 LAB
BUN SERPL-MCNC: 58 MG/DL (ref 7–18)
CALCIUM SERPL-MCNC: 8.6 MG/DL (ref 8.5–10.1)
CHLORIDE SERPL-SCNC: 99 MEQ/L (ref 98–107)
CREAT SERPL-MCNC: 2.21 MG/DL (ref 0.5–1)
ERYTHROCYTE [DISTWIDTH] IN BLOOD BY AUTOMATED COUNT: 14.7 % (ref 11.6–17.2)
GFR SERPLBLD BASED ON 1.73 SQ M-ARVRAT: 23 ML/MIN (ref 89–?)
GLUCOSE SERPL-MCNC: 249 MG/DL (ref 74–106)
HCO3 BLD-SCNC: 28.2 MEQ/L (ref 21–32)
HCT VFR BLD CALC: 36.2 % (ref 35–46)
HGB BLD-MCNC: 12.1 GM/DL (ref 11.6–15.3)
INR PPP: 2.3 RATIO
MCH RBC QN AUTO: 31.7 PG (ref 27–34)
MCHC RBC AUTO-ENTMCNC: 33.4 % (ref 32–36)
MCV RBC AUTO: 94.8 FL (ref 80–100)
PLATELET # BLD: 269 TH/MM3 (ref 150–450)
PMV BLD AUTO: 8.1 FL (ref 7–11)
PROTHROMBIN TIME: 26.1 SEC (ref 9.8–11.6)
RBC # BLD AUTO: 3.82 MIL/MM3 (ref 4–5.3)
SODIUM SERPL-SCNC: 135 MEQ/L (ref 136–145)
WBC # BLD AUTO: 8.9 TH/MM3 (ref 4–11)

## 2017-11-05 RX ADMIN — INSULIN ASPART SCH UNITS: 100 INJECTION, SOLUTION INTRAVENOUS; SUBCUTANEOUS at 12:00

## 2017-11-05 RX ADMIN — OXYBUTYNIN CHLORIDE SCH MG: 5 TABLET ORAL at 21:03

## 2017-11-05 RX ADMIN — WARFARIN SODIUM SCH MG: 5 TABLET ORAL at 16:00

## 2017-11-05 RX ADMIN — HYDROCODONE BITARTRATE AND ACETAMINOPHEN PRN TAB: 10; 325 TABLET ORAL at 23:42

## 2017-11-05 RX ADMIN — DOCUSATE SODIUM SCH MG: 100 CAPSULE, LIQUID FILLED ORAL at 21:03

## 2017-11-05 RX ADMIN — Medication SCH ML: at 21:04

## 2017-11-05 RX ADMIN — INSULIN ASPART SCH: 100 INJECTION, SOLUTION INTRAVENOUS; SUBCUTANEOUS at 09:52

## 2017-11-05 RX ADMIN — FUROSEMIDE SCH MG: 10 INJECTION, SOLUTION INTRAMUSCULAR; INTRAVENOUS at 09:53

## 2017-11-05 RX ADMIN — INSULIN ASPART SCH UNITS: 100 INJECTION, SOLUTION INTRAVENOUS; SUBCUTANEOUS at 18:06

## 2017-11-05 RX ADMIN — ESCITALOPRAM OXALATE SCH MG: 10 TABLET, FILM COATED ORAL at 09:54

## 2017-11-05 RX ADMIN — DOCUSATE SODIUM SCH MG: 100 CAPSULE, LIQUID FILLED ORAL at 09:53

## 2017-11-05 RX ADMIN — Medication SCH ML: at 09:54

## 2017-11-05 RX ADMIN — INSULIN ASPART SCH: 100 INJECTION, SOLUTION INTRAVENOUS; SUBCUTANEOUS at 21:05

## 2017-11-05 RX ADMIN — FUROSEMIDE SCH MG: 10 INJECTION, SOLUTION INTRAMUSCULAR; INTRAVENOUS at 21:04

## 2017-11-05 RX ADMIN — PANTOPRAZOLE SCH MG: 40 TABLET, DELAYED RELEASE ORAL at 09:53

## 2017-11-05 RX ADMIN — LEVOTHYROXINE SODIUM SCH MCG: 200 TABLET ORAL at 06:20

## 2017-11-05 RX ADMIN — INSULIN ASPART SCH: 100 INJECTION, SOLUTION INTRAVENOUS; SUBCUTANEOUS at 12:00

## 2017-11-05 RX ADMIN — ACYCLOVIR SCH UNITS: 800 TABLET ORAL at 21:05

## 2017-11-05 RX ADMIN — INSULIN ASPART SCH UNITS: 100 INJECTION, SOLUTION INTRAVENOUS; SUBCUTANEOUS at 09:52

## 2017-11-05 RX ADMIN — INSULIN ASPART SCH: 100 INJECTION, SOLUTION INTRAVENOUS; SUBCUTANEOUS at 18:06

## 2017-11-05 NOTE — HHI.PR
Subjective


Remarks


Patient seen and examined this morning.  Afebrile vital signs stable.  She 

reports that she's been using the incentive spirometer and is greatly improved 

her cough and chest congestion.  She reports having worsening lower extremities 

edema and that her left ankle is hurting because of the swelling.  Will provide 

her with medication for breakthrough pain.  Explained to patient we are 

currently waiting further recommendations by oncology and nephrology for next 

steps in her plan of care.





Objective


Vitals





Vital Signs








  Date Time  Temp Pulse Resp B/P (MAP) Pulse Ox O2 Delivery O2 Flow Rate FiO2


 


11/5/17 04:15 98.4 80 16 140/79 (99) 98   


 


11/5/17 04:04  85      


 


11/5/17 00:29   16     


 


11/5/17 00:16  102      


 


11/5/17 00:10 98.5 98 18 119/73 (88) 93   


 


11/4/17 21:48 98.7 94 18 145/79 (101) 94   


 


11/4/17 20:09  94      


 


11/4/17 16:00 98.6 76 18 138/74 (95) 94   


 


11/4/17 12:30 98.2 76 16 140/68 (92) 93   














I/O      


 


 11/4/17 11/4/17 11/4/17 11/5/17 11/5/17 11/5/17





 07:00 15:00 23:00 07:00 15:00 23:00


 


Intake Total   360 ml 720 ml  


 


Output Total    1 ml  


 


Balance   360 ml 719 ml  


 


      


 


Intake Oral   360 ml 720 ml  


 


Stool Total    1 ml  


 


# Voids 1  2 2  


 


# Bowel Movements    1  








Result Diagram:  


11/5/17 0628                                                                   

             11/5/17 0628





Imaging





Last Impressions








Foot X-Ray 10/28/17 0000 Signed





Impressions: 





 Service Date/Time:  Saturday, October 28, 2017 18:54 - CONCLUSION:  

Significant 





 dorsal soft tissue swelling.  The osseous structures of the forefoot are 

grossly 





 intact.     Jesse Martínez MD 


 


Head CT 10/24/17 1909 Signed





Impressions: 





 Service Date/Time:  Tuesday, October 24, 2017 20:34 - CONCLUSION: Unremarkable 





 study except for mild chronic sinusitis.     TYLER Magana MD 


 


Chest X-Ray 10/24/17 1909 Signed





Impressions: 





 Service Date/Time:  Tuesday, October 24, 2017 19:27 - CONCLUSION:  No acute 





 cardiopulmonary disease.     TYLER Magana MD 


 


Lower Extremity Ultrasound 10/24/17 0000 Signed





Impressions: 





 Service Date/Time:  Tuesday, October 24, 2017 20:03 - CONCLUSION:  Right 





 peroneal vein thrombus.     TYLER Magana MD 








Objective Remarks


GEN: Well-developed, well-nourished patient. No acute distress.


CV: Regular rate and rhythm without obvious murmurs


LUNGS: Clear to auscultation bilaterally. Normal respiratory effort. No 

wheezes.  Crackling in the upper lobes bilaterally improved with coughing


GI: Soft, nontender, nondistended. No palpable masses. Bowel sounds WNL.


EXT: 2+ pitting edema up to mid shin left worse than right


NEURO/PSYCH: Afocal. Awake, alert, and oriented x3. Appropriate insight and 

judgment.


Procedures


none


Medications and IVs





Current Medications








 Medications


  (Trade)  Dose


 Ordered  Sig/Kandis


 Route  Start Time


 Stop Time Status Last Admin


 


  (NS Flush)  2 ml  UNSCH  PRN


 IV FLUSH  10/24/17 23:30


    11/4/17 09:34


 


 


  (NS Flush)  2 ml  BID


 IV FLUSH  10/25/17 09:00


    11/4/17 21:52


 


 


  (Narcan Inj)  0.4 mg  UNSCH  PRN


 IV PUSH  10/24/17 23:30


     


 


 


  (D50w (Vial) Inj)  50 ml  UNSCH  PRN


 IV PUSH  10/24/17 23:30


     


 


 


  (Glucagon Inj)  1 mg  UNSCH  PRN


 OTHER  10/24/17 23:30


     


 


 


  (NovoLOG


 SUPPLEMENTAL


 SCALE)  1  ACHS SLIDING  SCALE


 SQ  10/25/17 08:00


    11/4/17 22:06


 


 


  (Protonix)  40 mg  DAILY


 PO  10/25/17 09:00


    11/4/17 09:33


 


 


  (Norco  5-325 Mg)  1 tab  Q4H  PRN


 PO  10/25/17 08:30


     


 


 


  (Norco  Mg)  1 tab  Q4H  PRN


 PO  10/25/17 08:30


    11/4/17 23:12


 


 


  (Colace)  100 mg  BID


 PO  10/25/17 21:00


    11/4/17 21:51


 


 


  (Milk Of


 Magnesia Liq)  30 ml  BID  PRN


 PO  10/25/17 10:45


    10/30/17 09:20


 


 


  (Synthroid)  200 mcg  DAILY@0600


 PO  10/26/17 06:00


    11/5/17 06:20


 


 


  (Coumadin)  5 mg  DAILY@1600


 PO  10/26/17 16:00


    11/4/17 17:13


 


 


  (Levemir Inj)  24 units  HS


 SQ  10/26/17 21:00


    11/4/17 22:06


 


 


  (Ditropan)  5 mg  HS


 PO  10/26/17 21:00


    11/4/17 21:52


 


 


  (KlonoPIN)  1 mg  Q12HR


 PO  10/29/17 21:00


    11/4/17 21:52


 


 


  (Lexapro)  5 mg  DAILY


 PO  10/29/17 12:00


    11/4/17 09:33


 


 


  (Pill Splitter)  1 ea  UNSCH  PRN


 OTHER  10/29/17 12:30


     


 


 


  (Lasix Inj)  40 mg  DAILY


 IV PUSH  11/3/17 09:00


    11/4/17 09:33


 


 


  (NovoLOG INJ)  7 units  TIDAC


 SQ  11/3/17 12:00


    11/4/17 17:13


 


 


  (Deltasone)  20 mg  BID


 PO  11/3/17 21:00


    11/4/17 21:52


 


 


 Pharmacy Profile


 Note  0 ml @ 0


 mls/hr  UNSCH


 OTHER  11/3/17 18:45


     


 


 


  (Catapres)  0.1 mg  Q6H  PRN


 PO  11/4/17 10:00


     


 











A/P


Problem List:  


(1) Lupus nephritis, ISN/RPS class III


ICD Code:  M32.14 - Glomerular disease in systemic lupus erythematosus


Plan:  Focal proliferative lupus nephritis/diabetic nephropathy/severe 

interstitial fibrosis and tubular atrophy on kidney biopsy she has poor risk 

factors and possibly will have end-stage renal disease in near future





Continue Cyclophosphamide, holding steroids at this time


Consider cyclophosphamide IV monthly appreciate Hematology /Onc to assist


Check STEPHANIE and serum complements: Results pending 


24-hour urine for protein 11.651 grams Nephrotic range


Give Lasix 40 mg IV daily





(2) DVT (deep venous thrombosis)


ICD Code:  I82.409 - Acute embolism and thrombosis of unspecified deep veins of 

unspecified lower extremity


Status:  Acute


Plan:  Patient has been bridged to warfarin


Continue warfarin 5 mg by mouth daily


Therapeutic INR at 2.3.  Continue to monitor PT/INR daily.





(3) Hypothyroid


ICD Code:  E03.9 - Hypothyroidism, unspecified


Status:  Chronic


Plan:   TSH very elevated at 69.9 with very low T3 and low T4.  Lower extremity 

edema could be also secondary to myxedema.


Continue levothyroxine 200 g by mouth daily.


Will recheck TSH, T3 and T4.





(4) Type 2 diabetes mellitus


ICD Code:  E11.9 - Type 2 diabetes mellitus


Status:  Chronic


Plan:  Current bedside glucose ranging between 166-220


Patient reports that she ate some peanut butter that her  brought her


Continue insulin Levemir 24 units subcutaneous at at bedtime as well as SSI 

with insulin NovoLog.


Continue to monitor bedside glucose per protocol.  Anticipate improvement with 

steroid cessation





(5) Acute on chronic kidney failure


ICD Code:  N17.9 - Acute kidney failure, unspecified; N18.9 - Chronic kidney 

disease, unspecified


Plan:   As above.  Management as per nephrology.  Continue to monitor BUN/

creatinine, strict I's and O's, avoid nephrotoxins.





Diet: Diabetic diet


Fluids: By mouth hydration


Monitor electrolytes and replace accordingly


Out of bed with assistance


Vitals per protocol


DVT prophylaxis with warfarin


Currently on telemetry





Discharge Planning


Undetermined at this time.  Starting Cytoxan on the oncology floor for 

treatment.





Problem Qualifiers





(1) DVT (deep venous thrombosis):  


Qualified Codes:  I82.492 - Acute embolism and thrombosis of other specified 

deep vein of left lower extremity


(2) Hypothyroid:  


Qualified Codes:  E03.9 - Hypothyroidism, unspecified


(3) Type 2 diabetes mellitus:  


Qualified Codes:  E11.9 - Type 2 diabetes mellitus without complications; Z79.4 

- Long term (current) use of insulin


(4) Acute on chronic kidney failure:  


Qualified Codes:  N17.8 - Other acute kidney failure; N18.9 - Chronic kidney 

disease, unspecified








Salvador Galindo MD, R3 Nov 5, 2017 09:24

## 2017-11-06 VITALS
OXYGEN SATURATION: 92 % | HEART RATE: 95 BPM | DIASTOLIC BLOOD PRESSURE: 92 MMHG | SYSTOLIC BLOOD PRESSURE: 190 MMHG | TEMPERATURE: 99.5 F | RESPIRATION RATE: 18 BRPM

## 2017-11-06 VITALS — HEART RATE: 101 BPM

## 2017-11-06 VITALS
DIASTOLIC BLOOD PRESSURE: 88 MMHG | RESPIRATION RATE: 16 BRPM | HEART RATE: 61 BPM | TEMPERATURE: 98.3 F | OXYGEN SATURATION: 93 % | SYSTOLIC BLOOD PRESSURE: 169 MMHG

## 2017-11-06 VITALS
RESPIRATION RATE: 18 BRPM | HEART RATE: 82 BPM | DIASTOLIC BLOOD PRESSURE: 81 MMHG | OXYGEN SATURATION: 95 % | SYSTOLIC BLOOD PRESSURE: 135 MMHG | TEMPERATURE: 98.6 F

## 2017-11-06 VITALS
RESPIRATION RATE: 16 BRPM | SYSTOLIC BLOOD PRESSURE: 156 MMHG | DIASTOLIC BLOOD PRESSURE: 90 MMHG | TEMPERATURE: 98.7 F | HEART RATE: 76 BPM

## 2017-11-06 VITALS — SYSTOLIC BLOOD PRESSURE: 140 MMHG | DIASTOLIC BLOOD PRESSURE: 75 MMHG

## 2017-11-06 VITALS
RESPIRATION RATE: 18 BRPM | OXYGEN SATURATION: 95 % | DIASTOLIC BLOOD PRESSURE: 97 MMHG | SYSTOLIC BLOOD PRESSURE: 145 MMHG | TEMPERATURE: 98.4 F | HEART RATE: 111 BPM

## 2017-11-06 VITALS — HEART RATE: 82 BPM

## 2017-11-06 LAB
ALBUMIN SERPL-MCNC: 2 GM/DL (ref 3.4–5)
ALP SERPL-CCNC: 149 U/L (ref 45–117)
ALT SERPL-CCNC: 28 U/L (ref 10–53)
ANA PATTERN: (no result)
AST SERPL-CCNC: 27 U/L (ref 15–37)
BASOPHILS # BLD AUTO: 0 TH/MM3 (ref 0–0.2)
BASOPHILS NFR BLD: 0.3 % (ref 0–2)
BILIRUB SERPL-MCNC: 0.2 MG/DL (ref 0.2–1)
BUN SERPL-MCNC: 57 MG/DL (ref 7–18)
CALCIUM SERPL-MCNC: 8.6 MG/DL (ref 8.5–10.1)
CHLORIDE SERPL-SCNC: 97 MEQ/L (ref 98–107)
CREAT SERPL-MCNC: 2.27 MG/DL (ref 0.5–1)
EOSINOPHIL # BLD: 0 TH/MM3 (ref 0–0.4)
EOSINOPHIL NFR BLD: 0 % (ref 0–4)
ERYTHROCYTE [DISTWIDTH] IN BLOOD BY AUTOMATED COUNT: 14.5 % (ref 11.6–17.2)
GFR SERPLBLD BASED ON 1.73 SQ M-ARVRAT: 22 ML/MIN (ref 89–?)
GLUCOSE SERPL-MCNC: 209 MG/DL (ref 74–106)
HCO3 BLD-SCNC: 29.5 MEQ/L (ref 21–32)
HCT VFR BLD CALC: 37.3 % (ref 35–46)
HGB BLD-MCNC: 12.2 GM/DL (ref 11.6–15.3)
INR PPP: 2.1 RATIO
LYMPHOCYTES # BLD AUTO: 1.8 TH/MM3 (ref 1–4.8)
LYMPHOCYTES NFR BLD AUTO: 18 % (ref 9–44)
MAGNESIUM SERPL-MCNC: 1.9 MG/DL (ref 1.5–2.5)
MCH RBC QN AUTO: 31.2 PG (ref 27–34)
MCHC RBC AUTO-ENTMCNC: 32.7 % (ref 32–36)
MCV RBC AUTO: 95.4 FL (ref 80–100)
MONOCYTE #: 0.4 TH/MM3 (ref 0–0.9)
MONOCYTES NFR BLD: 4.6 % (ref 0–8)
NEUTROPHILS # BLD AUTO: 7.6 TH/MM3 (ref 1.8–7.7)
NEUTROPHILS NFR BLD AUTO: 77.1 % (ref 16–70)
PLATELET # BLD: 284 TH/MM3 (ref 150–450)
PMV BLD AUTO: 7.9 FL (ref 7–11)
PROT SERPL-MCNC: 6.2 GM/DL (ref 6.4–8.2)
PROTHROMBIN TIME: 24 SEC (ref 9.8–11.6)
RBC # BLD AUTO: 3.91 MIL/MM3 (ref 4–5.3)
SODIUM SERPL-SCNC: 135 MEQ/L (ref 136–145)
WBC # BLD AUTO: 9.8 TH/MM3 (ref 4–11)

## 2017-11-06 RX ADMIN — HYDROCODONE BITARTRATE AND ACETAMINOPHEN PRN TAB: 10; 325 TABLET ORAL at 05:41

## 2017-11-06 RX ADMIN — INSULIN ASPART SCH: 100 INJECTION, SOLUTION INTRAVENOUS; SUBCUTANEOUS at 17:09

## 2017-11-06 RX ADMIN — PANTOPRAZOLE SCH MG: 40 TABLET, DELAYED RELEASE ORAL at 08:40

## 2017-11-06 RX ADMIN — INSULIN ASPART SCH UNITS: 100 INJECTION, SOLUTION INTRAVENOUS; SUBCUTANEOUS at 13:21

## 2017-11-06 RX ADMIN — INSULIN ASPART SCH UNITS: 100 INJECTION, SOLUTION INTRAVENOUS; SUBCUTANEOUS at 08:40

## 2017-11-06 RX ADMIN — LEVOTHYROXINE SODIUM SCH MCG: 200 TABLET ORAL at 05:37

## 2017-11-06 RX ADMIN — FUROSEMIDE SCH MG: 10 INJECTION, SOLUTION INTRAMUSCULAR; INTRAVENOUS at 08:42

## 2017-11-06 RX ADMIN — DOCUSATE SODIUM SCH MG: 100 CAPSULE, LIQUID FILLED ORAL at 08:40

## 2017-11-06 RX ADMIN — INSULIN ASPART SCH: 100 INJECTION, SOLUTION INTRAVENOUS; SUBCUTANEOUS at 13:21

## 2017-11-06 RX ADMIN — ESCITALOPRAM OXALATE SCH MG: 10 TABLET, FILM COATED ORAL at 08:40

## 2017-11-06 RX ADMIN — Medication SCH ML: at 08:42

## 2017-11-06 RX ADMIN — INSULIN ASPART SCH: 100 INJECTION, SOLUTION INTRAVENOUS; SUBCUTANEOUS at 08:41

## 2017-11-06 RX ADMIN — WARFARIN SODIUM SCH MG: 5 TABLET ORAL at 16:54

## 2017-11-06 NOTE — HHI.DS
__________________________________________________





Discharge Summary


Admission Date


Oct 24, 2017 at 23:17


Discharge Date:  Nov 6, 2017


Admitting Diagnosis





right peroneal DVT, hypothyrodism, kidney disease





(1) Lupus nephritis, ISN/RPS class III


ICD Code:  M32.14 - Glomerular disease in systemic lupus erythematosus


Diagnosis:  Principal





(2) DVT (deep venous thrombosis)


ICD Code:  I82.409 - Acute embolism and thrombosis of unspecified deep veins of 

unspecified lower extremity


Diagnosis:  Principal


Status:  Acute


(3) Hypothyroid


ICD Code:  E03.9 - Hypothyroidism, unspecified


Diagnosis:  Principal


Status:  Chronic


(4) Type 2 diabetes mellitus


ICD Code:  E11.9 - Type 2 diabetes mellitus


Diagnosis:  Principal


Status:  Chronic


(5) Acute on chronic kidney failure


ICD Code:  N17.9 - Acute kidney failure, unspecified; N18.9 - Chronic kidney 

disease, unspecified


Diagnosis:  Principal





Procedures


none


Brief History - From Admission


63 y/o female with a history of DM, chronic pancreatitis, CHF, CAD, liver 

disease, Systemic lupus, depression, hypothyroid and COPD presented to the ED 

with complaints of chest pain and lower extremity edema. She states for the 

last week she has been having substernal chest pain and bilateral lower 

extremity swelling for the last week. She states the chest pain is pressure like

, 8/10 with radiation to her left arm. She states she is compliant with all her 

medications at home. She states her diabetes is not controlled at home and runs 

in the 300s. She lives at home with her son but does not feel he can take care 

of her and she feels she can not care for her self. She complains of numbness 

and weakness in her bilateral hands. She denies any sob, fever or chills. She 

knows she has CHF but does not know her EF or the last time a echo was 

completed.


CBC/BMP:  


11/6/17 0530                                                                   

             11/6/17 0530





Significant Findings





Laboratory Tests








Test


  11/4/17


05:23 11/5/17


06:28 11/6/17


05:30


 


White Blood Count


  11.7 TH/MM3


(4.0-11.0) 


  


 


 


Red Blood Count


  3.51 MIL/MM3


(4.00-5.30) 3.82 MIL/MM3


(4.00-5.30) 3.91 MIL/MM3


(4.00-5.30)


 


Hemoglobin


  11.1 GM/DL


(11.6-15.3) 


  


 


 


Hematocrit


  33.8 %


(35.0-46.0) 


  


 


 


Neutrophils (%) (Auto)


  81.0 %


(16.0-70.0) 


  77.1 %


(16.0-70.0)


 


Neutrophils # (Auto)


  9.5 TH/MM3


(1.8-7.7) 


  


 


 


Prothrombin Time


  28.8 SEC


(9.8-11.6) 26.1 SEC


(9.8-11.6) 24.0 SEC


(9.8-11.6)


 


Blood Urea Nitrogen


  60 MG/DL


(7-18) 58 MG/DL


(7-18) 57 MG/DL


(7-18)


 


Creatinine


  2.27 MG/DL


(0.50-1.00) 2.21 MG/DL


(0.50-1.00) 2.27 MG/DL


(0.50-1.00)


 


Random Glucose


  181 MG/DL


() 249 MG/DL


() 209 MG/DL


()


 


Total Protein


  6.1 GM/DL


(6.4-8.2) 


  6.2 GM/DL


(6.4-8.2)


 


Albumin


  2.1 GM/DL


(3.4-5.0) 


  2.0 GM/DL


(3.4-5.0)


 


Alkaline Phosphatase


  142 U/L


() 


  149 U/L


()


 


Sodium Level


  132 MEQ/L


(136-145) 135 MEQ/L


(136-145) 135 MEQ/L


(136-145)


 


Estimat Glomerular Filtration


Rate 22 ML/MIN


(>89) 23 ML/MIN


(>89) 22 ML/MIN


(>89)


 


Chloride Level


  


  


  97 MEQ/L


()








Imaging





Last Impressions








Foot X-Ray 10/28/17 0000 Signed





Impressions: 





 Service Date/Time:  Saturday, October 28, 2017 18:54 - CONCLUSION:  

Significant 





 dorsal soft tissue swelling.  The osseous structures of the forefoot are 

grossly 





 intact.     Jesse Martínez MD 


 


Head CT 10/24/17 1909 Signed





Impressions: 





 Service Date/Time:  Tuesday, October 24, 2017 20:34 - CONCLUSION: Unremarkable 





 study except for mild chronic sinusitis.     TYLER Magana MD 


 


Chest X-Ray 10/24/17 1909 Signed





Impressions: 





 Service Date/Time:  Tuesday, October 24, 2017 19:27 - CONCLUSION:  No acute 





 cardiopulmonary disease.     TYLER Magana MD 


 


Lower Extremity Ultrasound 10/24/17 0000 Signed





Impressions: 





 Service Date/Time:  Tuesday, October 24, 2017 20:03 - CONCLUSION:  Right 





 peroneal vein thrombus.     TYLER Magana MD 








PE at Discharge


GEN: Well-developed, well-nourished patient. No acute distress.


CV: Regular rate and rhythm without obvious murmurs


LUNGS: Clear to auscultation bilaterally. Normal respiratory effort. No 

wheezes.  Crackling in the upper lobes bilaterally improved with coughing


GI: Soft, nontender, nondistended. No palpable masses. Bowel sounds WNL.


EXT: 2+ pitting edema up to mid shin left worse than right


NEURO/PSYCH: Afocal. Awake, alert, and oriented x3. Appropriate insight and 

judgment.


Hospital Course


This is a pleasant 63 y/o Female with Possible autoimmune disease, Possibly 

lupus versus Autoimmune Hepatitis with STEPHANIE positive


1/1280 and anti-smooth antibody positivity, renal biopsy performed on 09/12/ 2017 that shows focal proliferative and  necrotizing 


glomerulonephritis, ++global 13 out of 27 in segmental glomerulosclerosis, ++

interstitial fibrosis and tubular atrophy which is severe. 


was started on immunosuppressive therapy. has worsening renal function, 

appeared to not have responded. was consulted Oncology


to administer Cytoxan, discussed with Oncology specialist ARNP and okay to 

discharge from Oncology standpoint. 


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 

CellCept three times a day for the past month and a half.


h/o Diabetes. chronic pancreatitis. Congestive heart failure. Coronary artery 

disease.








Assessment and Plan


1. Lupus Nephritis on Cyclophosphamide IV Monthly Hematology and Oncology 

specialist following, 24 hour urine for protein [id number]


    Nephrotic range, on Lasix, Status post Cytoxan as per Oncology specialist 

okay to discharge Home. 


2. DVT of lower extremity on Warfarin INR 2.1


3. Hypothyroidism TSH at 69.9 with very low T3 and low T4.  Lower extremity 

edema could be also secondary to myxedema.


   Continue levothyroxine 200 g by mouth daily.


4. DM II on long lasting Insulin Subcutaneous, Sliding scale. worsening due to 

Steroid use. adjusted dosages of Insulin. 


5. Acute on chronic kidney disease Nephrology specialist managing the pathology 

continue hydration, at this time stable Creatinine. 





DVT prophylaxis with warfarin





Discharge Planning


Cleared from Oncology specialist 


And by Nephrology specialist.


Pt Condition on Discharge:  Stable


Discharge Disposition:  Disch w/ Home Health Serv


Discharge Time:  > 30 minutes


Discharge Instructions


DIET: Follow Instructions for:  Heart Healthy Diet, Diabetic Diet


Activities you can perform:  Regular-No Restrictions











Zheng Kenyn MD Nov 6, 2017 17:28

## 2017-11-06 NOTE — HHI.PR
Subjective


Remarks


This is a pleasant 61 y/o Female with Possible autoimmune disease, Possibly 

lupus versus Autoimmune Hepatitis with STEPHANIE positive


1/1280 and anti-smooth antibody positivity, renal biopsy performed on 09/12/ 2017 that shows focal proliferative and  necrotizing 


glomerulonephritis, ++global 13 out of 27 in segmental glomerulosclerosis, ++

interstitial fibrosis and tubular atrophy which is severe. 


was started on immunosuppressive therapy. has worsening renal function, 

appeared to not have responded. was consulted Oncology


to administer Cytoxan, discussed with Oncology specialist ARNP and okay to 

discharge from Oncology standpoint. 


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 

CellCept three times a day for the past month and a half.


h/o Diabetes. chronic pancreatitis. Congestive heart failure. Coronary artery 

disease.





Objective





Vital Signs








  Date Time  Temp Pulse Resp B/P (MAP) Pulse Ox O2 Delivery O2 Flow Rate FiO2


 


11/6/17 07:24   16     


 


11/6/17 04:10  82      


 


11/6/17 04:00 98.6 82 18 135/81 (99) 95   


 


11/6/17 00:30  101      


 


11/6/17 00:20 98.4 111 18 145/97 (113) 95   


 


11/5/17 20:41 98.5 87 16 157/86 (109) 96   


 


11/5/17 16:10 98.0 80 18 135/78 (97) 95   


 


11/5/17 12:30 98.6 80 18 122/78 (93) 95   














I/O      


 


 11/5/17 11/5/17 11/5/17 11/6/17 11/6/17 11/6/17





 07:00 15:00 23:00 07:00 15:00 23:00


 


Intake Total 720 ml  900 ml   


 


Output Total 1 ml     


 


Balance 719 ml  900 ml   


 


      


 


Intake Oral 720 ml  900 ml   


 


Stool Total 1 ml     


 


# Voids 2  3 7  


 


# Bowel Movements 1   1  








Result Diagram:  


11/6/17 0530                                                                   

             11/6/17 0530





Imaging





Last Impressions








Foot X-Ray 10/28/17 0000 Signed





Impressions: 





 Service Date/Time:  Saturday, October 28, 2017 18:54 - CONCLUSION:  

Significant 





 dorsal soft tissue swelling.  The osseous structures of the forefoot are 

grossly 





 intact.     Jesse Martínez MD 


 


Head CT 10/24/17 1909 Signed





Impressions: 





 Service Date/Time:  Tuesday, October 24, 2017 20:34 - CONCLUSION: Unremarkable 





 study except for mild chronic sinusitis.     TYLER Magana MD 


 


Chest X-Ray 10/24/17 1909 Signed





Impressions: 





 Service Date/Time:  Tuesday, October 24, 2017 19:27 - CONCLUSION:  No acute 





 cardiopulmonary disease.     TYLER Magana MD 


 


Lower Extremity Ultrasound 10/24/17 0000 Signed





Impressions: 





 Service Date/Time:  Tuesday, October 24, 2017 20:03 - CONCLUSION:  Right 





 peroneal vein thrombus.     TYLER Magana MD 








Procedures


None


Other Results





Laboratory Tests








Test


  10/24/17


19:21 10/24/17


19:55 10/25/17


06:24 10/25/17


09:31


 


Differential Total Cells


Counted 100 


  


  


  


 


 


Neutrophils % (Manual) 53 %    


 


Band Neutrophils % 5 %    


 


Lymphocytes % 31 %    


 


Monocytes % 8 %    


 


Eosinophils % 1 %    


 


Neutrophils # (Manual) 3.8 TH/MM3    


 


Metamyelocytes 1 %    


 


Myelocytes 1 %    


 


Urine Amorphous Sediment RARE    


 


Total Creatine Kinase 29 U/L    


 


Lipase 713 U/L    


 


Lactic Acid Level  1.0 mmol/L   


 


Platelet Estimate   NORMAL  


 


Platelet Morphology Comment   NORMAL  


 


Acanthocytes     


 


Hemoglobin A1c   10.4 %  


 


Total Triiodothyronine   45 NG/DL  


 


Troponin I


  


  


  


  LESS THAN 0.02


NG/ML


 


Test


  10/25/17


17:00 10/31/17


07:30 11/1/17


20:46 11/2/17


08:38


 


Urine Color LIGHT-YELLOW    


 


Urine Turbidity CLEAR    


 


Urine pH 6.5    


 


Urine Specific Gravity 1.013    


 


Urine Protein 300 mg/dL    


 


Urine Glucose (UA) 70 mg/dL    


 


Urine Ketones NEG mg/dL    


 


Urine Occult Blood MOD    


 


Urine Nitrite NEG    


 


Urine Bilirubin NEG    


 


Urine Urobilinogen


  LESS THAN 2.0


MG/DL 


  


  


 


 


Urine Leukocyte Esterase NEG    


 


Urine RBC 58 /hpf    


 


Urine WBC 7 /hpf    


 


Urine Squamous Epithelial


Cells <1 /hpf 


  


  


  


 


 


Urine Bacteria RARE /hpf    


 


Urine Hyaline Casts 3 /lpf    


 


Microscopic Urinalysis Comment


  CULT NOT


INDICATED 


  


  


 


 


B-Type Natriuretic Peptide  14 PG/ML   


 


Anti-Nuclear Antibody Screen   POS  


 


Anti-Double Strand DNA


Antibody 


  


  


  120 IU/mL 


 


 


Complement C3    95 MG/DL 


 


Complement C4    16 MG/DL 


 


Test


  11/2/17


21:15 11/3/17


06:47 11/4/17


05:23 11/6/17


05:30


 


Urine Total Volume 24 Hours 4530 ML    


 


Urine Total Protein 24 Hour 08524 MG/24HR    


 


Activated Partial


Thromboplast Time 


  35.9 SEC 


  


  


 


 


Free Thyroxine  1.00 NG/DL   


 


Free Triiodothyronine (T3)


pg/dL 


  1.30 PG/ML 


  


  


 


 


Thyroid Stimulating Hormone


3rd Gen 


  6.830 uIU/ML 


  


  


 


 


Blood Urea Nitrogen   60 MG/DL  57 MG/DL 


 


Creatinine   2.27 MG/DL  2.27 MG/DL 


 


Random Glucose   181 MG/DL  209 MG/DL 


 


Total Protein   6.1 GM/DL  6.2 GM/DL 


 


Albumin   2.1 GM/DL  2.0 GM/DL 


 


Calcium Level   8.6 MG/DL  8.6 MG/DL 


 


Phosphorus Level   2.6 MG/DL  


 


Magnesium Level   1.9 MG/DL  1.9 MG/DL 


 


Alkaline Phosphatase   142 U/L  149 U/L 


 


Aspartate Amino Transf


(AST/SGOT) 


  


  33 U/L 


  27 U/L 


 


 


Alanine Aminotransferase


(ALT/SGPT) 


  


  28 U/L 


  28 U/L 


 


 


Total Bilirubin   0.2 MG/DL  0.2 MG/DL 


 


Sodium Level   132 MEQ/L  135 MEQ/L 


 


Potassium Level   4.1 MEQ/L  3.9 MEQ/L 


 


Chloride Level   98 MEQ/L  97 MEQ/L 


 


Carbon Dioxide Level   27.3 MEQ/L  29.5 MEQ/L 


 


White Blood Count    9.8 TH/MM3 


 


Red Blood Count    3.91 MIL/MM3 


 


Hemoglobin    12.2 GM/DL 


 


Hematocrit    37.3 % 


 


Mean Corpuscular Volume    95.4 FL 


 


Mean Corpuscular Hemoglobin    31.2 PG 


 


Mean Corpuscular Hemoglobin


Concent 


  


  


  32.7 % 


 


 


Red Cell Distribution Width    14.5 % 


 


Platelet Count    284 TH/MM3 


 


Mean Platelet Volume    7.9 FL 


 


Neutrophils (%) (Auto)    77.1 % 


 


Lymphocytes (%) (Auto)    18.0 % 


 


Monocytes (%) (Auto)    4.6 % 


 


Eosinophils (%) (Auto)    0.0 % 


 


Basophils (%) (Auto)    0.3 % 


 


Neutrophils # (Auto)    7.6 TH/MM3 


 


Lymphocytes # (Auto)    1.8 TH/MM3 


 


Monocytes # (Auto)    0.4 TH/MM3 


 


Eosinophils # (Auto)    0.0 TH/MM3 


 


Basophils # (Auto)    0.0 TH/MM3 


 


CBC Comment    DIFF FINAL 


 


Differential Comment     


 


Prothrombin Time    24.0 SEC 


 


Prothromb Time International


Ratio 


  


  


  2.1 RATIO 


 


 


Anion Gap    9 MEQ/L 


 


Estimat Glomerular Filtration


Rate 


  


  


  22 ML/MIN 


 








Objective Remarks


GEN: Well-developed, well-nourished patient. No acute distress.


CV: Regular rate and rhythm without obvious murmurs


LUNGS: Clear to auscultation bilaterally. Normal respiratory effort. No 

wheezes.  Crackling in the upper lobes bilaterally improved with coughing


GI: Soft, nontender, nondistended. No palpable masses. Bowel sounds WNL.


EXT: 2+ pitting edema up to mid shin left worse than right


NEURO/PSYCH: Afocal. Awake, alert, and oriented x3. Appropriate insight and 

judgment.





Medications and IVs





Current Medications








 Medications


  (Trade)  Dose


 Ordered  Sig/Kandis


 Route  Start Time


 Stop Time Status Last Admin


 


  (NS Flush)  2 ml  UNSCH  PRN


 IV FLUSH  10/24/17 23:30


    11/4/17 09:34


 


 


  (NS Flush)  2 ml  BID


 IV FLUSH  10/25/17 09:00


    11/5/17 21:04


 


 


  (Narcan Inj)  0.4 mg  UNSCH  PRN


 IV PUSH  10/24/17 23:30


     


 


 


  (D50w (Vial) Inj)  50 ml  UNSCH  PRN


 IV PUSH  10/24/17 23:30


     


 


 


  (Glucagon Inj)  1 mg  UNSCH  PRN


 OTHER  10/24/17 23:30


     


 


 


  (NovoLOG


 SUPPLEMENTAL


 SCALE)  1  ACHS SLIDING  SCALE


 SQ  10/25/17 08:00


    11/5/17 21:05


 


 


  (Protonix)  40 mg  DAILY


 PO  10/25/17 09:00


    11/5/17 09:53


 


 


  (Norco  5-325 Mg)  1 tab  Q4H  PRN


 PO  10/25/17 08:30


     


 


 


  (Norco  Mg)  1 tab  Q4H  PRN


 PO  10/25/17 08:30


    11/6/17 05:41


 


 


  (Colace)  100 mg  BID


 PO  10/25/17 21:00


    11/5/17 21:03


 


 


  (Milk Of


 Magnesia Liq)  30 ml  BID  PRN


 PO  10/25/17 10:45


    10/30/17 09:20


 


 


  (Synthroid)  200 mcg  DAILY@0600


 PO  10/26/17 06:00


    11/6/17 05:37


 


 


  (Coumadin)  5 mg  DAILY@1600


 PO  10/26/17 16:00


    11/5/17 16:00


 


 


  (Levemir Inj)  24 units  HS


 SQ  10/26/17 21:00


    11/5/17 21:05


 


 


  (Ditropan)  5 mg  HS


 PO  10/26/17 21:00


    11/5/17 21:03


 


 


  (KlonoPIN)  1 mg  Q12HR


 PO  10/29/17 21:00


    11/5/17 21:03


 


 


  (Lexapro)  5 mg  DAILY


 PO  10/29/17 12:00


    11/5/17 09:54


 


 


  (Pill Splitter)  1 ea  UNSCH  PRN


 OTHER  10/29/17 12:30


     


 


 


  (NovoLOG INJ)  7 units  TIDAC


 SQ  11/3/17 12:00


    11/5/17 18:06


 


 


  (Deltasone)  20 mg  BID


 PO  11/3/17 21:00


    11/5/17 21:02


 


 


 Pharmacy Profile


 Note  0 ml @ 0


 mls/hr  UNSCH


 OTHER  11/3/17 18:45


     


 


 


  (Catapres)  0.1 mg  Q6H  PRN


 PO  11/4/17 10:00


     


 


 


  (Morphine Inj)  1 mg  Q4H  PRN


 IM  11/5/17 12:15


     


 


 


  (Lasix Inj)  40 mg  BID


 IV PUSH  11/5/17 21:00


    11/5/17 21:04


 











A/P


Assessment and Plan


1. Lupus Nephritis on Cyclophosphamide IV Monthly Hematology and Oncology 

specialist following, 24 hour urine for protein 26424


    Nephrotic range, on Lasix, Status post Cytoxan as per Oncology specialist 

okay to discharge Home. 


2. DVT of lower extremity on Warfarin INR 2.1


3. Hypothyroidism TSH at 69.9 with very low T3 and low T4.  Lower extremity 

edema could be also secondary to myxedema.


   Continue levothyroxine 200 g by mouth daily.


4. DM II on long lasting Insulin Subcutaneous, Sliding scale. worsening due to 

Steroid use. adjusted dosages of Insulin. 


5. Acute on chronic kidney disease Nephrology specialist managing the pathology 

continue hydration, at this time stable Creatinine. 





DVT prophylaxis with warfarin





Discharge Planning


Cleared from Oncology specialist 


awaiting final by Nephrology specialist.











Zheng Kenny MD Nov 6, 2017 08:45

## 2017-11-06 NOTE — HHI.DS
__________________________________________________





Discharge Summary


Admission Date


Oct 24, 2017 at 23:17


Discharge Date:  Nov 6, 2017


Admitting Diagnosis





right peroneal DVT, hypothyrodism, kidney disease





(1) Lupus nephritis, ISN/RPS class III


ICD Code:  M32.14 - Glomerular disease in systemic lupus erythematosus


Diagnosis:  Principal





(2) DVT (deep venous thrombosis)


ICD Code:  I82.409 - Acute embolism and thrombosis of unspecified deep veins of 

unspecified lower extremity


Diagnosis:  Principal


Status:  Acute


(3) Hypothyroid


ICD Code:  E03.9 - Hypothyroidism, unspecified


Diagnosis:  Principal


Status:  Chronic


(4) Type 2 diabetes mellitus


ICD Code:  E11.9 - Type 2 diabetes mellitus


Diagnosis:  Principal


Status:  Chronic


(5) Acute on chronic kidney failure


ICD Code:  N17.9 - Acute kidney failure, unspecified; N18.9 - Chronic kidney 

disease, unspecified


Diagnosis:  Principal





Procedures


none


Brief History - From Admission


61 y/o female with a history of DM, chronic pancreatitis, CHF, CAD, liver 

disease, Systemic lupus, depression, hypothyroid and COPD presented to the ED 

with complaints of chest pain and lower extremity edema. She states for the 

last week she has been having substernal chest pain and bilateral lower 

extremity swelling for the last week. She states the chest pain is pressure like

, 8/10 with radiation to her left arm. She states she is compliant with all her 

medications at home. She states her diabetes is not controlled at home and runs 

in the 300s. She lives at home with her son but does not feel he can take care 

of her and she feels she can not care for her self. She complains of numbness 

and weakness in her bilateral hands. She denies any sob, fever or chills. She 

knows she has CHF but does not know her EF or the last time a echo was 

completed.


CBC/BMP:  


11/6/17 0530                                                                   

             11/6/17 0530





Significant Findings





Laboratory Tests








Test


  11/4/17


05:23 11/5/17


06:28 11/6/17


05:30


 


White Blood Count


  11.7 TH/MM3


(4.0-11.0) 


  


 


 


Red Blood Count


  3.51 MIL/MM3


(4.00-5.30) 3.82 MIL/MM3


(4.00-5.30) 3.91 MIL/MM3


(4.00-5.30)


 


Hemoglobin


  11.1 GM/DL


(11.6-15.3) 


  


 


 


Hematocrit


  33.8 %


(35.0-46.0) 


  


 


 


Neutrophils (%) (Auto)


  81.0 %


(16.0-70.0) 


  77.1 %


(16.0-70.0)


 


Neutrophils # (Auto)


  9.5 TH/MM3


(1.8-7.7) 


  


 


 


Prothrombin Time


  28.8 SEC


(9.8-11.6) 26.1 SEC


(9.8-11.6) 24.0 SEC


(9.8-11.6)


 


Blood Urea Nitrogen


  60 MG/DL


(7-18) 58 MG/DL


(7-18) 57 MG/DL


(7-18)


 


Creatinine


  2.27 MG/DL


(0.50-1.00) 2.21 MG/DL


(0.50-1.00) 2.27 MG/DL


(0.50-1.00)


 


Random Glucose


  181 MG/DL


() 249 MG/DL


() 209 MG/DL


()


 


Total Protein


  6.1 GM/DL


(6.4-8.2) 


  6.2 GM/DL


(6.4-8.2)


 


Albumin


  2.1 GM/DL


(3.4-5.0) 


  2.0 GM/DL


(3.4-5.0)


 


Alkaline Phosphatase


  142 U/L


() 


  149 U/L


()


 


Sodium Level


  132 MEQ/L


(136-145) 135 MEQ/L


(136-145) 135 MEQ/L


(136-145)


 


Estimat Glomerular Filtration


Rate 22 ML/MIN


(>89) 23 ML/MIN


(>89) 22 ML/MIN


(>89)


 


Chloride Level


  


  


  97 MEQ/L


()








Imaging





Last Impressions








Foot X-Ray 10/28/17 0000 Signed





Impressions: 





 Service Date/Time:  Saturday, October 28, 2017 18:54 - CONCLUSION:  

Significant 





 dorsal soft tissue swelling.  The osseous structures of the forefoot are 

grossly 





 intact.     Jesse Martínez MD 


 


Head CT 10/24/17 1909 Signed





Impressions: 





 Service Date/Time:  Tuesday, October 24, 2017 20:34 - CONCLUSION: Unremarkable 





 study except for mild chronic sinusitis.     TYLER Magana MD 


 


Chest X-Ray 10/24/17 1909 Signed





Impressions: 





 Service Date/Time:  Tuesday, October 24, 2017 19:27 - CONCLUSION:  No acute 





 cardiopulmonary disease.     TYLER Magana MD 


 


Lower Extremity Ultrasound 10/24/17 0000 Signed





Impressions: 





 Service Date/Time:  Tuesday, October 24, 2017 20:03 - CONCLUSION:  Right 





 peroneal vein thrombus.     TYLER Magana MD 








PE at Discharge


GEN: Well-developed, well-nourished patient. No acute distress.


CV: Regular rate and rhythm without obvious murmurs


LUNGS: Clear to auscultation bilaterally. Normal respiratory effort. No 

wheezes.  Crackling in the upper lobes bilaterally improved with coughing


GI: Soft, nontender, nondistended. No palpable masses. Bowel sounds WNL.


EXT: 2+ pitting edema up to mid shin left worse than right


NEURO/PSYCH: Afocal. Awake, alert, and oriented x3. Appropriate insight and 

judgment.


Hospital Course


This is a pleasant 61 y/o Female with Possible autoimmune disease, Possibly 

lupus versus Autoimmune Hepatitis with STEPHANIE positive


1/1280 and anti-smooth antibody positivity, renal biopsy performed on 09/12/ 2017 that shows focal proliferative and  necrotizing 


glomerulonephritis, ++global 13 out of 27 in segmental glomerulosclerosis, ++

interstitial fibrosis and tubular atrophy which is severe. 


was started on immunosuppressive therapy. has worsening renal function, 

appeared to not have responded. was consulted Oncology


to administer Cytoxan, discussed with Oncology specialist ARNP and okay to 

discharge from Oncology standpoint. 


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 

CellCept three times a day for the past month and a half.


h/o Diabetes. chronic pancreatitis. Congestive heart failure. Coronary artery 

disease.








Assessment and Plan


1. Lupus Nephritis on Cyclophosphamide IV Monthly Hematology and Oncology 

specialist following, 24 hour urine for protein 17245


    Nephrotic range, on Lasix, Status post Cytoxan as per Oncology specialist 

okay to discharge Home. 


2. DVT of lower extremity on Warfarin INR 2.1


3. Hypothyroidism TSH at 69.9 with very low T3 and low T4.  Lower extremity 

edema could be also secondary to myxedema.


   Continue levothyroxine 200 g by mouth daily.


4. DM II on long lasting Insulin Subcutaneous, Sliding scale. worsening due to 

Steroid use. adjusted dosages of Insulin. 


5. Acute on chronic kidney disease Nephrology specialist managing the pathology 

continue hydration, at this time stable Creatinine. 





DVT prophylaxis with warfarin





Discharge Planning


Cleared from Oncology specialist 


And by Nephrology specialist.


Pt Condition on Discharge:  Stable


Discharge Disposition:  Disch w/ Home Health Serv


Discharge Time:  > 30 minutes


Discharge Instructions


DIET: Follow Instructions for:  Heart Healthy Diet, Diabetic Diet


Activities you can perform:  Regular-No Restrictions











Zheng Kenny MD Nov 6, 2017 17:28

## 2017-11-06 NOTE — HHI.DS
__________________________________________________





Discharge Summary


Admission Date


Oct 24, 2017 at 23:17


Discharge Date:  Nov 6, 2017


Admitting Diagnosis





right peroneal DVT, hypothyrodism, kidney disease





(1) Lupus nephritis, ISN/RPS class III


ICD Code:  M32.14 - Glomerular disease in systemic lupus erythematosus


Diagnosis:  Principal





(2) DVT (deep venous thrombosis)


ICD Code:  I82.409 - Acute embolism and thrombosis of unspecified deep veins of 

unspecified lower extremity


Diagnosis:  Principal


Status:  Acute


(3) Hypothyroid


ICD Code:  E03.9 - Hypothyroidism, unspecified


Diagnosis:  Principal


Status:  Chronic


(4) Type 2 diabetes mellitus


ICD Code:  E11.9 - Type 2 diabetes mellitus


Diagnosis:  Principal


Status:  Chronic


(5) Acute on chronic kidney failure


ICD Code:  N17.9 - Acute kidney failure, unspecified; N18.9 - Chronic kidney 

disease, unspecified


Diagnosis:  Principal





Procedures


none


Brief History - From Admission


63 y/o female with a history of DM, chronic pancreatitis, CHF, CAD, liver 

disease, Systemic lupus, depression, hypothyroid and COPD presented to the ED 

with complaints of chest pain and lower extremity edema. She states for the 

last week she has been having substernal chest pain and bilateral lower 

extremity swelling for the last week. She states the chest pain is pressure like

, 8/10 with radiation to her left arm. She states she is compliant with all her 

medications at home. She states her diabetes is not controlled at home and runs 

in the 300s. She lives at home with her son but does not feel he can take care 

of her and she feels she can not care for her self. She complains of numbness 

and weakness in her bilateral hands. She denies any sob, fever or chills. She 

knows she has CHF but does not know her EF or the last time a echo was 

completed.


CBC/BMP:  


11/6/17 0530                                                                   

             11/6/17 0530





Significant Findings





Laboratory Tests








Test


  11/4/17


05:23 11/5/17


06:28 11/6/17


05:30


 


White Blood Count


  11.7 TH/MM3


(4.0-11.0) 


  


 


 


Red Blood Count


  3.51 MIL/MM3


(4.00-5.30) 3.82 MIL/MM3


(4.00-5.30) 3.91 MIL/MM3


(4.00-5.30)


 


Hemoglobin


  11.1 GM/DL


(11.6-15.3) 


  


 


 


Hematocrit


  33.8 %


(35.0-46.0) 


  


 


 


Neutrophils (%) (Auto)


  81.0 %


(16.0-70.0) 


  77.1 %


(16.0-70.0)


 


Neutrophils # (Auto)


  9.5 TH/MM3


(1.8-7.7) 


  


 


 


Prothrombin Time


  28.8 SEC


(9.8-11.6) 26.1 SEC


(9.8-11.6) 24.0 SEC


(9.8-11.6)


 


Blood Urea Nitrogen


  60 MG/DL


(7-18) 58 MG/DL


(7-18) 57 MG/DL


(7-18)


 


Creatinine


  2.27 MG/DL


(0.50-1.00) 2.21 MG/DL


(0.50-1.00) 2.27 MG/DL


(0.50-1.00)


 


Random Glucose


  181 MG/DL


() 249 MG/DL


() 209 MG/DL


()


 


Total Protein


  6.1 GM/DL


(6.4-8.2) 


  6.2 GM/DL


(6.4-8.2)


 


Albumin


  2.1 GM/DL


(3.4-5.0) 


  2.0 GM/DL


(3.4-5.0)


 


Alkaline Phosphatase


  142 U/L


() 


  149 U/L


()


 


Sodium Level


  132 MEQ/L


(136-145) 135 MEQ/L


(136-145) 135 MEQ/L


(136-145)


 


Estimat Glomerular Filtration


Rate 22 ML/MIN


(>89) 23 ML/MIN


(>89) 22 ML/MIN


(>89)


 


Chloride Level


  


  


  97 MEQ/L


()








Imaging





Last Impressions








Foot X-Ray 10/28/17 0000 Signed





Impressions: 





 Service Date/Time:  Saturday, October 28, 2017 18:54 - CONCLUSION:  

Significant 





 dorsal soft tissue swelling.  The osseous structures of the forefoot are 

grossly 





 intact.     Jesse Martínez MD 


 


Head CT 10/24/17 1909 Signed





Impressions: 





 Service Date/Time:  Tuesday, October 24, 2017 20:34 - CONCLUSION: Unremarkable 





 study except for mild chronic sinusitis.     TYLER Magana MD 


 


Chest X-Ray 10/24/17 1909 Signed





Impressions: 





 Service Date/Time:  Tuesday, October 24, 2017 19:27 - CONCLUSION:  No acute 





 cardiopulmonary disease.     TYLER Magana MD 


 


Lower Extremity Ultrasound 10/24/17 0000 Signed





Impressions: 





 Service Date/Time:  Tuesday, October 24, 2017 20:03 - CONCLUSION:  Right 





 peroneal vein thrombus.     TYLER Magana MD 








PE at Discharge


GEN: Well-developed, well-nourished patient. No acute distress.


CV: Regular rate and rhythm without obvious murmurs


LUNGS: Clear to auscultation bilaterally. Normal respiratory effort. No 

wheezes.  Crackling in the upper lobes bilaterally improved with coughing


GI: Soft, nontender, nondistended. No palpable masses. Bowel sounds WNL.


EXT: 2+ pitting edema up to mid shin left worse than right


NEURO/PSYCH: Afocal. Awake, alert, and oriented x3. Appropriate insight and 

judgment.


Hospital Course


This is a pleasant 63 y/o Female with Possible autoimmune disease, Possibly 

lupus versus Autoimmune Hepatitis with STEPHANIE positive


1/1280 and anti-smooth antibody positivity, renal biopsy performed on 09/12/ 2017 that shows focal proliferative and  necrotizing 


glomerulonephritis, ++global 13 out of 27 in segmental glomerulosclerosis, ++

interstitial fibrosis and tubular atrophy which is severe. 


was started on immunosuppressive therapy. has worsening renal function, 

appeared to not have responded. was consulted Oncology


to administer Cytoxan, discussed with Oncology specialist ARNP and okay to 

discharge from Oncology standpoint. 


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 

CellCept three times a day for the past month and a half.


h/o Diabetes. chronic pancreatitis. Congestive heart failure. Coronary artery 

disease.








Assessment and Plan


1. Lupus Nephritis on Cyclophosphamide IV Monthly Hematology and Oncology 

specialist following, 24 hour urine for protein 17760


    Nephrotic range, on Lasix, Status post Cytoxan as per Oncology specialist 

okay to discharge Home. 


2. DVT of lower extremity on Warfarin INR 2.1


3. Hypothyroidism TSH at 69.9 with very low T3 and low T4.  Lower extremity 

edema could be also secondary to myxedema.


   Continue levothyroxine 200 g by mouth daily.


4. DM II on long lasting Insulin Subcutaneous, Sliding scale. worsening due to 

Steroid use. adjusted dosages of Insulin. 


5. Acute on chronic kidney disease Nephrology specialist managing the pathology 

continue hydration, at this time stable Creatinine. 





DVT prophylaxis with warfarin





Discharge Planning


Cleared from Oncology specialist 


And by Nephrology specialist.


Pt Condition on Discharge:  Stable


Discharge Disposition:  Disch w/ Home Health Serv


Discharge Time:  > 30 minutes


Discharge Instructions


DIET: Follow Instructions for:  Heart Healthy Diet, Diabetic Diet


Activities you can perform:  Regular-No Restrictions











Zheng Kenny MD Nov 6, 2017 17:28

## 2017-11-06 NOTE — HHI.FF
Face to Face Verification


Diagnosis:  


(1) Type II diabetes mellitus, uncontrolled


(2) Glomerulonephritis


(3) Lupus nephritis, ISN/RPS class III


(4) Acute on chronic kidney failure


Physical Therapy


Order:  Evaluate and Treat, Improve ambulation, Strength and gait training





Home Health Nursing








Order: Medical education





 Signs/symptoms of disease process





 Diabetic education





 Medication education-adverse effect





 Nursing assessment with vital signs








Instructions:


CONTINUE WARFARIN 5 MG DAILY AND TITRATE WITH PCP AND WITH HEMATOLOGY


PERFORM DAILY PT AND INR AND CALL PCP FOR TITRATION


INR GOAL 2 TO 3 HOLD WARFARIN IF INR IN 3 OR OVER.











I have seen patient Anay Negrete on 11/6/17. My clinical findings 

support the need for the requested home health care services because:








 Ltd mobility - disease progression





 Deconditioned w/ increased weakness














I certify that my clinical findings support that this patient is homebound 

because:








 Unsafe to leave home unassisted

















Zheng Kenny MD Nov 6, 2017 17:26

## 2017-11-06 NOTE — HHI.PR
Subjective


Remarks


This is a pleasant 61 y/o Female with Possible autoimmune disease, Possibly 

lupus versus Autoimmune Hepatitis with STEPHANIE positive


1/1280 and anti-smooth antibody positivity, renal biopsy performed on 09/12/ 2017 that shows focal proliferative and  necrotizing 


glomerulonephritis, ++global 13 out of 27 in segmental glomerulosclerosis, ++

interstitial fibrosis and tubular atrophy which is severe. 


was started on immunosuppressive therapy. has worsening renal function, 

appeared to not have responded. was consulted Oncology


to administer Cytoxan, discussed with Oncology specialist ARNP and okay to 

discharge from Oncology standpoint. 


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 

CellCept three times a day for the past month and a half.


h/o Diabetes. chronic pancreatitis. Congestive heart failure. Coronary artery 

disease.





Objective





Vital Signs








  Date Time  Temp Pulse Resp B/P (MAP) Pulse Ox O2 Delivery O2 Flow Rate FiO2


 


11/6/17 07:24   16     


 


11/6/17 04:10  82      


 


11/6/17 04:00 98.6 82 18 135/81 (99) 95   


 


11/6/17 00:30  101      


 


11/6/17 00:20 98.4 111 18 145/97 (113) 95   


 


11/5/17 20:41 98.5 87 16 157/86 (109) 96   


 


11/5/17 16:10 98.0 80 18 135/78 (97) 95   


 


11/5/17 12:30 98.6 80 18 122/78 (93) 95   














I/O      


 


 11/5/17 11/5/17 11/5/17 11/6/17 11/6/17 11/6/17





 07:00 15:00 23:00 07:00 15:00 23:00


 


Intake Total 720 ml  900 ml   


 


Output Total 1 ml     


 


Balance 719 ml  900 ml   


 


      


 


Intake Oral 720 ml  900 ml   


 


Stool Total 1 ml     


 


# Voids 2  3 7  


 


# Bowel Movements 1   1  








Result Diagram:  


11/6/17 0530                                                                   

             11/6/17 0530





Imaging





Last Impressions








Foot X-Ray 10/28/17 0000 Signed





Impressions: 





 Service Date/Time:  Saturday, October 28, 2017 18:54 - CONCLUSION:  

Significant 





 dorsal soft tissue swelling.  The osseous structures of the forefoot are 

grossly 





 intact.     Jesse Martínez MD 


 


Head CT 10/24/17 1909 Signed





Impressions: 





 Service Date/Time:  Tuesday, October 24, 2017 20:34 - CONCLUSION: Unremarkable 





 study except for mild chronic sinusitis.     TYLER Magana MD 


 


Chest X-Ray 10/24/17 1909 Signed





Impressions: 





 Service Date/Time:  Tuesday, October 24, 2017 19:27 - CONCLUSION:  No acute 





 cardiopulmonary disease.     TYLER Magana MD 


 


Lower Extremity Ultrasound 10/24/17 0000 Signed





Impressions: 





 Service Date/Time:  Tuesday, October 24, 2017 20:03 - CONCLUSION:  Right 





 peroneal vein thrombus.     TYLER Magana MD 








Procedures


None


Other Results





Laboratory Tests








Test


  10/24/17


19:21 10/24/17


19:55 10/25/17


06:24 10/25/17


09:31


 


Differential Total Cells


Counted 100 


  


  


  


 


 


Neutrophils % (Manual) 53 %    


 


Band Neutrophils % 5 %    


 


Lymphocytes % 31 %    


 


Monocytes % 8 %    


 


Eosinophils % 1 %    


 


Neutrophils # (Manual) 3.8 TH/MM3    


 


Metamyelocytes 1 %    


 


Myelocytes 1 %    


 


Urine Amorphous Sediment RARE    


 


Total Creatine Kinase 29 U/L    


 


Lipase 713 U/L    


 


Lactic Acid Level  1.0 mmol/L   


 


Platelet Estimate   NORMAL  


 


Platelet Morphology Comment   NORMAL  


 


Acanthocytes     


 


Hemoglobin A1c   10.4 %  


 


Total Triiodothyronine   45 NG/DL  


 


Troponin I


  


  


  


  LESS THAN 0.02


NG/ML


 


Test


  10/25/17


17:00 10/31/17


07:30 11/1/17


20:46 11/2/17


08:38


 


Urine Color LIGHT-YELLOW    


 


Urine Turbidity CLEAR    


 


Urine pH 6.5    


 


Urine Specific Gravity 1.013    


 


Urine Protein 300 mg/dL    


 


Urine Glucose (UA) 70 mg/dL    


 


Urine Ketones NEG mg/dL    


 


Urine Occult Blood MOD    


 


Urine Nitrite NEG    


 


Urine Bilirubin NEG    


 


Urine Urobilinogen


  LESS THAN 2.0


MG/DL 


  


  


 


 


Urine Leukocyte Esterase NEG    


 


Urine RBC 58 /hpf    


 


Urine WBC 7 /hpf    


 


Urine Squamous Epithelial


Cells <1 /hpf 


  


  


  


 


 


Urine Bacteria RARE /hpf    


 


Urine Hyaline Casts 3 /lpf    


 


Microscopic Urinalysis Comment


  CULT NOT


INDICATED 


  


  


 


 


B-Type Natriuretic Peptide  14 PG/ML   


 


Anti-Nuclear Antibody Screen   POS  


 


Anti-Double Strand DNA


Antibody 


  


  


  120 IU/mL 


 


 


Complement C3    95 MG/DL 


 


Complement C4    16 MG/DL 


 


Test


  11/2/17


21:15 11/3/17


06:47 11/4/17


05:23 11/6/17


05:30


 


Urine Total Volume 24 Hours 4530 ML    


 


Urine Total Protein 24 Hour 73821 MG/24HR    


 


Activated Partial


Thromboplast Time 


  35.9 SEC 


  


  


 


 


Free Thyroxine  1.00 NG/DL   


 


Free Triiodothyronine (T3)


pg/dL 


  1.30 PG/ML 


  


  


 


 


Thyroid Stimulating Hormone


3rd Gen 


  6.830 uIU/ML 


  


  


 


 


Blood Urea Nitrogen   60 MG/DL  57 MG/DL 


 


Creatinine   2.27 MG/DL  2.27 MG/DL 


 


Random Glucose   181 MG/DL  209 MG/DL 


 


Total Protein   6.1 GM/DL  6.2 GM/DL 


 


Albumin   2.1 GM/DL  2.0 GM/DL 


 


Calcium Level   8.6 MG/DL  8.6 MG/DL 


 


Phosphorus Level   2.6 MG/DL  


 


Magnesium Level   1.9 MG/DL  1.9 MG/DL 


 


Alkaline Phosphatase   142 U/L  149 U/L 


 


Aspartate Amino Transf


(AST/SGOT) 


  


  33 U/L 


  27 U/L 


 


 


Alanine Aminotransferase


(ALT/SGPT) 


  


  28 U/L 


  28 U/L 


 


 


Total Bilirubin   0.2 MG/DL  0.2 MG/DL 


 


Sodium Level   132 MEQ/L  135 MEQ/L 


 


Potassium Level   4.1 MEQ/L  3.9 MEQ/L 


 


Chloride Level   98 MEQ/L  97 MEQ/L 


 


Carbon Dioxide Level   27.3 MEQ/L  29.5 MEQ/L 


 


White Blood Count    9.8 TH/MM3 


 


Red Blood Count    3.91 MIL/MM3 


 


Hemoglobin    12.2 GM/DL 


 


Hematocrit    37.3 % 


 


Mean Corpuscular Volume    95.4 FL 


 


Mean Corpuscular Hemoglobin    31.2 PG 


 


Mean Corpuscular Hemoglobin


Concent 


  


  


  32.7 % 


 


 


Red Cell Distribution Width    14.5 % 


 


Platelet Count    284 TH/MM3 


 


Mean Platelet Volume    7.9 FL 


 


Neutrophils (%) (Auto)    77.1 % 


 


Lymphocytes (%) (Auto)    18.0 % 


 


Monocytes (%) (Auto)    4.6 % 


 


Eosinophils (%) (Auto)    0.0 % 


 


Basophils (%) (Auto)    0.3 % 


 


Neutrophils # (Auto)    7.6 TH/MM3 


 


Lymphocytes # (Auto)    1.8 TH/MM3 


 


Monocytes # (Auto)    0.4 TH/MM3 


 


Eosinophils # (Auto)    0.0 TH/MM3 


 


Basophils # (Auto)    0.0 TH/MM3 


 


CBC Comment    DIFF FINAL 


 


Differential Comment     


 


Prothrombin Time    24.0 SEC 


 


Prothromb Time International


Ratio 


  


  


  2.1 RATIO 


 


 


Anion Gap    9 MEQ/L 


 


Estimat Glomerular Filtration


Rate 


  


  


  22 ML/MIN 


 








Objective Remarks


GEN: Well-developed, well-nourished patient. No acute distress.


CV: Regular rate and rhythm without obvious murmurs


LUNGS: Clear to auscultation bilaterally. Normal respiratory effort. No 

wheezes.  Crackling in the upper lobes bilaterally improved with coughing


GI: Soft, nontender, nondistended. No palpable masses. Bowel sounds WNL.


EXT: 2+ pitting edema up to mid shin left worse than right


NEURO/PSYCH: Afocal. Awake, alert, and oriented x3. Appropriate insight and 

judgment.





Medications and IVs





Current Medications








 Medications


  (Trade)  Dose


 Ordered  Sig/Kandis


 Route  Start Time


 Stop Time Status Last Admin


 


  (NS Flush)  2 ml  UNSCH  PRN


 IV FLUSH  10/24/17 23:30


    11/4/17 09:34


 


 


  (NS Flush)  2 ml  BID


 IV FLUSH  10/25/17 09:00


    11/5/17 21:04


 


 


  (Narcan Inj)  0.4 mg  UNSCH  PRN


 IV PUSH  10/24/17 23:30


     


 


 


  (D50w (Vial) Inj)  50 ml  UNSCH  PRN


 IV PUSH  10/24/17 23:30


     


 


 


  (Glucagon Inj)  1 mg  UNSCH  PRN


 OTHER  10/24/17 23:30


     


 


 


  (NovoLOG


 SUPPLEMENTAL


 SCALE)  1  ACHS SLIDING  SCALE


 SQ  10/25/17 08:00


    11/5/17 21:05


 


 


  (Protonix)  40 mg  DAILY


 PO  10/25/17 09:00


    11/5/17 09:53


 


 


  (Norco  5-325 Mg)  1 tab  Q4H  PRN


 PO  10/25/17 08:30


     


 


 


  (Norco  Mg)  1 tab  Q4H  PRN


 PO  10/25/17 08:30


    11/6/17 05:41


 


 


  (Colace)  100 mg  BID


 PO  10/25/17 21:00


    11/5/17 21:03


 


 


  (Milk Of


 Magnesia Liq)  30 ml  BID  PRN


 PO  10/25/17 10:45


    10/30/17 09:20


 


 


  (Synthroid)  200 mcg  DAILY@0600


 PO  10/26/17 06:00


    11/6/17 05:37


 


 


  (Coumadin)  5 mg  DAILY@1600


 PO  10/26/17 16:00


    11/5/17 16:00


 


 


  (Levemir Inj)  24 units  HS


 SQ  10/26/17 21:00


    11/5/17 21:05


 


 


  (Ditropan)  5 mg  HS


 PO  10/26/17 21:00


    11/5/17 21:03


 


 


  (KlonoPIN)  1 mg  Q12HR


 PO  10/29/17 21:00


    11/5/17 21:03


 


 


  (Lexapro)  5 mg  DAILY


 PO  10/29/17 12:00


    11/5/17 09:54


 


 


  (Pill Splitter)  1 ea  UNSCH  PRN


 OTHER  10/29/17 12:30


     


 


 


  (NovoLOG INJ)  7 units  TIDAC


 SQ  11/3/17 12:00


    11/5/17 18:06


 


 


  (Deltasone)  20 mg  BID


 PO  11/3/17 21:00


    11/5/17 21:02


 


 


 Pharmacy Profile


 Note  0 ml @ 0


 mls/hr  UNSCH


 OTHER  11/3/17 18:45


     


 


 


  (Catapres)  0.1 mg  Q6H  PRN


 PO  11/4/17 10:00


     


 


 


  (Morphine Inj)  1 mg  Q4H  PRN


 IM  11/5/17 12:15


     


 


 


  (Lasix Inj)  40 mg  BID


 IV PUSH  11/5/17 21:00


    11/5/17 21:04


 











A/P


Assessment and Plan


1. Lupus Nephritis on Cyclophosphamide IV Monthly Hematology and Oncology 

specialist following, 24 hour urine for protein 39691


    Nephrotic range, on Lasix, Status post Cytoxan as per Oncology specialist 

okay to discharge Home. 


2. DVT of lower extremity on Warfarin INR 2.1


3. Hypothyroidism TSH at 69.9 with very low T3 and low T4.  Lower extremity 

edema could be also secondary to myxedema.


   Continue levothyroxine 200 g by mouth daily.


4. DM II on long lasting Insulin Subcutaneous, Sliding scale. worsening due to 

Steroid use. adjusted dosages of Insulin. 


5. Acute on chronic kidney disease Nephrology specialist managing the pathology 

continue hydration, at this time stable Creatinine. 





DVT prophylaxis with warfarin





Discharge Planning


Cleared from Oncology specialist 


awaiting final by Nephrology specialist.











Zheng Kenny MD Nov 6, 2017 08:45

## 2017-11-06 NOTE — HHI.PR
Subjective


Remarks


This is a pleasant 63 y/o Female with Possible autoimmune disease, Possibly 

lupus versus Autoimmune Hepatitis with STEPHANIE positive


1/1280 and anti-smooth antibody positivity, renal biopsy performed on 09/12/ 2017 that shows focal proliferative and  necrotizing 


glomerulonephritis, ++global 13 out of 27 in segmental glomerulosclerosis, ++

interstitial fibrosis and tubular atrophy which is severe. 


was started on immunosuppressive therapy. has worsening renal function, 

appeared to not have responded. was consulted Oncology


to administer Cytoxan, discussed with Oncology specialist ARNP and okay to 

discharge from Oncology standpoint. 


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 

CellCept three times a day for the past month and a half.


h/o Diabetes. chronic pancreatitis. Congestive heart failure. Coronary artery 

disease.





Objective





Vital Signs








  Date Time  Temp Pulse Resp B/P (MAP) Pulse Ox O2 Delivery O2 Flow Rate FiO2


 


11/6/17 07:24   16     


 


11/6/17 04:10  82      


 


11/6/17 04:00 98.6 82 18 135/81 (99) 95   


 


11/6/17 00:30  101      


 


11/6/17 00:20 98.4 111 18 145/97 (113) 95   


 


11/5/17 20:41 98.5 87 16 157/86 (109) 96   


 


11/5/17 16:10 98.0 80 18 135/78 (97) 95   


 


11/5/17 12:30 98.6 80 18 122/78 (93) 95   














I/O      


 


 11/5/17 11/5/17 11/5/17 11/6/17 11/6/17 11/6/17





 07:00 15:00 23:00 07:00 15:00 23:00


 


Intake Total 720 ml  900 ml   


 


Output Total 1 ml     


 


Balance 719 ml  900 ml   


 


      


 


Intake Oral 720 ml  900 ml   


 


Stool Total 1 ml     


 


# Voids 2  3 7  


 


# Bowel Movements 1   1  








Result Diagram:  


11/6/17 0530                                                                   

             11/6/17 0530





Imaging





Last Impressions








Foot X-Ray 10/28/17 0000 Signed





Impressions: 





 Service Date/Time:  Saturday, October 28, 2017 18:54 - CONCLUSION:  

Significant 





 dorsal soft tissue swelling.  The osseous structures of the forefoot are 

grossly 





 intact.     Jesse Martínez MD 


 


Head CT 10/24/17 1909 Signed





Impressions: 





 Service Date/Time:  Tuesday, October 24, 2017 20:34 - CONCLUSION: Unremarkable 





 study except for mild chronic sinusitis.     TYLER Magana MD 


 


Chest X-Ray 10/24/17 1909 Signed





Impressions: 





 Service Date/Time:  Tuesday, October 24, 2017 19:27 - CONCLUSION:  No acute 





 cardiopulmonary disease.     TYLER Magana MD 


 


Lower Extremity Ultrasound 10/24/17 0000 Signed





Impressions: 





 Service Date/Time:  Tuesday, October 24, 2017 20:03 - CONCLUSION:  Right 





 peroneal vein thrombus.     TYLER Magana MD 








Procedures


None


Other Results





Laboratory Tests








Test


  10/24/17


19:21 10/24/17


19:55 10/25/17


06:24 10/25/17


09:31


 


Differential Total Cells


Counted 100 


  


  


  


 


 


Neutrophils % (Manual) 53 %    


 


Band Neutrophils % 5 %    


 


Lymphocytes % 31 %    


 


Monocytes % 8 %    


 


Eosinophils % 1 %    


 


Neutrophils # (Manual) 3.8 TH/MM3    


 


Metamyelocytes 1 %    


 


Myelocytes 1 %    


 


Urine Amorphous Sediment RARE    


 


Total Creatine Kinase 29 U/L    


 


Lipase 713 U/L    


 


Lactic Acid Level  1.0 mmol/L   


 


Platelet Estimate   NORMAL  


 


Platelet Morphology Comment   NORMAL  


 


Acanthocytes     


 


Hemoglobin A1c   10.4 %  


 


Total Triiodothyronine   45 NG/DL  


 


Troponin I


  


  


  


  LESS THAN 0.02


NG/ML


 


Test


  10/25/17


17:00 10/31/17


07:30 11/1/17


20:46 11/2/17


08:38


 


Urine Color LIGHT-YELLOW    


 


Urine Turbidity CLEAR    


 


Urine pH 6.5    


 


Urine Specific Gravity 1.013    


 


Urine Protein 300 mg/dL    


 


Urine Glucose (UA) 70 mg/dL    


 


Urine Ketones NEG mg/dL    


 


Urine Occult Blood MOD    


 


Urine Nitrite NEG    


 


Urine Bilirubin NEG    


 


Urine Urobilinogen


  LESS THAN 2.0


MG/DL 


  


  


 


 


Urine Leukocyte Esterase NEG    


 


Urine RBC 58 /hpf    


 


Urine WBC 7 /hpf    


 


Urine Squamous Epithelial


Cells <1 /hpf 


  


  


  


 


 


Urine Bacteria RARE /hpf    


 


Urine Hyaline Casts 3 /lpf    


 


Microscopic Urinalysis Comment


  CULT NOT


INDICATED 


  


  


 


 


B-Type Natriuretic Peptide  14 PG/ML   


 


Anti-Nuclear Antibody Screen   POS  


 


Anti-Double Strand DNA


Antibody 


  


  


  120 IU/mL 


 


 


Complement C3    95 MG/DL 


 


Complement C4    16 MG/DL 


 


Test


  11/2/17


21:15 11/3/17


06:47 11/4/17


05:23 11/6/17


05:30


 


Urine Total Volume 24 Hours 4530 ML    


 


Urine Total Protein 24 Hour 03025 MG/24HR    


 


Activated Partial


Thromboplast Time 


  35.9 SEC 


  


  


 


 


Free Thyroxine  1.00 NG/DL   


 


Free Triiodothyronine (T3)


pg/dL 


  1.30 PG/ML 


  


  


 


 


Thyroid Stimulating Hormone


3rd Gen 


  6.830 uIU/ML 


  


  


 


 


Blood Urea Nitrogen   60 MG/DL  57 MG/DL 


 


Creatinine   2.27 MG/DL  2.27 MG/DL 


 


Random Glucose   181 MG/DL  209 MG/DL 


 


Total Protein   6.1 GM/DL  6.2 GM/DL 


 


Albumin   2.1 GM/DL  2.0 GM/DL 


 


Calcium Level   8.6 MG/DL  8.6 MG/DL 


 


Phosphorus Level   2.6 MG/DL  


 


Magnesium Level   1.9 MG/DL  1.9 MG/DL 


 


Alkaline Phosphatase   142 U/L  149 U/L 


 


Aspartate Amino Transf


(AST/SGOT) 


  


  33 U/L 


  27 U/L 


 


 


Alanine Aminotransferase


(ALT/SGPT) 


  


  28 U/L 


  28 U/L 


 


 


Total Bilirubin   0.2 MG/DL  0.2 MG/DL 


 


Sodium Level   132 MEQ/L  135 MEQ/L 


 


Potassium Level   4.1 MEQ/L  3.9 MEQ/L 


 


Chloride Level   98 MEQ/L  97 MEQ/L 


 


Carbon Dioxide Level   27.3 MEQ/L  29.5 MEQ/L 


 


White Blood Count    9.8 TH/MM3 


 


Red Blood Count    3.91 MIL/MM3 


 


Hemoglobin    12.2 GM/DL 


 


Hematocrit    37.3 % 


 


Mean Corpuscular Volume    95.4 FL 


 


Mean Corpuscular Hemoglobin    31.2 PG 


 


Mean Corpuscular Hemoglobin


Concent 


  


  


  32.7 % 


 


 


Red Cell Distribution Width    14.5 % 


 


Platelet Count    284 TH/MM3 


 


Mean Platelet Volume    7.9 FL 


 


Neutrophils (%) (Auto)    77.1 % 


 


Lymphocytes (%) (Auto)    18.0 % 


 


Monocytes (%) (Auto)    4.6 % 


 


Eosinophils (%) (Auto)    0.0 % 


 


Basophils (%) (Auto)    0.3 % 


 


Neutrophils # (Auto)    7.6 TH/MM3 


 


Lymphocytes # (Auto)    1.8 TH/MM3 


 


Monocytes # (Auto)    0.4 TH/MM3 


 


Eosinophils # (Auto)    0.0 TH/MM3 


 


Basophils # (Auto)    0.0 TH/MM3 


 


CBC Comment    DIFF FINAL 


 


Differential Comment     


 


Prothrombin Time    24.0 SEC 


 


Prothromb Time International


Ratio 


  


  


  2.1 RATIO 


 


 


Anion Gap    9 MEQ/L 


 


Estimat Glomerular Filtration


Rate 


  


  


  22 ML/MIN 


 








Objective Remarks


GEN: Well-developed, well-nourished patient. No acute distress.


CV: Regular rate and rhythm without obvious murmurs


LUNGS: Clear to auscultation bilaterally. Normal respiratory effort. No 

wheezes.  Crackling in the upper lobes bilaterally improved with coughing


GI: Soft, nontender, nondistended. No palpable masses. Bowel sounds WNL.


EXT: 2+ pitting edema up to mid shin left worse than right


NEURO/PSYCH: Afocal. Awake, alert, and oriented x3. Appropriate insight and 

judgment.





Medications and IVs





Current Medications








 Medications


  (Trade)  Dose


 Ordered  Sig/Kandis


 Route  Start Time


 Stop Time Status Last Admin


 


  (NS Flush)  2 ml  UNSCH  PRN


 IV FLUSH  10/24/17 23:30


    11/4/17 09:34


 


 


  (NS Flush)  2 ml  BID


 IV FLUSH  10/25/17 09:00


    11/5/17 21:04


 


 


  (Narcan Inj)  0.4 mg  UNSCH  PRN


 IV PUSH  10/24/17 23:30


     


 


 


  (D50w (Vial) Inj)  50 ml  UNSCH  PRN


 IV PUSH  10/24/17 23:30


     


 


 


  (Glucagon Inj)  1 mg  UNSCH  PRN


 OTHER  10/24/17 23:30


     


 


 


  (NovoLOG


 SUPPLEMENTAL


 SCALE)  1  ACHS SLIDING  SCALE


 SQ  10/25/17 08:00


    11/5/17 21:05


 


 


  (Protonix)  40 mg  DAILY


 PO  10/25/17 09:00


    11/5/17 09:53


 


 


  (Norco  5-325 Mg)  1 tab  Q4H  PRN


 PO  10/25/17 08:30


     


 


 


  (Norco  Mg)  1 tab  Q4H  PRN


 PO  10/25/17 08:30


    11/6/17 05:41


 


 


  (Colace)  100 mg  BID


 PO  10/25/17 21:00


    11/5/17 21:03


 


 


  (Milk Of


 Magnesia Liq)  30 ml  BID  PRN


 PO  10/25/17 10:45


    10/30/17 09:20


 


 


  (Synthroid)  200 mcg  DAILY@0600


 PO  10/26/17 06:00


    11/6/17 05:37


 


 


  (Coumadin)  5 mg  DAILY@1600


 PO  10/26/17 16:00


    11/5/17 16:00


 


 


  (Levemir Inj)  24 units  HS


 SQ  10/26/17 21:00


    11/5/17 21:05


 


 


  (Ditropan)  5 mg  HS


 PO  10/26/17 21:00


    11/5/17 21:03


 


 


  (KlonoPIN)  1 mg  Q12HR


 PO  10/29/17 21:00


    11/5/17 21:03


 


 


  (Lexapro)  5 mg  DAILY


 PO  10/29/17 12:00


    11/5/17 09:54


 


 


  (Pill Splitter)  1 ea  UNSCH  PRN


 OTHER  10/29/17 12:30


     


 


 


  (NovoLOG INJ)  7 units  TIDAC


 SQ  11/3/17 12:00


    11/5/17 18:06


 


 


  (Deltasone)  20 mg  BID


 PO  11/3/17 21:00


    11/5/17 21:02


 


 


 Pharmacy Profile


 Note  0 ml @ 0


 mls/hr  UNSCH


 OTHER  11/3/17 18:45


     


 


 


  (Catapres)  0.1 mg  Q6H  PRN


 PO  11/4/17 10:00


     


 


 


  (Morphine Inj)  1 mg  Q4H  PRN


 IM  11/5/17 12:15


     


 


 


  (Lasix Inj)  40 mg  BID


 IV PUSH  11/5/17 21:00


    11/5/17 21:04


 











A/P


Assessment and Plan


1. Lupus Nephritis on Cyclophosphamide IV Monthly Hematology and Oncology 

specialist following, 24 hour urine for protein [id number]


    Nephrotic range, on Lasix, Status post Cytoxan as per Oncology specialist 

okay to discharge Home. 


2. DVT of lower extremity on Warfarin INR 2.1


3. Hypothyroidism TSH at 69.9 with very low T3 and low T4.  Lower extremity 

edema could be also secondary to myxedema.


   Continue levothyroxine 200 g by mouth daily.


4. DM II on long lasting Insulin Subcutaneous, Sliding scale. worsening due to 

Steroid use. adjusted dosages of Insulin. 


5. Acute on chronic kidney disease Nephrology specialist managing the pathology 

continue hydration, at this time stable Creatinine. 





DVT prophylaxis with warfarin





Discharge Planning


Cleared from Oncology specialist 


awaiting final by Nephrology specialist.











Zheng Kenny MD Nov 6, 2017 08:45

## 2017-11-07 ENCOUNTER — HOSPITAL ENCOUNTER (EMERGENCY)
Dept: HOSPITAL 17 - NEPC | Age: 62
Discharge: HOME | End: 2017-11-07
Payer: COMMERCIAL

## 2017-11-07 VITALS
RESPIRATION RATE: 18 BRPM | SYSTOLIC BLOOD PRESSURE: 150 MMHG | TEMPERATURE: 97.5 F | OXYGEN SATURATION: 97 % | HEART RATE: 74 BPM | DIASTOLIC BLOOD PRESSURE: 71 MMHG

## 2017-11-07 VITALS — DIASTOLIC BLOOD PRESSURE: 82 MMHG | SYSTOLIC BLOOD PRESSURE: 170 MMHG

## 2017-11-07 VITALS — HEIGHT: 59 IN | WEIGHT: 176.37 LBS | BODY MASS INDEX: 35.56 KG/M2

## 2017-11-07 VITALS — OXYGEN SATURATION: 100 %

## 2017-11-07 DIAGNOSIS — I25.10: ICD-10-CM

## 2017-11-07 DIAGNOSIS — K59.00: Primary | ICD-10-CM

## 2017-11-07 DIAGNOSIS — N20.0: ICD-10-CM

## 2017-11-07 DIAGNOSIS — I11.0: ICD-10-CM

## 2017-11-07 DIAGNOSIS — R11.0: ICD-10-CM

## 2017-11-07 DIAGNOSIS — R94.31: ICD-10-CM

## 2017-11-07 DIAGNOSIS — E11.9: ICD-10-CM

## 2017-11-07 DIAGNOSIS — I50.9: ICD-10-CM

## 2017-11-07 DIAGNOSIS — B96.20: ICD-10-CM

## 2017-11-07 LAB
ALP SERPL-CCNC: 123 U/L (ref 45–117)
ALT SERPL-CCNC: 24 U/L (ref 10–53)
ANION GAP SERPL CALC-SCNC: 8 MEQ/L (ref 5–15)
APTT BLD: 31.8 SEC (ref 24.3–30.1)
AST SERPL-CCNC: 34 U/L (ref 15–37)
BACTERIA #/AREA URNS HPF: (no result) /HPF
BASOPHILS # BLD AUTO: 0.1 TH/MM3 (ref 0–0.2)
BASOPHILS NFR BLD: 0.6 % (ref 0–2)
BILIRUB SERPL-MCNC: 0.3 MG/DL (ref 0.2–1)
BUN SERPL-MCNC: 55 MG/DL (ref 7–18)
CHLORIDE SERPL-SCNC: 99 MEQ/L (ref 98–107)
COLOR UR: YELLOW
COMMENT (UR): (no result)
CULTURE IF INDICATED: (no result)
EOSINOPHIL # BLD: 0.1 TH/MM3 (ref 0–0.4)
EOSINOPHIL NFR BLD: 0.5 % (ref 0–4)
ERYTHROCYTE [DISTWIDTH] IN BLOOD BY AUTOMATED COUNT: 14.4 % (ref 11.6–17.2)
GFR SERPLBLD BASED ON 1.73 SQ M-ARVRAT: 24 ML/MIN (ref 89–?)
GLUCOSE UR STRIP-MCNC: 70 MG/DL
HCO3 BLD-SCNC: 27.6 MEQ/L (ref 21–32)
HCT VFR BLD CALC: 36.8 % (ref 35–46)
HEMO FLAGS: (no result)
HGB UR QL STRIP: (no result)
HYALINE CASTS #/AREA URNS LPF: 2 /LPF
INR PPP: 2.4 RATIO
KETONES UR STRIP-MCNC: (no result) MG/DL
LYMPHOCYTES # BLD AUTO: 2 TH/MM3 (ref 1–4.8)
LYMPHOCYTES NFR BLD AUTO: 15.5 % (ref 9–44)
MCH RBC QN AUTO: 31.2 PG (ref 27–34)
MCHC RBC AUTO-ENTMCNC: 33.1 % (ref 32–36)
MCV RBC AUTO: 94.3 FL (ref 80–100)
MONOCYTES NFR BLD: 5.2 % (ref 0–8)
NEUTROPHILS # BLD AUTO: 9.9 TH/MM3 (ref 1.8–7.7)
NEUTROPHILS NFR BLD AUTO: 78.2 % (ref 16–70)
NITRITE UR QL STRIP: (no result)
PLATELET # BLD: 274 TH/MM3 (ref 150–450)
POTASSIUM SERPL-SCNC: 3 MEQ/L (ref 3.5–5.1)
PROTHROMBIN TIME: 27.2 SEC (ref 9.8–11.6)
RBC # BLD AUTO: 3.9 MIL/MM3 (ref 4–5.3)
SODIUM SERPL-SCNC: 135 MEQ/L (ref 136–145)
SP GR UR STRIP: 1.01 (ref 1–1.03)
SQUAMOUS #/AREA URNS HPF: 1 /HPF (ref 0–5)
WBC # BLD AUTO: 12.7 TH/MM3 (ref 4–11)

## 2017-11-07 PROCEDURE — 93005 ELECTROCARDIOGRAM TRACING: CPT

## 2017-11-07 PROCEDURE — 87086 URINE CULTURE/COLONY COUNT: CPT

## 2017-11-07 PROCEDURE — 74020: CPT

## 2017-11-07 PROCEDURE — 85025 COMPLETE CBC W/AUTO DIFF WBC: CPT

## 2017-11-07 PROCEDURE — 85610 PROTHROMBIN TIME: CPT

## 2017-11-07 PROCEDURE — 87186 SC STD MICRODIL/AGAR DIL: CPT

## 2017-11-07 PROCEDURE — 81001 URINALYSIS AUTO W/SCOPE: CPT

## 2017-11-07 PROCEDURE — 87077 CULTURE AEROBIC IDENTIFY: CPT

## 2017-11-07 PROCEDURE — 83690 ASSAY OF LIPASE: CPT

## 2017-11-07 PROCEDURE — 85730 THROMBOPLASTIN TIME PARTIAL: CPT

## 2017-11-07 PROCEDURE — 80053 COMPREHEN METABOLIC PANEL: CPT

## 2017-11-07 NOTE — PD
HPI


Chief Complaint:  Abdominal Pain


Time Seen by Provider:  03:25


Travel History


International Travel<30 days:  No


Contact w/Intl Traveler<30days:  No


Traveled to known affect area:  No





History of Present Illness


HPI


The patient is a 62 year old female who presents to the [facility] 

emergency department with a history of abdominal pain that began today. She was 

discharged from the hospital last night at 7PM. She laid down to go to bed and 

then felt like she needed to move her bowels.  The patient reports that her 

last normal bowel movement was 3-4 days ago.  She reports that she was trying 

to move her bowels and then had the onset of the pain.  The abdominal pain is 

in the whole belly but worse in bilateral upper quadrants of the abdomen and in 

the center of her abdomen. She was able to move her bowels in small amount. The 

stool was dark. No blood in the stool that she is aware of. She has had chronic 

problems with constipation.  The patient's recent hospitalization was regarding 

multiple medical problems including lupus related nephritis with renal 

insufficiency, and a new diagnosis of DVT.  She is currently on Coumadin.  On 

review of systems, the patient denies having any known fevers, worsening cough 

or congestion, neck pain, chest pain, shortness of breath, urinary symptoms, or 

neurologic symptoms.  The patient reports that she had nausea without vomiting 

noted prior to arrival.





[facility]


Past Medical History


*** Narrative Medical


The patient's past medical history is significant for diabetes mellitus, 

chronic pancreatitis, congestive heart failure, coronary artery disease, liver 

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

, depression, anxiety disorder, hypothyroid disorder, COPD, lupus nephritis, 

nephrotic range proteinuria,  Right lower extremity DVT.


Hx Anticoagulant Therapy:  No


Arthritis:  Yes


Blood Disorders:  No


Anxiety:  Yes


Depression:  Yes


Heart Rhythm Problems:  Yes


Cancer:  No


Cardiovascular Problems:  Yes (HTN)


High Cholesterol:  Yes


Chemotherapy:  No


Chest Pain:  Yes (ENLARGED HEART)


Congestive Heart Failure:  Yes


COPD:  Yes


Cerebrovascular Accident:  Yes


Diabetes:  Yes


Patient Takes Glucophage:  Yes


Diminished Hearing:  Yes


Endocrine:  No


Genitourinary:  Yes (UTI'S)


Headaches:  No


Hypertension:  Yes


Kidney Stones:  No


Musculoskeletal:  Yes (BONES ACHE)


Neurologic:  Yes (TOES-HANDS NUMBNESS/TINGLING)


Psychiatric:  Yes


Reproductive:  No


Respiratory:  Yes (COPD,SOB)


Pneumonia:  Yes


Radiation Therapy:  No


Seizures:  No


Sleep Apnea:  Yes


Thyroid Disease:  Yes (HYPOTHYROID/GOITER)


Ulcer:  Yes (BLEEDING)


Pregnant?:  Not Pregnant


Menopausal:  Yes


Tubal Ligation:  Yes





Past Surgical History


*** Narrative Surgical


The patient's past surgical history is significant for an appendectomy, 

cholecystectomy, , hysterectomy.


Appendectomy:  Yes


 Section:  Yes


Cholecystectomy:  Yes


Hysterectomy:  Yes


Pacemaker:  No


Other Surgery:  Yes (APPENDECTOMY, JAIME, TUBAL LIGATION, C SECTIONS)





Social History


Alcohol Use:  No


Tobacco Use:  No


Substance Use:  No





Allergies-Medications


(Allergen,Severity, Reaction):  


Coded Allergies:  


     milk (Verified  Allergy, Mild, Cramping, 17)


 lactose intolerant


Reported Meds & Prescriptions





Reported Meds & Active Scripts


Active


Miralax Powder (Polyethylene Glycol 3350 Powder) 17 Gm Powd 17 Gm PO DAILY


     Mix and dissolve one measuring cap-ful (17 grams) in water or juice.


Lasix (Furosemide) 40 Mg Tab 40 Mg PO DAILY


Ditropan (Oxybutynin Chloride) 5 Mg Tab 5 Mg PO HS


Escitalopram (Escitalopram Oxalate) 10 Mg Tab 5 Mg PO DAILY


Hydrocodone-Acetaminophen  mg Tab 1 Tab PO Q4H PRN


     do not use this medicine if you will drive a car or use a machine


     only use it when resting at home.


Coumadin (Warfarin) 5 Mg Tab 5 Mg PO DAILY@1600


Prednisone 20 Mg Tab 20 Mg PO DAILY 30 Days


Novolog Inj (Insulin Aspart) 1,000 Unit/10 Ml Vial 1-9 Units SQ ACHS


      sugars less than 70,(0)units; sugars


     150-199,(1) unit; sugars 200-249,(3) units; sugars 250-299,(5) units;


     sugars 300-349,(7) units; sugars greater than 349,(9) units.


Levemir Inj (Insulin Detemir) 1,000 unit/ 10 ML Vial 10 Units SQ HS 30 Days


     Do not mix with any other Insulin.


Cellcept (Mycophenolate Mofetil) 500 Mg Tab 500 Mg PO Q8HR 30 Days


Synthroid (Levothyroxine Sodium) 150 Mcg Tab 150 Mcg PO DAILY@0600 30 Days


Pantoprazole (Pantoprazole Sodium) 40 Mg Tab 40 Mg PO DAILY 30 Days


Mirtazapine 15 Mg Tab 30 Mg PO HS 30 Days


Klonopin (Clonazepam) 0.5 Mg Tab 0.5 Mg PO Q12HR 30 Days








Review of Systems


Except as stated in HPI:  all other systems reviewed are Neg


General / Constitutional:  No: Fever


Eyes:  No: Visual changes


HENT:  No: Headaches


Cardiovascular:  No: Chest Pain or Discomfort


Respiratory:  No: Shortness of Breath


Gastrointestinal:  Positive: Nausea, Abdominal Pain, Constipation, Changes in 

Bowel Habits, No: Vomiting, Diarrhea, Hematemesis, Hematochezia, Indigestion, 

Loss of Appetite


Genitourinary:  No: Dysuria


Musculoskeletal:  No: Pain


Skin:  No Rash


Neurologic:  No: Weakness, Focal Abnormalities, Change in Mentation, Slurred 

Speech, Sensory Disturbance


Psychiatric:  No: Depression


Endocrine:  No: Polydipsia


Hematologic/Lymphatic:  No: Easy Bruising





Physical Exam


Narrative


General: 


The patient is a well-developed well-nourished female in no acute distress.





Head and Neck exam: 


Head is normocephalic atraumatic. 


Eyes: EOMI, pupils are equal round and reactive to light. 


Nose: Midline septum with pink mucous membranes 


Mouth: Dentition unremarkable. Moist mucus membranes. Posterior oropharynx is 

not erythematous. No tonsillar hypertrophy. Uvula midline. Airway patent. 


Neck: No palpable lymphadenopathy. No nuchal rigidity. No thyromegaly. 





Cardiovascular: 


Regular rate and rhythm without murmurs, gallops, or rubs. 





Lungs: 


Clear to auscultation bilaterally. No wheezes, rhonchi, or rales.


 


Abdomen:


Soft, with reported tenderness on palpation of bilateral upper quadrants of the 

abdomen in the midepigastric area.  Negative Rader's sign.  No point 

tenderness specifically on palpation over McBurney's point.  Normal active 

bowel sounds are audible.  No guarding, rebound, or rigidity. 





Extremities: 


No cyanosis.  The patient has clubbing of her fingers.  The patient has 1+ 

pitting edema bilateral lower extremities.  Less than 3 second capillary refill.





Back: 


 No costovertebral angle tenderness to palpation. 





Neurologic Exam: Grossly nonfocal.





Skin Exam: No rash noted. Intact skin that is warm and dry. 





RECTAL EXAM: No masses or tenderness, stool is brown, small amount of blood was 

noted in the mucus.  This was of course heme positive.





Data


Data


Last Documented VS





Vital Signs








  Date Time  Temp Pulse Resp B/P (MAP) Pulse Ox O2 Delivery O2 Flow Rate FiO2


 


17 09:16  72 18 170/82 (111) 100   


 


17 03:33      Room Air  


 


17 03:10 97.5       








Orders





 Orders


Electrocardiogram (17 03:30)


Complete Blood Count With Diff (17 03:30)


Comprehensive Metabolic Panel (17 03:30)


Prothrombin Time / Inr (Pt) (17 03:30)


Act Partial Throm Time (Ptt) (17 03:30)


Lipase (17 03:30)


Urinalysis - C+S If Indicated (17 03:30)


Iv Access Insert/Monitor (17 03:30)


Ecg Monitoring (17 03:30)


Oximetry (17 03:30)


Abdomen, Flat & Upright (17 03:30)


Glycerin Adult Supp (Glycerin Adult Supp (17 04:15)


Urine Culture (17 04:35)


Ed Discharge Order (17 05:30)





Labs





Laboratory Tests








Test


  17


03:35 17


04:35


 


White Blood Count 12.7 TH/MM3  


 


Red Blood Count 3.90 MIL/MM3  


 


Hemoglobin 12.2 GM/DL  


 


Hematocrit 36.8 %  


 


Mean Corpuscular Volume 94.3 FL  


 


Mean Corpuscular Hemoglobin 31.2 PG  


 


Mean Corpuscular Hemoglobin


Concent 33.1 % 


  


 


 


Red Cell Distribution Width 14.4 %  


 


Platelet Count 274 TH/MM3  


 


Mean Platelet Volume 7.9 FL  


 


Neutrophils (%) (Auto) 78.2 %  


 


Lymphocytes (%) (Auto) 15.5 %  


 


Monocytes (%) (Auto) 5.2 %  


 


Eosinophils (%) (Auto) 0.5 %  


 


Basophils (%) (Auto) 0.6 %  


 


Neutrophils # (Auto) 9.9 TH/MM3  


 


Lymphocytes # (Auto) 2.0 TH/MM3  


 


Monocytes # (Auto) 0.7 TH/MM3  


 


Eosinophils # (Auto) 0.1 TH/MM3  


 


Basophils # (Auto) 0.1 TH/MM3  


 


CBC Comment DIFF FINAL  


 


Differential Comment   


 


Prothrombin Time 27.2 SEC  


 


Prothromb Time International


Ratio 2.4 RATIO 


  


 


 


Activated Partial


Thromboplast Time 31.8 SEC 


  


 


 


Blood Urea Nitrogen 55 MG/DL  


 


Creatinine 2.11 MG/DL  


 


Random Glucose 184 MG/DL  


 


Total Protein 5.7 GM/DL  


 


Albumin 1.9 GM/DL  


 


Calcium Level 8.1 MG/DL  


 


Alkaline Phosphatase 123 U/L  


 


Aspartate Amino Transf


(AST/SGOT) 34 U/L 


  


 


 


Alanine Aminotransferase


(ALT/SGPT) 24 U/L 


  


 


 


Total Bilirubin 0.3 MG/DL  


 


Sodium Level 135 MEQ/L  


 


Potassium Level 3.0 MEQ/L  


 


Chloride Level 99 MEQ/L  


 


Carbon Dioxide Level 27.6 MEQ/L  


 


Anion Gap 8 MEQ/L  


 


Estimat Glomerular Filtration


Rate 24 ML/MIN 


  


 


 


Lipase 130 U/L  


 


Urine Color  YELLOW 


 


Urine Turbidity  HAZY 


 


Urine pH  6.0 


 


Urine Specific Gravity  1.013 


 


Urine Protein  300 mg/dL 


 


Urine Glucose (UA)  70 mg/dL 


 


Urine Ketones  NEG mg/dL 


 


Urine Occult Blood  MOD 


 


Urine Nitrite  NEG 


 


Urine Bilirubin  NEG 


 


Urine Urobilinogen


  


  LESS THAN 2.0


MG/DL


 


Urine Leukocyte Esterase  MOD 


 


Urine RBC   /hpf 


 


Urine WBC  74 /hpf 


 


Urine WBC Clumps  FEW 


 


Urine Squamous Epithelial


Cells 


  1 /hpf 


 


 


Urine Amorphous Sediment  RARE 


 


Urine Bacteria  OCC /hpf 


 


Urine Hyaline Casts  2 /lpf 


 


Urine Yeast (Budding)  RARE 


 


Microscopic Urinalysis Comment


  


  CULTURE


INDICATED











MDM


Medical Decision Making


Medical Screen Exam Complete:  Yes


Emergency Medical Condition:  Yes


Medical Record Reviewed:  Yes


Interpretation(s)





Last Impressions








Abdomen X-Ray 17 0330 Signed





Impressions: 





 Service Date/Time:  2017 03:42 - CONCLUSION:  1. No 





 evidence of obstruction. Multiple right renal stones     Nixon Hartley MD 








Differential Diagnosis


Constipation, versus fecal impaction, versus bowel obstruction


Narrative Course


During the course of the patients emergency department visit, the patients 

history, examination, and differential diagnosis were reviewed with the 

patient. The patient was placed on a cardiac monitor with oximetry and frequent 

blood pressure monitoring. The patient had IV access obtained and blood work 

sent for analysis.  The patient had an ECG done on arrival.  The patient's ECG 

shows a sinus rhythm heart rate of 75, no acute ST segment elevation or 

depression.  QRS duration is 77 ms,  ms.  The patient has a wavy 

baseline which could affect ECG interpretation.





The patient was initially provided a glycerin suppository rectally.





The patients laboratory studies were reviewed and remarkable for a white count 

12.7, hemoglobin 12.2, platelets 274 with 78.2 neutrophils.  CMP is remarkable 

for a sodium of 135, BUN 55, creatinine 2.11 which is similar compared to 

previously, glucose 184, calcium 8.1, alkaline phosphatase 123, total protein 

5.7, albumin 1.9, lipase 130, INR 2.4 and the patient who is currently on 

Coumadin related to DVT.  Urinalysis shows 70 glucose, moderate occult blood, 

moderate leukocyte esterase, innumerable RBCs, 74 wbc's with few clumps, 

occasional bacteria, culture indicated.  The patient's urine results from her 

recent admission were reviewed.  Culture was negative.  The patient has had 

similar appearing urinalysis in the past that was culture negative.  This 

abnormalities present on the patient's urinalysis could certainly be related to 

the patient's nephritis.  We will await culture prior to treatment with further 

antibiotic.  I suspect that again the patient's culture will be negative as the 

patient does not have any significant urinary symptoms and is afebrile.





Radiology studies were reviewed and remarkable for an abdominal flat and 

upright that shows no evidence of obstruction, multiple right renal stones 

which is been present previously.





The patient moved her bowels well in the emergency department.  The patient 

reported feeling improved.  Stool was nonbloody or melanotic, brown in color.





The patient is resting comfortably and feels better, is alert and in no 

distress. The patients results and examination findings were discussed with the 

patient. The repeat examination is unremarkable and benign. The history, exam, 

diagnostic testing, and current condition do not suggest any significant 

pathology to warrant further testing, continued ED treatment, admission, or 

surgical evaluation at this point. The vital signs have been stable. The 

patient does not have uncontrollable pain, intractable vomiting, or other 

significant symptoms. The patient's condition is stable and appropriate for 

discharge. The patient will pursue further outpatient evaluation with a primary 

care physician or other designated or consulting physician as indicated in the 

discharge instructions. The patient expressed understanding and was agreeable 

with this plan.





Diagnosis





 Primary Impression:  


 Constipation


 Qualified Codes:  K59.00 - Constipation, unspecified


Referrals:  


Primary Care Physician


2 days


Patient Instructions:  Constipation (ED), General Instructions


***Med/Other Pt SpecificInfo:  Prescription(s) given


Scripts


Polyethylene Glycol 3350 Powder (Miralax Powder) 17 Gm Powd


17 GM PO DAILY for Constipation, #1 CAN 0 Refills


   Mix and dissolve one measuring cap-ful (17 grams) in water or juice.


   Prov: Claire Schuler MD         17


Disposition:  01 DISCHARGE HOME


Condition:  Stable











Claire Schuler MD 2017 03:25

## 2017-11-07 NOTE — RADRPT
EXAM DATE/TIME:  11/07/2017 03:42 

 

[facility] COMPARISON:     

CT ABDOMEN & PELVIS W/O CONTRAST, September 02, 2017, 0:32.  CT ABDOMEN & PELVIS W/O CONTRAST, Septem
ber 13, 2015, 14:31.

 

                     

INDICATIONS :     

Abdominal pain. 

                     

 

MEDICAL HISTORY :            

Cardiovascular disease. Hypertension. Chronic obstructive pulmonary disease.   

 

SURGICAL HISTORY :        

Appendectomy. Cholecystectomy.Hysterectomy.

 

ENCOUNTER:     

Initial                                        

 

ACUITY:     

1 day      

 

PAIN SCORE:     

9/10

 

LOCATION:       

abdomen. 

 

FINDINGS:     

The bowel gas pattern appears normal. No free air is identified. No organomegaly is evident. The gall
bladder is normal without wall thickening or pericholecystic fluid. Multiple right renal stones are p
resent the largest measuring 15 mm.

 

CONCLUSION:     

1. No evidence of obstruction. Multiple right renal stones

 

 

 

 Nixon Hartley MD on November 07, 2017 at 4:03           

Board Certified Radiologist.

 This report was verified electronically.

## 2017-11-07 NOTE — EKG
Date Performed: 11/07/2017       Time Performed: 04:35:05

 

PTAGE:      62 years

 

EKG:      Sinus rhythm 

 

 LOW QRS VOLTAGE IN PRECORDIAL LEADS ABNORMAL QRS-T ANGLE ABNORMAL ECG Compared to prior tracing no s
ignificant change 

 

 PREVIOUS TRACING            : 10/24/2017 19.55

 

DOCTOR:   Jaimee Cam  Interpretating Date/Time  11/07/2017 19:07:50

## 2018-02-01 ENCOUNTER — HOSPITAL ENCOUNTER (EMERGENCY)
Dept: HOSPITAL 17 - NEPC | Age: 63
LOS: 1 days | Discharge: HOME | End: 2018-02-02
Payer: COMMERCIAL

## 2018-02-01 VITALS
RESPIRATION RATE: 16 BRPM | SYSTOLIC BLOOD PRESSURE: 155 MMHG | HEART RATE: 80 BPM | DIASTOLIC BLOOD PRESSURE: 76 MMHG | OXYGEN SATURATION: 98 %

## 2018-02-01 VITALS
OXYGEN SATURATION: 94 % | SYSTOLIC BLOOD PRESSURE: 169 MMHG | RESPIRATION RATE: 18 BRPM | HEART RATE: 67 BPM | TEMPERATURE: 98.7 F | DIASTOLIC BLOOD PRESSURE: 80 MMHG

## 2018-02-01 VITALS — OXYGEN SATURATION: 94 %

## 2018-02-01 VITALS — BODY MASS INDEX: 34.63 KG/M2 | WEIGHT: 176.37 LBS | HEIGHT: 60 IN

## 2018-02-01 DIAGNOSIS — R94.31: ICD-10-CM

## 2018-02-01 DIAGNOSIS — M19.012: Primary | ICD-10-CM

## 2018-02-01 DIAGNOSIS — N39.0: ICD-10-CM

## 2018-02-01 DIAGNOSIS — I11.0: ICD-10-CM

## 2018-02-01 DIAGNOSIS — J18.9: ICD-10-CM

## 2018-02-01 DIAGNOSIS — I50.9: ICD-10-CM

## 2018-02-01 DIAGNOSIS — B95.7: ICD-10-CM
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
## 2018-02-01 DIAGNOSIS — R42: ICD-10-CM

## 2018-02-01 LAB
ALBUMIN SERPL-MCNC: 2.1 GM/DL (ref 3.4–5)
ALP SERPL-CCNC: 110 U/L (ref 45–117)
ALT SERPL-CCNC: 21 U/L (ref 10–53)
AMORPHOUS SEDIMENT, URINE: (no result)
AST SERPL-CCNC: 43 U/L (ref 15–37)
BASOPHILS # BLD AUTO: 0 TH/MM3 (ref 0–0.2)
BASOPHILS NFR BLD: 0.6 % (ref 0–2)
BILIRUB SERPL-MCNC: 0.4 MG/DL (ref 0.2–1)
BUN SERPL-MCNC: 19 MG/DL (ref 7–18)
CALCIUM SERPL-MCNC: 8.6 MG/DL (ref 8.5–10.1)
CHLORIDE SERPL-SCNC: 101 MEQ/L (ref 98–107)
COLOR UR: YELLOW
CREAT SERPL-MCNC: 2.71 MG/DL (ref 0.5–1)
EOSINOPHIL # BLD: 0.2 TH/MM3 (ref 0–0.4)
EOSINOPHIL NFR BLD: 2.1 % (ref 0–4)
ERYTHROCYTE [DISTWIDTH] IN BLOOD BY AUTOMATED COUNT: 15.9 % (ref 11.6–17.2)
GFR SERPLBLD BASED ON 1.73 SQ M-ARVRAT: 18 ML/MIN (ref 89–?)
GLUCOSE SERPL-MCNC: 123 MG/DL (ref 74–106)
GLUCOSE UR STRIP-MCNC: (no result) MG/DL
HCO3 BLD-SCNC: 28.7 MEQ/L (ref 21–32)
HCT VFR BLD CALC: 31.6 % (ref 35–46)
HGB BLD-MCNC: 10.6 GM/DL (ref 11.6–15.3)
HGB UR QL STRIP: (no result)
INR PPP: 1.1 RATIO
KETONES UR STRIP-MCNC: (no result) MG/DL
LYMPHOCYTES # BLD AUTO: 2 TH/MM3 (ref 1–4.8)
LYMPHOCYTES NFR BLD AUTO: 26.6 % (ref 9–44)
MCH RBC QN AUTO: 29.9 PG (ref 27–34)
MCHC RBC AUTO-ENTMCNC: 33.5 % (ref 32–36)
MCV RBC AUTO: 89.2 FL (ref 80–100)
MONOCYTE #: 0.5 TH/MM3 (ref 0–0.9)
MONOCYTES NFR BLD: 6.2 % (ref 0–8)
MUCOUS THREADS #/AREA URNS LPF: (no result) /LPF
NEUTROPHILS # BLD AUTO: 4.9 TH/MM3 (ref 1.8–7.7)
NEUTROPHILS NFR BLD AUTO: 64.5 % (ref 16–70)
NITRITE UR QL STRIP: (no result)
PLATELET # BLD: 324 TH/MM3 (ref 150–450)
PMV BLD AUTO: 6.4 FL (ref 7–11)
PROT SERPL-MCNC: 8.1 GM/DL (ref 6.4–8.2)
PROTHROMBIN TIME: 11.2 SEC (ref 9.8–11.6)
RBC # BLD AUTO: 3.55 MIL/MM3 (ref 4–5.3)
SODIUM SERPL-SCNC: 135 MEQ/L (ref 136–145)
SP GR UR STRIP: 1.01 (ref 1–1.03)
SQUAMOUS #/AREA URNS HPF: 3 /HPF (ref 0–5)
TROPONIN I SERPL-MCNC: (no result) NG/ML (ref 0.02–0.05)
URINE LEUKOCYTE ESTERASE: (no result)
WBC # BLD AUTO: 7.6 TH/MM3 (ref 4–11)

## 2018-02-01 PROCEDURE — 71045 X-RAY EXAM CHEST 1 VIEW: CPT

## 2018-02-01 PROCEDURE — 93005 ELECTROCARDIOGRAM TRACING: CPT

## 2018-02-01 PROCEDURE — 81001 URINALYSIS AUTO W/SCOPE: CPT

## 2018-02-01 PROCEDURE — 85610 PROTHROMBIN TIME: CPT

## 2018-02-01 PROCEDURE — 85730 THROMBOPLASTIN TIME PARTIAL: CPT

## 2018-02-01 PROCEDURE — 84484 ASSAY OF TROPONIN QUANT: CPT

## 2018-02-01 PROCEDURE — 72170 X-RAY EXAM OF PELVIS: CPT

## 2018-02-01 PROCEDURE — 99285 EMERGENCY DEPT VISIT HI MDM: CPT

## 2018-02-01 PROCEDURE — 80053 COMPREHEN METABOLIC PANEL: CPT

## 2018-02-01 PROCEDURE — 70450 CT HEAD/BRAIN W/O DYE: CPT

## 2018-02-01 PROCEDURE — 85025 COMPLETE CBC W/AUTO DIFF WBC: CPT

## 2018-02-01 PROCEDURE — 87077 CULTURE AEROBIC IDENTIFY: CPT

## 2018-02-01 PROCEDURE — 87186 SC STD MICRODIL/AGAR DIL: CPT

## 2018-02-01 PROCEDURE — 96368 THER/DIAG CONCURRENT INF: CPT

## 2018-02-01 PROCEDURE — 72125 CT NECK SPINE W/O DYE: CPT

## 2018-02-01 PROCEDURE — 87086 URINE CULTURE/COLONY COUNT: CPT

## 2018-02-01 PROCEDURE — 96361 HYDRATE IV INFUSION ADD-ON: CPT

## 2018-02-01 PROCEDURE — 96365 THER/PROPH/DIAG IV INF INIT: CPT

## 2018-02-01 PROCEDURE — 73030 X-RAY EXAM OF SHOULDER: CPT

## 2018-02-01 PROCEDURE — 86403 PARTICLE AGGLUT ANTBDY SCRN: CPT

## 2018-02-01 NOTE — RADRPT
EXAM DATE/TIME:  02/01/2018 19:47 

 

[facility] COMPARISON:     

CHEST SINGLE AP, October 24, 2017, 19:27.

 

                     

INDICATIONS :     

Chest discomfort; fall today. 

                     

 

MEDICAL HISTORY :     

Congestive heart failure.  Cirrhosis.     Hypertension. Chronic obstructive pulmonary disease. Diabet
es. CVA.    

 

SURGICAL HISTORY :        

Cholecystectomy. Appendectomy. Hysterectomy

 

ENCOUNTER:     

Initial                                        

 

ACUITY:     

1 day      

 

PAIN SCORE:     

1/10

 

LOCATION:     

Bilateral chest 

 

FINDINGS:     

Scattered patchy opacities are noted within the right lung consistent with probable pneumonia. Clinic
al correlation is recommended. The left lung is clear. The heart is stable. Degenerative changes are 
noted throughout the thoracic spine.

 

CONCLUSION:     

Scattered patchy opacities within the right lung consistent with probable pneumonia. Clinical correla
tion is recommended. 

 

 

 Silviano Aggarwal MD on February 01, 2018 at 20:22           

Board Certified Radiologist.

 This report was verified electronically.

## 2018-02-01 NOTE — PD
HPI


Chief Complaint:  Fall


Time Seen by Provider:  19:23


Travel History


International Travel<30 days:  No


Contact w/Intl Traveler<30days:  No


Traveled to known affect area:  No





History of Present Illness


HPI


Patient is a 62 year old female who comes in complaining of left shoulder pain 

after she slipped this evening.  She says that her cane gave away and her door 

got stuck.  She tried to prevent herself from falling and she grabbed onto the 

doorknob, straining her arm.  She says she did not actually hit the ground.  

She denies hitting her head. She says she has been feeling dizzy for the past 

few days.  She says this happens to her often and does not seem worse than in 

the past.  She denies chest pain or SOB.  She denies fever or chills.  She has 

not taken anything for pain.  Movement makes her pain worse.





PFSH


Past Medical History


Hx Anticoagulant Therapy:  No


Arthritis:  Yes


Blood Disorders:  No


Anxiety:  Yes


Depression:  Yes


Heart Rhythm Problems:  Yes


Cancer:  No


Cardiovascular Problems:  Yes


High Cholesterol:  Yes


Chemotherapy:  No


Chest Pain:  Yes (ENLARGED HEART)


Congestive Heart Failure:  Yes


COPD:  Yes


Cerebrovascular Accident:  Yes


Diabetes:  Yes


Patient Takes Glucophage:  Yes


Diminished Hearing:  Yes


Endocrine:  No


Genitourinary:  Yes (UTI'S)


Headaches:  No


Hypertension:  Yes


Kidney Stones:  No


Musculoskeletal:  Yes (BONES ACHE)


Neurologic:  Yes (TOES-HANDS NUMBNESS/TINGLING)


Psychiatric:  Yes


Reproductive:  No


Respiratory:  Yes (COPD,SOB)


Pneumonia:  Yes


Radiation Therapy:  No


Seizures:  No


Sleep Apnea:  Yes


Thyroid Disease:  Yes (HYPOTHYROID/GOITER)


Ulcer:  Yes (BLEEDING)


Pregnant?:  Not Pregnant


Menopausal:  Yes


Tubal Ligation:  Yes





Past Surgical History


Appendectomy:  Yes


 Section:  Yes


Cholecystectomy:  Yes


Hysterectomy:  Yes


Pacemaker:  No


Other Surgery:  Yes (APPENDECTOMY, JAIME, TUBAL LIGATION, C SECTIONS)





Social History


Alcohol Use:  No


Tobacco Use:  No


Substance Use:  No





Allergies-Medications


(Allergen,Severity, Reaction):  


Coded Allergies:  


     milk (Verified  Allergy, Mild, Cramping, 17)


 lactose intolerant


Reported Meds & Prescriptions





Reported Meds & Active Scripts


Active


Zithromax Z-Brian (Azithromycin) 250 Mg Dspk 250 Mg PO AS DIRECTED


     500 MG (2 tabs) day 1, then 1 tab days 2-5.


Miralax Powder (Polyethylene Glycol 3350 Powder) 17 Gm Powd 17 Gm PO DAILY


     Mix and dissolve one measuring cap-ful (17 grams) in water or juice.


Lasix (Furosemide) 40 Mg Tab 40 Mg PO DAILY


Ditropan (Oxybutynin Chloride) 5 Mg Tab 5 Mg PO HS


Escitalopram (Escitalopram Oxalate) 10 Mg Tab 5 Mg PO DAILY


Hydrocodone-Acetaminophen  mg Tab 1 Tab PO Q4H PRN


     do not use this medicine if you will drive a car or use a machine


     only use it when resting at home.


Coumadin (Warfarin) 5 Mg Tab 5 Mg PO DAILY@1600


Prednisone 20 Mg Tab 20 Mg PO DAILY 30 Days


Novolog Inj (Insulin Aspart) 1,000 Unit/10 Ml Vial 1-9 Units SQ ACHS


      sugars less than 70,(0)units; sugars


     150-199,(1) unit; sugars 200-249,(3) units; sugars 250-299,(5) units;


     sugars 300-349,(7) units; sugars greater than 349,(9) units.


Levemir Inj (Insulin Detemir) 1,000 unit/ 10 ML Vial 10 Units SQ HS 30 Days


     Do not mix with any other Insulin.


Cellcept (Mycophenolate Mofetil) 500 Mg Tab 500 Mg PO Q8HR 30 Days


Synthroid (Levothyroxine Sodium) 150 Mcg Tab 150 Mcg PO DAILY@0600 30 Days


Pantoprazole (Pantoprazole Sodium) 40 Mg Tab 40 Mg PO DAILY 30 Days


Mirtazapine 15 Mg Tab 30 Mg PO HS 30 Days


Klonopin (Clonazepam) 0.5 Mg Tab 0.5 Mg PO Q12HR 30 Days








Review of Systems


Except as stated in HPI:  all other systems reviewed are Neg


General / Constitutional:  No: Fever, Chills


Eyes:  No: Blurred Vision


HENT:  Positive: Lightheadedness, No: Headaches


Cardiovascular:  No: Chest Pain or Discomfort


Respiratory:  No: Shortness of Breath, Wheezing


Gastrointestinal:  No: Nausea, Vomiting


Musculoskeletal:  Positive: Myalgias, Pain


Skin:  No Rash, No Change in Pigmentation


Neurologic:  Positive: Dizziness





Physical Exam


Narrative


GENERAL: Awake and alert, no acute distress.


SKIN: Focused skin assessment warm/dry.  No wounds or signs of infection.


HEAD: Atraumatic. Normocephalic. 


EYES: Pupils equal and round. No scleral icterus.  Extraocular movements intact.


ENT: Mucous membranes pink and moist.


NECK: Trachea midline. No JVD.  Tender to palpation to the paraspinal muscles 

of the neck.


CARDIOVASCULAR: Regular rate and rhythm.  No murmur appreciated.


RESPIRATORY: No accessory muscle use. Clear to auscultation. Breath sounds 

equal bilaterally. 


GASTROINTESTINAL: Abdomen soft, non-tender, nondistended. 


MUSCULOSKELETAL: No obvious deformities. No clubbing.  No cyanosis.  No edema.  

Tender to palpation of the left hip.  Pain with movement of the left arm.  

Pulses intact.


NEUROLOGICAL: Awake and alert. No obvious cranial nerve deficits.  Motor 

grossly within normal limits. Normal speech.


PSYCHIATRIC: Appropriate mood and affect; insight and judgment normal.





Data


Data


Last Documented VS





Vital Signs








  Date Time  Temp Pulse Resp B/P (MAP) Pulse Ox O2 Delivery O2 Flow Rate FiO2


 


18 19:31     94 Room Air  


 


18 19:31  76      


 


18 19:20 98.7  18 169/80 (109)    








Orders





 Orders


Electrocardiogram (18 19:23)


Complete Blood Count With Diff (18 19:23)


Comprehensive Metabolic Panel (18 19:23)


Troponin I (18 19:23)


Act Partial Throm Time (Ptt) (18 19:23)


Prothrombin Time / Inr (Pt) (18 19:23)


Urinalysis - C+S If Indicated (18 19:23)


Chest, Single Ap (18 19:23)


Ct Brain W/O Iv Contrast(Rout) (18 19:23)


Ct Cerv Spine W/O Contrast (18 19:23)


Ecg Monitoring (18 19:23)


Iv Access Insert/Monitor (18 19:23)


Oximetry (18 19:23)


Sodium Chloride 0.9% Flush (Ns Flush) (18 19:30)


Shoulder, Complete (>2vws) (18 )


Pelvis, Ap Only (Routine) (18 )


Sodium Chlorid 0.9% 500 Ml Inj (Ns 500 M (18 19:30)


Cyclobenzaprine (Flexeril) (18 19:30)


Acetaminophen (Tylenol) (18 19:30)


Ceftriaxone Inj (Rocephin Inj) (18 22:15)


Azithromycin Inj (Zithromax Inj) (18 22:15)


Oxycodone-Acetamin 5-325 Mg (Percocet (18 22:45)


Urine Culture (18 22:20)





Labs





Laboratory Tests








Test


  18


19:34 18


22:20


 


White Blood Count 7.6 TH/MM3  


 


Red Blood Count 3.55 MIL/MM3  


 


Hemoglobin 10.6 GM/DL  


 


Hematocrit 31.6 %  


 


Mean Corpuscular Volume 89.2 FL  


 


Mean Corpuscular Hemoglobin 29.9 PG  


 


Mean Corpuscular Hemoglobin


Concent 33.5 % 


  


 


 


Red Cell Distribution Width 15.9 %  


 


Platelet Count 324 TH/MM3  


 


Mean Platelet Volume 6.4 FL  


 


Neutrophils (%) (Auto) 64.5 %  


 


Lymphocytes (%) (Auto) 26.6 %  


 


Monocytes (%) (Auto) 6.2 %  


 


Eosinophils (%) (Auto) 2.1 %  


 


Basophils (%) (Auto) 0.6 %  


 


Neutrophils # (Auto) 4.9 TH/MM3  


 


Lymphocytes # (Auto) 2.0 TH/MM3  


 


Monocytes # (Auto) 0.5 TH/MM3  


 


Eosinophils # (Auto) 0.2 TH/MM3  


 


Basophils # (Auto) 0.0 TH/MM3  


 


CBC Comment DIFF FINAL  


 


Differential Comment   


 


Prothrombin Time 11.2 SEC  


 


Prothromb Time International


Ratio 1.1 RATIO 


  


 


 


Activated Partial


Thromboplast Time 31.7 SEC 


  


 


 


Blood Urea Nitrogen 19 MG/DL  


 


Creatinine 2.71 MG/DL  


 


Random Glucose 123 MG/DL  


 


Total Protein 8.1 GM/DL  


 


Albumin 2.1 GM/DL  


 


Calcium Level 8.6 MG/DL  


 


Alkaline Phosphatase 110 U/L  


 


Aspartate Amino Transf


(AST/SGOT) 43 U/L 


  


 


 


Alanine Aminotransferase


(ALT/SGPT) 21 U/L 


  


 


 


Total Bilirubin 0.4 MG/DL  


 


Sodium Level 135 MEQ/L  


 


Potassium Level 3.1 MEQ/L  


 


Chloride Level 101 MEQ/L  


 


Carbon Dioxide Level 28.7 MEQ/L  


 


Anion Gap 5 MEQ/L  


 


Estimat Glomerular Filtration


Rate 18 ML/MIN 


  


 


 


Troponin I


  LESS THAN 0.02


NG/ML 


 


 


Urine Color  YELLOW 


 


Urine Turbidity  HAZY 


 


Urine pH  6.0 


 


Urine Specific Gravity  1.012 


 


Urine Protein  300 mg/dL 


 


Urine Glucose (UA)  NEG mg/dL 


 


Urine Ketones  NEG mg/dL 


 


Urine Occult Blood  LARGE 


 


Urine Nitrite  NEG 


 


Urine Bilirubin  NEG 


 


Urine Urobilinogen


  


  LESS THAN 2.0


MG/DL


 


Urine Leukocyte Esterase  LARGE 


 


Urine RBC  69 /hpf 


 


Urine WBC  109 /hpf 


 


Urine Squamous Epithelial


Cells 


  3 /hpf 


 


 


Urine Amorphous Sediment  RARE 


 


Urine Mucus  FEW /lpf 


 


Microscopic Urinalysis Comment


  


  CULTURE


INDICATED











MDM


Medical Decision Making


Medical Screen Exam Complete:  Yes


Emergency Medical Condition:  Yes


Medical Record Reviewed:  Yes


Interpretation(s)


ECG shows normal sinus rhythm, low voltage.


Differential Diagnosis


Electrolyte abnormality versus dehydration versus muscle strain


Narrative Course


Patient is a 62-year-old female who comes in complaining of shoulder pain after 

she slipped at night.  She also says she has been dizzy for a few days.  Exam 

shows pain to the left hip and left shoulder.  I have established, labs sent.  

Labs show an elevated creatinine, but this is similar to her baseline, she has 

known glomerular disease from her lupus.





Chest x-ray concerning for pneumonia.


Last 24 hours Impressions








Head CT 18 Signed





Impressions: 





 Service Date/Time:   19:55 - CONCLUSION:  No acute 





 intracranial abnormality. Mild mucosal thickening involving the ethmoid air 





 cells bilaterally and left maxillary sinus.      Silviano Aggarwal MD 


 


Chest X-Ray 18 Signed





Impressions: 





 Service Date/Time:   19:47 - CONCLUSION:  Scattered 





 patchy opacities within the right lung consistent with probable pneumonia. 





 Clinical correlation is recommended.     Silviano Aggarwal MD 


 


Cervical Spine CT 18 Signed





Impressions: 





 Service Date/Time:   19:57 - CONCLUSION:  No acute 





 fracture or subluxation. Prominence of the prevertebral soft tissues which 





 likely is related to markedly enlarged thyroid gland which extends posteriorly 





 and superiorly. Clinical correlation is recommended. Reversal of the normal 





 cervical lordosis. Cervical spondylosis at C5-6 and to a lesser extent at C4-5 





 and C6-7. Mild diffuse disc osteophyte complex at C5-6 resulting in flattening 





 in the anterior thecal sac and mild foraminal narrowing but no spinal 

stenosis.  





    Silviano Aggarwal MD 


 


Shoulder X-Ray 18 0000 Signed





Impressions: 





 Service Date/Time:   19:44 - CONCLUSION:  No acute 





 fracture or dislocation. Mild degenerative changes involving the left 





 acromioclavicular and glenohumeral joints.     Silviano Aggarwal MD 


 


Pelvis X-Ray 18 0000 Signed





Impressions: 





 Service Date/Time:   19:43 - CONCLUSION: No acute 





 fracture or dislocation. Degenerative changes involving the lower lumbar spine 





 and bilateral hips.     Silviano Aggarwal MD 





Patient given a dose of antibiotics.  Given pain medicine.  Oxygen saturation 

is within normal limits.  Blood pressure and other vitals are also within 

normal limits.  Patient will be discharged home with prescription for 

antibiotics.  She is advised to take Tylenol as needed for pain.  Advised follow

-up with her doctors.  Advised to return to the ED as needed for any worsening 

symptoms.





Diagnosis





 Primary Impression:  


 Shoulder pain


 Qualified Codes:  M25.512 - Pain in left shoulder


 Additional Impressions:  


 Pneumonia


 Qualified Codes:  J18.9 - Pneumonia, unspecified organism


 UTI (urinary tract infection)


 Qualified Codes:  N30.00 - Acute cystitis without hematuria


Patient Instructions:  Bacterial Pneumonia (ED), General Instructions, Shoulder 

Sprain (ED)





***Additional Instructions:  


Follow up with your doctor.  Take all of your antibiotics.  Return to the ED as 

needed for any worsening symptoms.


Scripts


Amoxicillin-Clavulanate (Augmentin) 875-125 Mg Tab


1 TAB PO BID for Infection for 10 Days, #20 TAB 0 Refills


   Prov: Katharine Scott MD         18 


Azithromycin (Zithromax Z-Brian) 250 Mg Dspk


250 MG PO AS DIRECTED for Infection, #1 DSPK 0 Refills


   500 MG (2 tabs) day 1, then 1 tab days 2-5.


   Prov: Katharine Scott MD         18


Disposition:   DISCHARGE HOME


Condition:  Stable











Katharine Scott MD 2018 22:33

## 2018-02-01 NOTE — RADRPT
EXAM DATE/TIME:  02/01/2018 19:57 

 

[facility] COMPARISON:     

No previous studies available for comparison.

 

 

INDICATIONS :     

Trauma, patient fell.

                     

 

RADIATION DOSE:     

30.13 CTDIvol (mGy) 

 

 

 

MEDICAL HISTORY :     

Cardiovascular disease. Hypertension. Chronic obstructive pulmonary disease.diabetic

 

SURGICAL HISTORY :      

Appendectomy. Cholecystectomy.

 

ENCOUNTER:      

Initial

 

ACUITY:      

1 day

 

PAIN SCALE:      

6/10

 

LOCATION:        

neck 

 

TECHNIQUE:     

Volumetric scanning of the cervical spine was performed. Multiplanar reconstructions in the sagittal,
 coronal and oblique axial planes were performed.   Using automated exposure control and adjustment o
f the mA and/or kV according to patient size, radiation dose was kept as low as reasonably achievable
 to obtain optimal diagnostic quality images.   DICOM format image data is available electronically f
or review and comparison.  

 

FINDINGS:     

There is reversal of the normal cervical lordosis. There is cervical spondylosis at C5-6 and to lesse
r extent at C4-5 and C6-7. Mild diffuse disc osteophyte complex is noted at C5-6 resulting in slight 
flattening of the anterior thecal sac but no significant spinal stenosis mild bilateral foraminal anthony
rowing is noted at C5-6 also. There is no acute fracture or subluxation of the cervical spine. There 
is prominence of the prevertebral soft tissues which likely is related to markedly enlarged thyroid g
land which extends posteriorly and superiorly. Clinical correlation is recommended.

 

CONCLUSION:     

No acute fracture or subluxation. Prominence of the prevertebral soft tissues which likely is related
 to markedly enlarged thyroid gland which extends posteriorly and superiorly. Clinical correlation is
 recommended. Reversal of the normal cervical lordosis. Cervical spondylosis at C5-6 and to a lesser 
extent at C4-5 and C6-7. Mild diffuse disc osteophyte complex at C5-6 resulting in flattening in the 
anterior thecal sac and mild foraminal narrowing but no spinal stenosis. 

 

 

 Silviano Aggarwal MD on February 01, 2018 at 20:47           

Board Certified Radiologist.

 This report was verified electronically.

## 2018-02-01 NOTE — RADRPT
EXAM DATE/TIME:  02/01/2018 19:43 

 

[facility] COMPARISON:     

No previous studies available for comparison.

 

                     

INDICATIONS :     

Pelvis discomfort; fall today. 

                     

 

MEDICAL HISTORY :     

Congestive heart failure.  Cirrhosis.     Hypertension. Chronic obstructive pulmonary disease. Diabet
es. CVA.    

 

SURGICAL HISTORY :        

Cholecystectomy. Appendectomy. Hysterectomy

 

ENCOUNTER:     

Initial                                        

 

ACUITY:     

1 day      

 

PAIN SCORE:     

5/10

 

LOCATION:     

Bilateral pelvis 

 

FINDINGS:     

Degenerative changes are noted involving lumbar spine and bilateral hips. No acute fracture or disloc
ation is noted.

 

CONCLUSION:     No acute fracture or dislocation. Degenerative changes involving the lower lumbar spi
ne and bilateral hips. 

 

 

 Silviano Aggarwal MD on February 01, 2018 at 20:08           

Board Certified Radiologist.

 This report was verified electronically.

## 2018-02-01 NOTE — RADRPT
EXAM DATE/TIME:  02/01/2018 19:44 

 

[facility] COMPARISON:     

No previous studies available for comparison.

 

                     

INDICATIONS :     

Left shoulder pain; fell today. 

                     

 

MEDICAL HISTORY :     

Congestive heart failure.       Hypertension. Chronic obstructive pulmonary disease   

 

SURGICAL HISTORY :        

Cholecystectomy. Appendectomy. Hysterectomy.

 

ENCOUNTER:     

Initial                                        

 

ACUITY:     

1 day      

 

PAIN SCORE:     

10/10

 

LOCATION:     

Left  shoulder. 

 

FINDINGS:     

Mild degenerative changes are noted involving the left acromioclavicular and glenohumeral joints. The
re is no acute fracture or dislocation.

 

CONCLUSION:     

No acute fracture or dislocation. Mild degenerative changes involving the left acromioclavicular and 
glenohumeral joints. 

 

 

 Silviano Aggarwal MD on February 01, 2018 at 20:21           

Board Certified Radiologist.

 This report was verified electronically.

## 2018-02-01 NOTE — RADRPT
EXAM DATE/TIME:  02/01/2018 19:55 

 

[facility] COMPARISON:     

CT BRAIN W/O CONTRAST, October 24, 2017, 20:34.

 

 

INDICATIONS :     

Patient fell, complains of dizziness.

                     

 

RADIATION DOSE:     

56.35 CTDIvol (mGy) 

 

 

 

MEDICAL HISTORY :     

Cardiovascular disease. Hypertension. Chronic obstructive pulmonary disease.diabetic

 

SURGICAL HISTORY :      

Appendectomy. Cholecystectomy.

 

ENCOUNTER:      

Initial

 

ACUITY:      

1 day

 

PAIN SCALE:      

6/10

 

LOCATION:        

cranial 

 

TECHNIQUE:     

Multiple contiguous axial images were obtained of the head.  Using automated exposure control and adj
ustment of the mA and/or kV according to patient size, radiation dose was kept as low as reasonably a
chievable to obtain optimal diagnostic quality images.   DICOM format image data is available electro
nically for review and comparison.  

 

FINDINGS:     

 

CEREBRUM:     

The ventricles are normal for age.  No evidence of midline shift, mass lesion, hemorrhage or acute in
farction.  No extra-axial fluid collections are seen.

 

POSTERIOR FOSSA:     

The cerebellum and brainstem are intact.  The 4th ventricle is midline.  The cerebellopontine angle i
s unremarkable.

 

EXTRACRANIAL:     

The visualized portion of the orbits is intact. Mild mucosal thickening is noted within the ethmoid a
ir cells bilaterally and left maxillary sinus.

 

SKULL:     

The calvaria is intact.  No evidence of skull fracture.

 

CONCLUSION:     

No acute intracranial abnormality. Mild mucosal thickening involving the ethmoid air cells bilaterall
y and left maxillary sinus. 

 

 

 

 Silviano Aggarwal MD on February 01, 2018 at 20:19           

Board Certified Radiologist.

 This report was verified electronically.

## 2018-02-02 VITALS — DIASTOLIC BLOOD PRESSURE: 76 MMHG | SYSTOLIC BLOOD PRESSURE: 140 MMHG

## 2018-02-02 NOTE — EKG
Date Performed: 02/01/2018       Time Performed: 19:27:45

 

PTAGE:      62 years

 

EKG:      Sinus rhythm 

 

 MARKED LEFT AXIS DEVIATION LOW QRS VOLTAGE Compared to previous tracing, slight nonspecific ST-T wav
e changes are new ABNORMAL ECG

 

PREVIOUS TRACING       : 11/07/2017 04.35

 

DOCTOR:   Luis Schuler  Interpretating Date/Time  02/02/2018 19:05:40

## 2018-03-02 ENCOUNTER — HOSPITAL ENCOUNTER (EMERGENCY)
Dept: HOSPITAL 17 - NETRI | Age: 63
Discharge: LEFT BEFORE DISCHARGE | End: 2018-03-02
Payer: COMMERCIAL

## 2018-03-02 VITALS — WEIGHT: 176.37 LBS | BODY MASS INDEX: 35.56 KG/M2 | HEIGHT: 59 IN

## 2018-03-02 VITALS
OXYGEN SATURATION: 98 % | RESPIRATION RATE: 18 BRPM | TEMPERATURE: 99.1 F | DIASTOLIC BLOOD PRESSURE: 89 MMHG | SYSTOLIC BLOOD PRESSURE: 180 MMHG | HEART RATE: 88 BPM

## 2018-03-02 DIAGNOSIS — Z53.21: Primary | ICD-10-CM

## 2018-03-02 PROCEDURE — 99281 EMR DPT VST MAYX REQ PHY/QHP: CPT

## 2021-04-24 NOTE — HHI.NPPN
Subjective


History of Present Illness


62 year old female with ARF/CKD Lupus nephritis DVT





Review of Systems


General


Constitutional:  Fatigue





Respiratory


Lungs:  SOB, Cough





Cardiovascular


Cardiac:  Edema





Objective Data


Data





Vital Signs








  Date Time  Temp Pulse Resp B/P (MAP) Pulse Ox O2 Delivery O2 Flow Rate FiO2


 


11/2/17 12:00 98.1 75 19 142/65 (90) 95   


 


11/2/17 09:10  96      


 


11/2/17 08:00 97.4 85 19 132/66 (88) 95   


 


11/2/17 04:00 97.6 64 20 129/60 (83) 95   


 


11/2/17 00:00 98.8 78 19 130/70 (90) 98   


 


11/1/17 20:54 99.2 68 18 157/78 (104) 96   


 


11/1/17 16:00 96.9 61 17 115/60 (78) 93   








-:  


11/2/17 0838                                                                   

             11/2/17 0838








Physical Exam


General


Appearance:  Well Developed, Obese





Neck


Neck Exam:  Neck Supple





Pulmonary


Resp Exam:  Rhonchi, Decreased Bases





Cardiology


CV Exam:  Regular





Gastrointestinal/Abdomen


GI Exam:  Soft, Non-Tender, Bowel Sounds Present





Extremeties


Extremities Exam:  Pitting Edema, Dependent Edema





Assessment/Plan


Problem List:  


(1) Lupus nephritis, ISN/RPS class III


ICD Codes:  M32.14 - Glomerular disease in systemic lupus erythematosus


Plan:  Patient is on Solu-Medrol she failed mycophenolate, will stop once plan 

to give Cyclophosphamide


Consider cyclophosphamide IV monthly consulted Hematology /Onc to arrange 

medication


She had focal proliferative lupus nephritis/diabetic nephropathy/severe 

interstitial fibrosis and tubular atrophy on kidney biopsy she has poor risk 

factors and possibly will have end-stage renal disease in near future


Check STEPHANIE and serum complements 24-hour urine for protein


Give Lasix 40 mg IV daily


check BMP


d/w  can be given peripherally cancel central line





(2) Acute on chronic kidney failure


ICD Codes:  N17.9 - Acute kidney failure, unspecified; N18.9 - Chronic kidney 

disease, unspecified


Status:  Acute


Plan:  Doing poorly advanced renal failure with proteinuria due to systemic 

lupus erythematosus





(3) Type 2 diabetes mellitus


ICD Codes:  E11.9 - Type 2 diabetes mellitus


Status:  Chronic


Plan:  Continue to monitor





(4) Peroneal DVT (deep venous thrombosis)


ICD Codes:  I82.499 - Acute embolism and thrombosis of other specified deep 

vein of unspecified lower extremity


Status:  Acute


Plan:  On heparin








Problem Qualifiers





(1) Acute on chronic kidney failure:  


Qualified Codes:  N17.9 - Acute kidney failure, unspecified; N18.9 - Chronic 

kidney disease, unspecified


(2) Type 2 diabetes mellitus:  


Qualified Codes:  E11.9 - Type 2 diabetes mellitus without complications; Z79.4 

- Long term (current) use of insulin


(3) Peroneal DVT (deep venous thrombosis):  


Qualified Codes:  I82.491 - Acute embolism and thrombosis of other specified 

deep vein of right lower extremity








Delia Moreno MD Nov 2, 2017 13:44
Subjective


History of Present Illness


62 year old female with ARF/CKD Lupus nephritis DVT





Review of Systems


General


Constitutional:  Fatigue





Respiratory


Lungs:  SOB, Cough





Cardiovascular


Cardiac:  Edema





Objective Data


Data





Vital Signs








  Date Time  Temp Pulse Resp B/P (MAP) Pulse Ox O2 Delivery O2 Flow Rate FiO2


 


11/2/17 12:00 98.1 75 19 142/65 (90) 95   


 


11/2/17 09:10  96      


 


11/2/17 08:00 97.4 85 19 132/66 (88) 95   


 


11/2/17 04:00 97.6 64 20 129/60 (83) 95   


 


11/2/17 00:00 98.8 78 19 130/70 (90) 98   


 


11/1/17 20:54 99.2 68 18 157/78 (104) 96   


 


11/1/17 16:00 96.9 61 17 115/60 (78) 93   








-:  


11/2/17 0838                                                                   

             11/2/17 0838








Physical Exam


General


Appearance:  Well Developed, Obese





Neck


Neck Exam:  Neck Supple





Pulmonary


Resp Exam:  Rhonchi, Decreased Bases





Cardiology


CV Exam:  Regular





Gastrointestinal/Abdomen


GI Exam:  Soft, Non-Tender, Bowel Sounds Present





Extremeties


Extremities Exam:  Pitting Edema, Dependent Edema





Assessment/Plan


Problem List:  


(1) Lupus nephritis, ISN/RPS class III


ICD Codes:  M32.14 - Glomerular disease in systemic lupus erythematosus


Plan:  Patient is on Solu-Medrol she failed mycophenolate, will stop once plan 

to give Cyclophosphamide


Consider cyclophosphamide IV monthly consulted Hematology /Onc to arrange 

medication


She had focal proliferative lupus nephritis/diabetic nephropathy/severe 

interstitial fibrosis and tubular atrophy on kidney biopsy she has poor risk 

factors and possibly will have end-stage renal disease in near future


Check STEPHANIE and serum complements 24-hour urine for protein


Give Lasix 40 mg IV daily


check BMP


d/w  can be given peripherally cancel central line





(2) Acute on chronic kidney failure


ICD Codes:  N17.9 - Acute kidney failure, unspecified; N18.9 - Chronic kidney 

disease, unspecified


Status:  Acute


Plan:  Doing poorly advanced renal failure with proteinuria due to systemic 

lupus erythematosus





(3) Type 2 diabetes mellitus


ICD Codes:  E11.9 - Type 2 diabetes mellitus


Status:  Chronic


Plan:  Continue to monitor





(4) Peroneal DVT (deep venous thrombosis)


ICD Codes:  I82.499 - Acute embolism and thrombosis of other specified deep 

vein of unspecified lower extremity


Status:  Acute


Plan:  On heparin








Problem Qualifiers





(1) Acute on chronic kidney failure:  


Qualified Codes:  N17.9 - Acute kidney failure, unspecified; N18.9 - Chronic 

kidney disease, unspecified


(2) Type 2 diabetes mellitus:  


Qualified Codes:  E11.9 - Type 2 diabetes mellitus without complications; Z79.4 

- Long term (current) use of insulin


(3) Peroneal DVT (deep venous thrombosis):  


Qualified Codes:  I82.491 - Acute embolism and thrombosis of other specified 

deep vein of right lower extremity








Delia Moreno MD Nov 2, 2017 13:44
Subjective


History of Present Illness


62 year old female with ARF/CKD Lupus nephritis DVT





Review of Systems


General


Constitutional:  Fatigue





Respiratory


Lungs:  SOB, Cough





Cardiovascular


Cardiac:  Edema





Objective Data


Data





Vital Signs








  Date Time  Temp Pulse Resp B/P (MAP) Pulse Ox O2 Delivery O2 Flow Rate FiO2


 


11/2/17 12:00 98.1 75 19 142/65 (90) 95   


 


11/2/17 09:10  96      


 


11/2/17 08:00 97.4 85 19 132/66 (88) 95   


 


11/2/17 04:00 97.6 64 20 129/60 (83) 95   


 


11/2/17 00:00 98.8 78 19 130/70 (90) 98   


 


11/1/17 20:54 99.2 68 18 157/78 (104) 96   


 


11/1/17 16:00 96.9 61 17 115/60 (78) 93   








-:  


11/2/17 0838                                                                   

             11/2/17 0838








Physical Exam


General


Appearance:  Well Developed, Obese





Neck


Neck Exam:  Neck Supple





Pulmonary


Resp Exam:  Rhonchi, Decreased Bases





Cardiology


CV Exam:  Regular





Gastrointestinal/Abdomen


GI Exam:  Soft, Non-Tender, Bowel Sounds Present





Extremeties


Extremities Exam:  Pitting Edema, Dependent Edema





Assessment/Plan


Problem List:  


(1) Lupus nephritis, ISN/RPS class III


ICD Codes:  M32.14 - Glomerular disease in systemic lupus erythematosus


Plan:  Patient is on Solu-Medrol she failed mycophenolate, will stop once plan 

to give Cyclophosphamide


Consider cyclophosphamide IV monthly consulted Hematology /Onc to arrange 

medication


She had focal proliferative lupus nephritis/diabetic nephropathy/severe 

interstitial fibrosis and tubular atrophy on kidney biopsy she has poor risk 

factors and possibly will have end-stage renal disease in near future


Check STEPHANIE and serum complements 24-hour urine for protein


Give Lasix 40 mg IV daily


check BMP


d/w  can be given peripherally cancel central line





(2) Acute on chronic kidney failure


ICD Codes:  N17.9 - Acute kidney failure, unspecified; N18.9 - Chronic kidney 

disease, unspecified


Status:  Acute


Plan:  Doing poorly advanced renal failure with proteinuria due to systemic 

lupus erythematosus





(3) Type 2 diabetes mellitus


ICD Codes:  E11.9 - Type 2 diabetes mellitus


Status:  Chronic


Plan:  Continue to monitor





(4) Peroneal DVT (deep venous thrombosis)


ICD Codes:  I82.499 - Acute embolism and thrombosis of other specified deep 

vein of unspecified lower extremity


Status:  Acute


Plan:  On heparin








Problem Qualifiers





(1) Acute on chronic kidney failure:  


Qualified Codes:  N17.9 - Acute kidney failure, unspecified; N18.9 - Chronic 

kidney disease, unspecified


(2) Type 2 diabetes mellitus:  


Qualified Codes:  E11.9 - Type 2 diabetes mellitus without complications; Z79.4 

- Long term (current) use of insulin


(3) Peroneal DVT (deep venous thrombosis):  


Qualified Codes:  I82.491 - Acute embolism and thrombosis of other specified 

deep vein of right lower extremity








Delia Moreno MD Nov 2, 2017 13:44
Subjective


History of Present Illness


62 year old female with ARF/CKD Lupus nephritis DVT





Review of Systems


General


Constitutional:  Fatigue





Respiratory


Lungs:  SOB, Cough





Cardiovascular


Cardiac:  Edema





Objective Data


Data





Vital Signs








  Date Time  Temp Pulse Resp B/P (MAP) Pulse Ox O2 Delivery O2 Flow Rate FiO2


 


11/3/17 12:00 96.9 78 17 151/77 (101) 94   


 


11/3/17 09:30  67      


 


11/3/17 08:00 98.1 78 14 132/73 (92) 95   


 


11/3/17 04:00 98.6 90 20 140/69 (92) 94   


 


11/3/17 00:00 98.4 77 20 144/68 (93) 97   


 


11/2/17 20:00 99.1 86 20 173/94 (120) 93   


 


11/2/17 16:00 97.7 70 19 158/79 (105) 95   








-:  


11/3/17 0647                                                                   

             11/3/17 0647








Physical Exam


General


Appearance:  Well Developed, Obese





Neck


Neck Exam:  Neck Supple





Pulmonary


Resp Exam:  Rhonchi, Decreased Bases





Cardiology


CV Exam:  Regular





Gastrointestinal/Abdomen


GI Exam:  Soft, Non-Tender, Bowel Sounds Present





Extremeties


Extremities Exam:  Pitting Edema, Dependent Edema





Assessment/Plan


Problem List:  


(1) Lupus nephritis, ISN/RPS class III


ICD Codes:  M32.14 - Glomerular disease in systemic lupus erythematosus


Plan:  Patient is on Solu-Medrol she failed mycophenolate, will stop plan to 

give Cyclophosphamide


Consider cyclophosphamide IV monthly appreciate Hematology /Onc to assist


She had focal proliferative lupus nephritis/diabetic nephropathy/severe 

interstitial fibrosis and tubular atrophy on kidney biopsy she has poor risk 

factors and possibly will have end-stage renal disease in near future


Check STEPHANIE and serum complements 


24-hour urine for protein 11.651 grams Nephrotic range


Give Lasix 40 mg IV daily





(2) Acute on chronic kidney failure


ICD Codes:  N17.9 - Acute kidney failure, unspecified; N18.9 - Chronic kidney 

disease, unspecified


Status:  Acute


Plan:  Doing poorly advanced renal failure with proteinuria due to systemic 

lupus erythematosus





(3) Type 2 diabetes mellitus


ICD Codes:  E11.9 - Type 2 diabetes mellitus


Status:  Chronic


Plan:  Continue to monitor





(4) Peroneal DVT (deep venous thrombosis)


ICD Codes:  I82.499 - Acute embolism and thrombosis of other specified deep 

vein of unspecified lower extremity


Status:  Acute


Plan:  On heparin








Problem Qualifiers





(1) Acute on chronic kidney failure:  


Qualified Codes:  N17.9 - Acute kidney failure, unspecified; N18.9 - Chronic 

kidney disease, unspecified


(2) Type 2 diabetes mellitus:  


Qualified Codes:  E11.9 - Type 2 diabetes mellitus without complications; Z79.4 

- Long term (current) use of insulin


(3) Peroneal DVT (deep venous thrombosis):  


Qualified Codes:  I82.491 - Acute embolism and thrombosis of other specified 

deep vein of right lower extremity








Delia Moreno MD Nov 3, 2017 13:04
Subjective


History of Present Illness


62 year old female with ARF/CKD Lupus nephritis DVT





Review of Systems


General


Constitutional:  Fatigue





Respiratory


Lungs:  SOB, Cough





Cardiovascular


Cardiac:  Edema





Objective Data


Data





Vital Signs








  Date Time  Temp Pulse Resp B/P (MAP) Pulse Ox O2 Delivery O2 Flow Rate FiO2


 


11/3/17 12:00 96.9 78 17 151/77 (101) 94   


 


11/3/17 09:30  67      


 


11/3/17 08:00 98.1 78 14 132/73 (92) 95   


 


11/3/17 04:00 98.6 90 20 140/69 (92) 94   


 


11/3/17 00:00 98.4 77 20 144/68 (93) 97   


 


11/2/17 20:00 99.1 86 20 173/94 (120) 93   


 


11/2/17 16:00 97.7 70 19 158/79 (105) 95   








-:  


11/3/17 0647                                                                   

             11/3/17 0647








Physical Exam


General


Appearance:  Well Developed, Obese





Neck


Neck Exam:  Neck Supple





Pulmonary


Resp Exam:  Rhonchi, Decreased Bases





Cardiology


CV Exam:  Regular





Gastrointestinal/Abdomen


GI Exam:  Soft, Non-Tender, Bowel Sounds Present





Extremeties


Extremities Exam:  Pitting Edema, Dependent Edema





Assessment/Plan


Problem List:  


(1) Lupus nephritis, ISN/RPS class III


ICD Codes:  M32.14 - Glomerular disease in systemic lupus erythematosus


Plan:  Patient is on Solu-Medrol she failed mycophenolate, will stop plan to 

give Cyclophosphamide


Consider cyclophosphamide IV monthly appreciate Hematology /Onc to assist


She had focal proliferative lupus nephritis/diabetic nephropathy/severe 

interstitial fibrosis and tubular atrophy on kidney biopsy she has poor risk 

factors and possibly will have end-stage renal disease in near future


Check STEPHANIE and serum complements 


24-hour urine for protein 11.651 grams Nephrotic range


Give Lasix 40 mg IV daily





(2) Acute on chronic kidney failure


ICD Codes:  N17.9 - Acute kidney failure, unspecified; N18.9 - Chronic kidney 

disease, unspecified


Status:  Acute


Plan:  Doing poorly advanced renal failure with proteinuria due to systemic 

lupus erythematosus





(3) Type 2 diabetes mellitus


ICD Codes:  E11.9 - Type 2 diabetes mellitus


Status:  Chronic


Plan:  Continue to monitor





(4) Peroneal DVT (deep venous thrombosis)


ICD Codes:  I82.499 - Acute embolism and thrombosis of other specified deep 

vein of unspecified lower extremity


Status:  Acute


Plan:  On heparin








Problem Qualifiers





(1) Acute on chronic kidney failure:  


Qualified Codes:  N17.9 - Acute kidney failure, unspecified; N18.9 - Chronic 

kidney disease, unspecified


(2) Type 2 diabetes mellitus:  


Qualified Codes:  E11.9 - Type 2 diabetes mellitus without complications; Z79.4 

- Long term (current) use of insulin


(3) Peroneal DVT (deep venous thrombosis):  


Qualified Codes:  I82.491 - Acute embolism and thrombosis of other specified 

deep vein of right lower extremity








Delia Moreno MD Nov 3, 2017 13:04
Subjective


History of Present Illness


62 year old female with ARF/CKD Lupus nephritis DVT





Review of Systems


General


Constitutional:  Fatigue





Respiratory


Lungs:  SOB, Cough





Cardiovascular


Cardiac:  Edema





Objective Data


Data





Vital Signs








  Date Time  Temp Pulse Resp B/P (MAP) Pulse Ox O2 Delivery O2 Flow Rate FiO2


 


11/3/17 12:00 96.9 78 17 151/77 (101) 94   


 


11/3/17 09:30  67      


 


11/3/17 08:00 98.1 78 14 132/73 (92) 95   


 


11/3/17 04:00 98.6 90 20 140/69 (92) 94   


 


11/3/17 00:00 98.4 77 20 144/68 (93) 97   


 


11/2/17 20:00 99.1 86 20 173/94 (120) 93   


 


11/2/17 16:00 97.7 70 19 158/79 (105) 95   








-:  


11/3/17 0647                                                                   

             11/3/17 0647








Physical Exam


General


Appearance:  Well Developed, Obese





Neck


Neck Exam:  Neck Supple





Pulmonary


Resp Exam:  Rhonchi, Decreased Bases





Cardiology


CV Exam:  Regular





Gastrointestinal/Abdomen


GI Exam:  Soft, Non-Tender, Bowel Sounds Present





Extremeties


Extremities Exam:  Pitting Edema, Dependent Edema





Assessment/Plan


Problem List:  


(1) Lupus nephritis, ISN/RPS class III


ICD Codes:  M32.14 - Glomerular disease in systemic lupus erythematosus


Plan:  Patient is on Solu-Medrol she failed mycophenolate, will stop plan to 

give Cyclophosphamide


Consider cyclophosphamide IV monthly appreciate Hematology /Onc to assist


She had focal proliferative lupus nephritis/diabetic nephropathy/severe 

interstitial fibrosis and tubular atrophy on kidney biopsy she has poor risk 

factors and possibly will have end-stage renal disease in near future


Check STEPHANIE and serum complements 


24-hour urine for protein 11.651 grams Nephrotic range


Give Lasix 40 mg IV daily





(2) Acute on chronic kidney failure


ICD Codes:  N17.9 - Acute kidney failure, unspecified; N18.9 - Chronic kidney 

disease, unspecified


Status:  Acute


Plan:  Doing poorly advanced renal failure with proteinuria due to systemic 

lupus erythematosus





(3) Type 2 diabetes mellitus


ICD Codes:  E11.9 - Type 2 diabetes mellitus


Status:  Chronic


Plan:  Continue to monitor





(4) Peroneal DVT (deep venous thrombosis)


ICD Codes:  I82.499 - Acute embolism and thrombosis of other specified deep 

vein of unspecified lower extremity


Status:  Acute


Plan:  On heparin








Problem Qualifiers





(1) Acute on chronic kidney failure:  


Qualified Codes:  N17.9 - Acute kidney failure, unspecified; N18.9 - Chronic 

kidney disease, unspecified


(2) Type 2 diabetes mellitus:  


Qualified Codes:  E11.9 - Type 2 diabetes mellitus without complications; Z79.4 

- Long term (current) use of insulin


(3) Peroneal DVT (deep venous thrombosis):  


Qualified Codes:  I82.491 - Acute embolism and thrombosis of other specified 

deep vein of right lower extremity








Delia Moreno MD Nov 3, 2017 13:04
Subjective


History of Present Illness


62 year old female with ARF/CKD Lupus nephritis DVT


Additional Remarks


Patient is alert, sitting on chair, not in distress.





Review of Systems


General


Constitutional:  Fatigue





Respiratory


Lungs:  SOB, Cough





Cardiovascular


Cardiac:  Edema





Objective Data


Data





Vital Signs








  Date Time  Temp Pulse Resp B/P (MAP) Pulse Ox O2 Delivery O2 Flow Rate FiO2


 


11/4/17 09:16 98.4 73 18 149/75 (99) 92   


 


11/4/17 06:00   0     


 


11/4/17 04:55 98.2 65 18 171/96 (121) 95   


 


11/4/17 03:59  67      


 


11/4/17 00:15  59      


 


11/4/17 00:15 98.6 63 15 148/79 (102) 95   


 


11/3/17 20:12  78      


 


11/3/17 20:08 99.0 69 21 155/88 (110) 94   


 


11/3/17 17:53 98.6 70 16 162/98 (119) 94   


 


11/3/17 12:00 96.9 78 17 151/77 (101) 94   








-:  


11/4/17 0523                                                                   

             11/4/17 0523








Physical Exam


General


Appearance:  Well Developed, No Acute Distress, Comfortable, Obese





Neck


Neck Exam:  Neck Supple





Pulmonary


Resp Exam:  Rhonchi, Decreased Bases





Cardiology


CV Exam:  Regular





Gastrointestinal/Abdomen


GI Exam:  Soft, Non-Tender, Bowel Sounds Present





Extremeties


Extremities Exam:  Moderate Edema, Pitting Edema, Dependent Edema





Neurologic


Neuro Exam:  Alert, Awake, Oriented





Psychiatric


Psych Exam:  Appropriate Responses





Assessment/Plan


Problem List:  


(1) Lupus nephritis, ISN/RPS class III


ICD Codes:  M32.14 - Glomerular disease in systemic lupus erythematosus


Plan:  Patient is on Solu-Medrol she failed mycophenolate.


She had focal proliferative lupus nephritis/diabetic nephropathy/severe 

interstitial fibrosis and tubular atrophy on kidney biopsy she has poor risk 

factors and possibly will have end-stage renal disease in near future


Check STEPHANIE and serum complements 


24-hour urine for protein 11.651 grams Nephrotic range


Give Lasix 40 mg IV daily.


Started on Cyclophosphamide, tolerated well.


Continue diuretics, Creatinine is stable.





(2) Acute on chronic kidney failure


ICD Codes:  N17.9 - Acute kidney failure, unspecified; N18.9 - Chronic kidney 

disease, unspecified


Status:  Acute


Plan:  Doing poorly advanced renal failure with proteinuria due to systemic 

lupus erythematosus





(3) Type 2 diabetes mellitus


ICD Codes:  E11.9 - Type 2 diabetes mellitus


Status:  Chronic


Plan:  Continue to monitor





(4) Peroneal DVT (deep venous thrombosis)


ICD Codes:  I82.499 - Acute embolism and thrombosis of other specified deep 

vein of unspecified lower extremity


Status:  Acute


Plan:  On heparin








Problem Qualifiers





(1) Acute on chronic kidney failure:  


Qualified Codes:  N17.9 - Acute kidney failure, unspecified; N18.9 - Chronic 

kidney disease, unspecified


(2) Type 2 diabetes mellitus:  


Qualified Codes:  E11.9 - Type 2 diabetes mellitus without complications; Z79.4 

- Long term (current) use of insulin


(3) Peroneal DVT (deep venous thrombosis):  


Qualified Codes:  I82.491 - Acute embolism and thrombosis of other specified 

deep vein of right lower extremity








Smita Abbott MD Nov 4, 2017 11:51
Subjective


History of Present Illness


62 year old female with ARF/CKD Lupus nephritis DVT


Additional Remarks


Patient is alert, sitting on chair, not in distress.





Review of Systems


General


Constitutional:  Fatigue





Respiratory


Lungs:  SOB, Cough





Cardiovascular


Cardiac:  Edema





Objective Data


Data





Vital Signs








  Date Time  Temp Pulse Resp B/P (MAP) Pulse Ox O2 Delivery O2 Flow Rate FiO2


 


11/4/17 09:16 98.4 73 18 149/75 (99) 92   


 


11/4/17 06:00   0     


 


11/4/17 04:55 98.2 65 18 171/96 (121) 95   


 


11/4/17 03:59  67      


 


11/4/17 00:15  59      


 


11/4/17 00:15 98.6 63 15 148/79 (102) 95   


 


11/3/17 20:12  78      


 


11/3/17 20:08 99.0 69 21 155/88 (110) 94   


 


11/3/17 17:53 98.6 70 16 162/98 (119) 94   


 


11/3/17 12:00 96.9 78 17 151/77 (101) 94   








-:  


11/4/17 0523                                                                   

             11/4/17 0523








Physical Exam


General


Appearance:  Well Developed, No Acute Distress, Comfortable, Obese





Neck


Neck Exam:  Neck Supple





Pulmonary


Resp Exam:  Rhonchi, Decreased Bases





Cardiology


CV Exam:  Regular





Gastrointestinal/Abdomen


GI Exam:  Soft, Non-Tender, Bowel Sounds Present





Extremeties


Extremities Exam:  Moderate Edema, Pitting Edema, Dependent Edema





Neurologic


Neuro Exam:  Alert, Awake, Oriented





Psychiatric


Psych Exam:  Appropriate Responses





Assessment/Plan


Problem List:  


(1) Lupus nephritis, ISN/RPS class III


ICD Codes:  M32.14 - Glomerular disease in systemic lupus erythematosus


Plan:  Patient is on Solu-Medrol she failed mycophenolate.


She had focal proliferative lupus nephritis/diabetic nephropathy/severe 

interstitial fibrosis and tubular atrophy on kidney biopsy she has poor risk 

factors and possibly will have end-stage renal disease in near future


Check STEPHANIE and serum complements 


24-hour urine for protein 11.651 grams Nephrotic range


Give Lasix 40 mg IV daily.


Started on Cyclophosphamide, tolerated well.


Continue diuretics, Creatinine is stable.





(2) Acute on chronic kidney failure


ICD Codes:  N17.9 - Acute kidney failure, unspecified; N18.9 - Chronic kidney 

disease, unspecified


Status:  Acute


Plan:  Doing poorly advanced renal failure with proteinuria due to systemic 

lupus erythematosus





(3) Type 2 diabetes mellitus


ICD Codes:  E11.9 - Type 2 diabetes mellitus


Status:  Chronic


Plan:  Continue to monitor





(4) Peroneal DVT (deep venous thrombosis)


ICD Codes:  I82.499 - Acute embolism and thrombosis of other specified deep 

vein of unspecified lower extremity


Status:  Acute


Plan:  On heparin








Problem Qualifiers





(1) Acute on chronic kidney failure:  


Qualified Codes:  N17.9 - Acute kidney failure, unspecified; N18.9 - Chronic 

kidney disease, unspecified


(2) Type 2 diabetes mellitus:  


Qualified Codes:  E11.9 - Type 2 diabetes mellitus without complications; Z79.4 

- Long term (current) use of insulin


(3) Peroneal DVT (deep venous thrombosis):  


Qualified Codes:  I82.491 - Acute embolism and thrombosis of other specified 

deep vein of right lower extremity








Smita Abbott MD Nov 4, 2017 11:51
Subjective


History of Present Illness


62 year old female with ARF/CKD Lupus nephritis DVT


Additional Remarks


Patient is alert, sitting on chair, not in distress.





Review of Systems


General


Constitutional:  Fatigue





Respiratory


Lungs:  SOB, Cough





Cardiovascular


Cardiac:  Edema





Objective Data


Data





Vital Signs








  Date Time  Temp Pulse Resp B/P (MAP) Pulse Ox O2 Delivery O2 Flow Rate FiO2


 


11/4/17 09:16 98.4 73 18 149/75 (99) 92   


 


11/4/17 06:00   0     


 


11/4/17 04:55 98.2 65 18 171/96 (121) 95   


 


11/4/17 03:59  67      


 


11/4/17 00:15  59      


 


11/4/17 00:15 98.6 63 15 148/79 (102) 95   


 


11/3/17 20:12  78      


 


11/3/17 20:08 99.0 69 21 155/88 (110) 94   


 


11/3/17 17:53 98.6 70 16 162/98 (119) 94   


 


11/3/17 12:00 96.9 78 17 151/77 (101) 94   








-:  


11/4/17 0523                                                                   

             11/4/17 0523








Physical Exam


General


Appearance:  Well Developed, No Acute Distress, Comfortable, Obese





Neck


Neck Exam:  Neck Supple





Pulmonary


Resp Exam:  Rhonchi, Decreased Bases





Cardiology


CV Exam:  Regular





Gastrointestinal/Abdomen


GI Exam:  Soft, Non-Tender, Bowel Sounds Present





Extremeties


Extremities Exam:  Moderate Edema, Pitting Edema, Dependent Edema





Neurologic


Neuro Exam:  Alert, Awake, Oriented





Psychiatric


Psych Exam:  Appropriate Responses





Assessment/Plan


Problem List:  


(1) Lupus nephritis, ISN/RPS class III


ICD Codes:  M32.14 - Glomerular disease in systemic lupus erythematosus


Plan:  Patient is on Solu-Medrol she failed mycophenolate.


She had focal proliferative lupus nephritis/diabetic nephropathy/severe 

interstitial fibrosis and tubular atrophy on kidney biopsy she has poor risk 

factors and possibly will have end-stage renal disease in near future


Check STEPHANIE and serum complements 


24-hour urine for protein 11.651 grams Nephrotic range


Give Lasix 40 mg IV daily.


Started on Cyclophosphamide, tolerated well.


Continue diuretics, Creatinine is stable.





(2) Acute on chronic kidney failure


ICD Codes:  N17.9 - Acute kidney failure, unspecified; N18.9 - Chronic kidney 

disease, unspecified


Status:  Acute


Plan:  Doing poorly advanced renal failure with proteinuria due to systemic 

lupus erythematosus





(3) Type 2 diabetes mellitus


ICD Codes:  E11.9 - Type 2 diabetes mellitus


Status:  Chronic


Plan:  Continue to monitor





(4) Peroneal DVT (deep venous thrombosis)


ICD Codes:  I82.499 - Acute embolism and thrombosis of other specified deep 

vein of unspecified lower extremity


Status:  Acute


Plan:  On heparin








Problem Qualifiers





(1) Acute on chronic kidney failure:  


Qualified Codes:  N17.9 - Acute kidney failure, unspecified; N18.9 - Chronic 

kidney disease, unspecified


(2) Type 2 diabetes mellitus:  


Qualified Codes:  E11.9 - Type 2 diabetes mellitus without complications; Z79.4 

- Long term (current) use of insulin


(3) Peroneal DVT (deep venous thrombosis):  


Qualified Codes:  I82.491 - Acute embolism and thrombosis of other specified 

deep vein of right lower extremity








Smita Abbott MD Nov 4, 2017 11:51
Subjective


History of Present Illness


62 year old female with ARF/CKD Lupus nephritis DVT


Additional Remarks


Patient is alert, sitting on chair, now eating , has increase leg swelling.





Review of Systems


General


Constitutional:  Fatigue





Respiratory


Lungs:  SOB, Cough





Cardiovascular


Cardiac:  Edema





Objective Data


Data





Vital Signs








  Date Time  Temp Pulse Resp B/P (MAP) Pulse Ox O2 Delivery O2 Flow Rate FiO2


 


11/5/17 08:00 98.8 73 18 158/86 (110) 96   


 


11/5/17 04:15 98.4 80 16 140/79 (99) 98   


 


11/5/17 04:04  85      


 


11/5/17 00:29   16     


 


11/5/17 00:16  102      


 


11/5/17 00:10 98.5 98 18 119/73 (88) 93   


 


11/4/17 21:48 98.7 94 18 145/79 (101) 94   


 


11/4/17 20:09  94      


 


11/4/17 16:00 98.6 76 18 138/74 (95) 94   








-:  


11/5/17 0628                                                                   

             11/5/17 0628








Physical Exam


General


Appearance:  Well Developed, No Acute Distress, Comfortable, Obese





Neck


Neck Exam:  Neck Supple





Pulmonary


Resp Exam:  Rhonchi, Decreased Bases





Cardiology


CV Exam:  Regular





Gastrointestinal/Abdomen


GI Exam:  Soft, Non-Tender, Bowel Sounds Present





Extremeties


Extremities Exam:  Moderate Edema, Pitting Edema, Dependent Edema





Neurologic


Neuro Exam:  Alert, Awake, Oriented





Psychiatric


Psych Exam:  Appropriate Responses





Assessment/Plan


Problem List:  


(1) Lupus nephritis, ISN/RPS class III


ICD Codes:  M32.14 - Glomerular disease in systemic lupus erythematosus


Plan:  Patient is on Solu-Medrol she failed mycophenolate.


She had focal proliferative lupus nephritis/diabetic nephropathy/severe 

interstitial fibrosis and tubular atrophy on kidney biopsy she has poor risk 

factors and possibly will have end-stage renal disease in near future


Check STEPHANIE and serum complements 


24-hour urine for protein 11.651 grams Nephrotic range


Give Lasix 40 mg IV daily.


Started on Cyclophosphamide, tolerated well.


Creatinine is same.


Increase Massimo, Dr. Moreno will follow in AM.





(2) Acute on chronic kidney failure


ICD Codes:  N17.9 - Acute kidney failure, unspecified; N18.9 - Chronic kidney 

disease, unspecified


Status:  Acute


Plan:  Doing poorly advanced renal failure with proteinuria due to systemic 

lupus erythematosus





(3) Type 2 diabetes mellitus


ICD Codes:  E11.9 - Type 2 diabetes mellitus


Status:  Chronic


Plan:  Continue to monitor





(4) Peroneal DVT (deep venous thrombosis)


ICD Codes:  I82.499 - Acute embolism and thrombosis of other specified deep 

vein of unspecified lower extremity


Status:  Acute


Plan:  On heparin








Problem Qualifiers





(1) Acute on chronic kidney failure:  


Qualified Codes:  N17.9 - Acute kidney failure, unspecified; N18.9 - Chronic 

kidney disease, unspecified


(2) Type 2 diabetes mellitus:  


Qualified Codes:  E11.9 - Type 2 diabetes mellitus without complications; Z79.4 

- Long term (current) use of insulin


(3) Peroneal DVT (deep venous thrombosis):  


Qualified Codes:  I82.491 - Acute embolism and thrombosis of other specified 

deep vein of right lower extremity








Smita Abbott MD Nov 5, 2017 13:29
Subjective


History of Present Illness


62 year old female with ARF/CKD Lupus nephritis DVT


Additional Remarks


Patient is alert, sitting on chair, now eating , has increase leg swelling.





Review of Systems


General


Constitutional:  Fatigue





Respiratory


Lungs:  SOB, Cough





Cardiovascular


Cardiac:  Edema





Objective Data


Data





Vital Signs








  Date Time  Temp Pulse Resp B/P (MAP) Pulse Ox O2 Delivery O2 Flow Rate FiO2


 


11/5/17 08:00 98.8 73 18 158/86 (110) 96   


 


11/5/17 04:15 98.4 80 16 140/79 (99) 98   


 


11/5/17 04:04  85      


 


11/5/17 00:29   16     


 


11/5/17 00:16  102      


 


11/5/17 00:10 98.5 98 18 119/73 (88) 93   


 


11/4/17 21:48 98.7 94 18 145/79 (101) 94   


 


11/4/17 20:09  94      


 


11/4/17 16:00 98.6 76 18 138/74 (95) 94   








-:  


11/5/17 0628                                                                   

             11/5/17 0628








Physical Exam


General


Appearance:  Well Developed, No Acute Distress, Comfortable, Obese





Neck


Neck Exam:  Neck Supple





Pulmonary


Resp Exam:  Rhonchi, Decreased Bases





Cardiology


CV Exam:  Regular





Gastrointestinal/Abdomen


GI Exam:  Soft, Non-Tender, Bowel Sounds Present





Extremeties


Extremities Exam:  Moderate Edema, Pitting Edema, Dependent Edema





Neurologic


Neuro Exam:  Alert, Awake, Oriented





Psychiatric


Psych Exam:  Appropriate Responses





Assessment/Plan


Problem List:  


(1) Lupus nephritis, ISN/RPS class III


ICD Codes:  M32.14 - Glomerular disease in systemic lupus erythematosus


Plan:  Patient is on Solu-Medrol she failed mycophenolate.


She had focal proliferative lupus nephritis/diabetic nephropathy/severe 

interstitial fibrosis and tubular atrophy on kidney biopsy she has poor risk 

factors and possibly will have end-stage renal disease in near future


Check STEPHANIE and serum complements 


24-hour urine for protein 11.651 grams Nephrotic range


Give Lasix 40 mg IV daily.


Started on Cyclophosphamide, tolerated well.


Creatinine is same.


Increase Massimo, Dr. Moreno will follow in AM.





(2) Acute on chronic kidney failure


ICD Codes:  N17.9 - Acute kidney failure, unspecified; N18.9 - Chronic kidney 

disease, unspecified


Status:  Acute


Plan:  Doing poorly advanced renal failure with proteinuria due to systemic 

lupus erythematosus





(3) Type 2 diabetes mellitus


ICD Codes:  E11.9 - Type 2 diabetes mellitus


Status:  Chronic


Plan:  Continue to monitor





(4) Peroneal DVT (deep venous thrombosis)


ICD Codes:  I82.499 - Acute embolism and thrombosis of other specified deep 

vein of unspecified lower extremity


Status:  Acute


Plan:  On heparin








Problem Qualifiers





(1) Acute on chronic kidney failure:  


Qualified Codes:  N17.9 - Acute kidney failure, unspecified; N18.9 - Chronic 

kidney disease, unspecified


(2) Type 2 diabetes mellitus:  


Qualified Codes:  E11.9 - Type 2 diabetes mellitus without complications; Z79.4 

- Long term (current) use of insulin


(3) Peroneal DVT (deep venous thrombosis):  


Qualified Codes:  I82.491 - Acute embolism and thrombosis of other specified 

deep vein of right lower extremity








Smita Abbott MD Nov 5, 2017 13:29
Subjective


History of Present Illness


62 year old female with ARF/CKD Lupus nephritis DVT


Additional Remarks


Patient is alert, sitting on chair, now eating , has increase leg swelling.





Review of Systems


General


Constitutional:  Fatigue





Respiratory


Lungs:  SOB, Cough





Cardiovascular


Cardiac:  Edema





Objective Data


Data





Vital Signs








  Date Time  Temp Pulse Resp B/P (MAP) Pulse Ox O2 Delivery O2 Flow Rate FiO2


 


11/5/17 08:00 98.8 73 18 158/86 (110) 96   


 


11/5/17 04:15 98.4 80 16 140/79 (99) 98   


 


11/5/17 04:04  85      


 


11/5/17 00:29   16     


 


11/5/17 00:16  102      


 


11/5/17 00:10 98.5 98 18 119/73 (88) 93   


 


11/4/17 21:48 98.7 94 18 145/79 (101) 94   


 


11/4/17 20:09  94      


 


11/4/17 16:00 98.6 76 18 138/74 (95) 94   








-:  


11/5/17 0628                                                                   

             11/5/17 0628








Physical Exam


General


Appearance:  Well Developed, No Acute Distress, Comfortable, Obese





Neck


Neck Exam:  Neck Supple





Pulmonary


Resp Exam:  Rhonchi, Decreased Bases





Cardiology


CV Exam:  Regular





Gastrointestinal/Abdomen


GI Exam:  Soft, Non-Tender, Bowel Sounds Present





Extremeties


Extremities Exam:  Moderate Edema, Pitting Edema, Dependent Edema





Neurologic


Neuro Exam:  Alert, Awake, Oriented





Psychiatric


Psych Exam:  Appropriate Responses





Assessment/Plan


Problem List:  


(1) Lupus nephritis, ISN/RPS class III


ICD Codes:  M32.14 - Glomerular disease in systemic lupus erythematosus


Plan:  Patient is on Solu-Medrol she failed mycophenolate.


She had focal proliferative lupus nephritis/diabetic nephropathy/severe 

interstitial fibrosis and tubular atrophy on kidney biopsy she has poor risk 

factors and possibly will have end-stage renal disease in near future


Check STEPHANIE and serum complements 


24-hour urine for protein 11.651 grams Nephrotic range


Give Lasix 40 mg IV daily.


Started on Cyclophosphamide, tolerated well.


Creatinine is same.


Increase Massimo, Dr. Moreno will follow in AM.





(2) Acute on chronic kidney failure


ICD Codes:  N17.9 - Acute kidney failure, unspecified; N18.9 - Chronic kidney 

disease, unspecified


Status:  Acute


Plan:  Doing poorly advanced renal failure with proteinuria due to systemic 

lupus erythematosus





(3) Type 2 diabetes mellitus


ICD Codes:  E11.9 - Type 2 diabetes mellitus


Status:  Chronic


Plan:  Continue to monitor





(4) Peroneal DVT (deep venous thrombosis)


ICD Codes:  I82.499 - Acute embolism and thrombosis of other specified deep 

vein of unspecified lower extremity


Status:  Acute


Plan:  On heparin








Problem Qualifiers





(1) Acute on chronic kidney failure:  


Qualified Codes:  N17.9 - Acute kidney failure, unspecified; N18.9 - Chronic 

kidney disease, unspecified


(2) Type 2 diabetes mellitus:  


Qualified Codes:  E11.9 - Type 2 diabetes mellitus without complications; Z79.4 

- Long term (current) use of insulin


(3) Peroneal DVT (deep venous thrombosis):  


Qualified Codes:  I82.491 - Acute embolism and thrombosis of other specified 

deep vein of right lower extremity








Smita Abbott MD Nov 5, 2017 13:29
Subjective


History of Present Illness


62 year old female with ARF/CKD Lupus nephritis DVT


Additional Remarks


Patient is alert, sitting on chair, now eating , has increase leg swelling.





Review of Systems


General


Constitutional:  Fatigue





Respiratory


Lungs:  SOB, Cough





Cardiovascular


Cardiac:  Edema





Objective Data


Data





Vital Signs








  Date Time  Temp Pulse Resp B/P (MAP) Pulse Ox O2 Delivery O2 Flow Rate FiO2


 


11/6/17 13:05 98.7 76 16 156/90 (112)    


 


11/6/17 08:35 98.3 61 16 169/88 (115) 93   


 


11/6/17 07:24   16     


 


11/6/17 04:10  82      


 


11/6/17 04:00 98.6 82 18 135/81 (99) 95   


 


11/6/17 00:30  101      


 


11/6/17 00:20 98.4 111 18 145/97 (113) 95   


 


11/5/17 20:41 98.5 87 16 157/86 (109) 96   


 


11/5/17 16:10 98.0 80 18 135/78 (97) 95   








-:  


11/6/17 0530                                                                   

             11/6/17 0530








Physical Exam


General


Appearance:  Well Developed, No Acute Distress, Comfortable, Obese





Neck


Neck Exam:  Neck Supple





Pulmonary


Resp Exam:  Rhonchi, Decreased Bases





Cardiology


CV Exam:  Regular





Gastrointestinal/Abdomen


GI Exam:  Soft, Non-Tender, Bowel Sounds Present





Extremeties


Extremities Exam:  Moderate Edema, Pitting Edema, Dependent Edema





Neurologic


Neuro Exam:  Alert, Awake, Oriented





Psychiatric


Psych Exam:  Appropriate Responses





Assessment/Plan


Problem List:  


(1) Lupus nephritis, ISN/RPS class III


ICD Codes:  M32.14 - Glomerular disease in systemic lupus erythematosus


Plan:  Patient is on Solu-Medrol she failed mycophenolate.


She had focal proliferative lupus nephritis/diabetic nephropathy/severe 

interstitial fibrosis and tubular atrophy on kidney biopsy she has poor risk 

factors and possibly will have end-stage renal disease in near future


 STEPHANIE 1:640 DS dna 120 and serum complements normal


24-hour urine for protein 11.651 grams Nephrotic range


Give Lasix 40 mg IV daily.


Started on Cyclophosphamide, tolerated well.


follow BMP


once a Month Cyclophosphamide continue with Prednisone





(2) Acute on chronic kidney failure


ICD Codes:  N17.9 - Acute kidney failure, unspecified; N18.9 - Chronic kidney 

disease, unspecified


Status:  Acute


Plan:  Doing poorly advanced renal failure with proteinuria due to systemic 

lupus erythematosus





(3) Type 2 diabetes mellitus


ICD Codes:  E11.9 - Type 2 diabetes mellitus


Status:  Chronic


Plan:  Continue to monitor





(4) Peroneal DVT (deep venous thrombosis)


ICD Codes:  I82.499 - Acute embolism and thrombosis of other specified deep 

vein of unspecified lower extremity


Status:  Acute


Plan:  On heparin








Problem Qualifiers





(1) Acute on chronic kidney failure:  


Qualified Codes:  N17.9 - Acute kidney failure, unspecified; N18.9 - Chronic 

kidney disease, unspecified


(2) Type 2 diabetes mellitus:  


Qualified Codes:  E11.9 - Type 2 diabetes mellitus without complications; Z79.4 

- Long term (current) use of insulin


(3) Peroneal DVT (deep venous thrombosis):  


Qualified Codes:  I82.491 - Acute embolism and thrombosis of other specified 

deep vein of right lower extremity








Delia Moreno MD Nov 6, 2017 14:45
Subjective


History of Present Illness


62 year old female with ARF/CKD Lupus nephritis DVT


Additional Remarks


Patient is alert, sitting on chair, now eating , has increase leg swelling.





Review of Systems


General


Constitutional:  Fatigue





Respiratory


Lungs:  SOB, Cough





Cardiovascular


Cardiac:  Edema





Objective Data


Data





Vital Signs








  Date Time  Temp Pulse Resp B/P (MAP) Pulse Ox O2 Delivery O2 Flow Rate FiO2


 


11/6/17 13:05 98.7 76 16 156/90 (112)    


 


11/6/17 08:35 98.3 61 16 169/88 (115) 93   


 


11/6/17 07:24   16     


 


11/6/17 04:10  82      


 


11/6/17 04:00 98.6 82 18 135/81 (99) 95   


 


11/6/17 00:30  101      


 


11/6/17 00:20 98.4 111 18 145/97 (113) 95   


 


11/5/17 20:41 98.5 87 16 157/86 (109) 96   


 


11/5/17 16:10 98.0 80 18 135/78 (97) 95   








-:  


11/6/17 0530                                                                   

             11/6/17 0530








Physical Exam


General


Appearance:  Well Developed, No Acute Distress, Comfortable, Obese





Neck


Neck Exam:  Neck Supple





Pulmonary


Resp Exam:  Rhonchi, Decreased Bases





Cardiology


CV Exam:  Regular





Gastrointestinal/Abdomen


GI Exam:  Soft, Non-Tender, Bowel Sounds Present





Extremeties


Extremities Exam:  Moderate Edema, Pitting Edema, Dependent Edema





Neurologic


Neuro Exam:  Alert, Awake, Oriented





Psychiatric


Psych Exam:  Appropriate Responses





Assessment/Plan


Problem List:  


(1) Lupus nephritis, ISN/RPS class III


ICD Codes:  M32.14 - Glomerular disease in systemic lupus erythematosus


Plan:  Patient is on Solu-Medrol she failed mycophenolate.


She had focal proliferative lupus nephritis/diabetic nephropathy/severe 

interstitial fibrosis and tubular atrophy on kidney biopsy she has poor risk 

factors and possibly will have end-stage renal disease in near future


 STEPHANIE 1:640 DS dna 120 and serum complements normal


24-hour urine for protein 11.651 grams Nephrotic range


Give Lasix 40 mg IV daily.


Started on Cyclophosphamide, tolerated well.


follow BMP


once a Month Cyclophosphamide continue with Prednisone





(2) Acute on chronic kidney failure


ICD Codes:  N17.9 - Acute kidney failure, unspecified; N18.9 - Chronic kidney 

disease, unspecified


Status:  Acute


Plan:  Doing poorly advanced renal failure with proteinuria due to systemic 

lupus erythematosus





(3) Type 2 diabetes mellitus


ICD Codes:  E11.9 - Type 2 diabetes mellitus


Status:  Chronic


Plan:  Continue to monitor





(4) Peroneal DVT (deep venous thrombosis)


ICD Codes:  I82.499 - Acute embolism and thrombosis of other specified deep 

vein of unspecified lower extremity


Status:  Acute


Plan:  On heparin








Problem Qualifiers





(1) Acute on chronic kidney failure:  


Qualified Codes:  N17.9 - Acute kidney failure, unspecified; N18.9 - Chronic 

kidney disease, unspecified


(2) Type 2 diabetes mellitus:  


Qualified Codes:  E11.9 - Type 2 diabetes mellitus without complications; Z79.4 

- Long term (current) use of insulin


(3) Peroneal DVT (deep venous thrombosis):  


Qualified Codes:  I82.491 - Acute embolism and thrombosis of other specified 

deep vein of right lower extremity








Delia Moreno MD Nov 6, 2017 14:45
Subjective


History of Present Illness


62 year old female with ARF/CKD Lupus nephritis DVT


Additional Remarks


Patient is alert, sitting on chair, now eating , has increase leg swelling.





Review of Systems


General


Constitutional:  Fatigue





Respiratory


Lungs:  SOB, Cough





Cardiovascular


Cardiac:  Edema





Objective Data


Data





Vital Signs








  Date Time  Temp Pulse Resp B/P (MAP) Pulse Ox O2 Delivery O2 Flow Rate FiO2


 


11/6/17 13:05 98.7 76 16 156/90 (112)    


 


11/6/17 08:35 98.3 61 16 169/88 (115) 93   


 


11/6/17 07:24   16     


 


11/6/17 04:10  82      


 


11/6/17 04:00 98.6 82 18 135/81 (99) 95   


 


11/6/17 00:30  101      


 


11/6/17 00:20 98.4 111 18 145/97 (113) 95   


 


11/5/17 20:41 98.5 87 16 157/86 (109) 96   


 


11/5/17 16:10 98.0 80 18 135/78 (97) 95   








-:  


11/6/17 0530                                                                   

             11/6/17 0530








Physical Exam


General


Appearance:  Well Developed, No Acute Distress, Comfortable, Obese





Neck


Neck Exam:  Neck Supple





Pulmonary


Resp Exam:  Rhonchi, Decreased Bases





Cardiology


CV Exam:  Regular





Gastrointestinal/Abdomen


GI Exam:  Soft, Non-Tender, Bowel Sounds Present





Extremeties


Extremities Exam:  Moderate Edema, Pitting Edema, Dependent Edema





Neurologic


Neuro Exam:  Alert, Awake, Oriented





Psychiatric


Psych Exam:  Appropriate Responses





Assessment/Plan


Problem List:  


(1) Lupus nephritis, ISN/RPS class III


ICD Codes:  M32.14 - Glomerular disease in systemic lupus erythematosus


Plan:  Patient is on Solu-Medrol she failed mycophenolate.


She had focal proliferative lupus nephritis/diabetic nephropathy/severe 

interstitial fibrosis and tubular atrophy on kidney biopsy she has poor risk 

factors and possibly will have end-stage renal disease in near future


 STEPHANIE 1:640 DS dna 120 and serum complements normal


24-hour urine for protein 11.651 grams Nephrotic range


Give Lasix 40 mg IV daily.


Started on Cyclophosphamide, tolerated well.


follow BMP


once a Month Cyclophosphamide continue with Prednisone





(2) Acute on chronic kidney failure


ICD Codes:  N17.9 - Acute kidney failure, unspecified; N18.9 - Chronic kidney 

disease, unspecified


Status:  Acute


Plan:  Doing poorly advanced renal failure with proteinuria due to systemic 

lupus erythematosus





(3) Type 2 diabetes mellitus


ICD Codes:  E11.9 - Type 2 diabetes mellitus


Status:  Chronic


Plan:  Continue to monitor





(4) Peroneal DVT (deep venous thrombosis)


ICD Codes:  I82.499 - Acute embolism and thrombosis of other specified deep 

vein of unspecified lower extremity


Status:  Acute


Plan:  On heparin








Problem Qualifiers





(1) Acute on chronic kidney failure:  


Qualified Codes:  N17.9 - Acute kidney failure, unspecified; N18.9 - Chronic 

kidney disease, unspecified


(2) Type 2 diabetes mellitus:  


Qualified Codes:  E11.9 - Type 2 diabetes mellitus without complications; Z79.4 

- Long term (current) use of insulin


(3) Peroneal DVT (deep venous thrombosis):  


Qualified Codes:  I82.491 - Acute embolism and thrombosis of other specified 

deep vein of right lower extremity








Delia Moreno MD Nov 6, 2017 14:45
No

## 2022-10-20 NOTE — [id number].NPPN
Subjective


History of Present Illness


62 year old with ARF/Pancreatitis/Cirrhosis Autoimmune disease





Review of Systems


General


Constitutional:  Fatigue





Gastrointestinal


Gastrointestinal:  Abdominal Pain





Musculoskeletal


MS:  Pain/Stiffness





Objective Data


Data





Vital Signs








  Date Time  Temp Pulse Resp B/P (MAP) Pulse Ox O2 Delivery O2 Flow Rate FiO2


 


9/20/17 11:52 97.4 82 18 131/68 (89) 96   


 


9/20/17 08:00 99.2 102 18 143/82 (102) 95   


 


9/19/17 23:20 97.5 76 17 151/90 (110) 97   


 


9/19/17 20:30 97.7 77 18 141/75 (97) 95   


 


9/19/17 19:08      Room Air  


 


9/19/17 15:40 96.4 77 18 182/81 (114) 97   








-:  


9/19/17 0630                                                                   

             9/19/17 2316








Physical Exam


General


Appearance:  Well Developed





Neck


Neck Exam:  Neck Supple





Pulmonary


Resp Exam:  Clear Bilaterally, Breath Sounds Equal





Cardiology


CV Exam:  Regular, Normal Sinus Rhythm





Gastrointestinal/Abdomen


GI Exam:  Soft, Bowel Sounds Present





Extremeties


Extremities Exam:  No Edema





Assessment/Plan


Problem List:  


(1) VALENTIN (acute kidney injury)


ICD Codes:  N17.9 - Acute kidney failure, unspecified


Plan:  ARF Autoimmune process, has advanced kidney disease due to lupus 

nephritis and diabetic kidney disease


STEPHANIE positive


C3, C4 low


 SLE by serology STEPHANIE 1:1280 Anti dsDNA 775 very high


follow renal functions


on prednisone Cr 2.57


 kidney biopsy, showed Focal Proliferative GN, with chronicity started on 

Cellcept goal is to keep her on Prednisone and Cellcept and adjust dose as 

tolerated


She also has diabetic kidney disease


Prognosis for kidney is worse possibility of dialysis is high


Need to arrange her medication as outpatient increase CellCept to 500 mg 3 

times a day


i again discussed above


Cr 2.5


GFR 19


FU as out patient


she needs long term fu with Nephrology


case management to help with assistance


BG are high 651 I cut back on Prednisone to 20 mg daily





(2) Pancreatitis


ICD Codes:  K85.90 - Acute pancreatitis without necrosis or infection, 

unspecified


Status:  Acute


Plan:  She has undiagnosed underlying autoimmune disease possibility of SLE 

versus autoimmune hepatitis exists


STEPHANIE was 1:1280 and Antismooth antibody positive in 6/12





(3) Autoimmune disease


ICD Codes:  M35.9 - Systemic involvement of connective tissue, unspecified


Plan:  Continue to monitor she needs to be followed by Delia Nunez MD Sep 20, 2017 12:13 complains of pain/discomfort
